# Patient Record
Sex: FEMALE | Race: ASIAN | NOT HISPANIC OR LATINO
[De-identification: names, ages, dates, MRNs, and addresses within clinical notes are randomized per-mention and may not be internally consistent; named-entity substitution may affect disease eponyms.]

---

## 2017-06-28 PROBLEM — Z00.00 ENCOUNTER FOR PREVENTIVE HEALTH EXAMINATION: Status: ACTIVE | Noted: 2017-06-28

## 2017-06-29 ENCOUNTER — APPOINTMENT (OUTPATIENT)
Dept: THORACIC SURGERY | Facility: CLINIC | Age: 67
End: 2017-06-29
Payer: MEDICARE

## 2017-06-29 VITALS
WEIGHT: 120 LBS | DIASTOLIC BLOOD PRESSURE: 64 MMHG | RESPIRATION RATE: 16 BRPM | SYSTOLIC BLOOD PRESSURE: 131 MMHG | HEIGHT: 61.42 IN | HEART RATE: 64 BPM | OXYGEN SATURATION: 98 % | BODY MASS INDEX: 22.37 KG/M2

## 2017-06-29 DIAGNOSIS — I10 ESSENTIAL (PRIMARY) HYPERTENSION: ICD-10-CM

## 2017-06-29 DIAGNOSIS — Z77.22 CONTACT WITH AND (SUSPECTED) EXPOSURE TO ENVIRONMENTAL TOBACCO SMOKE (ACUTE) (CHRONIC): ICD-10-CM

## 2017-06-29 DIAGNOSIS — Z80.1 FAMILY HISTORY OF MALIGNANT NEOPLASM OF TRACHEA, BRONCHUS AND LUNG: ICD-10-CM

## 2017-06-29 DIAGNOSIS — Z86.39 PERSONAL HISTORY OF OTHER ENDOCRINE, NUTRITIONAL AND METABOLIC DISEASE: ICD-10-CM

## 2017-06-29 PROCEDURE — 99205 OFFICE O/P NEW HI 60 MIN: CPT

## 2017-06-29 RX ORDER — OXCARBAZEPINE 150 MG/1
150 TABLET, FILM COATED ORAL
Qty: 60 | Refills: 0 | Status: COMPLETED | COMMUNITY
Start: 2017-05-25

## 2017-06-29 RX ORDER — FLUOCINONIDE 0.5 MG/G
0.05 CREAM TOPICAL
Qty: 60 | Refills: 0 | Status: COMPLETED | COMMUNITY
Start: 2017-05-04

## 2017-06-29 RX ORDER — AZITHROMYCIN 250 MG/1
250 TABLET, FILM COATED ORAL
Qty: 6 | Refills: 0 | Status: COMPLETED | COMMUNITY
Start: 2017-06-21

## 2017-06-29 RX ORDER — METHYLPREDNISOLONE 4 MG/1
4 TABLET ORAL
Qty: 21 | Refills: 0 | Status: COMPLETED | COMMUNITY
Start: 2017-05-22

## 2017-07-03 ENCOUNTER — OUTPATIENT (OUTPATIENT)
Dept: OUTPATIENT SERVICES | Facility: HOSPITAL | Age: 67
LOS: 1 days | End: 2017-07-03

## 2017-07-03 VITALS
HEIGHT: 60 IN | SYSTOLIC BLOOD PRESSURE: 120 MMHG | WEIGHT: 121.03 LBS | DIASTOLIC BLOOD PRESSURE: 70 MMHG | RESPIRATION RATE: 15 BRPM | HEART RATE: 64 BPM | TEMPERATURE: 99 F

## 2017-07-03 DIAGNOSIS — Z90.710 ACQUIRED ABSENCE OF BOTH CERVIX AND UTERUS: Chronic | ICD-10-CM

## 2017-07-03 DIAGNOSIS — R91.1 SOLITARY PULMONARY NODULE: ICD-10-CM

## 2017-07-03 DIAGNOSIS — I10 ESSENTIAL (PRIMARY) HYPERTENSION: ICD-10-CM

## 2017-07-03 DIAGNOSIS — R91.8 OTHER NONSPECIFIC ABNORMAL FINDING OF LUNG FIELD: ICD-10-CM

## 2017-07-03 DIAGNOSIS — Z90.49 ACQUIRED ABSENCE OF OTHER SPECIFIED PARTS OF DIGESTIVE TRACT: Chronic | ICD-10-CM

## 2017-07-03 LAB
APPEARANCE UR: CLEAR — SIGNIFICANT CHANGE UP
BILIRUB UR-MCNC: NEGATIVE — SIGNIFICANT CHANGE UP
BLD GP AB SCN SERPL QL: NEGATIVE — SIGNIFICANT CHANGE UP
BLOOD UR QL VISUAL: NEGATIVE — SIGNIFICANT CHANGE UP
BUN SERPL-MCNC: 12 MG/DL — SIGNIFICANT CHANGE UP (ref 7–23)
CALCIUM SERPL-MCNC: 9.5 MG/DL — SIGNIFICANT CHANGE UP (ref 8.4–10.5)
CHLORIDE SERPL-SCNC: 102 MMOL/L — SIGNIFICANT CHANGE UP (ref 98–107)
CO2 SERPL-SCNC: 25 MMOL/L — SIGNIFICANT CHANGE UP (ref 22–31)
COLOR SPEC: YELLOW — SIGNIFICANT CHANGE UP
CREAT SERPL-MCNC: 0.61 MG/DL — SIGNIFICANT CHANGE UP (ref 0.5–1.3)
GLUCOSE SERPL-MCNC: 84 MG/DL — SIGNIFICANT CHANGE UP (ref 70–99)
GLUCOSE UR-MCNC: NEGATIVE — SIGNIFICANT CHANGE UP
HCT VFR BLD CALC: 37.7 % — SIGNIFICANT CHANGE UP (ref 34.5–45)
HGB BLD-MCNC: 12.6 G/DL — SIGNIFICANT CHANGE UP (ref 11.5–15.5)
INR BLD: 1.03 — SIGNIFICANT CHANGE UP (ref 0.88–1.17)
KETONES UR-MCNC: SIGNIFICANT CHANGE UP
LEUKOCYTE ESTERASE UR-ACNC: NEGATIVE — SIGNIFICANT CHANGE UP
MCHC RBC-ENTMCNC: 30.4 PG — SIGNIFICANT CHANGE UP (ref 27–34)
MCHC RBC-ENTMCNC: 33.4 % — SIGNIFICANT CHANGE UP (ref 32–36)
MCV RBC AUTO: 90.8 FL — SIGNIFICANT CHANGE UP (ref 80–100)
MUCOUS THREADS # UR AUTO: SIGNIFICANT CHANGE UP
NITRITE UR-MCNC: NEGATIVE — SIGNIFICANT CHANGE UP
NON-SQ EPI CELLS # UR AUTO: <1 — SIGNIFICANT CHANGE UP
NRBC # FLD: 0 — SIGNIFICANT CHANGE UP
PH UR: 6 — SIGNIFICANT CHANGE UP (ref 4.6–8)
PLATELET # BLD AUTO: 229 K/UL — SIGNIFICANT CHANGE UP (ref 150–400)
PMV BLD: 10.1 FL — SIGNIFICANT CHANGE UP (ref 7–13)
POTASSIUM SERPL-MCNC: 3.7 MMOL/L — SIGNIFICANT CHANGE UP (ref 3.5–5.3)
POTASSIUM SERPL-SCNC: 3.7 MMOL/L — SIGNIFICANT CHANGE UP (ref 3.5–5.3)
PROT UR-MCNC: NEGATIVE — SIGNIFICANT CHANGE UP
PROTHROM AB SERPL-ACNC: 11.6 SEC — SIGNIFICANT CHANGE UP (ref 9.8–13.1)
RBC # BLD: 4.15 M/UL — SIGNIFICANT CHANGE UP (ref 3.8–5.2)
RBC # FLD: 12.3 % — SIGNIFICANT CHANGE UP (ref 10.3–14.5)
RBC CASTS # UR COMP ASSIST: SIGNIFICANT CHANGE UP (ref 0–?)
RH IG SCN BLD-IMP: POSITIVE — SIGNIFICANT CHANGE UP
SODIUM SERPL-SCNC: 143 MMOL/L — SIGNIFICANT CHANGE UP (ref 135–145)
SP GR SPEC: 1.02 — SIGNIFICANT CHANGE UP (ref 1–1.03)
SQUAMOUS # UR AUTO: SIGNIFICANT CHANGE UP
UROBILINOGEN FLD QL: NORMAL E.U. — SIGNIFICANT CHANGE UP (ref 0.1–0.2)
WBC # BLD: 9.18 K/UL — SIGNIFICANT CHANGE UP (ref 3.8–10.5)
WBC # FLD AUTO: 9.18 K/UL — SIGNIFICANT CHANGE UP (ref 3.8–10.5)
WBC UR QL: SIGNIFICANT CHANGE UP (ref 0–?)

## 2017-07-03 RX ORDER — SODIUM CHLORIDE 9 MG/ML
1000 INJECTION, SOLUTION INTRAVENOUS
Qty: 0 | Refills: 0 | Status: DISCONTINUED | OUTPATIENT
Start: 2017-07-06 | End: 2017-07-21

## 2017-07-03 NOTE — H&P PST ADULT - LYMPHATIC
posterior cervical R/supraclavicular L/supraclavicular R/anterior cervical R/posterior cervical L/anterior cervical L

## 2017-07-03 NOTE — H&P PST ADULT - PROBLEM SELECTOR PLAN 1
Patient flexible bronchoscopy navigational bronchoscopy, biopsy of left upper lobe mass on 7/6/17.  preop instructions given and explained, son present both verbalized understanding   medical clearance completed awaiting report  Hibiclens given and explained

## 2017-07-03 NOTE — H&P PST ADULT - ASSESSMENT
66 y/o mandarin speaking female with pmhx of HTN, GERD presents to [facility] for flexible bronchoscopy navigational bronchoscopy, biopsy of left upper lobe mass on 7/6/17.

## 2017-07-03 NOTE — H&P PST ADULT - HISTORY OF PRESENT ILLNESS
68 y/o mandarin speaking female with pmhx of HTN, GERD presents to [facility] for flexible bronchoscopy navigational bronchoscopy, biopsy of left upper lobe mass on 7/6/17. Patient with recent SOB treated for URI and chest xray done revealing mass to lung.

## 2017-07-03 NOTE — H&P PST ADULT - NSANTHOSAYNRD_GEN_A_CORE
No. HIGINIO screening performed.  STOP BANG Legend: 0-2 = LOW Risk; 3-4 = INTERMEDIATE Risk; 5-8 = HIGH Risk

## 2017-07-05 ENCOUNTER — FORM ENCOUNTER (OUTPATIENT)
Age: 67
End: 2017-07-05

## 2017-07-06 ENCOUNTER — RESULT REVIEW (OUTPATIENT)
Age: 67
End: 2017-07-06

## 2017-07-06 ENCOUNTER — TRANSCRIPTION ENCOUNTER (OUTPATIENT)
Age: 67
End: 2017-07-06

## 2017-07-06 ENCOUNTER — APPOINTMENT (OUTPATIENT)
Dept: THORACIC SURGERY | Facility: HOSPITAL | Age: 67
End: 2017-07-06
Payer: MEDICARE

## 2017-07-06 ENCOUNTER — OUTPATIENT (OUTPATIENT)
Dept: OUTPATIENT SERVICES | Facility: HOSPITAL | Age: 67
LOS: 1 days | Discharge: HOME | End: 2017-07-06
Payer: MEDICARE

## 2017-07-06 VITALS
OXYGEN SATURATION: 97 % | HEART RATE: 59 BPM | RESPIRATION RATE: 16 BRPM | TEMPERATURE: 98 F | WEIGHT: 121.03 LBS | HEIGHT: 60 IN | SYSTOLIC BLOOD PRESSURE: 131 MMHG | DIASTOLIC BLOOD PRESSURE: 76 MMHG

## 2017-07-06 VITALS
OXYGEN SATURATION: 94 % | RESPIRATION RATE: 17 BRPM | SYSTOLIC BLOOD PRESSURE: 123 MMHG | HEART RATE: 64 BPM | DIASTOLIC BLOOD PRESSURE: 61 MMHG

## 2017-07-06 DIAGNOSIS — Z90.49 ACQUIRED ABSENCE OF OTHER SPECIFIED PARTS OF DIGESTIVE TRACT: Chronic | ICD-10-CM

## 2017-07-06 DIAGNOSIS — Z90.710 ACQUIRED ABSENCE OF BOTH CERVIX AND UTERUS: Chronic | ICD-10-CM

## 2017-07-06 DIAGNOSIS — R91.8 OTHER NONSPECIFIC ABNORMAL FINDING OF LUNG FIELD: ICD-10-CM

## 2017-07-06 LAB
CULTURE - ACID FAST SMEAR CONCENTRATED: SIGNIFICANT CHANGE UP
GRAM STN SPT: SIGNIFICANT CHANGE UP
SPECIMEN SOURCE: SIGNIFICANT CHANGE UP
SPECIMEN SOURCE: SIGNIFICANT CHANGE UP

## 2017-07-06 PROCEDURE — 88112 CYTOPATH CELL ENHANCE TECH: CPT | Mod: 26

## 2017-07-06 PROCEDURE — 31645 BRNCHSC W/THER ASPIR 1ST: CPT

## 2017-07-06 PROCEDURE — 88342 IMHCHEM/IMCYTCHM 1ST ANTB: CPT | Mod: 26

## 2017-07-06 PROCEDURE — 31627 NAVIGATIONAL BRONCHOSCOPY: CPT

## 2017-07-06 PROCEDURE — 31629 BRONCHOSCOPY/NEEDLE BX EACH: CPT

## 2017-07-06 PROCEDURE — 71010: CPT | Mod: 26

## 2017-07-06 PROCEDURE — 88341 IMHCHEM/IMCYTCHM EA ADD ANTB: CPT | Mod: 26

## 2017-07-06 PROCEDURE — 31624 DX BRONCHOSCOPE/LAVAGE: CPT

## 2017-07-06 PROCEDURE — 88305 TISSUE EXAM BY PATHOLOGIST: CPT | Mod: 26

## 2017-07-06 PROCEDURE — 88173 CYTOPATH EVAL FNA REPORT: CPT | Mod: 26,59

## 2017-07-06 PROCEDURE — 31623 DX BRONCHOSCOPE/BRUSH: CPT

## 2017-07-06 RX ORDER — ONDANSETRON 8 MG/1
4 TABLET, FILM COATED ORAL ONCE
Qty: 0 | Refills: 0 | Status: DISCONTINUED | OUTPATIENT
Start: 2017-07-06 | End: 2017-07-06

## 2017-07-06 RX ORDER — FENTANYL CITRATE 50 UG/ML
25 INJECTION INTRAVENOUS
Qty: 0 | Refills: 0 | Status: DISCONTINUED | OUTPATIENT
Start: 2017-07-06 | End: 2017-07-06

## 2017-07-06 RX ADMIN — SODIUM CHLORIDE 30 MILLILITER(S): 9 INJECTION, SOLUTION INTRAVENOUS at 10:47

## 2017-07-06 NOTE — ASU DISCHARGE PLAN (ADULT/PEDIATRIC). - CONDITIONS AT DISCHARGE
stable stable Verbalizing good understanding of post op instructions and medication review. Discharge criteria met. Cleared for discharge home by anesthesia. Escorted, via wheelchair, to waiting car for discharge home.

## 2017-07-06 NOTE — ASU DISCHARGE PLAN (ADULT/PEDIATRIC). - NOTIFY
Bleeding that does not stop/Swelling that continues/shortness of breath. It is normal to cough up small amounts of blood tinged sputum

## 2017-07-06 NOTE — BRIEF OPERATIVE NOTE - PROCEDURE
Navigational bronchoscopy  07/06/2017  Flex Bronch, BAL, Ruben Bronch with Biopsy and brushing  Active  ROD

## 2017-07-06 NOTE — ASU DISCHARGE PLAN (ADULT/PEDIATRIC). - NURSING INSTRUCTIONS
DO NOT TAKE ANY TYLENOL PRODUCT UNTIL  3PM today.  call MD for any shortness of breath, any difficulty swallowing, and >3TBSp blood in sputum.

## 2017-07-06 NOTE — ASU DISCHARGE PLAN (ADULT/PEDIATRIC). - MEDICATION SUMMARY - MEDICATIONS TO TAKE
I will START or STAY ON the medications listed below when I get home from the hospital:    atenolol 25 mg oral tablet  -- 1 tab(s) by mouth once a day  -- Indication: For blood pressure    alendronate 35 mg oral tablet  -- 1 tab(s) by mouth once a week  -- Indication: For osteoporosis    omeprazole 40 mg oral delayed release capsule  -- 1 cap(s) by mouth once a day  -- Indication: For GERD    Calcium 500+D  --  by mouth daily  -- Indication: For supplement    ergocalciferol 50,000 intl units (1.25 mg) oral capsule  -- 1 cap(s) by mouth once a week  -- Indication: For supplement

## 2017-07-07 LAB — SPECIMEN SOURCE: SIGNIFICANT CHANGE UP

## 2017-07-08 LAB — BACTERIA SPT RESP CULT: SIGNIFICANT CHANGE UP

## 2017-07-11 ENCOUNTER — APPOINTMENT (OUTPATIENT)
Dept: THORACIC SURGERY | Facility: CLINIC | Age: 67
End: 2017-07-11

## 2017-07-11 VITALS
DIASTOLIC BLOOD PRESSURE: 71 MMHG | RESPIRATION RATE: 16 BRPM | WEIGHT: 120 LBS | OXYGEN SATURATION: 97 % | SYSTOLIC BLOOD PRESSURE: 107 MMHG | BODY MASS INDEX: 22.37 KG/M2 | HEART RATE: 73 BPM | HEIGHT: 61.42 IN

## 2017-07-12 VITALS
HEIGHT: 60 IN | SYSTOLIC BLOOD PRESSURE: 107 MMHG | OXYGEN SATURATION: 97 % | DIASTOLIC BLOOD PRESSURE: 71 MMHG | RESPIRATION RATE: 16 BRPM | BODY MASS INDEX: 23.56 KG/M2 | WEIGHT: 120 LBS | HEART RATE: 73 BPM

## 2017-07-13 LAB — SPECIMEN SOURCE: SIGNIFICANT CHANGE UP

## 2017-07-17 ENCOUNTER — RESULT REVIEW (OUTPATIENT)
Age: 67
End: 2017-07-17

## 2017-07-17 ENCOUNTER — APPOINTMENT (OUTPATIENT)
Dept: GYNECOLOGIC ONCOLOGY | Facility: CLINIC | Age: 67
End: 2017-07-17
Payer: MEDICARE

## 2017-07-17 VITALS
SYSTOLIC BLOOD PRESSURE: 122 MMHG | BODY MASS INDEX: 23.06 KG/M2 | DIASTOLIC BLOOD PRESSURE: 78 MMHG | HEIGHT: 60.24 IN | HEART RATE: 59 BPM | WEIGHT: 119 LBS

## 2017-07-17 DIAGNOSIS — N83.9 NONINFLAMMATORY DISORDER OF OVARY, FALLOPIAN TUBE AND BROAD LIGAMENT, UNSPECIFIED: ICD-10-CM

## 2017-07-17 DIAGNOSIS — N84.1 POLYP OF CERVIX UTERI: ICD-10-CM

## 2017-07-17 PROCEDURE — 99205 OFFICE O/P NEW HI 60 MIN: CPT

## 2017-07-18 LAB — HPV HIGH+LOW RISK DNA PNL CVX: NEGATIVE

## 2017-07-19 ENCOUNTER — OUTPATIENT (OUTPATIENT)
Dept: OUTPATIENT SERVICES | Facility: HOSPITAL | Age: 67
LOS: 1 days | End: 2017-07-19
Payer: MEDICARE

## 2017-07-19 VITALS
SYSTOLIC BLOOD PRESSURE: 100 MMHG | HEART RATE: 56 BPM | TEMPERATURE: 96 F | RESPIRATION RATE: 16 BRPM | WEIGHT: 117.95 LBS | HEIGHT: 61 IN | DIASTOLIC BLOOD PRESSURE: 70 MMHG

## 2017-07-19 DIAGNOSIS — N83.9 NONINFLAMMATORY DISORDER OF OVARY, FALLOPIAN TUBE AND BROAD LIGAMENT, UNSPECIFIED: ICD-10-CM

## 2017-07-19 DIAGNOSIS — I10 ESSENTIAL (PRIMARY) HYPERTENSION: ICD-10-CM

## 2017-07-19 DIAGNOSIS — Z90.710 ACQUIRED ABSENCE OF BOTH CERVIX AND UTERUS: Chronic | ICD-10-CM

## 2017-07-19 DIAGNOSIS — Z90.49 ACQUIRED ABSENCE OF OTHER SPECIFIED PARTS OF DIGESTIVE TRACT: Chronic | ICD-10-CM

## 2017-07-19 LAB
ALBUMIN SERPL ELPH-MCNC: 4.1 G/DL — SIGNIFICANT CHANGE UP (ref 3.3–5)
ALP SERPL-CCNC: 52 U/L — SIGNIFICANT CHANGE UP (ref 40–120)
ALT FLD-CCNC: 15 U/L — SIGNIFICANT CHANGE UP (ref 4–33)
AST SERPL-CCNC: 16 U/L — SIGNIFICANT CHANGE UP (ref 4–32)
BILIRUB SERPL-MCNC: 0.6 MG/DL — SIGNIFICANT CHANGE UP (ref 0.2–1.2)
BLD GP AB SCN SERPL QL: NEGATIVE — SIGNIFICANT CHANGE UP
BUN SERPL-MCNC: 9 MG/DL — SIGNIFICANT CHANGE UP (ref 7–23)
CALCIUM SERPL-MCNC: 9.5 MG/DL — SIGNIFICANT CHANGE UP (ref 8.4–10.5)
CHLORIDE SERPL-SCNC: 104 MMOL/L — SIGNIFICANT CHANGE UP (ref 98–107)
CO2 SERPL-SCNC: 26 MMOL/L — SIGNIFICANT CHANGE UP (ref 22–31)
CREAT SERPL-MCNC: 0.7 MG/DL — SIGNIFICANT CHANGE UP (ref 0.5–1.3)
GLUCOSE SERPL-MCNC: 97 MG/DL — SIGNIFICANT CHANGE UP (ref 70–99)
HCT VFR BLD CALC: 37.3 % — SIGNIFICANT CHANGE UP (ref 34.5–45)
HGB BLD-MCNC: 12.5 G/DL — SIGNIFICANT CHANGE UP (ref 11.5–15.5)
MCHC RBC-ENTMCNC: 31 PG — SIGNIFICANT CHANGE UP (ref 27–34)
MCHC RBC-ENTMCNC: 33.5 % — SIGNIFICANT CHANGE UP (ref 32–36)
MCV RBC AUTO: 92.6 FL — SIGNIFICANT CHANGE UP (ref 80–100)
NRBC # FLD: 0 — SIGNIFICANT CHANGE UP
PLATELET # BLD AUTO: 216 K/UL — SIGNIFICANT CHANGE UP (ref 150–400)
PMV BLD: 10.2 FL — SIGNIFICANT CHANGE UP (ref 7–13)
POTASSIUM SERPL-MCNC: 3.9 MMOL/L — SIGNIFICANT CHANGE UP (ref 3.5–5.3)
POTASSIUM SERPL-SCNC: 3.9 MMOL/L — SIGNIFICANT CHANGE UP (ref 3.5–5.3)
PROT SERPL-MCNC: 7.8 G/DL — SIGNIFICANT CHANGE UP (ref 6–8.3)
RBC # BLD: 4.03 M/UL — SIGNIFICANT CHANGE UP (ref 3.8–5.2)
RBC # FLD: 12.1 % — SIGNIFICANT CHANGE UP (ref 10.3–14.5)
RH IG SCN BLD-IMP: POSITIVE — SIGNIFICANT CHANGE UP
SODIUM SERPL-SCNC: 142 MMOL/L — SIGNIFICANT CHANGE UP (ref 135–145)
WBC # BLD: 7.07 K/UL — SIGNIFICANT CHANGE UP (ref 3.8–10.5)
WBC # FLD AUTO: 7.07 K/UL — SIGNIFICANT CHANGE UP (ref 3.8–10.5)

## 2017-07-19 PROCEDURE — 93010 ELECTROCARDIOGRAM REPORT: CPT

## 2017-07-19 RX ORDER — ERGOCALCIFEROL 1.25 MG/1
1 CAPSULE ORAL
Qty: 0 | Refills: 0 | COMMUNITY

## 2017-07-19 RX ORDER — SODIUM CHLORIDE 9 MG/ML
1000 INJECTION, SOLUTION INTRAVENOUS
Qty: 0 | Refills: 0 | Status: DISCONTINUED | OUTPATIENT
Start: 2017-07-21 | End: 2017-07-21

## 2017-07-19 RX ORDER — ALENDRONATE SODIUM 70 MG/1
1 TABLET ORAL
Qty: 0 | Refills: 0 | COMMUNITY

## 2017-07-19 RX ORDER — ATENOLOL 25 MG/1
1 TABLET ORAL
Qty: 0 | Refills: 0 | COMMUNITY

## 2017-07-19 RX ORDER — SODIUM CHLORIDE 9 MG/ML
3 INJECTION INTRAMUSCULAR; INTRAVENOUS; SUBCUTANEOUS EVERY 8 HOURS
Qty: 0 | Refills: 0 | Status: DISCONTINUED | OUTPATIENT
Start: 2017-07-21 | End: 2017-07-21

## 2017-07-19 RX ORDER — OMEPRAZOLE 10 MG/1
1 CAPSULE, DELAYED RELEASE ORAL
Qty: 0 | Refills: 0 | COMMUNITY

## 2017-07-19 NOTE — H&P PST ADULT - PMH
Abnormal lung field    GERD (gastroesophageal reflux disease)    HTN (hypertension)    Noninflammatory disorder of ovary, fallopian tube and broad ligament, unspecified

## 2017-07-19 NOTE — H&P PST ADULT - HISTORY OF PRESENT ILLNESS
66y/o female presents for preop eval for scheduled robotic total laparoscopic hysterectomy, b/l salpingo oophorectomy tumor debulking on 7/21/17.  Pt states few months ago c/o cough with hemoptysis.  Cxr done and was followed with ct scan and pet scan.  Pt pt dx with"ovarian & lung cancer".  Pt denies vaginal spotting, bloating.

## 2017-07-19 NOTE — H&P PST ADULT - NEGATIVE CARDIOVASCULAR SYMPTOMS
no chest pain/no dyspnea on exertion/no paroxysmal nocturnal dyspnea/no claudication/no peripheral edema/no palpitations/no orthopnea

## 2017-07-19 NOTE — H&P PST ADULT - PROBLEM SELECTOR PLAN 1
scheduled robotic total laparoscopic hysterectomy, b/l salpingo oophorectomy tumor debulking on 7/21/17.   preop instructions, gi prophylaxis & surgical soap given  pt verbalized understanding  abo, fs on admit  pending copy of medical eval

## 2017-07-19 NOTE — H&P PST ADULT - LYMPHATIC
posterior cervical R/supraclavicular R/anterior cervical L/posterior cervical L/anterior cervical R/supraclavicular L

## 2017-07-19 NOTE — H&P PST ADULT - NEGATIVE GENERAL GENITOURINARY SYMPTOMS
no flank pain L/no dysuria/no urinary hesitancy/normal urinary frequency/no nocturia/no flank pain R/no incontinence

## 2017-07-21 ENCOUNTER — APPOINTMENT (OUTPATIENT)
Dept: GYNECOLOGIC ONCOLOGY | Facility: HOSPITAL | Age: 67
End: 2017-07-21

## 2017-07-21 ENCOUNTER — TRANSCRIPTION ENCOUNTER (OUTPATIENT)
Age: 67
End: 2017-07-21

## 2017-07-21 ENCOUNTER — INPATIENT (INPATIENT)
Facility: HOSPITAL | Age: 67
LOS: 0 days | Discharge: HOME | End: 2017-07-21
Attending: OBSTETRICS & GYNECOLOGY | Admitting: OBSTETRICS & GYNECOLOGY
Payer: MEDICARE

## 2017-07-21 ENCOUNTER — RESULT REVIEW (OUTPATIENT)
Age: 67
End: 2017-07-21

## 2017-07-21 VITALS
RESPIRATION RATE: 14 BRPM | OXYGEN SATURATION: 99 % | SYSTOLIC BLOOD PRESSURE: 111 MMHG | HEART RATE: 86 BPM | DIASTOLIC BLOOD PRESSURE: 63 MMHG

## 2017-07-21 VITALS
OXYGEN SATURATION: 97 % | WEIGHT: 117.95 LBS | DIASTOLIC BLOOD PRESSURE: 66 MMHG | HEART RATE: 61 BPM | SYSTOLIC BLOOD PRESSURE: 118 MMHG | RESPIRATION RATE: 15 BRPM | TEMPERATURE: 98 F | HEIGHT: 61 IN

## 2017-07-21 DIAGNOSIS — N83.9 NONINFLAMMATORY DISORDER OF OVARY, FALLOPIAN TUBE AND BROAD LIGAMENT, UNSPECIFIED: ICD-10-CM

## 2017-07-21 DIAGNOSIS — Z90.49 ACQUIRED ABSENCE OF OTHER SPECIFIED PARTS OF DIGESTIVE TRACT: Chronic | ICD-10-CM

## 2017-07-21 DIAGNOSIS — Z90.710 ACQUIRED ABSENCE OF BOTH CERVIX AND UTERUS: Chronic | ICD-10-CM

## 2017-07-21 PROCEDURE — 88360 TUMOR IMMUNOHISTOCHEM/MANUAL: CPT | Mod: 26

## 2017-07-21 PROCEDURE — 58662 LAPAROSCOPY EXCISE LESIONS: CPT | Mod: 22,GC

## 2017-07-21 PROCEDURE — 88305 TISSUE EXAM BY PATHOLOGIST: CPT | Mod: 26

## 2017-07-21 PROCEDURE — 88341 IMHCHEM/IMCYTCHM EA ADD ANTB: CPT | Mod: 26,59

## 2017-07-21 PROCEDURE — 88342 IMHCHEM/IMCYTCHM 1ST ANTB: CPT | Mod: 26,59

## 2017-07-21 PROCEDURE — 88112 CYTOPATH CELL ENHANCE TECH: CPT | Mod: 26

## 2017-07-21 PROCEDURE — 88331 PATH CONSLTJ SURG 1 BLK 1SPC: CPT | Mod: 26

## 2017-07-21 PROCEDURE — 88307 TISSUE EXAM BY PATHOLOGIST: CPT | Mod: 26

## 2017-07-21 RX ORDER — SODIUM CHLORIDE 9 MG/ML
1000 INJECTION, SOLUTION INTRAVENOUS
Qty: 0 | Refills: 0 | Status: DISCONTINUED | OUTPATIENT
Start: 2017-07-21 | End: 2017-07-21

## 2017-07-21 RX ORDER — ONDANSETRON 8 MG/1
4 TABLET, FILM COATED ORAL ONCE
Qty: 0 | Refills: 0 | Status: DISCONTINUED | OUTPATIENT
Start: 2017-07-21 | End: 2017-07-21

## 2017-07-21 RX ORDER — OXYCODONE HYDROCHLORIDE 5 MG/1
5 TABLET ORAL ONCE
Qty: 0 | Refills: 0 | Status: DISCONTINUED | OUTPATIENT
Start: 2017-07-21 | End: 2017-07-21

## 2017-07-21 RX ORDER — OXYCODONE HYDROCHLORIDE 5 MG/1
1 TABLET ORAL
Qty: 20 | Refills: 0 | OUTPATIENT
Start: 2017-07-21 | End: 2017-07-26

## 2017-07-21 RX ORDER — HEPARIN SODIUM 5000 [USP'U]/ML
5000 INJECTION INTRAVENOUS; SUBCUTANEOUS ONCE
Qty: 0 | Refills: 0 | Status: COMPLETED | OUTPATIENT
Start: 2017-07-21 | End: 2017-07-21

## 2017-07-21 RX ORDER — FENTANYL CITRATE 50 UG/ML
25 INJECTION INTRAVENOUS
Qty: 0 | Refills: 0 | Status: DISCONTINUED | OUTPATIENT
Start: 2017-07-21 | End: 2017-07-21

## 2017-07-21 RX ORDER — ACETAMINOPHEN 500 MG
2 TABLET ORAL
Qty: 40 | Refills: 0 | OUTPATIENT
Start: 2017-07-21 | End: 2017-07-26

## 2017-07-21 RX ADMIN — SODIUM CHLORIDE 125 MILLILITER(S): 9 INJECTION, SOLUTION INTRAVENOUS at 18:26

## 2017-07-21 RX ADMIN — OXYCODONE HYDROCHLORIDE 5 MILLIGRAM(S): 5 TABLET ORAL at 20:39

## 2017-07-21 RX ADMIN — SODIUM CHLORIDE 30 MILLILITER(S): 9 INJECTION, SOLUTION INTRAVENOUS at 14:16

## 2017-07-21 RX ADMIN — HEPARIN SODIUM 5000 UNIT(S): 5000 INJECTION INTRAVENOUS; SUBCUTANEOUS at 13:05

## 2017-07-21 NOTE — ASU DISCHARGE PLAN (ADULT/PEDIATRIC). - NOTIFY
Increased Irritability or Sluggishness/Bleeding that does not stop/Swelling that continues/GYN Fever>100.4/Inability to Tolerate Liquids or Foods/Persistent Nausea and Vomiting/Unable to Urinate/Pain not relieved by Medications

## 2017-07-21 NOTE — ASU DISCHARGE PLAN (ADULT/PEDIATRIC). - NURSING INSTRUCTIONS
Nothing in vagina, no intercourse, no douching, no tampons, no tub baths, and no swimming for about 2 weeks or until cleared by MD. Contact your doctor if you are soaking a pad every 30 minutes to an hour, experience clots or have a foul smelling discharge. No creams, lotions, powders  or ointments to incision site. Notify Md if red or pus drainage on the incision sites. Make appointment with MD.

## 2017-07-21 NOTE — ASU DISCHARGE PLAN (ADULT/PEDIATRIC). - MEDICATION SUMMARY - MEDICATIONS TO TAKE
I will START or STAY ON the medications listed below when I get home from the hospital:    Tylenol 325 mg oral tablet  -- 2 tab(s) by mouth every 6 hours  -- This product contains acetaminophen.  Do not use  with any other product containing acetaminophen to prevent possible liver damage.    -- Indication: For pain    oxyCODONE 5 mg oral tablet  -- 1 tab(s) by mouth every 6 hours MDD:4  -- Caution federal law prohibits the transfer of this drug to any person other  than the person for whom it was prescribed.  It is very important that you take or use this exactly as directed.  Do not skip doses or discontinue unless directed by your doctor.  May cause drowsiness.  Alcohol may intensify this effect.  Use care when operating dangerous machinery.  This prescription cannot be refilled.  Using more of this medication than prescribed may cause serious breathing problems.    -- Indication: For pain    benzonatate 100 mg oral capsule  -- 1 tab(s) by mouth 2 times a day, As Needed  -- Indication: For home med    atenolol 25 mg oral tablet  -- 1 tab(s) by mouth once a day, am  -- Indication: For home med

## 2017-07-21 NOTE — BRIEF OPERATIVE NOTE - OPERATION/FINDINGS
6cm friable left ovarian mass densely adherent to sigmoid colon and pelvic sidewall  Omentum with nodular thickening and adherent to anterior abdominal wall, pelvic peritoneal carcinomatosis in culdesac and on surface of rectum and cecum, 1cm perihepatic nodule adherent to anterior abdominal wall

## 2017-07-21 NOTE — BRIEF OPERATIVE NOTE - PROCEDURE
Salpingo-oopherectomy, robot-assisted, using da Damaris Xi  07/21/2017    Active  [facility]

## 2017-07-21 NOTE — ASU DISCHARGE PLAN (ADULT/PEDIATRIC). - SPECIAL INSTRUCTIONS
Narcotic pain medication may cause nausea or constipation. Take medication with food. Increase fluids and fiber intake. Majority of pain medication can irritate your stomach and should be taken with food or milk.  Please check your RX bottle for instructions.

## 2017-07-25 LAB — RESPIRATORY PATH PNL SPEC CULT: SIGNIFICANT CHANGE UP

## 2017-07-28 LAB — CYTOLOGY CVX/VAG DOC THIN PREP: NORMAL

## 2017-08-01 LAB — SURGICAL PATHOLOGY STUDY: SIGNIFICANT CHANGE UP

## 2017-08-03 ENCOUNTER — APPOINTMENT (OUTPATIENT)
Dept: GYNECOLOGIC ONCOLOGY | Facility: CLINIC | Age: 67
End: 2017-08-03
Payer: MEDICARE

## 2017-08-03 VITALS
HEIGHT: 60 IN | HEART RATE: 69 BPM | SYSTOLIC BLOOD PRESSURE: 126 MMHG | WEIGHT: 117 LBS | BODY MASS INDEX: 22.97 KG/M2 | DIASTOLIC BLOOD PRESSURE: 80 MMHG | TEMPERATURE: 98.3 F

## 2017-08-03 LAB — NON-GYNECOLOGICAL CYTOLOGY STUDY: SIGNIFICANT CHANGE UP

## 2017-08-03 PROCEDURE — 99024 POSTOP FOLLOW-UP VISIT: CPT | Mod: NC

## 2017-08-04 ENCOUNTER — OUTPATIENT (OUTPATIENT)
Dept: OUTPATIENT SERVICES | Facility: HOSPITAL | Age: 67
LOS: 1 days | End: 2017-08-04
Payer: MEDICARE

## 2017-08-04 VITALS
HEART RATE: 59 BPM | TEMPERATURE: 99 F | WEIGHT: 110.01 LBS | RESPIRATION RATE: 16 BRPM | OXYGEN SATURATION: 99 % | DIASTOLIC BLOOD PRESSURE: 72 MMHG | HEIGHT: 61 IN | SYSTOLIC BLOOD PRESSURE: 106 MMHG

## 2017-08-04 DIAGNOSIS — C34.92 MALIGNANT NEOPLASM OF UNSPECIFIED PART OF LEFT BRONCHUS OR LUNG: ICD-10-CM

## 2017-08-04 DIAGNOSIS — C80.1 MALIGNANT (PRIMARY) NEOPLASM, UNSPECIFIED: ICD-10-CM

## 2017-08-04 DIAGNOSIS — Z90.722 ACQUIRED ABSENCE OF OVARIES, BILATERAL: Chronic | ICD-10-CM

## 2017-08-04 DIAGNOSIS — Z90.49 ACQUIRED ABSENCE OF OTHER SPECIFIED PARTS OF DIGESTIVE TRACT: Chronic | ICD-10-CM

## 2017-08-04 DIAGNOSIS — Z90.710 ACQUIRED ABSENCE OF BOTH CERVIX AND UTERUS: Chronic | ICD-10-CM

## 2017-08-04 LAB
BLD GP AB SCN SERPL QL: NEGATIVE — SIGNIFICANT CHANGE UP
BUN SERPL-MCNC: 11 MG/DL — SIGNIFICANT CHANGE UP (ref 7–23)
CALCIUM SERPL-MCNC: 9.4 MG/DL — SIGNIFICANT CHANGE UP (ref 8.4–10.5)
CHLORIDE SERPL-SCNC: 103 MMOL/L — SIGNIFICANT CHANGE UP (ref 98–107)
CO2 SERPL-SCNC: 27 MMOL/L — SIGNIFICANT CHANGE UP (ref 22–31)
CREAT SERPL-MCNC: 0.62 MG/DL — SIGNIFICANT CHANGE UP (ref 0.5–1.3)
GLUCOSE SERPL-MCNC: 77 MG/DL — SIGNIFICANT CHANGE UP (ref 70–99)
HCT VFR BLD CALC: 34.4 % — LOW (ref 34.5–45)
HGB BLD-MCNC: 11.3 G/DL — LOW (ref 11.5–15.5)
MCHC RBC-ENTMCNC: 30.3 PG — SIGNIFICANT CHANGE UP (ref 27–34)
MCHC RBC-ENTMCNC: 32.8 % — SIGNIFICANT CHANGE UP (ref 32–36)
MCV RBC AUTO: 92.2 FL — SIGNIFICANT CHANGE UP (ref 80–100)
NRBC # FLD: 0 — SIGNIFICANT CHANGE UP
PLATELET # BLD AUTO: 298 K/UL — SIGNIFICANT CHANGE UP (ref 150–400)
PMV BLD: 9.8 FL — SIGNIFICANT CHANGE UP (ref 7–13)
POTASSIUM SERPL-MCNC: 4.3 MMOL/L — SIGNIFICANT CHANGE UP (ref 3.5–5.3)
POTASSIUM SERPL-SCNC: 4.3 MMOL/L — SIGNIFICANT CHANGE UP (ref 3.5–5.3)
RBC # BLD: 3.73 M/UL — LOW (ref 3.8–5.2)
RBC # FLD: 12.2 % — SIGNIFICANT CHANGE UP (ref 10.3–14.5)
RH IG SCN BLD-IMP: POSITIVE — SIGNIFICANT CHANGE UP
SODIUM SERPL-SCNC: 144 MMOL/L — SIGNIFICANT CHANGE UP (ref 135–145)
WBC # BLD: 6.59 K/UL — SIGNIFICANT CHANGE UP (ref 3.8–10.5)
WBC # FLD AUTO: 6.59 K/UL — SIGNIFICANT CHANGE UP (ref 3.8–10.5)

## 2017-08-04 PROCEDURE — 93010 ELECTROCARDIOGRAM REPORT: CPT

## 2017-08-04 RX ORDER — SODIUM CHLORIDE 9 MG/ML
1000 INJECTION, SOLUTION INTRAVENOUS
Qty: 0 | Refills: 0 | Status: DISCONTINUED | OUTPATIENT
Start: 2017-08-16 | End: 2017-08-17

## 2017-08-04 NOTE — H&P PST ADULT - PMH
Abnormal lung field    GERD (gastroesophageal reflux disease)    HTN (hypertension)    Noninflammatory disorder of ovary, fallopian tube and broad ligament, unspecified Abnormal lung field    GERD (gastroesophageal reflux disease)    HTN (hypertension)    Malignant neoplasm  left lung  Noninflammatory disorder of ovary, fallopian tube and broad ligament, unspecified

## 2017-08-04 NOTE — H&P PST ADULT - HISTORY OF PRESENT ILLNESS
66y/o female with recent  S/p b/l salpingo oophorectomy tumor debulking on 7/21/17.  Pt states few months ago c/o cough with hemoptysis.  Cxr done and was followed with ct scan and pet scan  and dx with lung & ovarian cancer.  Now scheduled for left VAT, robotic assisted left upper lobectomy on 8/16/2017.

## 2017-08-04 NOTE — H&P PST ADULT - PSH
H/O abdominal hysterectomy    H/O bilateral salpingo-oophorectomy  7/21/17  History of cholecystectomy

## 2017-08-04 NOTE — H&P PST ADULT - PROBLEM SELECTOR PLAN 2
pt instructed to take atenolol on routine schedule Now scheduled for left VAT, robotic assisted left upper lobectomy on 8/16/2017.  preop instructions, gi prophylaxis & surgical soap given  pt verbalized understanding

## 2017-08-04 NOTE — H&P PST ADULT - PROBLEM SELECTOR PLAN 1
scheduled robotic total laparoscopic hysterectomy, b/l salpingo oophorectomy tumor debulking on 7/21/17.   preop instructions, gi prophylaxis & surgical soap given  pt verbalized understanding  abo, fs on admit  pending copy of medical eval pt instructed to take atenolol on routine schedule

## 2017-08-04 NOTE — H&P PST ADULT - ASSESSMENT
preop dx noninflammatory disorder of ovary fallopian tube and broad ligament unspecified preop dx malignant neoplasm of lung

## 2017-08-04 NOTE — H&P PST ADULT - NEGATIVE GENERAL GENITOURINARY SYMPTOMS
no flank pain R/no flank pain L/no nocturia/no dysuria/no urinary hesitancy/normal urinary frequency/no incontinence

## 2017-08-04 NOTE — H&P PST ADULT - NEGATIVE CARDIOVASCULAR SYMPTOMS
no orthopnea/no chest pain/no paroxysmal nocturnal dyspnea/no claudication/no palpitations/no dyspnea on exertion/no peripheral edema

## 2017-08-04 NOTE — H&P PST ADULT - PROBLEM SELECTOR PROBLEM 1
Noninflammatory disorder of ovary, fallopian tube and broad ligament, unspecified HTN (hypertension)

## 2017-08-07 LAB — FUNGUS SPEC QL CULT: SIGNIFICANT CHANGE UP

## 2017-08-08 ENCOUNTER — APPOINTMENT (OUTPATIENT)
Dept: THORACIC SURGERY | Facility: CLINIC | Age: 67
End: 2017-08-08
Payer: MEDICARE

## 2017-08-08 VITALS
WEIGHT: 114 LBS | SYSTOLIC BLOOD PRESSURE: 90 MMHG | RESPIRATION RATE: 16 BRPM | OXYGEN SATURATION: 97 % | HEART RATE: 74 BPM | HEIGHT: 60 IN | TEMPERATURE: 97.9 F | DIASTOLIC BLOOD PRESSURE: 60 MMHG | BODY MASS INDEX: 22.38 KG/M2

## 2017-08-08 PROCEDURE — 99215 OFFICE O/P EST HI 40 MIN: CPT

## 2017-08-16 ENCOUNTER — APPOINTMENT (OUTPATIENT)
Dept: THORACIC SURGERY | Facility: HOSPITAL | Age: 67
End: 2017-08-16
Payer: MEDICARE

## 2017-08-16 ENCOUNTER — INPATIENT (INPATIENT)
Facility: HOSPITAL | Age: 67
LOS: 3 days | Discharge: HOME | End: 2017-08-20
Attending: THORACIC SURGERY (CARDIOTHORACIC VASCULAR SURGERY) | Admitting: THORACIC SURGERY (CARDIOTHORACIC VASCULAR SURGERY)
Payer: MEDICAID

## 2017-08-16 ENCOUNTER — TRANSCRIPTION ENCOUNTER (OUTPATIENT)
Age: 67
End: 2017-08-16

## 2017-08-16 ENCOUNTER — RESULT REVIEW (OUTPATIENT)
Age: 67
End: 2017-08-16

## 2017-08-16 VITALS
WEIGHT: 110.01 LBS | SYSTOLIC BLOOD PRESSURE: 138 MMHG | TEMPERATURE: 99 F | HEART RATE: 63 BPM | HEIGHT: 61 IN | RESPIRATION RATE: 16 BRPM | DIASTOLIC BLOOD PRESSURE: 82 MMHG | OXYGEN SATURATION: 98 %

## 2017-08-16 DIAGNOSIS — Z90.49 ACQUIRED ABSENCE OF OTHER SPECIFIED PARTS OF DIGESTIVE TRACT: Chronic | ICD-10-CM

## 2017-08-16 DIAGNOSIS — Z90.722 ACQUIRED ABSENCE OF OVARIES, BILATERAL: Chronic | ICD-10-CM

## 2017-08-16 DIAGNOSIS — Z90.710 ACQUIRED ABSENCE OF BOTH CERVIX AND UTERUS: Chronic | ICD-10-CM

## 2017-08-16 DIAGNOSIS — C34.92 MALIGNANT NEOPLASM OF UNSPECIFIED PART OF LEFT BRONCHUS OR LUNG: ICD-10-CM

## 2017-08-16 PROCEDURE — 71010: CPT | Mod: 26

## 2017-08-16 PROCEDURE — 32652 THORACOSCOPY REM TOTL CORTEX: CPT | Mod: AS

## 2017-08-16 PROCEDURE — 32663 THORACOSCOPY W/LOBECTOMY: CPT | Mod: AS

## 2017-08-16 PROCEDURE — 32652 THORACOSCOPY REM TOTL CORTEX: CPT

## 2017-08-16 PROCEDURE — 88313 SPECIAL STAINS GROUP 2: CPT | Mod: 26

## 2017-08-16 PROCEDURE — 88309 TISSUE EXAM BY PATHOLOGIST: CPT | Mod: 26

## 2017-08-16 PROCEDURE — 88305 TISSUE EXAM BY PATHOLOGIST: CPT | Mod: 26

## 2017-08-16 PROCEDURE — 32674 THORACOSCOPY LYMPH NODE EXC: CPT

## 2017-08-16 PROCEDURE — 32663 THORACOSCOPY W/LOBECTOMY: CPT

## 2017-08-16 PROCEDURE — S2900 ROBOTIC SURGICAL SYSTEM: CPT | Mod: NC

## 2017-08-16 PROCEDURE — 32674 THORACOSCOPY LYMPH NODE EXC: CPT | Mod: AS

## 2017-08-16 PROCEDURE — 99233 SBSQ HOSP IP/OBS HIGH 50: CPT

## 2017-08-16 RX ORDER — DOCUSATE SODIUM 100 MG
100 CAPSULE ORAL THREE TIMES A DAY
Qty: 0 | Refills: 0 | Status: DISCONTINUED | OUTPATIENT
Start: 2017-08-16 | End: 2017-08-20

## 2017-08-16 RX ORDER — BENZOCAINE AND MENTHOL 5; 1 G/100ML; G/100ML
1 LIQUID ORAL EVERY 4 HOURS
Qty: 0 | Refills: 0 | Status: DISCONTINUED | OUTPATIENT
Start: 2017-08-16 | End: 2017-08-20

## 2017-08-16 RX ORDER — NALOXONE HYDROCHLORIDE 4 MG/.1ML
0.1 SPRAY NASAL
Qty: 0 | Refills: 0 | Status: DISCONTINUED | OUTPATIENT
Start: 2017-08-16 | End: 2017-08-20

## 2017-08-16 RX ORDER — CHLORHEXIDINE GLUCONATE 213 G/1000ML
1 SOLUTION TOPICAL DAILY
Qty: 0 | Refills: 0 | Status: DISCONTINUED | OUTPATIENT
Start: 2017-08-16 | End: 2017-08-20

## 2017-08-16 RX ORDER — HYDROMORPHONE HYDROCHLORIDE 2 MG/ML
30 INJECTION INTRAMUSCULAR; INTRAVENOUS; SUBCUTANEOUS
Qty: 0 | Refills: 0 | Status: DISCONTINUED | OUTPATIENT
Start: 2017-08-16 | End: 2017-08-17

## 2017-08-16 RX ORDER — FAMOTIDINE 10 MG/ML
20 INJECTION INTRAVENOUS EVERY 12 HOURS
Qty: 0 | Refills: 0 | Status: DISCONTINUED | OUTPATIENT
Start: 2017-08-16 | End: 2017-08-20

## 2017-08-16 RX ORDER — HYDROMORPHONE HYDROCHLORIDE 2 MG/ML
0.5 INJECTION INTRAMUSCULAR; INTRAVENOUS; SUBCUTANEOUS
Qty: 0 | Refills: 0 | Status: DISCONTINUED | OUTPATIENT
Start: 2017-08-16 | End: 2017-08-17

## 2017-08-16 RX ORDER — HEPARIN SODIUM 5000 [USP'U]/ML
5000 INJECTION INTRAVENOUS; SUBCUTANEOUS ONCE
Qty: 0 | Refills: 0 | Status: COMPLETED | OUTPATIENT
Start: 2017-08-16 | End: 2017-08-16

## 2017-08-16 RX ORDER — SENNA PLUS 8.6 MG/1
2 TABLET ORAL AT BEDTIME
Qty: 0 | Refills: 0 | Status: DISCONTINUED | OUTPATIENT
Start: 2017-08-16 | End: 2017-08-20

## 2017-08-16 RX ORDER — HEPARIN SODIUM 5000 [USP'U]/ML
5000 INJECTION INTRAVENOUS; SUBCUTANEOUS EVERY 8 HOURS
Qty: 0 | Refills: 0 | Status: DISCONTINUED | OUTPATIENT
Start: 2017-08-16 | End: 2017-08-20

## 2017-08-16 RX ORDER — ONDANSETRON 8 MG/1
4 TABLET, FILM COATED ORAL EVERY 6 HOURS
Qty: 0 | Refills: 0 | Status: DISCONTINUED | OUTPATIENT
Start: 2017-08-16 | End: 2017-08-20

## 2017-08-16 RX ADMIN — FAMOTIDINE 20 MILLIGRAM(S): 10 INJECTION INTRAVENOUS at 16:39

## 2017-08-16 RX ADMIN — SODIUM CHLORIDE 30 MILLILITER(S): 9 INJECTION, SOLUTION INTRAVENOUS at 12:10

## 2017-08-16 RX ADMIN — HYDROMORPHONE HYDROCHLORIDE 30 MILLILITER(S): 2 INJECTION INTRAMUSCULAR; INTRAVENOUS; SUBCUTANEOUS at 15:13

## 2017-08-16 RX ADMIN — HYDROMORPHONE HYDROCHLORIDE 30 MILLILITER(S): 2 INJECTION INTRAMUSCULAR; INTRAVENOUS; SUBCUTANEOUS at 19:28

## 2017-08-16 RX ADMIN — HEPARIN SODIUM 5000 UNIT(S): 5000 INJECTION INTRAVENOUS; SUBCUTANEOUS at 22:21

## 2017-08-16 RX ADMIN — SENNA PLUS 2 TABLET(S): 8.6 TABLET ORAL at 22:20

## 2017-08-16 RX ADMIN — ONDANSETRON 4 MILLIGRAM(S): 8 TABLET, FILM COATED ORAL at 22:32

## 2017-08-16 RX ADMIN — HEPARIN SODIUM 5000 UNIT(S): 5000 INJECTION INTRAVENOUS; SUBCUTANEOUS at 08:11

## 2017-08-16 RX ADMIN — HEPARIN SODIUM 5000 UNIT(S): 5000 INJECTION INTRAVENOUS; SUBCUTANEOUS at 14:05

## 2017-08-16 RX ADMIN — ONDANSETRON 4 MILLIGRAM(S): 8 TABLET, FILM COATED ORAL at 14:05

## 2017-08-16 RX ADMIN — Medication 100 MILLIGRAM(S): at 22:20

## 2017-08-16 RX ADMIN — SODIUM CHLORIDE 30 MILLILITER(S): 9 INJECTION, SOLUTION INTRAVENOUS at 08:11

## 2017-08-16 NOTE — PROGRESS NOTE ADULT - SUBJECTIVE AND OBJECTIVE BOX
LINA CHAIDEZ            MRN-[id number]         No Known Allergies                 HPI:  66y/o female with recent  S/p b/l salpingo oophorectomy tumor debulking on 7/21/17.  Pt states few months ago c/o cough with hemoptysis.  Cxr done and was followed with ct scan and pet scan  and dx with lung & ovarian cancer.  Now scheduled for left VAT, robotic assisted left upper lobectomy on 8/16/2017. (04 Aug 2017 12:22)      Procedure: FB / Robot-assisted FERMÍN Lobectomy / LND  POD# 0    Home Medications:   * Incomplete Medication History as of 04-Aug-2017 13:31 documented in Prescription Writer  · 	atenolol 25 mg oral tablet: Last Dose Taken:  , 1 tab(s) orally once a day, am    Issues:   Lung cancer  Postop pain  HTN  GERD    PAST MEDICAL & SURGICAL HISTORY:  Malignant neoplasm: left lung  Noninflammatory disorder of ovary, fallopian tube and broad ligament, unspecified  GERD (gastroesophageal reflux disease)  Abnormal lung field  HTN (hypertension)  H/O bilateral salpingo-oophorectomy: 7/21/17  H/O abdominal hysterectomy  History of cholecystectomy        ICU Vital Signs Last 24 Hrs  T(C): 36.7 (16 Aug 2017 16:00), Max: 37 (16 Aug 2017 07:42)  T(F): 98 (16 Aug 2017 16:00), Max: 98.6 (16 Aug 2017 07:42)  HR: 67 (16 Aug 2017 16:30) (51 - 68)  BP: 144/79 (16 Aug 2017 14:00) (125/64 - 144/79)  BP(mean): 93 (16 Aug 2017 14:00) (80 - 93)  ABP: 126/68 (16 Aug 2017 16:30) (126/66 - 147/78)  ABP(mean): 92 (16 Aug 2017 16:30) (65 - 105)  RR: 14 (16 Aug 2017 16:30) (12 - 19)  SpO2: 98% (16 Aug 2017 16:30) (98% - 100%)    I&O's Summary    16 Aug 2017 07:01  -  16 Aug 2017 16:55  --------------------------------------------------------  IN: 150 mL / OUT: 400 mL / NET: -250 mL      CAPILLARY BLOOD GLUCOSE      MEDICATIONS  (STANDING):  lactated ringers. 1000 milliLiter(s) (30 mL/Hr) IV Continuous <Continuous>  heparin  Injectable 5000 Unit(s) SubCutaneous every 8 hours  famotidine    Tablet 20 milliGRAM(s) Oral every 12 hours  docusate sodium 100 milliGRAM(s) Oral three times a day  senna 2 Tablet(s) Oral at bedtime  HYDROmorphone PCA (1 mG/mL) 30 milliLiter(s) PCA Continuous PCA Continuous    MEDICATIONS  (PRN):  HYDROmorphone PCA (1 mG/mL) Rescue Clinician Bolus 0.5 milliGRAM(s) IV Push every 15 minutes PRN for Pain Scale GREATER THAN 6  naloxone Injectable 0.1 milliGRAM(s) IV Push every 3 minutes PRN For ANY of the following changes in patient status:  A. RR LESS THAN 10 breaths per minute, B. Oxygen saturation LESS THAN 90%, C. Sedation score of 6  ondansetron Injectable 4 milliGRAM(s) IV Push every 6 hours PRN Nausea      Physical exam:                             General:               Pt is awake, alert, not in any distress                                                  Neuro:                  Nonfocal                             Cardiovascular:     S1 & S2, regular                           Respiratory:         Air entry is fair and equal on both sides, has bilateral conducted sounds                           GI:                      Soft, nondistended and nontender, Bowel sounds active                            Ext:                    No cyanosis or edema     Labs:                                                                 CXR: 8/16/17  Postop changes, L-CT     Plan:    General: 67yFemale  s/p  FB / Robot-assisted FERMÍN Lobectomy / LND  POD# 0 progressing well, OOB in chair experiencing  pain with deep breathing.                             Neuro:                                         Pain control with PCA                             Cardiovascular:                                          Continue hemodynamic monitoring.    HTN: Restart Atenolol                            Respiratory:                                         Pt is on  2L  nasal canula,                                           Comfortable, not in any distress.                                         Using incentive spirometry                                          Monitor chest tube output                                         Chest tube to suction - No air leak                                                                 Continue bronchodilators, pulmonary toilet                            GI                                         On clear liquids                                          GERD: Continue Pepcid                                          Continue Zofran / Reglan for nausea - PRN	                                                                 Renal:                                         Continue LR 30CC/hr                                         Monitor I/Os and electrolytes                                         [place]                                                  Hem/ Onc:                                                                                  Monitor chest tube output &  signs of bleeding.                                          Follow CBC in AM                           Infectious disease:                                            No signs of infection. Monitor for fever / leukocytosis.                                          All surgical incision / chest tube  sites look clean                            Endocrine                                             Continue Accu-Checks with coverage        Pertinent clinical, laboratory, radiographic, hemodynamic, echocardiographic, respiratory data, microbiologic data and chart were reviewed and analyzed frequently throughout the course of the day and night  Patient seen, examined and plan discussed with CT Surgeon / CTICU team during rounds.    Pt's status discussed with family at bedside, updated status          Fernando Presley MD

## 2017-08-16 NOTE — BRIEF OPERATIVE NOTE - PROCEDURE
Lobectomy, lung, robot-assisted, thoracoscopic  08/16/2017  Left upper lobe, lymph node dissection, flex bronch  Active  [facility]

## 2017-08-16 NOTE — PHYSICAL THERAPY INITIAL EVALUATION ADULT - PERTINENT HX OF CURRENT PROBLEM, REHAB EVAL
S/p b/l salpingo oophorectomy tumor debulking on 7/21/17.  Pt states few months ago c/o cough with hemoptysis.  Cxr done and was followed with ct scan and pet scan  and dx with lung & ovarian cancer.

## 2017-08-17 ENCOUNTER — TRANSCRIPTION ENCOUNTER (OUTPATIENT)
Age: 67
End: 2017-08-17

## 2017-08-17 LAB
ACID FAST STN SPEC: SIGNIFICANT CHANGE UP
BASOPHILS # BLD AUTO: 0.01 K/UL — SIGNIFICANT CHANGE UP (ref 0–0.2)
BASOPHILS NFR BLD AUTO: 0.1 % — SIGNIFICANT CHANGE UP (ref 0–2)
BUN SERPL-MCNC: 12 MG/DL — SIGNIFICANT CHANGE UP (ref 7–23)
CALCIUM SERPL-MCNC: 8.6 MG/DL — SIGNIFICANT CHANGE UP (ref 8.4–10.5)
CHLORIDE SERPL-SCNC: 103 MMOL/L — SIGNIFICANT CHANGE UP (ref 98–107)
CO2 SERPL-SCNC: 24 MMOL/L — SIGNIFICANT CHANGE UP (ref 22–31)
CREAT SERPL-MCNC: 0.52 MG/DL — SIGNIFICANT CHANGE UP (ref 0.5–1.3)
EOSINOPHIL # BLD AUTO: 0 K/UL — SIGNIFICANT CHANGE UP (ref 0–0.5)
EOSINOPHIL NFR BLD AUTO: 0 % — SIGNIFICANT CHANGE UP (ref 0–6)
GLUCOSE SERPL-MCNC: 129 MG/DL — HIGH (ref 70–99)
HCT VFR BLD CALC: 29.6 % — LOW (ref 34.5–45)
HGB BLD-MCNC: 10 G/DL — LOW (ref 11.5–15.5)
IMM GRANULOCYTES # BLD AUTO: 0.06 # — SIGNIFICANT CHANGE UP
IMM GRANULOCYTES NFR BLD AUTO: 0.5 % — SIGNIFICANT CHANGE UP (ref 0–1.5)
LYMPHOCYTES # BLD AUTO: 1.45 K/UL — SIGNIFICANT CHANGE UP (ref 1–3.3)
LYMPHOCYTES # BLD AUTO: 12.9 % — LOW (ref 13–44)
MCHC RBC-ENTMCNC: 30.7 PG — SIGNIFICANT CHANGE UP (ref 27–34)
MCHC RBC-ENTMCNC: 33.8 % — SIGNIFICANT CHANGE UP (ref 32–36)
MCV RBC AUTO: 90.8 FL — SIGNIFICANT CHANGE UP (ref 80–100)
MONOCYTES # BLD AUTO: 0.86 K/UL — SIGNIFICANT CHANGE UP (ref 0–0.9)
MONOCYTES NFR BLD AUTO: 7.7 % — SIGNIFICANT CHANGE UP (ref 2–14)
NEUTROPHILS # BLD AUTO: 8.85 K/UL — HIGH (ref 1.8–7.4)
NEUTROPHILS NFR BLD AUTO: 78.8 % — HIGH (ref 43–77)
NRBC # FLD: 0 — SIGNIFICANT CHANGE UP
PLATELET # BLD AUTO: 173 K/UL — SIGNIFICANT CHANGE UP (ref 150–400)
PMV BLD: 10.8 FL — SIGNIFICANT CHANGE UP (ref 7–13)
POTASSIUM SERPL-MCNC: 4.3 MMOL/L — SIGNIFICANT CHANGE UP (ref 3.5–5.3)
POTASSIUM SERPL-SCNC: 4.3 MMOL/L — SIGNIFICANT CHANGE UP (ref 3.5–5.3)
RBC # BLD: 3.26 M/UL — LOW (ref 3.8–5.2)
RBC # FLD: 12 % — SIGNIFICANT CHANGE UP (ref 10.3–14.5)
SODIUM SERPL-SCNC: 139 MMOL/L — SIGNIFICANT CHANGE UP (ref 135–145)
WBC # BLD: 11.23 K/UL — HIGH (ref 3.8–10.5)
WBC # FLD AUTO: 11.23 K/UL — HIGH (ref 3.8–10.5)

## 2017-08-17 PROCEDURE — 71010: CPT | Mod: 26,77,76

## 2017-08-17 PROCEDURE — 71010: CPT | Mod: 26,76

## 2017-08-17 RX ORDER — ACETAMINOPHEN 500 MG
650 TABLET ORAL EVERY 6 HOURS
Qty: 0 | Refills: 0 | Status: DISCONTINUED | OUTPATIENT
Start: 2017-08-17 | End: 2017-08-20

## 2017-08-17 RX ORDER — TRAMADOL HYDROCHLORIDE 50 MG/1
25 TABLET ORAL EVERY 4 HOURS
Qty: 0 | Refills: 0 | Status: DISCONTINUED | OUTPATIENT
Start: 2017-08-17 | End: 2017-08-20

## 2017-08-17 RX ADMIN — SENNA PLUS 2 TABLET(S): 8.6 TABLET ORAL at 21:42

## 2017-08-17 RX ADMIN — CHLORHEXIDINE GLUCONATE 1 APPLICATION(S): 213 SOLUTION TOPICAL at 05:49

## 2017-08-17 RX ADMIN — Medication 650 MILLIGRAM(S): at 19:07

## 2017-08-17 RX ADMIN — Medication 100 MILLIGRAM(S): at 05:48

## 2017-08-17 RX ADMIN — Medication 650 MILLIGRAM(S): at 19:30

## 2017-08-17 RX ADMIN — HEPARIN SODIUM 5000 UNIT(S): 5000 INJECTION INTRAVENOUS; SUBCUTANEOUS at 13:03

## 2017-08-17 RX ADMIN — HYDROMORPHONE HYDROCHLORIDE 30 MILLILITER(S): 2 INJECTION INTRAMUSCULAR; INTRAVENOUS; SUBCUTANEOUS at 08:30

## 2017-08-17 RX ADMIN — Medication 100 MILLIGRAM(S): at 21:42

## 2017-08-17 RX ADMIN — BENZOCAINE AND MENTHOL 1 LOZENGE: 5; 1 LIQUID ORAL at 02:58

## 2017-08-17 RX ADMIN — Medication 100 MILLIGRAM(S): at 13:03

## 2017-08-17 RX ADMIN — HEPARIN SODIUM 5000 UNIT(S): 5000 INJECTION INTRAVENOUS; SUBCUTANEOUS at 05:48

## 2017-08-17 RX ADMIN — SODIUM CHLORIDE 30 MILLILITER(S): 9 INJECTION, SOLUTION INTRAVENOUS at 09:07

## 2017-08-17 RX ADMIN — FAMOTIDINE 20 MILLIGRAM(S): 10 INJECTION INTRAVENOUS at 18:41

## 2017-08-17 RX ADMIN — FAMOTIDINE 20 MILLIGRAM(S): 10 INJECTION INTRAVENOUS at 05:48

## 2017-08-17 RX ADMIN — HEPARIN SODIUM 5000 UNIT(S): 5000 INJECTION INTRAVENOUS; SUBCUTANEOUS at 21:43

## 2017-08-17 NOTE — PROGRESS NOTE ADULT - SUBJECTIVE AND OBJECTIVE BOX
ANESTHESIA POSTOP CHECK    67y Female POSTOP DAY 1 S/P     Vital Signs Last 24 Hrs  T(C): 36.8 (17 Aug 2017 12:29), Max: 37.1 (17 Aug 2017 04:00)  T(F): 98.2 (17 Aug 2017 12:29), Max: 98.7 (17 Aug 2017 04:00)  HR: 61 (17 Aug 2017 12:29) (58 - 77)  BP: 110/68 (17 Aug 2017 12:29) (106/64 - 127/74)  BP(mean): 86 (16 Aug 2017 22:00) (74 - 86)  RR: 18 (17 Aug 2017 12:29) (11 - 23)  SpO2: 93% (17 Aug 2017 12:29) (93% - 100%)  I&O's Summary    16 Aug 2017 07:01  -  17 Aug 2017 07:00  --------------------------------------------------------  IN: 995 mL / OUT: 1090 mL / NET: -95 mL    17 Aug 2017 07:01  -  17 Aug 2017 14:58  --------------------------------------------------------  IN: 120 mL / OUT: 325 mL / NET: -205 mL        [X ] NO APPARENT ANESTHESIA COMPLICATIONS      Comments:

## 2017-08-17 NOTE — PROGRESS NOTE ADULT - SUBJECTIVE AND OBJECTIVE BOX
Anesthesia Pain Management Service    SUBJECTIVE: Patient is doing well with IV PCA and no significant problems reported.    Pain Scale Score	At rest: ___ 	With Activity: ___ 	[X ] Refer to charted pain scores    THERAPY:    [ ] IV PCA Morphine		[ ] 5 mg/mL	[ ] 1 mg/mL  [X ] IV PCA Hydromorphone	[ ] 5 mg/mL	[X ] 1 mg/mL  [ ] IV PCA Fentanyl		[ ] 50 micrograms/mL    Demand dose __0.2_ lockout __6_ (minutes) Continuous Rate _0__ Total: _4__  mg used (in past 24 hours)      MEDICATIONS  (STANDING):  lactated ringers. 1000 milliLiter(s) (30 mL/Hr) IV Continuous <Continuous>  heparin  Injectable 5000 Unit(s) SubCutaneous every 8 hours  famotidine    Tablet 20 milliGRAM(s) Oral every 12 hours  docusate sodium 100 milliGRAM(s) Oral three times a day  senna 2 Tablet(s) Oral at bedtime  HYDROmorphone PCA (1 mG/mL) 30 milliLiter(s) PCA Continuous PCA Continuous  chlorhexidine 4% Liquid 1 Application(s) Topical daily    MEDICATIONS  (PRN):  HYDROmorphone PCA (1 mG/mL) Rescue Clinician Bolus 0.5 milliGRAM(s) IV Push every 15 minutes PRN for Pain Scale GREATER THAN 6  naloxone Injectable 0.1 milliGRAM(s) IV Push every 3 minutes PRN For ANY of the following changes in patient status:  A. RR LESS THAN 10 breaths per minute, B. Oxygen saturation LESS THAN 90%, C. Sedation score of 6  ondansetron Injectable 4 milliGRAM(s) IV Push every 6 hours PRN Nausea  benzocaine 15 mG/menthol 3.6 mG Lozenge 1 Lozenge Oral every 4 hours PRN Sore Throat      OBJECTIVE:    Sedation Score:	[ X] Alert	[ ] Drowsy 	[ ] Arousable	[ ] Asleep	[ ] Unresponsive    Side Effects:	[X ] None	[ ] Nausea	[ ] Vomiting	[ ] Pruritus  		[ ] Other:    Vital Signs Last 24 Hrs  T(C): 36.8 (17 Aug 2017 12:29), Max: 37.1 (17 Aug 2017 04:00)  T(F): 98.2 (17 Aug 2017 12:29), Max: 98.7 (17 Aug 2017 04:00)  HR: 61 (17 Aug 2017 12:29) (58 - 77)  BP: 110/68 (17 Aug 2017 12:29) (106/64 - 127/74)  BP(mean): 86 (16 Aug 2017 22:00) (74 - 86)  RR: 18 (17 Aug 2017 12:29) (11 - 23)  SpO2: 93% (17 Aug 2017 12:29) (93% - 100%)    ASSESSMENT/ PLAN    Therapy to  be:	[ X] Continue   [ ] Discontinued   [ ] Change to prn Analgesics    Documentation and Verification of current medications:   [X] Done	[ ] Not done, not elligible    Comments:    Patient was seen 08-17-17 @ 14:57

## 2017-08-17 NOTE — PROGRESS NOTE ADULT - SUBJECTIVE AND OBJECTIVE BOX
LINA CHAIDEZ            MRN-[id number]         No Known Allergies               HPI:  66y/o female with recent  S/p b/l salpingo oophorectomy tumor debulking on 7/21/17.  Pt states few months ago c/o cough with hemoptysis.  Cxr done and was followed with ct scan and pet scan  and dx with lung & ovarian cancer.  Now scheduled for left VAT, robotic assisted left upper lobectomy on 8/16/2017. (04 Aug 2017 12:22)      Procedure: FB / Robot-assisted FERMÍN Lobectomy / LND  POD# 1    Home Medications:   * Incomplete Medication History as of 04-Aug-2017 13:31 documented in Prescription Writer  · 	atenolol 25 mg oral tablet: Last Dose Taken:  , 1 tab(s) orally once a day, am    Issues:   Lung cancer  Postop pain  HTN  GERD      PAST MEDICAL & SURGICAL HISTORY:  Malignant neoplasm: left lung  Noninflammatory disorder of ovary, fallopian tube and broad ligament, unspecified  GERD (gastroesophageal reflux disease)  Abnormal lung field  HTN (hypertension)  H/O bilateral salpingo-oophorectomy: 7/21/17  H/O abdominal hysterectomy  History of cholecystectomy        ICU Vital Signs Last 24 Hrs  T(C): 37.1 (17 Aug 2017 04:00), Max: 37.1 (17 Aug 2017 04:00)  T(F): 98.7 (17 Aug 2017 04:00), Max: 98.7 (17 Aug 2017 04:00)  HR: 62 (17 Aug 2017 06:00) (51 - 77)  BP: 127/74 (16 Aug 2017 22:00) (110/63 - 144/79)  BP(mean): 86 (16 Aug 2017 22:00) (74 - 93)  ABP: 127/69 (17 Aug 2017 06:00) (108/56 - 147/78)  ABP(mean): 93 (17 Aug 2017 06:00) (65 - 105)  RR: 21 (17 Aug 2017 06:00) (11 - 23)  SpO2: 93% (17 Aug 2017 06:00) (93% - 100%)    I&O's Summary    16 Aug 2017 07:01  -  17 Aug 2017 06:23  --------------------------------------------------------  IN: 995 mL / OUT: 1090 mL / NET: -95 mL      CAPILLARY BLOOD GLUCOSE        MEDICATIONS  (STANDING):  lactated ringers. 1000 milliLiter(s) (30 mL/Hr) IV Continuous <Continuous>  heparin  Injectable 5000 Unit(s) SubCutaneous every 8 hours  famotidine    Tablet 20 milliGRAM(s) Oral every 12 hours  docusate sodium 100 milliGRAM(s) Oral three times a day  senna 2 Tablet(s) Oral at bedtime  HYDROmorphone PCA (1 mG/mL) 30 milliLiter(s) PCA Continuous PCA Continuous  chlorhexidine 4% Liquid 1 Application(s) Topical daily    MEDICATIONS  (PRN):  HYDROmorphone PCA (1 mG/mL) Rescue Clinician Bolus 0.5 milliGRAM(s) IV Push every 15 minutes PRN for Pain Scale GREATER THAN 6  naloxone Injectable 0.1 milliGRAM(s) IV Push every 3 minutes PRN For ANY of the following changes in patient status:  A. RR LESS THAN 10 breaths per minute, B. Oxygen saturation LESS THAN 90%, C. Sedation score of 6  ondansetron Injectable 4 milliGRAM(s) IV Push every 6 hours PRN Nausea  benzocaine 15 mG/menthol 3.6 mG Lozenge 1 Lozenge Oral every 4 hours PRN Sore Throat      Physical exam:                            General:               Pt is awake, alert, not in any distress                                                  Neuro:                 Nonfocal                             Cardiovascular:     S1 & S2, regular                           Respiratory:         Air entry is fair and equal on both sides, has bilateral conducted sounds                           GI:                      Soft, nondistended and nontender, Bowel sounds active                            Ext:                    No cyanosis or edema     Labs:                                                                           10.0   11.23 )-----------( 173      ( 17 Aug 2017 04:35 )             29.6             08-17    139  |  103  |  12  ----------------------------<  129<H>  4.3   |  24  |  0.52    Ca    8.6      17 Aug 2017 04:35                      CXR:    Plan:  General: 67yFemale  s/p  FB / Robot-assisted FERMÍN Lobectomy / LND  POD# 1 progressing well, OOB in chair experiencing  pain with deep breathing.                             Neuro:                                         Pain control with PCA                             Cardiovascular:                                          Continue hemodynamic monitoring.    HTN: Restart Atenolol                            Respiratory:                                         Pt is on  2L  nasal canula,                                           Comfortable, not in any distress.                                         Using incentive spirometry                                          Monitor chest tube output                                         Chest tube to suction - Small air leak                                                                 Continue bronchodilators, pulmonary toilet                            GI                                         Start regular diet                                          GERD: Continue Pepcid                                          Continue Zofran / Reglan for nausea - PRN	                                                                 Renal:                                         Continue LR 30CC/hr                                         Monitor I/Os and electrolytes                                         [place]                                                  Hem/ Onc:                                                                                  Monitor chest tube output &  signs of bleeding.                                          Follow CBC in AM                           Infectious disease:                                            No signs of infection. Monitor for fever / leukocytosis.                                          All surgical incision / chest tube  sites look clean                            Endocrine                                             Continue Accu-Checks with coverage        Pertinent clinical, laboratory, radiographic, hemodynamic, echocardiographic, respiratory data, microbiologic data and chart were reviewed and analyzed frequently throughout the course of the day and night  Patient seen, examined and plan discussed with CT Surgeon / CTICU team during rounds.    Pt's status discussed with family at bedside, updated status        Fernando Presley MD

## 2017-08-18 LAB
BUN SERPL-MCNC: 11 MG/DL — SIGNIFICANT CHANGE UP (ref 7–23)
CALCIUM SERPL-MCNC: 9 MG/DL — SIGNIFICANT CHANGE UP (ref 8.4–10.5)
CHLORIDE SERPL-SCNC: 102 MMOL/L — SIGNIFICANT CHANGE UP (ref 98–107)
CO2 SERPL-SCNC: 28 MMOL/L — SIGNIFICANT CHANGE UP (ref 22–31)
CREAT SERPL-MCNC: 0.56 MG/DL — SIGNIFICANT CHANGE UP (ref 0.5–1.3)
GLUCOSE SERPL-MCNC: 120 MG/DL — HIGH (ref 70–99)
HCT VFR BLD CALC: 31.9 % — LOW (ref 34.5–45)
HGB BLD-MCNC: 10.5 G/DL — LOW (ref 11.5–15.5)
MAGNESIUM SERPL-MCNC: 2.2 MG/DL — SIGNIFICANT CHANGE UP (ref 1.6–2.6)
MCHC RBC-ENTMCNC: 29.7 PG — SIGNIFICANT CHANGE UP (ref 27–34)
MCHC RBC-ENTMCNC: 32.9 % — SIGNIFICANT CHANGE UP (ref 32–36)
MCV RBC AUTO: 90.4 FL — SIGNIFICANT CHANGE UP (ref 80–100)
NRBC # FLD: 0 — SIGNIFICANT CHANGE UP
PHOSPHATE SERPL-MCNC: 2 MG/DL — LOW (ref 2.5–4.5)
PLATELET # BLD AUTO: 180 K/UL — SIGNIFICANT CHANGE UP (ref 150–400)
PMV BLD: 10.8 FL — SIGNIFICANT CHANGE UP (ref 7–13)
POTASSIUM SERPL-MCNC: 3.8 MMOL/L — SIGNIFICANT CHANGE UP (ref 3.5–5.3)
POTASSIUM SERPL-SCNC: 3.8 MMOL/L — SIGNIFICANT CHANGE UP (ref 3.5–5.3)
RBC # BLD: 3.53 M/UL — LOW (ref 3.8–5.2)
RBC # FLD: 11.9 % — SIGNIFICANT CHANGE UP (ref 10.3–14.5)
SODIUM SERPL-SCNC: 142 MMOL/L — SIGNIFICANT CHANGE UP (ref 135–145)
SURGICAL PATHOLOGY STUDY: SIGNIFICANT CHANGE UP
WBC # BLD: 9.37 K/UL — SIGNIFICANT CHANGE UP (ref 3.8–10.5)
WBC # FLD AUTO: 9.37 K/UL — SIGNIFICANT CHANGE UP (ref 3.8–10.5)

## 2017-08-18 PROCEDURE — 71010: CPT | Mod: 26

## 2017-08-18 RX ORDER — ATENOLOL 25 MG/1
25 TABLET ORAL DAILY
Qty: 0 | Refills: 0 | Status: DISCONTINUED | OUTPATIENT
Start: 2017-08-18 | End: 2017-08-20

## 2017-08-18 RX ORDER — METOPROLOL TARTRATE 50 MG
12.5 TABLET ORAL
Qty: 0 | Refills: 0 | Status: DISCONTINUED | OUTPATIENT
Start: 2017-08-18 | End: 2017-08-18

## 2017-08-18 RX ORDER — POTASSIUM CHLORIDE 20 MEQ
40 PACKET (EA) ORAL ONCE
Qty: 0 | Refills: 0 | Status: COMPLETED | OUTPATIENT
Start: 2017-08-18 | End: 2017-08-18

## 2017-08-18 RX ADMIN — ATENOLOL 25 MILLIGRAM(S): 25 TABLET ORAL at 21:24

## 2017-08-18 RX ADMIN — HEPARIN SODIUM 5000 UNIT(S): 5000 INJECTION INTRAVENOUS; SUBCUTANEOUS at 21:25

## 2017-08-18 RX ADMIN — TRAMADOL HYDROCHLORIDE 25 MILLIGRAM(S): 50 TABLET ORAL at 21:25

## 2017-08-18 RX ADMIN — SENNA PLUS 2 TABLET(S): 8.6 TABLET ORAL at 21:25

## 2017-08-18 RX ADMIN — FAMOTIDINE 20 MILLIGRAM(S): 10 INJECTION INTRAVENOUS at 18:26

## 2017-08-18 RX ADMIN — HEPARIN SODIUM 5000 UNIT(S): 5000 INJECTION INTRAVENOUS; SUBCUTANEOUS at 05:33

## 2017-08-18 RX ADMIN — TRAMADOL HYDROCHLORIDE 25 MILLIGRAM(S): 50 TABLET ORAL at 04:00

## 2017-08-18 RX ADMIN — Medication 100 MILLIGRAM(S): at 05:33

## 2017-08-18 RX ADMIN — CHLORHEXIDINE GLUCONATE 1 APPLICATION(S): 213 SOLUTION TOPICAL at 05:33

## 2017-08-18 RX ADMIN — FAMOTIDINE 20 MILLIGRAM(S): 10 INJECTION INTRAVENOUS at 05:33

## 2017-08-18 RX ADMIN — Medication 100 MILLIGRAM(S): at 13:29

## 2017-08-18 RX ADMIN — Medication 100 MILLIGRAM(S): at 21:25

## 2017-08-18 RX ADMIN — Medication 12.5 MILLIGRAM(S): at 18:26

## 2017-08-18 RX ADMIN — Medication 40 MILLIEQUIVALENT(S): at 13:29

## 2017-08-18 RX ADMIN — HEPARIN SODIUM 5000 UNIT(S): 5000 INJECTION INTRAVENOUS; SUBCUTANEOUS at 13:29

## 2017-08-18 NOTE — DISCHARGE NOTE ADULT - HOSPITAL COURSE
66y/o female who recently underwent a b/l salpingo oophorectomy tumor debulking on 7/21/17.for a recent ovarian cancer diagnosis was also found to have lung cancer.   Now she underwent a left VATS, robotic assisted left upper lobectomy on 8/16/2017.  She was discharged home after chest tube removal.

## 2017-08-18 NOTE — DISCHARGE NOTE ADULT - CONDITIONS AT DISCHARGE
Vital signs stable, pain controlled Vital signs stable, pain controlled. VATs site is c/d/i. IV removed. D/c instructions given to patient and daughter who's able to translate.

## 2017-08-18 NOTE — DISCHARGE NOTE ADULT - CARE PROVIDER_API CALL
Ken Sol (MD), Surgery; Thoracic Surgery  [address] [address] [place] [address]  [place] [address]  Phone: [phone]  Fax: [phone]    Romy Sanchez  Phone: [phone]  Fax: (   )    -

## 2017-08-18 NOTE — DISCHARGE NOTE ADULT - PATIENT PORTAL LINK FT
“You can access the Follow[employer] Patient Portal, offered by [facility], by registering with the following website: [email or web address]”

## 2017-08-18 NOTE — DISCHARGE NOTE ADULT - MEDICATION SUMMARY - MEDICATIONS TO TAKE
I will START or STAY ON the medications listed below when I get home from the hospital:    acetaminophen 325 mg oral tablet  -- 2 tab(s) by mouth every 6 hours, As needed, Mild Pain (1 - 3)  -- Indication: For Mild pain, do not take tylenol and percocet at the same time, as both contain tylenol    acetaminophen-oxycodone 325 mg-5 mg oral tablet  -- 1 tab(s) by mouth every 4 hours, As Needed -for moderate pain MDD:6  -- Caution federal law prohibits the transfer of this drug to any person other  than the person for whom it was prescribed.  May cause drowsiness.  Alcohol may intensify this effect.  Use care when operating dangerous machinery.  This prescription cannot be refilled.  This product contains acetaminophen.  Do not use  with any other product containing acetaminophen to prevent possible liver damage.  Using more of this medication than prescribed may cause serious breathing problems.    -- Indication: For Moderate pain.    atenolol 25 mg oral tablet  -- 1 tab(s) by mouth once a day, am  -- Indication: For Heart rate and blood pressure    docusate sodium 100 mg oral capsule  -- 1 cap(s) by mouth 3 times a day, As Needed for constipation  -- Indication: For stool softener    senna oral tablet  -- 2 tab(s) by mouth once a day (at bedtime), As Needed for constipation  -- Indication: For laxative I will START or STAY ON the medications listed below when I get home from the hospital:    acetaminophen 325 mg oral tablet  -- 2 tab(s) by mouth every 6 hours, As needed, Mild Pain (1 - 3)  -- Indication: For Mild pain, do not take tylenol and percocet at the same time, as both contain tylenol    traMADol 50 mg oral tablet  -- 0.5 to 1 tab(s) by mouth every 4 to 6 hours, As Needed, Moderate to Severe Pain MDD:6  -- Indication: For Pain    atenolol 25 mg oral tablet  -- 1 tab(s) by mouth once a day, am  -- Indication: For Heart rate and blood pressure    docusate sodium 100 mg oral capsule  -- 1 cap(s) by mouth 3 times a day, As Needed for constipation  -- Indication: For stool softener    senna oral tablet  -- 2 tab(s) by mouth once a day (at bedtime), As Needed for constipation  -- Indication: For laxative

## 2017-08-18 NOTE — DISCHARGE NOTE ADULT - PLAN OF CARE
follow up final pathology for plan; wound healing folow up with Dr Sol in about 2 weeks; call for an appointment; bring a new chext x-ray with you. as above please call with Dr Sol to schedule a follow visit in about 2 weeks; call for an appointment;have a  chext x-ray done 1-2 days prior to your appointment and bring a copy with you. Apply bandaid daily after showering to cover stitch and blister please call with Dr Sol to schedule a follow visit in about 2 weeks; call for an appointment; have a  chext x-ray done 1-2 days prior to your appointment and bring a copy with you. Apply bandaid daily after showering to cover stitch and blister

## 2017-08-18 NOTE — DISCHARGE NOTE ADULT - NS AS ACTIVITY OBS
Walking-Indoors allowed/Walking-Outdoors allowed/Showering allowed/No Heavy lifting/straining/Do not drive or operate machinery/Stairs allowed

## 2017-08-18 NOTE — DISCHARGE NOTE ADULT - CARE PLAN
Principal Discharge DX:	Malignant neoplasm  Goal:	follow up final pathology for plan; wound healing  Instructions for follow-up, activity and diet:	folow up with Dr Sol in about 2 weeks; call for an appointment; bring a new chext x-ray with you. Principal Discharge DX:	Malignant neoplasm  Goal:	follow up final pathology for plan; wound healing  Instructions for follow-up, activity and diet:	please call with Dr Sol to schedule a follow visit in about 2 weeks; call for an appointment;have a  chext x-ray done 1-2 days prior to your appointment and bring a copy with you. Apply bandaid daily after showering to cover stitch and blister  Instructions for follow-up, activity and diet:	as above Principal Discharge DX:	Malignant neoplasm  Goal:	follow up final pathology for plan; wound healing  Instructions for follow-up, activity and diet:	please call with Dr Sol to schedule a follow visit in about 2 weeks; call for an appointment; have a  chext x-ray done 1-2 days prior to your appointment and bring a copy with you. Apply bandaid daily after showering to cover stitch and blister  Instructions for follow-up, activity and diet:	as above

## 2017-08-18 NOTE — PROGRESS NOTE ADULT - SUBJECTIVE AND OBJECTIVE BOX
Subjective:    Vital Signs: Hemodynamically stable and afebrile  Vital Signs Last 24 Hrs  T(C): 36.2 (08-18-17 @ 15:57), Max: 37.2 (08-18-17 @ 12:00)  T(F): 97.2 (08-18-17 @ 15:57), Max: 98.9 (08-18-17 @ 12:00)  HR: 86 (08-18-17 @ 15:57) (74 - 96)  BP: 121/77 (08-18-17 @ 15:57) (116/71 - 123/73)  RR: 18 (08-18-17 @ 15:57) (18 - 20)  SpO2: 93% (08-18-17 @ 15:57) (93% - 97%) on21%O2    Telemetry/Alarms: NSR  General: WN/WD NAD  Neurology: Awake, nonfocal, HOUSE x 4  Eyes: Scleras clear, PERRLA/ EOMI, Gross vision intact  ENT:Gross hearing intact, grossly patent pharynx, no stridor  Neck: Neck supple, trachea midline, No JVD,   Respiratory: CTA B/L, No wheezing, rales, rhonchi  CV: RRR, S1S2, no murmurs, rubs or gallops  Abdominal: Soft, NT, ND +BS,   Extremities: No edema, + peripheral pulses, no calf pain or tenderness  Skin: No Rashes, Hematoma, Ecchymosis  Lymphatic: No Neck, axilla, groin LAD  Psych: Oriented x 3, normal affect  Incisions: No s-s of infection  Tubes: none  Relevant labs, radiology and Medications reviewed                        10.5   9.37  )-----------( 180      ( 18 Aug 2017 05:30 )             31.9     08-18    142  |  102  |  11  ----------------------------<  120<H>  3.8   |  28  |  0.56    Ca    9.0      18 Aug 2017 05:30  Phos  2.0     08-18  Mg     2.2     08-18        MEDICATIONS  (STANDING):  heparin  Injectable 5000 Unit(s) SubCutaneous every 8 hours  famotidine    Tablet 20 milliGRAM(s) Oral every 12 hours  docusate sodium 100 milliGRAM(s) Oral three times a day  senna 2 Tablet(s) Oral at bedtime  chlorhexidine 4% Liquid 1 Application(s) Topical daily  ATENolol  Tablet 25 milliGRAM(s) Oral daily    MEDICATIONS  (PRN):  naloxone Injectable 0.1 milliGRAM(s) IV Push every 3 minutes PRN For ANY of the following changes in patient status:  A. RR LESS THAN 10 breaths per minute, B. Oxygen saturation LESS THAN 90%, C. Sedation score of 6  ondansetron Injectable 4 milliGRAM(s) IV Push every 6 hours PRN Nausea  benzocaine 15 mG/menthol 3.6 mG Lozenge 1 Lozenge Oral every 4 hours PRN Sore Throat  traMADol 25 milliGRAM(s) Oral every 4 hours PRN Moderate Pain (4 - 6)  acetaminophen   Tablet. 650 milliGRAM(s) Oral every 6 hours PRN Mild Pain (1 - 3)    Pertinent Physical Exam  I&O's Summary    17 Aug 2017 07:01  -  18 Aug 2017 07:00  --------------------------------------------------------  IN: 120 mL / OUT: 1050 mL / NET: -930 mL        Assessment  67y Female  w/ PAST MEDICAL & SURGICAL HISTORY:  Malignant neoplasm: left lung  Noninflammatory disorder of ovary, fallopian tube and broad ligament, unspecified  GERD (gastroesophageal reflux disease)  Abnormal lung field  HTN (hypertension)  H/O bilateral salpingo-oophorectomy: 7/21/17  H/O abdominal hysterectomy  History of cholecystectomy  admitted with complaints of Patient is a 67y old  Female who presents with a chief complaint of Left lung mass (18 Aug 2017 03:45)  .  On (Date), patient underwent Lobectomy, lung, robot-assisted, thoracoscopic  . Postoperative course/issues:    PLAN  Neuro: Pain management  Pulm: Encourage coughing, deep breathing and use of incentive spirometry. Wean off supplemental oxygen as able. Daily CXR.   Cardio: Monitor telemetry/alarms  GI: Tolerating diet. Continue stool softeners.  Renal: monitor urine output, supplement electrolytes as needed  Vasc: Heparin SC/SCDs for DVT prophylaxis  Heme: Stable H/H. .   ID: Off antibiotics. Stable.  Therapy: OOB/ambulate  Tubes: Monitor Chest tube output  Disposition: Aim to D/C to home on  Discussed with Cardiothoracic Team at AM rounds. Subjective:    Vital Signs: Hemodynamically stable and afebrile  Vital Signs Last 24 Hrs  T(C): 36.2 (08-18-17 @ 15:57), Max: 37.2 (08-18-17 @ 12:00)  T(F): 97.2 (08-18-17 @ 15:57), Max: 98.9 (08-18-17 @ 12:00)  HR: 86 (08-18-17 @ 15:57) (74 - 96)  BP: 121/77 (08-18-17 @ 15:57) (116/71 - 123/73)  RR: 18 (08-18-17 @ 15:57) (18 - 20)  SpO2: 93% (08-18-17 @ 15:57) (93% - 97%) on21%O2    Telemetry/Alarms: NSR  General: WN/WD NAD  Neurology: Awake, nonfocal, HOUSE x 4  Eyes: Scleras clear, PERRLA/ EOMI, Gross vision intact  ENT:Gross hearing intact, grossly patent pharynx, no stridor  Neck: Neck supple, trachea midline, No JVD,   Respiratory: CTA B/L, No wheezing, rales, rhonchi  CV: RRR, S1S2, no murmurs, rubs or gallops  Abdominal: Soft, NT, ND +BS,   Extremities: No edema, + peripheral pulses, no calf pain or tenderness  Skin: No Rashes, Hematoma, Ecchymosis  Lymphatic: No Neck, axilla, groin LAD  Psych: Oriented x 3, normal affect  Incisions: No s-s of infection  Tubes: none  Relevant labs, radiology->Right basilar linear atelectasis. No pneumothorax  and Medications reviewed                        10.5   9.37  )-----------( 180      ( 18 Aug 2017 05:30 )             31.9     08-18    142  |  102  |  11  ----------------------------<  120<H>  3.8   |  28  |  0.56    Ca    9.0      18 Aug 2017 05:30  Phos  2.0     08-18  Mg     2.2     08-18        MEDICATIONS  (STANDING):  heparin  Injectable 5000 Unit(s) SubCutaneous every 8 hours  famotidine    Tablet 20 milliGRAM(s) Oral every 12 hours  docusate sodium 100 milliGRAM(s) Oral three times a day  senna 2 Tablet(s) Oral at bedtime  chlorhexidine 4% Liquid 1 Application(s) Topical daily  ATENolol  Tablet 25 milliGRAM(s) Oral daily    MEDICATIONS  (PRN):  naloxone Injectable 0.1 milliGRAM(s) IV Push every 3 minutes PRN For ANY of the following changes in patient status:  A. RR LESS THAN 10 breaths per minute, B. Oxygen saturation LESS THAN 90%, C. Sedation score of 6  ondansetron Injectable 4 milliGRAM(s) IV Push every 6 hours PRN Nausea  benzocaine 15 mG/menthol 3.6 mG Lozenge 1 Lozenge Oral every 4 hours PRN Sore Throat  traMADol 25 milliGRAM(s) Oral every 4 hours PRN Moderate Pain (4 - 6)  acetaminophen   Tablet. 650 milliGRAM(s) Oral every 6 hours PRN Mild Pain (1 - 3)    Pertinent Physical Exam  I&O's Summary    17 Aug 2017 07:01  -  18 Aug 2017 07:00  --------------------------------------------------------  IN: 120 mL / OUT: 1050 mL / NET: -930 mL        Assessment  67y Female  w/ PAST MEDICAL & SURGICAL HISTORY:  Malignant neoplasm: left lung  Noninflammatory disorder of ovary, fallopian tube and broad ligament, unspecified  GERD (gastroesophageal reflux disease)  Abnormal lung field  HTN (hypertension)  H/O bilateral salpingo-oophorectomy: 7/21/17  H/O abdominal hysterectomy  History of cholecystectomy  admitted with complaints of Patient is a 67y old  Female who presents with a chief complaint of Left lung mass (18 Aug 2017 03:45)  .  On (Date), patient underwent Lobectomy, lung, robot-assisted, thoracoscopic  .Postoperative course/issues:8/17 chest tube removed w foolow up cxr stable8/18 Burst of rapid AFib, resume BB and supplement K     PLAN  Neuro: Pain management  Pulm: Encourage coughing, deep breathing and use of incentive spirometry. Wean off supplemental oxygen as able. Daily CXR.   Cardio: Monitor telemetry/alarms  GI: Tolerating diet. Continue stool softeners.  Renal: monitor urine output, supplement electrolytes as needed  Vasc: Heparin SC/SCDs for DVT prophylaxis  Heme: Stable H/H. .   ID: Off antibiotics. Stable.  Therapy: OOB/ambulate  Tubes: Monitor Chest tube output  Disposition: Aim to D/C to home on8/19  Discussed with Cardiothoracic Team at AM rounds. Subjective:    Vital Signs: Hemodynamically stable and afebrile  Vital Signs Last 24 Hrs  T(C): 36.2 (08-18-17 @ 15:57), Max: 37.2 (08-18-17 @ 12:00)  T(F): 97.2 (08-18-17 @ 15:57), Max: 98.9 (08-18-17 @ 12:00)  HR: 86 (08-18-17 @ 15:57) (74 - 96)  BP: 121/77 (08-18-17 @ 15:57) (116/71 - 123/73)  RR: 18 (08-18-17 @ 15:57) (18 - 20)  SpO2: 93% (08-18-17 @ 15:57) (93% - 97%) on21%O2    Telemetry/Alarms: NSR  General: WN/WD NAD  Neurology: Awake, nonfocal, HOUSE x 4  Eyes: Scleras clear, PERRLA/ EOMI, Gross vision intact  ENT:Gross hearing intact, grossly patent pharynx, no stridor  Neck: Neck supple, trachea midline, No JVD,   Respiratory: CTA B/L, No wheezing, rales, rhonchi  CV: RRR, S1S2, no murmurs, rubs or gallops  Abdominal: Soft, NT, ND +BS,   Extremities: No edema, + peripheral pulses, no calf pain or tenderness  Skin: No Rashes, Hematoma, Ecchymosis  Lymphatic: No Neck, axilla, groin LAD  Psych: Oriented x 3, normal affect  Incisions: No s-s of infection  Tubes: none  Relevant labs, radiology->Right basilar linear atelectasis. No pneumothorax  and Medications reviewed                        10.5   9.37  )-----------( 180      ( 18 Aug 2017 05:30 )             31.9     08-18    142  |  102  |  11  ----------------------------<  120<H>  3.8   |  28  |  0.56    Ca    9.0      18 Aug 2017 05:30  Phos  2.0     08-18  Mg     2.2     08-18        MEDICATIONS  (STANDING):  heparin  Injectable 5000 Unit(s) SubCutaneous every 8 hours  famotidine    Tablet 20 milliGRAM(s) Oral every 12 hours  docusate sodium 100 milliGRAM(s) Oral three times a day  senna 2 Tablet(s) Oral at bedtime  chlorhexidine 4% Liquid 1 Application(s) Topical daily  ATENolol  Tablet 25 milliGRAM(s) Oral daily    MEDICATIONS  (PRN):  naloxone Injectable 0.1 milliGRAM(s) IV Push every 3 minutes PRN For ANY of the following changes in patient status:  A. RR LESS THAN 10 breaths per minute, B. Oxygen saturation LESS THAN 90%, C. Sedation score of 6  ondansetron Injectable 4 milliGRAM(s) IV Push every 6 hours PRN Nausea  benzocaine 15 mG/menthol 3.6 mG Lozenge 1 Lozenge Oral every 4 hours PRN Sore Throat  traMADol 25 milliGRAM(s) Oral every 4 hours PRN Moderate Pain (4 - 6)  acetaminophen   Tablet. 650 milliGRAM(s) Oral every 6 hours PRN Mild Pain (1 - 3)    Pertinent Physical Exam  I&O's Summary    17 Aug 2017 07:01  -  18 Aug 2017 07:00  --------------------------------------------------------  IN: 120 mL / OUT: 1050 mL / NET: -930 mL        Assessment  67y Female  w/ PAST MEDICAL & SURGICAL HISTORY:  Malignant neoplasm: left lung  Noninflammatory disorder of ovary, fallopian tube and broad ligament, unspecified  GERD (gastroesophageal reflux disease)  Abnormal lung field  HTN (hypertension)  H/O bilateral salpingo-oophorectomy: 7/21/17  H/O abdominal hysterectomy  History of cholecystectomy  admitted with complaints of Patient is a 67y old  Female who presents with a chief complaint of Left lung mass (18 Aug 2017 03:45)  .  On (Date), patient underwent Lobectomy, lung, robot-assisted, thoracoscopic  .Postoperative course/issues:8/17 chest tube removed w foolow up cxr stable8/18 Burst of rapid AFib, resume BB and supplement K     PLAN d/c home tomorrow if remains in SR  Neuro: Pain management  Pulm: Encourage coughing, deep breathing and use of incentive spirometry. Wean off supplemental oxygen as able. Daily CXR.   Cardio: Monitor telemetry/alarms  GI: Tolerating diet. Continue stool softeners.  Renal: monitor urine output, supplement electrolytes as needed  Vasc: Heparin SC/SCDs for DVT prophylaxis  Heme: Stable H/H. .   ID: Off antibiotics. Stable.  Therapy: OOB/ambulate  Tubes: Monitor Chest tube output  Disposition: Aim to D/C to home on8/19  Discussed with Cardiothoracic Team at AM rounds.

## 2017-08-18 NOTE — DISCHARGE NOTE ADULT - INSTRUCTIONS
resume usual diet Any temperature greater than 100.4, pain not relieved with medications or white discharge coming from surgical site please call your surgeon. Please continue to use incentive spirometer and ambulate as tolerated.

## 2017-08-19 LAB
BUN SERPL-MCNC: 8 MG/DL — SIGNIFICANT CHANGE UP (ref 7–23)
CALCIUM SERPL-MCNC: 9.1 MG/DL — SIGNIFICANT CHANGE UP (ref 8.4–10.5)
CHLORIDE SERPL-SCNC: 101 MMOL/L — SIGNIFICANT CHANGE UP (ref 98–107)
CO2 SERPL-SCNC: 25 MMOL/L — SIGNIFICANT CHANGE UP (ref 22–31)
CREAT SERPL-MCNC: 0.58 MG/DL — SIGNIFICANT CHANGE UP (ref 0.5–1.3)
GLUCOSE SERPL-MCNC: 144 MG/DL — HIGH (ref 70–99)
POTASSIUM SERPL-MCNC: 4.1 MMOL/L — SIGNIFICANT CHANGE UP (ref 3.5–5.3)
POTASSIUM SERPL-SCNC: 4.1 MMOL/L — SIGNIFICANT CHANGE UP (ref 3.5–5.3)
SODIUM SERPL-SCNC: 140 MMOL/L — SIGNIFICANT CHANGE UP (ref 135–145)

## 2017-08-19 PROCEDURE — 71010: CPT | Mod: 26

## 2017-08-19 RX ORDER — DOCUSATE SODIUM 100 MG
1 CAPSULE ORAL
Qty: 0 | Refills: 0 | DISCHARGE
Start: 2017-08-19

## 2017-08-19 RX ORDER — SENNA PLUS 8.6 MG/1
2 TABLET ORAL
Qty: 0 | Refills: 0 | COMMUNITY
Start: 2017-08-19

## 2017-08-19 RX ORDER — OXYCODONE HYDROCHLORIDE 5 MG/1
1 TABLET ORAL
Qty: 30 | Refills: 0 | OUTPATIENT
Start: 2017-08-19 | End: 2017-08-24

## 2017-08-19 RX ORDER — ACETAMINOPHEN 500 MG
2 TABLET ORAL
Qty: 0 | Refills: 0 | COMMUNITY
Start: 2017-08-19

## 2017-08-19 RX ADMIN — TRAMADOL HYDROCHLORIDE 25 MILLIGRAM(S): 50 TABLET ORAL at 21:31

## 2017-08-19 RX ADMIN — HEPARIN SODIUM 5000 UNIT(S): 5000 INJECTION INTRAVENOUS; SUBCUTANEOUS at 05:39

## 2017-08-19 RX ADMIN — HEPARIN SODIUM 5000 UNIT(S): 5000 INJECTION INTRAVENOUS; SUBCUTANEOUS at 14:40

## 2017-08-19 RX ADMIN — HEPARIN SODIUM 5000 UNIT(S): 5000 INJECTION INTRAVENOUS; SUBCUTANEOUS at 21:01

## 2017-08-19 RX ADMIN — FAMOTIDINE 20 MILLIGRAM(S): 10 INJECTION INTRAVENOUS at 17:37

## 2017-08-19 RX ADMIN — Medication 100 MILLIGRAM(S): at 05:39

## 2017-08-19 RX ADMIN — ATENOLOL 25 MILLIGRAM(S): 25 TABLET ORAL at 05:39

## 2017-08-19 RX ADMIN — TRAMADOL HYDROCHLORIDE 25 MILLIGRAM(S): 50 TABLET ORAL at 21:01

## 2017-08-19 RX ADMIN — FAMOTIDINE 20 MILLIGRAM(S): 10 INJECTION INTRAVENOUS at 05:39

## 2017-08-19 RX ADMIN — CHLORHEXIDINE GLUCONATE 1 APPLICATION(S): 213 SOLUTION TOPICAL at 05:42

## 2017-08-20 VITALS
TEMPERATURE: 99 F | SYSTOLIC BLOOD PRESSURE: 105 MMHG | DIASTOLIC BLOOD PRESSURE: 69 MMHG | RESPIRATION RATE: 18 BRPM | OXYGEN SATURATION: 95 % | HEART RATE: 66 BPM

## 2017-08-20 LAB
BUN SERPL-MCNC: 8 MG/DL — SIGNIFICANT CHANGE UP (ref 7–23)
CALCIUM SERPL-MCNC: 8.9 MG/DL — SIGNIFICANT CHANGE UP (ref 8.4–10.5)
CHLORIDE SERPL-SCNC: 105 MMOL/L — SIGNIFICANT CHANGE UP (ref 98–107)
CO2 SERPL-SCNC: 23 MMOL/L — SIGNIFICANT CHANGE UP (ref 22–31)
CREAT SERPL-MCNC: 0.5 MG/DL — SIGNIFICANT CHANGE UP (ref 0.5–1.3)
GLUCOSE SERPL-MCNC: 107 MG/DL — HIGH (ref 70–99)
MAGNESIUM SERPL-MCNC: 2.9 MG/DL — HIGH (ref 1.6–2.6)
POTASSIUM SERPL-MCNC: 3.8 MMOL/L — SIGNIFICANT CHANGE UP (ref 3.5–5.3)
POTASSIUM SERPL-SCNC: 3.8 MMOL/L — SIGNIFICANT CHANGE UP (ref 3.5–5.3)
SODIUM SERPL-SCNC: 141 MMOL/L — SIGNIFICANT CHANGE UP (ref 135–145)

## 2017-08-20 PROCEDURE — 71010: CPT | Mod: 26

## 2017-08-20 RX ORDER — OXYCODONE HYDROCHLORIDE 5 MG/1
1 TABLET ORAL
Qty: 30 | Refills: 0 | OUTPATIENT
Start: 2017-08-20 | End: 2017-08-25

## 2017-08-20 RX ORDER — TRAMADOL HYDROCHLORIDE 50 MG/1
1 TABLET ORAL
Qty: 30 | Refills: 0 | OUTPATIENT
Start: 2017-08-20 | End: 2017-08-25

## 2017-08-20 RX ORDER — MAGNESIUM SULFATE 500 MG/ML
2 VIAL (ML) INJECTION ONCE
Qty: 0 | Refills: 0 | Status: COMPLETED | OUTPATIENT
Start: 2017-08-20 | End: 2017-08-20

## 2017-08-20 RX ADMIN — Medication 650 MILLIGRAM(S): at 08:27

## 2017-08-20 RX ADMIN — Medication 50 GRAM(S): at 01:03

## 2017-08-20 RX ADMIN — Medication 100 MILLIGRAM(S): at 05:59

## 2017-08-20 RX ADMIN — HEPARIN SODIUM 5000 UNIT(S): 5000 INJECTION INTRAVENOUS; SUBCUTANEOUS at 05:59

## 2017-08-20 RX ADMIN — ATENOLOL 25 MILLIGRAM(S): 25 TABLET ORAL at 05:59

## 2017-08-20 RX ADMIN — FAMOTIDINE 20 MILLIGRAM(S): 10 INJECTION INTRAVENOUS at 05:59

## 2017-08-20 NOTE — PROVIDER CONTACT NOTE (OTHER) - SITUATION
as per tele tech, patient having frequent/bursts of PAC's with elevated HR >120bpm sustaining for a few seconds.

## 2017-08-20 NOTE — PROVIDER CONTACT NOTE (OTHER) - ACTION/TREATMENT ORDERED:
order to give 2 grams Magnesium sulfate. as per order by ankit Clarke to give 2 grams Magnesium sulfate. as per order by ankit Clarke to give 2 grams Magnesium sulfate IVPB.

## 2017-08-29 ENCOUNTER — OUTPATIENT (OUTPATIENT)
Dept: OUTPATIENT SERVICES | Facility: HOSPITAL | Age: 67
LOS: 1 days | End: 2017-08-29
Payer: MEDICARE

## 2017-08-29 VITALS
OXYGEN SATURATION: 97 % | WEIGHT: 113.98 LBS | TEMPERATURE: 99 F | RESPIRATION RATE: 16 BRPM | HEART RATE: 66 BPM | DIASTOLIC BLOOD PRESSURE: 68 MMHG | SYSTOLIC BLOOD PRESSURE: 98 MMHG | HEIGHT: 61 IN

## 2017-08-29 DIAGNOSIS — N83.9 NONINFLAMMATORY DISORDER OF OVARY, FALLOPIAN TUBE AND BROAD LIGAMENT, UNSPECIFIED: ICD-10-CM

## 2017-08-29 DIAGNOSIS — C56.9 MALIGNANT NEOPLASM OF UNSPECIFIED OVARY: ICD-10-CM

## 2017-08-29 DIAGNOSIS — Z90.49 ACQUIRED ABSENCE OF OTHER SPECIFIED PARTS OF DIGESTIVE TRACT: Chronic | ICD-10-CM

## 2017-08-29 DIAGNOSIS — Z90.722 ACQUIRED ABSENCE OF OVARIES, BILATERAL: Chronic | ICD-10-CM

## 2017-08-29 DIAGNOSIS — Z98.890 OTHER SPECIFIED POSTPROCEDURAL STATES: Chronic | ICD-10-CM

## 2017-08-29 DIAGNOSIS — Z90.710 ACQUIRED ABSENCE OF BOTH CERVIX AND UTERUS: Chronic | ICD-10-CM

## 2017-08-29 LAB
ALBUMIN SERPL ELPH-MCNC: 4 G/DL — SIGNIFICANT CHANGE UP (ref 3.3–5)
ALP SERPL-CCNC: 68 U/L — SIGNIFICANT CHANGE UP (ref 40–120)
ALT FLD-CCNC: 20 U/L — SIGNIFICANT CHANGE UP (ref 4–33)
AST SERPL-CCNC: 19 U/L — SIGNIFICANT CHANGE UP (ref 4–32)
BILIRUB SERPL-MCNC: 0.2 MG/DL — SIGNIFICANT CHANGE UP (ref 0.2–1.2)
BLD GP AB SCN SERPL QL: NEGATIVE — SIGNIFICANT CHANGE UP
BUN SERPL-MCNC: 14 MG/DL — SIGNIFICANT CHANGE UP (ref 7–23)
CALCIUM SERPL-MCNC: 9.2 MG/DL — SIGNIFICANT CHANGE UP (ref 8.4–10.5)
CHLORIDE SERPL-SCNC: 105 MMOL/L — SIGNIFICANT CHANGE UP (ref 98–107)
CO2 SERPL-SCNC: 26 MMOL/L — SIGNIFICANT CHANGE UP (ref 22–31)
CREAT SERPL-MCNC: 0.63 MG/DL — SIGNIFICANT CHANGE UP (ref 0.5–1.3)
GLUCOSE SERPL-MCNC: 90 MG/DL — SIGNIFICANT CHANGE UP (ref 70–99)
HCT VFR BLD CALC: 34.6 % — SIGNIFICANT CHANGE UP (ref 34.5–45)
HGB BLD-MCNC: 11.3 G/DL — LOW (ref 11.5–15.5)
MCHC RBC-ENTMCNC: 30.1 PG — SIGNIFICANT CHANGE UP (ref 27–34)
MCHC RBC-ENTMCNC: 32.7 % — SIGNIFICANT CHANGE UP (ref 32–36)
MCV RBC AUTO: 92 FL — SIGNIFICANT CHANGE UP (ref 80–100)
NRBC # FLD: 0 — SIGNIFICANT CHANGE UP
PLATELET # BLD AUTO: 357 K/UL — SIGNIFICANT CHANGE UP (ref 150–400)
PMV BLD: 9.7 FL — SIGNIFICANT CHANGE UP (ref 7–13)
POTASSIUM SERPL-MCNC: 4 MMOL/L — SIGNIFICANT CHANGE UP (ref 3.5–5.3)
POTASSIUM SERPL-SCNC: 4 MMOL/L — SIGNIFICANT CHANGE UP (ref 3.5–5.3)
PROT SERPL-MCNC: 7.7 G/DL — SIGNIFICANT CHANGE UP (ref 6–8.3)
RBC # BLD: 3.76 M/UL — LOW (ref 3.8–5.2)
RBC # FLD: 12.1 % — SIGNIFICANT CHANGE UP (ref 10.3–14.5)
RH IG SCN BLD-IMP: POSITIVE — SIGNIFICANT CHANGE UP
SODIUM SERPL-SCNC: 144 MMOL/L — SIGNIFICANT CHANGE UP (ref 135–145)
WBC # BLD: 6.97 K/UL — SIGNIFICANT CHANGE UP (ref 3.8–10.5)
WBC # FLD AUTO: 6.97 K/UL — SIGNIFICANT CHANGE UP (ref 3.8–10.5)

## 2017-08-29 PROCEDURE — 93010 ELECTROCARDIOGRAM REPORT: CPT

## 2017-08-29 RX ORDER — SODIUM CHLORIDE 9 MG/ML
3 INJECTION INTRAMUSCULAR; INTRAVENOUS; SUBCUTANEOUS EVERY 8 HOURS
Qty: 0 | Refills: 0 | Status: DISCONTINUED | OUTPATIENT
Start: 2017-09-15 | End: 2017-09-23

## 2017-08-29 RX ORDER — SODIUM CHLORIDE 9 MG/ML
1000 INJECTION, SOLUTION INTRAVENOUS
Qty: 0 | Refills: 0 | Status: DISCONTINUED | OUTPATIENT
Start: 2017-09-15 | End: 2017-09-15

## 2017-08-29 NOTE — H&P PST ADULT - NEGATIVE MUSCULOSKELETAL SYMPTOMS
no muscle cramps/no muscle weakness/no arthralgia/no arthritis/no stiffness/no neck pain/no back pain

## 2017-08-29 NOTE — H&P PST ADULT - RESPIRATORY COMMENTS
Surgical scars left lateral abdomen covered with steri strips - no redness or swelling of tissue - no drainage noted

## 2017-08-29 NOTE — H&P PST ADULT - RESPIRATORY AND THORAX COMMENTS
hx of Hx of left upper lobe resection for lung ca 8/17/17 - pt healing well w/o infection or drainage but c/o of dyspnea with exertion

## 2017-08-29 NOTE — H&P PST ADULT - NEGATIVE NEUROLOGICAL SYMPTOMS
no syncope/no tremors/no transient paralysis/no weakness/no paresthesias/no difficulty walking/no confusion/no generalized seizures/no focal seizures

## 2017-08-29 NOTE — H&P PST ADULT - PSH
H/O bilateral salpingo-oophorectomy  7/21/17 - ovarian ca  History of cholecystectomy    History of lung surgery  8/16/17 - Left = lung ca

## 2017-08-29 NOTE — H&P PST ADULT - HISTORY OF PRESENT ILLNESS
66y/o female with recent  S/p b/l salpingo oophorectomy tumor debulking on 7/21/17.  Pt states few months ago c/o cough with hemoptysis.  Cxr done and was followed with ct scan and pet scan  and dx with lung & ovarian cancer.  Now scheduled for left VAT, robotic assisted left upper lobectomy on 8/16/2017. 66y/o Mandarin speaking female S/P  b/l salpingo oophorectomy tumor debulking on 7/21/17 and Left VAT and left upper lobectomy with Dr Sol 8/16/17 for lung ca - is now scheduled for Total Abdominal Hysterectomty Tumor Debulking Possible Bowel Resection with Dr Brooks. Pt was Dx with abdominal mass that is now being removed following lung surgery. Pt  c/o of LLQ pain 3-4/10 that continues since surgery in 7/17 - pt surgical scars are healing and covered with steri strips - no redness or drainage noted.

## 2017-08-29 NOTE — H&P PST ADULT - ATTENDING COMMENTS
Plan for exploratory laparotomy, total abdominal hysterectomy/right salpingo-oophorectomy/tumor debulking/possible bowel resection/possible ostomy.  Discussed risks including bleeding/blood transfusion/infection/injury to bowel/bladder/ureter/blood vessels.

## 2017-08-29 NOTE — H&P PST ADULT - PMH
Abnormal lung field    GERD (gastroesophageal reflux disease)    HTN (hypertension)    Malignant neoplasm  left lung  Noninflammatory disorder of ovary, fallopian tube and broad ligament, unspecified GERD (gastroesophageal reflux disease)    HTN (hypertension)    Malignant neoplasm  left lung  Noninflammatory disorder of ovary, fallopian tube and broad ligament, unspecified    Ovarian ca

## 2017-08-29 NOTE — H&P PST ADULT - NEUROLOGICAL DETAILS
normal strength/responds to pain/responds to verbal commands/alert and oriented x 3/sensation intact

## 2017-08-29 NOTE — H&P PST ADULT - PROBLEM SELECTOR PLAN 1
Pt given pre-op instructions and Famotidine and Chlorhexidine and verbalized understanding.   Pt to get MC from PCP - forms given.

## 2017-08-29 NOTE — H&P PST ADULT - GIT GEN HX ROS MEA POS PC
abdominal pain/hx of Left LQ pain continues from surgery 7/17 abdominal pain/hx of Left LQ pain 3-4/10 continues from surgery 7/17

## 2017-08-29 NOTE — H&P PST ADULT - NSANTHOSAYNRD_GEN_A_CORE
No. HIGINIO screening performed.  STOP BANG Legend: 0-2 = LOW Risk; 3-4 = INTERMEDIATE Risk; 5-8 = HIGH Risk/denies

## 2017-08-29 NOTE — H&P PST ADULT - GENERAL COMMENTS
Pt is postop surgery for left lung resection 8/16/17 - pt denies fever Pt is postop surgery for left lung resection 8/16/17 - pt denies fever or infection

## 2017-09-05 ENCOUNTER — APPOINTMENT (OUTPATIENT)
Dept: THORACIC SURGERY | Facility: CLINIC | Age: 67
End: 2017-09-05
Payer: MEDICARE

## 2017-09-05 VITALS
RESPIRATION RATE: 16 BRPM | SYSTOLIC BLOOD PRESSURE: 119 MMHG | DIASTOLIC BLOOD PRESSURE: 78 MMHG | WEIGHT: 114 LBS | HEIGHT: 61 IN | BODY MASS INDEX: 21.52 KG/M2 | OXYGEN SATURATION: 97 % | HEART RATE: 72 BPM | TEMPERATURE: 98.9 F

## 2017-09-05 PROCEDURE — 99024 POSTOP FOLLOW-UP VISIT: CPT

## 2017-09-05 NOTE — DISCUSSION/SUMMARY
[Doing Well] : is doing well [Excellent Pain Control] : has excellent pain control [No Sign of Infection] : is showing no signs of infection [3] : 3 [Remove Sutures/Staples] : removed sutures/staples

## 2017-09-09 NOTE — CONSULT LETTER
[Dear  ___] : Dear  [unfilled], [Consult Letter:] : I had the pleasure of evaluating your patient, [unfilled]. [( Thank you for referring [unfilled] for consultation for _____ )] : Thank you for referring [unfilled] for consultation for [unfilled] [Please see my note below.] : Please see my note below. [Consult Closing:] : Thank you very much for allowing me to participate in the care of this patient.  If you have any questions, please do not hesitate to contact me. [Sincerely,] : Sincerely, [Ken Sol MD, MPH] : Ken Sol MD, MPH [System Director of Thoracic Surgery] : System Director of Thoracic Surgery [Director of [facility] [place]] : Director of [facility] [place] [Professor Cardiovascular & Thoracic Surgery] : Professor Cardiovascular & Thoracic Surgery  [[facility]] : [facility] [DrLewis  ___] : Dr. JOHNSON [FreeTextEntry2] : Liliana Sanchez (PCP) \bharath Brooks (OB/GYN)

## 2017-09-09 NOTE — REASON FOR VISIT
[Spouse] : spouse [Other: _____] : [unfilled] [de-identified] : Lt VATS Robotic-assisted LULobectomy, MLND [de-identified] : 8/16/17

## 2017-09-09 NOTE — ASSESSMENT
[FreeTextEntry1] : 66 y/o F, never smoker, w/ hx of HTN, DM, and Lung CA.\par \par CT Chest on 6/27/17:\par - a 2.3 x 2cm FERMÍN mass, irregular w/ adjacent linear changes extending to the posterior pleura\par - a 5mm LLL nodule, nonspecific\par - small Rt pleural effusion\par \par PFT 7/1/2017: FVC 1.96 (78%) FEV1 1.52 (76%) DLCO 15.76 (80%\par \par PET Scan 7/1/2017 revealed:\par - 23 mm SUV 2.6 posterior FERMÍN nodule \par - 42 x 31 mm SUV 7.9 left ovarian lesion consistent with malignance\par - 2 adjacent left common iliac lymph nodes measuring up to 8 mm SUV 2.2 suspicious for metastases\par - 6 mm SUV 3.1 perihepatic implant along the surface of the right hepatic lobe consistent with metastases\par - 5 mm LLL along the major fissure is stable\par \par Now 2mo s/p FB w/ BAL of the LLL bronchus, Navigational Bronch, FNA of the FERMÍN mass, Brushing of the FERMÍN lung mass and biopsy of the FERMÍN mass on 7/6/2017. Path revealed NSCLC, favoring AdenoCA, +TTF-1, +Naspin.\par LLL Bronchoalveolar Lavage - Negative for Malignant Cells\par FERMÍN Bronchoalveolar Lavage - Negative for Malignant Cells\par Respiratory Culture: No growth; AFB (-); Culture (-) Yeast and Molds; GMS No organisms.\par \par Pt is also s/p Lt ovarian tumor excision and fallopian tube excision on 7/21/17 by Dr. Tiffanie Brooks. 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 Path revealed AdenoCA, acinar (90%) and solid (10%), 2 x 1.8 x 0.5cm, margins, visceral pleura and LNs negative, pT1aN0 Stg IA.\par \par CXR 9/2/17 - small to moderate bilateral pleural effusions. Admits to increased sensation to Lt anterior chest wall and SOB, not using Percocet, using Tylenol.\par \par I have reviewed the patient's medical records and diagnostic images at time of this office consultation and have made the following recommendation:\par 1. Pt needs to f/u w/ GYN, Pt is stable to go for ovarian cancer removal, no need for further treatment in regards to her early staged lung cancer other than surveillance CTs.\par 2. RTC in 2-3mo w/ CXR (after ovarian Sx)\par 3. Recommended Motrin 600mg Q6-8hours, instead of Tylenol

## 2017-09-09 NOTE — PHYSICAL EXAM
[] : no respiratory distress [Auscultation Breath Sounds / Voice Sounds] : lungs were clear to auscultation bilaterally [Heart Rate And Rhythm] : heart rate was normal and rhythm regular [Heart Sounds] : normal S1 and S2 [Heart Sounds Gallop] : no gallops [Murmurs] : no murmurs [Heart Sounds Pericardial Friction Rub] : no pericardial rub [Clean] : clean [Dry] : dry [Healing Well] : healing well [No Edema] : no edema [Bleeding] : no active bleeding [Foul Odor] : no foul smell [Purulent Drainage] : no purulent drainage [Serosanguinous Drainage] : no serosanguinous drainage [Erythema] : not erythematous [Warm] : not warm [Tender] : not tender

## 2017-09-14 NOTE — ASU PATIENT PROFILE, ADULT - PMH
GERD (gastroesophageal reflux disease)    HTN (hypertension)    Malignant neoplasm  left lung  Noninflammatory disorder of ovary, fallopian tube and broad ligament, unspecified    Ovarian ca

## 2017-09-15 ENCOUNTER — RESULT REVIEW (OUTPATIENT)
Age: 67
End: 2017-09-15

## 2017-09-15 ENCOUNTER — INPATIENT (INPATIENT)
Facility: HOSPITAL | Age: 67
LOS: 7 days | Discharge: HOME | End: 2017-09-23
Attending: OBSTETRICS & GYNECOLOGY | Admitting: OBSTETRICS & GYNECOLOGY
Payer: MEDICARE

## 2017-09-15 ENCOUNTER — APPOINTMENT (OUTPATIENT)
Dept: GYNECOLOGIC ONCOLOGY | Facility: HOSPITAL | Age: 67
End: 2017-09-15

## 2017-09-15 VITALS
RESPIRATION RATE: 16 BRPM | TEMPERATURE: 98 F | HEART RATE: 72 BPM | OXYGEN SATURATION: 98 % | HEIGHT: 61 IN | DIASTOLIC BLOOD PRESSURE: 74 MMHG | SYSTOLIC BLOOD PRESSURE: 120 MMHG | WEIGHT: 113.98 LBS

## 2017-09-15 DIAGNOSIS — N83.9 NONINFLAMMATORY DISORDER OF OVARY, FALLOPIAN TUBE AND BROAD LIGAMENT, UNSPECIFIED: ICD-10-CM

## 2017-09-15 DIAGNOSIS — Z98.890 OTHER SPECIFIED POSTPROCEDURAL STATES: Chronic | ICD-10-CM

## 2017-09-15 DIAGNOSIS — Z90.722 ACQUIRED ABSENCE OF OVARIES, BILATERAL: Chronic | ICD-10-CM

## 2017-09-15 DIAGNOSIS — Z90.49 ACQUIRED ABSENCE OF OTHER SPECIFIED PARTS OF DIGESTIVE TRACT: Chronic | ICD-10-CM

## 2017-09-15 LAB
BASE EXCESS BLDA CALC-SCNC: -1.1 MMOL/L — SIGNIFICANT CHANGE UP
BASE EXCESS BLDA CALC-SCNC: -1.9 MMOL/L — SIGNIFICANT CHANGE UP
BASE EXCESS BLDA CALC-SCNC: -4.5 MMOL/L — SIGNIFICANT CHANGE UP
BASE EXCESS BLDA CALC-SCNC: -4.6 MMOL/L — SIGNIFICANT CHANGE UP
CA-I BLDA-SCNC: 1.11 MMOL/L — LOW (ref 1.15–1.29)
CA-I BLDA-SCNC: 1.13 MMOL/L — LOW (ref 1.15–1.29)
CA-I BLDA-SCNC: 1.13 MMOL/L — LOW (ref 1.15–1.29)
CA-I BLDA-SCNC: 1.19 MMOL/L — SIGNIFICANT CHANGE UP (ref 1.15–1.29)
GLUCOSE BLDA-MCNC: 130 MG/DL — HIGH (ref 70–99)
GLUCOSE BLDA-MCNC: 212 MG/DL — HIGH (ref 70–99)
GLUCOSE BLDA-MCNC: 233 MG/DL — HIGH (ref 70–99)
GLUCOSE BLDA-MCNC: 246 MG/DL — HIGH (ref 70–99)
HCO3 BLDA-SCNC: 20 MMOL/L — LOW (ref 22–26)
HCO3 BLDA-SCNC: 21 MMOL/L — LOW (ref 22–26)
HCO3 BLDA-SCNC: 23 MMOL/L — SIGNIFICANT CHANGE UP (ref 22–26)
HCO3 BLDA-SCNC: 24 MMOL/L — SIGNIFICANT CHANGE UP (ref 22–26)
HCT VFR BLDA CALC: 25.9 % — LOW (ref 34.5–46.5)
HCT VFR BLDA CALC: 27.1 % — LOW (ref 34.5–46.5)
HCT VFR BLDA CALC: 30.4 % — LOW (ref 34.5–46.5)
HCT VFR BLDA CALC: 30.6 % — LOW (ref 34.5–46.5)
HGB BLDA-MCNC: 8.3 G/DL — LOW (ref 11.5–15.5)
HGB BLDA-MCNC: 8.7 G/DL — LOW (ref 11.5–15.5)
HGB BLDA-MCNC: 9.8 G/DL — LOW (ref 11.5–15.5)
HGB BLDA-MCNC: 9.9 G/DL — LOW (ref 11.5–15.5)
PCO2 BLDA: 38 MMHG — SIGNIFICANT CHANGE UP (ref 32–48)
PCO2 BLDA: 39 MMHG — SIGNIFICANT CHANGE UP (ref 32–48)
PCO2 BLDA: 41 MMHG — SIGNIFICANT CHANGE UP (ref 32–48)
PCO2 BLDA: 43 MMHG — SIGNIFICANT CHANGE UP (ref 32–48)
PH BLDA: 7.31 PH — LOW (ref 7.35–7.45)
PH BLDA: 7.32 PH — LOW (ref 7.35–7.45)
PH BLDA: 7.39 PH — SIGNIFICANT CHANGE UP (ref 7.35–7.45)
PH BLDA: 7.39 PH — SIGNIFICANT CHANGE UP (ref 7.35–7.45)
PO2 BLDA: 153 MMHG — HIGH (ref 83–108)
PO2 BLDA: 213 MMHG — HIGH (ref 83–108)
PO2 BLDA: 237 MMHG — HIGH (ref 83–108)
PO2 BLDA: 238 MMHG — HIGH (ref 83–108)
POTASSIUM BLDA-SCNC: 3.5 MMOL/L — SIGNIFICANT CHANGE UP (ref 3.4–4.5)
POTASSIUM BLDA-SCNC: 3.6 MMOL/L — SIGNIFICANT CHANGE UP (ref 3.4–4.5)
SAO2 % BLDA: 99.1 % — HIGH (ref 95–99)
SAO2 % BLDA: 99.4 % — HIGH (ref 95–99)
SAO2 % BLDA: 99.4 % — HIGH (ref 95–99)
SAO2 % BLDA: 99.6 % — HIGH (ref 95–99)
SODIUM BLDA-SCNC: 136 MMOL/L — SIGNIFICANT CHANGE UP (ref 136–146)
SODIUM BLDA-SCNC: 136 MMOL/L — SIGNIFICANT CHANGE UP (ref 136–146)
SODIUM BLDA-SCNC: 137 MMOL/L — SIGNIFICANT CHANGE UP (ref 136–146)
SODIUM BLDA-SCNC: 140 MMOL/L — SIGNIFICANT CHANGE UP (ref 136–146)

## 2017-09-15 PROCEDURE — 88305 TISSUE EXAM BY PATHOLOGIST: CPT | Mod: 26,59

## 2017-09-15 PROCEDURE — 49204: CPT | Mod: 59

## 2017-09-15 PROCEDURE — 88112 CYTOPATH CELL ENHANCE TECH: CPT | Mod: 26

## 2017-09-15 PROCEDURE — 88305 TISSUE EXAM BY PATHOLOGIST: CPT | Mod: 26

## 2017-09-15 PROCEDURE — 58953 TAH RAD DISSECT FOR DEBULK: CPT | Mod: 58,GC

## 2017-09-15 PROCEDURE — 88309 TISSUE EXAM BY PATHOLOGIST: CPT | Mod: 26

## 2017-09-15 PROCEDURE — 88342 IMHCHEM/IMCYTCHM 1ST ANTB: CPT | Mod: 26

## 2017-09-15 PROCEDURE — 88341 IMHCHEM/IMCYTCHM EA ADD ANTB: CPT | Mod: 26

## 2017-09-15 PROCEDURE — 88307 TISSUE EXAM BY PATHOLOGIST: CPT | Mod: 26

## 2017-09-15 PROCEDURE — 44139 MOBILIZATION OF COLON: CPT

## 2017-09-15 PROCEDURE — 44145 PARTIAL REMOVAL OF COLON: CPT

## 2017-09-15 RX ORDER — INSULIN LISPRO 100/ML
VIAL (ML) SUBCUTANEOUS EVERY 6 HOURS
Qty: 0 | Refills: 0 | Status: DISCONTINUED | OUTPATIENT
Start: 2017-09-15 | End: 2017-09-18

## 2017-09-15 RX ORDER — HEPARIN SODIUM 5000 [USP'U]/ML
5000 INJECTION INTRAVENOUS; SUBCUTANEOUS EVERY 8 HOURS
Qty: 0 | Refills: 0 | Status: DISCONTINUED | OUTPATIENT
Start: 2017-09-15 | End: 2017-09-18

## 2017-09-15 RX ORDER — HYDROMORPHONE HYDROCHLORIDE 2 MG/ML
30 INJECTION INTRAMUSCULAR; INTRAVENOUS; SUBCUTANEOUS
Qty: 0 | Refills: 0 | Status: DISCONTINUED | OUTPATIENT
Start: 2017-09-15 | End: 2017-09-18

## 2017-09-15 RX ORDER — MEPERIDINE HYDROCHLORIDE 50 MG/ML
12.5 INJECTION INTRAMUSCULAR; INTRAVENOUS; SUBCUTANEOUS
Qty: 0 | Refills: 0 | Status: DISCONTINUED | OUTPATIENT
Start: 2017-09-15 | End: 2017-09-18

## 2017-09-15 RX ORDER — METOPROLOL TARTRATE 50 MG
5 TABLET ORAL EVERY 6 HOURS
Qty: 0 | Refills: 0 | Status: DISCONTINUED | OUTPATIENT
Start: 2017-09-15 | End: 2017-09-21

## 2017-09-15 RX ORDER — DIPHENHYDRAMINE HCL 50 MG
12.5 CAPSULE ORAL EVERY 4 HOURS
Qty: 0 | Refills: 0 | Status: DISCONTINUED | OUTPATIENT
Start: 2017-09-15 | End: 2017-09-18

## 2017-09-15 RX ORDER — NALOXONE HYDROCHLORIDE 4 MG/.1ML
0.1 SPRAY NASAL
Qty: 0 | Refills: 0 | Status: DISCONTINUED | OUTPATIENT
Start: 2017-09-15 | End: 2017-09-23

## 2017-09-15 RX ORDER — HEPARIN SODIUM 5000 [USP'U]/ML
5000 INJECTION INTRAVENOUS; SUBCUTANEOUS ONCE
Qty: 0 | Refills: 0 | Status: COMPLETED | OUTPATIENT
Start: 2017-09-15 | End: 2017-09-15

## 2017-09-15 RX ORDER — ONDANSETRON 8 MG/1
4 TABLET, FILM COATED ORAL EVERY 6 HOURS
Qty: 0 | Refills: 0 | Status: DISCONTINUED | OUTPATIENT
Start: 2017-09-15 | End: 2017-09-23

## 2017-09-15 RX ORDER — HYDROMORPHONE HYDROCHLORIDE 2 MG/ML
0.4 INJECTION INTRAMUSCULAR; INTRAVENOUS; SUBCUTANEOUS
Qty: 0 | Refills: 0 | Status: DISCONTINUED | OUTPATIENT
Start: 2017-09-15 | End: 2017-09-18

## 2017-09-15 RX ORDER — HYDROMORPHONE HYDROCHLORIDE 2 MG/ML
0.5 INJECTION INTRAMUSCULAR; INTRAVENOUS; SUBCUTANEOUS
Qty: 0 | Refills: 0 | Status: DISCONTINUED | OUTPATIENT
Start: 2017-09-15 | End: 2017-09-18

## 2017-09-15 RX ORDER — HYDROMORPHONE HYDROCHLORIDE 2 MG/ML
0.8 INJECTION INTRAMUSCULAR; INTRAVENOUS; SUBCUTANEOUS
Qty: 0 | Refills: 0 | Status: DISCONTINUED | OUTPATIENT
Start: 2017-09-15 | End: 2017-09-18

## 2017-09-15 RX ORDER — SODIUM CHLORIDE 9 MG/ML
1000 INJECTION INTRAMUSCULAR; INTRAVENOUS; SUBCUTANEOUS
Qty: 0 | Refills: 0 | Status: DISCONTINUED | OUTPATIENT
Start: 2017-09-15 | End: 2017-09-20

## 2017-09-15 RX ADMIN — Medication 4: at 20:44

## 2017-09-15 RX ADMIN — HEPARIN SODIUM 5000 UNIT(S): 5000 INJECTION INTRAVENOUS; SUBCUTANEOUS at 21:09

## 2017-09-15 RX ADMIN — SODIUM CHLORIDE 3 MILLILITER(S): 9 INJECTION INTRAMUSCULAR; INTRAVENOUS; SUBCUTANEOUS at 22:06

## 2017-09-15 RX ADMIN — HEPARIN SODIUM 5000 UNIT(S): 5000 INJECTION INTRAVENOUS; SUBCUTANEOUS at 09:22

## 2017-09-15 RX ADMIN — SODIUM CHLORIDE 100 MILLILITER(S): 9 INJECTION INTRAMUSCULAR; INTRAVENOUS; SUBCUTANEOUS at 20:47

## 2017-09-15 RX ADMIN — HYDROMORPHONE HYDROCHLORIDE 30 MILLILITER(S): 2 INJECTION INTRAMUSCULAR; INTRAVENOUS; SUBCUTANEOUS at 22:08

## 2017-09-15 RX ADMIN — SODIUM CHLORIDE 30 MILLILITER(S): 9 INJECTION, SOLUTION INTRAVENOUS at 09:22

## 2017-09-15 NOTE — BRIEF OPERATIVE NOTE - POST-OP DX
Carcinomatosis  09/15/2017    Active  Tiffanie Brooks  Malignant neoplasm of left ovary  09/15/2017    Active  Tiffanie Brooks

## 2017-09-15 NOTE — BRIEF OPERATIVE NOTE - OPERATION/FINDINGS
Extensive carcinomatosis in pelvis with tumor involvement of bladder serosa, left pelvic sidewall and rectosigmoid colon.  Multiple omental masses in infracolic and supracolic omentum extending to hepatic flexure  <5mm nodules on right diaphragm peritoneum  5cm anterior abdominal wall mass involving fascia in right lower quadrant port site  2cm anterior abdominal mass involving the fascia in left lower quadrant port site

## 2017-09-15 NOTE — ASU PREOP CHECKLIST - 1.
SCIP measures initiated form in chart. Patient primary language is Mandarin. refused  service phone used, spoke with [employer] ID# [id number].Requested to us daughter in law Nilam groves to translate.

## 2017-09-15 NOTE — BRIEF OPERATIVE NOTE - SPECIMENS
uterus, cervix, right ovary and fallopian tube, omentum, tumor and portion of ascending colon, appendix with tumor, recto-sigmoid resection, tumor from sigmoid colon, proximal and distal donuts, tumor from bladder serosa, tumor from greater curvature of stomach, tumor from left pelvic sidewall, right abdominal wall mass, and left anterior abdominal wall mass

## 2017-09-15 NOTE — BRIEF OPERATIVE NOTE - PROCEDURE
<<-----Click on this checkbox to enter Procedure Hysterectomy, total, abdominal, with radical dissection for tumor  09/15/2017    Active  [facility]

## 2017-09-16 ENCOUNTER — TRANSCRIPTION ENCOUNTER (OUTPATIENT)
Age: 67
End: 2017-09-16

## 2017-09-16 LAB
BASOPHILS # BLD AUTO: 0.01 K/UL — SIGNIFICANT CHANGE UP (ref 0–0.2)
BASOPHILS NFR BLD AUTO: 0.1 % — SIGNIFICANT CHANGE UP (ref 0–2)
BUN SERPL-MCNC: 10 MG/DL — SIGNIFICANT CHANGE UP (ref 7–23)
BUN SERPL-MCNC: 10 MG/DL — SIGNIFICANT CHANGE UP (ref 7–23)
CALCIUM SERPL-MCNC: 7.7 MG/DL — LOW (ref 8.4–10.5)
CALCIUM SERPL-MCNC: 7.7 MG/DL — LOW (ref 8.4–10.5)
CHLORIDE SERPL-SCNC: 104 MMOL/L — SIGNIFICANT CHANGE UP (ref 98–107)
CHLORIDE SERPL-SCNC: 107 MMOL/L — SIGNIFICANT CHANGE UP (ref 98–107)
CO2 SERPL-SCNC: 20 MMOL/L — LOW (ref 22–31)
CO2 SERPL-SCNC: 21 MMOL/L — LOW (ref 22–31)
CREAT SERPL-MCNC: 0.54 MG/DL — SIGNIFICANT CHANGE UP (ref 0.5–1.3)
CREAT SERPL-MCNC: 0.84 MG/DL — SIGNIFICANT CHANGE UP (ref 0.5–1.3)
EOSINOPHIL # BLD AUTO: 0 K/UL — SIGNIFICANT CHANGE UP (ref 0–0.5)
EOSINOPHIL NFR BLD AUTO: 0 % — SIGNIFICANT CHANGE UP (ref 0–6)
GLUCOSE SERPL-MCNC: 158 MG/DL — HIGH (ref 70–99)
GLUCOSE SERPL-MCNC: 217 MG/DL — HIGH (ref 70–99)
HBA1C BLD-MCNC: 6.2 % — HIGH (ref 4–5.6)
HCT VFR BLD CALC: 33.6 % — LOW (ref 34.5–45)
HCT VFR BLD CALC: 36 % — SIGNIFICANT CHANGE UP (ref 34.5–45)
HGB BLD-MCNC: 11.6 G/DL — SIGNIFICANT CHANGE UP (ref 11.5–15.5)
HGB BLD-MCNC: 12 G/DL — SIGNIFICANT CHANGE UP (ref 11.5–15.5)
IMM GRANULOCYTES # BLD AUTO: 0.03 # — SIGNIFICANT CHANGE UP
IMM GRANULOCYTES NFR BLD AUTO: 0.2 % — SIGNIFICANT CHANGE UP (ref 0–1.5)
LYMPHOCYTES # BLD AUTO: 0.89 K/UL — LOW (ref 1–3.3)
LYMPHOCYTES # BLD AUTO: 6.4 % — LOW (ref 13–44)
MAGNESIUM SERPL-MCNC: 1.3 MG/DL — LOW (ref 1.6–2.6)
MAGNESIUM SERPL-MCNC: 1.3 MG/DL — LOW (ref 1.6–2.6)
MCHC RBC-ENTMCNC: 29.3 PG — SIGNIFICANT CHANGE UP (ref 27–34)
MCHC RBC-ENTMCNC: 30.1 PG — SIGNIFICANT CHANGE UP (ref 27–34)
MCHC RBC-ENTMCNC: 33.3 % — SIGNIFICANT CHANGE UP (ref 32–36)
MCHC RBC-ENTMCNC: 34.5 % — SIGNIFICANT CHANGE UP (ref 32–36)
MCV RBC AUTO: 87 FL — SIGNIFICANT CHANGE UP (ref 80–100)
MCV RBC AUTO: 87.8 FL — SIGNIFICANT CHANGE UP (ref 80–100)
MONOCYTES # BLD AUTO: 1.2 K/UL — HIGH (ref 0–0.9)
MONOCYTES NFR BLD AUTO: 8.7 % — SIGNIFICANT CHANGE UP (ref 2–14)
NEUTROPHILS # BLD AUTO: 11.67 K/UL — HIGH (ref 1.8–7.4)
NEUTROPHILS NFR BLD AUTO: 84.6 % — HIGH (ref 43–77)
NRBC # FLD: 0 — SIGNIFICANT CHANGE UP
NRBC # FLD: 0 — SIGNIFICANT CHANGE UP
PHOSPHATE SERPL-MCNC: 4.1 MG/DL — SIGNIFICANT CHANGE UP (ref 2.5–4.5)
PHOSPHATE SERPL-MCNC: 5.1 MG/DL — HIGH (ref 2.5–4.5)
PLATELET # BLD AUTO: 153 K/UL — SIGNIFICANT CHANGE UP (ref 150–400)
PLATELET # BLD AUTO: 194 K/UL — SIGNIFICANT CHANGE UP (ref 150–400)
PMV BLD: 10 FL — SIGNIFICANT CHANGE UP (ref 7–13)
PMV BLD: 11.1 FL — SIGNIFICANT CHANGE UP (ref 7–13)
POTASSIUM SERPL-MCNC: 3.9 MMOL/L — SIGNIFICANT CHANGE UP (ref 3.5–5.3)
POTASSIUM SERPL-MCNC: 4.4 MMOL/L — SIGNIFICANT CHANGE UP (ref 3.5–5.3)
POTASSIUM SERPL-SCNC: 3.9 MMOL/L — SIGNIFICANT CHANGE UP (ref 3.5–5.3)
POTASSIUM SERPL-SCNC: 4.4 MMOL/L — SIGNIFICANT CHANGE UP (ref 3.5–5.3)
RBC # BLD: 3.86 M/UL — SIGNIFICANT CHANGE UP (ref 3.8–5.2)
RBC # BLD: 4.1 M/UL — SIGNIFICANT CHANGE UP (ref 3.8–5.2)
RBC # FLD: 12.6 % — SIGNIFICANT CHANGE UP (ref 10.3–14.5)
RBC # FLD: 13 % — SIGNIFICANT CHANGE UP (ref 10.3–14.5)
SODIUM SERPL-SCNC: 140 MMOL/L — SIGNIFICANT CHANGE UP (ref 135–145)
SODIUM SERPL-SCNC: 141 MMOL/L — SIGNIFICANT CHANGE UP (ref 135–145)
WBC # BLD: 13.17 K/UL — HIGH (ref 3.8–10.5)
WBC # BLD: 13.8 K/UL — HIGH (ref 3.8–10.5)
WBC # FLD AUTO: 13.17 K/UL — HIGH (ref 3.8–10.5)
WBC # FLD AUTO: 13.8 K/UL — HIGH (ref 3.8–10.5)

## 2017-09-16 RX ORDER — MAGNESIUM SULFATE 500 MG/ML
2 VIAL (ML) INJECTION ONCE
Qty: 0 | Refills: 0 | Status: COMPLETED | OUTPATIENT
Start: 2017-09-16 | End: 2017-09-16

## 2017-09-16 RX ORDER — INFLUENZA VIRUS VACCINE 15; 15; 15; 15 UG/.5ML; UG/.5ML; UG/.5ML; UG/.5ML
0.5 SUSPENSION INTRAMUSCULAR ONCE
Qty: 0 | Refills: 0 | Status: COMPLETED | OUTPATIENT
Start: 2017-09-16 | End: 2017-09-16

## 2017-09-16 RX ADMIN — HYDROMORPHONE HYDROCHLORIDE 30 MILLILITER(S): 2 INJECTION INTRAMUSCULAR; INTRAVENOUS; SUBCUTANEOUS at 08:23

## 2017-09-16 RX ADMIN — Medication 6: at 15:34

## 2017-09-16 RX ADMIN — Medication 5 MILLIGRAM(S): at 17:37

## 2017-09-16 RX ADMIN — SODIUM CHLORIDE 3 MILLILITER(S): 9 INJECTION INTRAMUSCULAR; INTRAVENOUS; SUBCUTANEOUS at 22:20

## 2017-09-16 RX ADMIN — HEPARIN SODIUM 5000 UNIT(S): 5000 INJECTION INTRAVENOUS; SUBCUTANEOUS at 05:25

## 2017-09-16 RX ADMIN — SODIUM CHLORIDE 100 MILLILITER(S): 9 INJECTION INTRAMUSCULAR; INTRAVENOUS; SUBCUTANEOUS at 22:20

## 2017-09-16 RX ADMIN — SODIUM CHLORIDE 3 MILLILITER(S): 9 INJECTION INTRAMUSCULAR; INTRAVENOUS; SUBCUTANEOUS at 11:57

## 2017-09-16 RX ADMIN — Medication 2: at 09:02

## 2017-09-16 RX ADMIN — HEPARIN SODIUM 5000 UNIT(S): 5000 INJECTION INTRAVENOUS; SUBCUTANEOUS at 22:20

## 2017-09-16 RX ADMIN — HYDROMORPHONE HYDROCHLORIDE 30 MILLILITER(S): 2 INJECTION INTRAMUSCULAR; INTRAVENOUS; SUBCUTANEOUS at 01:13

## 2017-09-16 RX ADMIN — Medication 2: at 03:14

## 2017-09-16 RX ADMIN — SODIUM CHLORIDE 100 MILLILITER(S): 9 INJECTION INTRAMUSCULAR; INTRAVENOUS; SUBCUTANEOUS at 18:52

## 2017-09-16 RX ADMIN — HYDROMORPHONE HYDROCHLORIDE 30 MILLILITER(S): 2 INJECTION INTRAMUSCULAR; INTRAVENOUS; SUBCUTANEOUS at 18:52

## 2017-09-16 RX ADMIN — Medication 5 MILLIGRAM(S): at 11:59

## 2017-09-16 RX ADMIN — Medication 50 GRAM(S): at 05:35

## 2017-09-16 RX ADMIN — HEPARIN SODIUM 5000 UNIT(S): 5000 INJECTION INTRAVENOUS; SUBCUTANEOUS at 14:05

## 2017-09-16 RX ADMIN — SODIUM CHLORIDE 3 MILLILITER(S): 9 INJECTION INTRAMUSCULAR; INTRAVENOUS; SUBCUTANEOUS at 05:26

## 2017-09-16 RX ADMIN — Medication 2: at 22:20

## 2017-09-16 NOTE — PATIENT PROFILE ADULT. - LANGUAGE ASSISTANCE NEEDED
No-Patient/Caregiver offered and refused free interpretation services./Nilam Flores (daughter in law at [facility] to interpret)

## 2017-09-16 NOTE — PROGRESS NOTE ADULT - SUBJECTIVE AND OBJECTIVE BOX
Anesthesia Pain Management Service    SUBJECTIVE: Patient is doing well with IV PCA and no significant problems reported.    Pain Scale Score	At rest: ___ 	With Activity: ___ 	[X ] Refer to charted pain scores    THERAPY:    [ ] IV PCA Morphine		[ ] 5 mg/mL	[ ] 1 mg/mL  [X ] IV PCA Hydromorphone	[ ] 5 mg/mL	[X ] 1 mg/mL  [ ] IV PCA Fentanyl		[ ] 50 micrograms/mL    Demand dose __0.2_ lockout __6_ (minutes) Continuous Rate _0__ Total: _3.2__  mg used (in past 24 hours)      MEDICATIONS  (STANDING):  sodium chloride 0.9% lock flush 3 milliLiter(s) IV Push every 8 hours  HYDROmorphone PCA (1 mG/mL) 30 milliLiter(s) PCA Continuous PCA Continuous  heparin  Injectable 5000 Unit(s) SubCutaneous every 8 hours  metoprolol Injectable 5 milliGRAM(s) IV Push every 6 hours  insulin lispro (HumaLOG) corrective regimen sliding scale   SubCutaneous every 6 hours  sodium chloride 0.9%. 1000 milliLiter(s) (100 mL/Hr) IV Continuous <Continuous>  influenza   Vaccine 0.5 milliLiter(s) IntraMuscular once    MEDICATIONS  (PRN):  HYDROmorphone PCA (1 mG/mL) Rescue Clinician Bolus 0.5 milliGRAM(s) IV Push every 15 minutes PRN for Pain Scale GREATER THAN 6  naloxone Injectable 0.1 milliGRAM(s) IV Push every 3 minutes PRN For ANY of the following changes in patient status:  A. RR LESS THAN 10 breaths per minute, B. Oxygen saturation LESS THAN 90%, C. Sedation score of 6  ondansetron Injectable 4 milliGRAM(s) IV Push every 6 hours PRN Nausea  diphenhydrAMINE   Injectable 12.5 milliGRAM(s) IV Push every 4 hours PRN Pruritus  HYDROmorphone  Injectable 0.8 milliGRAM(s) IV Push every 10 minutes PRN Severe Pain (7 - 10)  HYDROmorphone  Injectable 0.4 milliGRAM(s) IV Push every 10 minutes PRN Moderate Pain (4 - 6)  promethazine IVPB 6.25 milliGRAM(s) IV Intermittent once PRN Nausea and/or Vomiting  meperidine     Injectable 12.5 milliGRAM(s) IV Push every 10 minutes PRN Shivering      OBJECTIVE:    Sedation Score:	[ X] Alert	[ ] Drowsy 	[ ] Arousable	[ ] Asleep	[ ] Unresponsive    Side Effects:	[X ] None	[ ] Nausea	[ ] Vomiting	[ ] Pruritus  		[ ] Other:    Vital Signs Last 24 Hrs  T(C): 37 (16 Sep 2017 13:58), Max: 37.1 (15 Sep 2017 23:15)  T(F): 98.6 (16 Sep 2017 13:58), Max: 98.8 (15 Sep 2017 23:15)  HR: 94 (16 Sep 2017 13:58) (75 - 120)  BP: 128/76 (16 Sep 2017 13:58) (106/70 - 147/77)  BP(mean): --  RR: 16 (16 Sep 2017 13:58) (12 - 19)  SpO2: 98% (16 Sep 2017 13:58) (89% - 100%)    ASSESSMENT/ PLAN    Therapy to  be:	[ X] Continue   [ ] Discontinued   [ ] Change to prn Analgesics    Documentation and Verification of current medications:   [X] Done	[ ] Not done, not elligible    Comments:    Progress Note written now but Patient was seen earlier.

## 2017-09-16 NOTE — DISCHARGE NOTE ADULT - CARE PLAN
Principal Discharge DX:	Malignant neoplasm of ovary, unspecified laterality  Goal:	Wellness  Instructions for follow-up, activity and diet:	Regular diet as tolerated, regular activity as tolerated, no heavy lifting for first two weeks.  Nothing per vagina: no intercourse, tampons or douching.  Call your provider if you experience fevers, chills, worsening abdominal pain, inability to urinate or worsening vaginal bleeding.  Follow up with your provider in 2 weeks. Principal Discharge DX:	Malignant neoplasm of ovary, unspecified laterality  Goal:	Wellness  Instructions for follow-up, activity and diet:	Regular diet as tolerated, regular activity as tolerated, no heavy lifting for first two weeks.  Nothing per vagina: no intercourse, tampons or douching.  Call your provider if you experience fevers, chills, worsening abdominal pain, inability to urinate or worsening vaginal bleeding.  Follow up with your provider in 2 weeks.    You are being discharged with a drain in place.  Please drain the bulb daily and keep in on suction in between as instructed by your nurse.  Please see Dr Rossi in the office in one week for drain removal.    Please see Dr Brooks in the office on 10/2/17 at 11am Principal Discharge DX:	Malignant neoplasm of ovary, unspecified laterality  Goal:	Wellness  Instructions for follow-up, activity and diet:	Regular diet as tolerated, regular activity as tolerated, no heavy lifting for first two weeks.  Nothing per vagina: no intercourse, tampons or douching.  Call your provider if you experience fevers, chills, worsening abdominal pain, inability to urinate or worsening vaginal bleeding.    You are being discharged with a drain in place.  Please drain the bulb daily and keep in on suction in between as instructed by your nurse.  Please see Dr Rossi in the office in one week for drain removal.    Please see Dr Brooks in the office on 10/2/17 at 11am

## 2017-09-16 NOTE — PROGRESS NOTE ADULT - SUBJECTIVE AND OBJECTIVE BOX
SURGERY DAILY PROGRESS NOTE:     Overnight Events:  No acute events.   Complains of pain, with some relief from PCA.      Physical Exam  Vital Signs Last 24 Hrs  T(C): 36.4 (16 Sep 2017 09:00), Max: 37.1 (15 Sep 2017 23:15)  T(F): 97.6 (16 Sep 2017 09:00), Max: 98.8 (15 Sep 2017 23:15)  HR: 100 (16 Sep 2017 09:00) (75 - 100)  BP: 115/73 (16 Sep 2017 09:00) (106/70 - 147/77)  BP(mean): --  RR: 15 (16 Sep 2017 09:00) (12 - 19)  SpO2: 93% (16 Sep 2017 09:00) (93% - 100%)    Gen: NAD, awake  HEENT: normocephalic, atraumatic, no scleral icterus  CV: RRR  Pulm: non-labored respirations  Abd: Softly distended. Incisional tenderness. Dressing with min serosang staining. JPSS x2.   Ext: warm, no edema    I&O's Detail    15 Sep 2017 07:01  -  16 Sep 2017 07:00  --------------------------------------------------------  IN:    sodium chloride 0.9%.: 1100 mL  Total IN: 1100 mL    OUT:    Bulb: 24 mL    Bulb: 160 mL    Indwelling Catheter - Urethral: 675 mL  Total OUT: 859 mL    Total NET: 241 mL      16 Sep 2017 07:01  -  16 Sep 2017 09:34  --------------------------------------------------------  IN:  Total IN: 0 mL    OUT:    Bulb: 5 mL    Bulb: 10 mL    Indwelling Catheter - Urethral: 200 mL  Total OUT: 215 mL    Total NET: -215 mL          Labs:                        11.6   13.17 )-----------( 153      ( 16 Sep 2017 06:00 )             33.6     09-16    141  |  107  |  10  ----------------------------<  158<H>  3.9   |  21<L>  |  0.54    Ca    7.7<L>      16 Sep 2017 06:00  Phos  4.1     09-16  Mg     1.3     09-16          ABG - ( 15 Sep 2017 18:48 )  pH: 7.32  /  pCO2: 41    /  pO2: 238   / HCO3: 21    / Base Excess: -4.5  /  SaO2: 99.6                  Assesment/Plan  67 year old female s/p AVEL, LAR POD# 1  - Check GI fxn  - Pain control  - Monitor abd exams  - DVT PPx  - Out of Bed  - Incentive Spirometry  - Care per primary team  - will follow along  - pt seen and examined with attending Onelia Frost R4  D Team #[id number]

## 2017-09-16 NOTE — DISCHARGE NOTE ADULT - PATIENT PORTAL LINK FT
“You can access the Follow[employer] Patient Portal, offered by [facility], by registering with the following website: [email or web address]”

## 2017-09-16 NOTE — DISCHARGE NOTE ADULT - CARE PROVIDERS DIRECT ADDRESSES
[email or web address]

## 2017-09-16 NOTE — DISCHARGE NOTE ADULT - MEDICATION SUMMARY - MEDICATIONS TO TAKE
I will START or STAY ON the medications listed below when I get home from the hospital:    acetaminophen 325 mg oral tablet  -- 2 tab(s) by mouth every 6 hours, As needed, Mild Pain (1 - 3)  -- Indication: For Pain    oxyCODONE 5 mg oral capsule  -- 1 cap(s) by mouth every 6 hours, As Needed -for severe pain MDD:4 tabs   -- Caution federal law prohibits the transfer of this drug to any person other  than the person for whom it was prescribed.  It is very important that you take or use this exactly as directed.  Do not skip doses or discontinue unless directed by your doctor.  May cause drowsiness.  Alcohol may intensify this effect.  Use care when operating dangerous machinery.  This prescription cannot be refilled.  Using more of this medication than prescribed may cause serious breathing problems.    -- Indication: For Pain    naproxen 375 mg oral tablet  -- 1 tab(s) by mouth every 12 hours   -- Check with your doctor before becoming pregnant.  It is very important that you take or use this exactly as directed.  Do not skip doses or discontinue unless directed by your doctor.  May cause drowsiness or dizziness.  Obtain medical advice before taking any non-prescription drugs as some may affect the action of this medication.  Take with food or milk.    -- Indication: For Pain    enoxaparin 40 mg/0.4 mL injectable solution  -- 40 milligram(s) injectable once a day MDD:.4ml  -- It is very important that you take or use this exactly as directed.  Do not skip doses or discontinue unless directed by your doctor.    -- Indication: For blood clot prevention    atenolol 25 mg oral tablet  -- 1 tab(s) by mouth once a day, am  -- Indication: For Home med    docusate sodium 100 mg oral capsule  -- 1 cap(s) by mouth 3 times a day, As Needed for constipation  -- Indication: For Home med    senna oral tablet  -- 2 tab(s) by mouth once a day (at bedtime), As Needed for constipation  -- Indication: For Home med I will START or STAY ON the medications listed below when I get home from the hospital:    acetaminophen 325 mg oral tablet  -- 2 tab(s) by mouth every 6 hours, As needed, Mild Pain (1 - 3)  -- Indication: For Pain    oxyCODONE 5 mg oral capsule  -- 1 cap(s) by mouth every 6 hours, As Needed -for severe pain MDD:4 tabs   -- Caution federal law prohibits the transfer of this drug to any person other  than the person for whom it was prescribed.  It is very important that you take or use this exactly as directed.  Do not skip doses or discontinue unless directed by your doctor.  May cause drowsiness.  Alcohol may intensify this effect.  Use care when operating dangerous machinery.  This prescription cannot be refilled.  Using more of this medication than prescribed may cause serious breathing problems.    -- Indication: For Pain    naproxen 375 mg oral tablet  -- 1 tab(s) by mouth every 12 hours   -- Check with your doctor before becoming pregnant.  It is very important that you take or use this exactly as directed.  Do not skip doses or discontinue unless directed by your doctor.  May cause drowsiness or dizziness.  Obtain medical advice before taking any non-prescription drugs as some may affect the action of this medication.  Take with food or milk.    -- Indication: For Pain    enoxaparin 40 mg/0.4 mL injectable solution  -- 40 milligram(s) injectable once a day MDD:.4ml  -- It is very important that you take or use this exactly as directed.  Do not skip doses or discontinue unless directed by your doctor.    -- Indication: For blood clot prevention    atenolol 25 mg oral tablet  -- 1 tab(s) by mouth once a day, am  -- Indication: For Home med    docusate sodium 100 mg oral capsule  -- 1 cap(s) by mouth 3 times a day, As Needed for constipation  -- Indication: For Home med    senna oral tablet  -- 2 tab(s) by mouth once a day (at bedtime), As Needed for constipation  -- Indication: For Home med    potassium chloride 20 mEq oral tablet, extended release  -- 1 tab(s) by mouth once a day   -- Indication: For Low potassium I will START or STAY ON the medications listed below when I get home from the hospital:    Serum potassium level  -- Please draw blood and check serum potassium level.  -- Indication: For To check potassium level    oxyCODONE 5 mg oral capsule  -- 1 cap(s) by mouth every 6 hours, As Needed -for severe pain MDD:4 tabs   -- Caution federal law prohibits the transfer of this drug to any person other  than the person for whom it was prescribed.  It is very important that you take or use this exactly as directed.  Do not skip doses or discontinue unless directed by your doctor.  May cause drowsiness.  Alcohol may intensify this effect.  Use care when operating dangerous machinery.  This prescription cannot be refilled.  Using more of this medication than prescribed may cause serious breathing problems.    -- Indication: For Pain    naproxen 375 mg oral tablet  -- 1 tab(s) by mouth every 12 hours   -- Check with your doctor before becoming pregnant.  It is very important that you take or use this exactly as directed.  Do not skip doses or discontinue unless directed by your doctor.  May cause drowsiness or dizziness.  Obtain medical advice before taking any non-prescription drugs as some may affect the action of this medication.  Take with food or milk.    -- Indication: For Pain    acetaminophen 325 mg oral tablet  -- 2 tab(s) by mouth every 6 hours, As needed, Mild Pain (1 - 3)  -- Indication: For Pain    enoxaparin 40 mg/0.4 mL injectable solution  -- 40 milligram(s) injectable once a day MDD:.4ml  -- It is very important that you take or use this exactly as directed.  Do not skip doses or discontinue unless directed by your doctor.    -- Indication: For blood clot prevention    atenolol 25 mg oral tablet  -- 1 tab(s) by mouth once a day, am  -- Indication: For Home med    docusate sodium 100 mg oral capsule  -- 1 cap(s) by mouth 3 times a day, As Needed for constipation  -- Indication: For Home med    senna oral tablet  -- 2 tab(s) by mouth once a day (at bedtime), As Needed for constipation  -- Indication: For Home med    Colace 100 mg oral capsule  -- 1 cap(s) by mouth 2 times a day   -- Medication should be taken with plenty of water.    -- Indication: For Constipation    potassium chloride 20 mEq oral tablet, extended release  -- 1 tab(s) by mouth once a day   -- Indication: For Low potassium

## 2017-09-16 NOTE — DISCHARGE NOTE ADULT - PLAN OF CARE
Wellness Regular diet as tolerated, regular activity as tolerated, no heavy lifting for first two weeks.  Nothing per vagina: no intercourse, tampons or douching.  Call your provider if you experience fevers, chills, worsening abdominal pain, inability to urinate or worsening vaginal bleeding.  Follow up with your provider in 2 weeks. Regular diet as tolerated, regular activity as tolerated, no heavy lifting for first two weeks.  Nothing per vagina: no intercourse, tampons or douching.  Call your provider if you experience fevers, chills, worsening abdominal pain, inability to urinate or worsening vaginal bleeding.  Follow up with your provider in 2 weeks.    You are being discharged with a drain in place.  Please drain the bulb daily and keep in on suction in between as instructed by your nurse.  Please see Dr Rossi in the office in one week for drain removal.    Please see Dr Brooks in the office on 10/2/17 at 11am Regular diet as tolerated, regular activity as tolerated, no heavy lifting for first two weeks.  Nothing per vagina: no intercourse, tampons or douching.  Call your provider if you experience fevers, chills, worsening abdominal pain, inability to urinate or worsening vaginal bleeding.    You are being discharged with a drain in place.  Please drain the bulb daily and keep in on suction in between as instructed by your nurse.  Please see Dr Rossi in the office in one week for drain removal.    Please see Dr Brooks in the office on 10/2/17 at 11am

## 2017-09-16 NOTE — DISCHARGE NOTE ADULT - CARE PROVIDER_API CALL
Tiffanie Brooks (MD), OBSGYN  Oncology  [address] [place] [address]  [address] [place] [address]  Phone: [phone]  Fax: [phone] Tiffanie Brooks), OBSGYN  Oncology  [address] [place] [address]  [address]th [place] [address]  Phone: [phone]  Fax: [phone]    Michael Rossi), Surgery  [address] [place] [id number]  Phone: [phone]  Fax: [phone]

## 2017-09-16 NOTE — PROGRESS NOTE ADULT - SUBJECTIVE AND OBJECTIVE BOX
[facility] Progress Note POD #    Pt seen and examined at bedside. Pt states mild abdominal pain.  Pt denies fever, chills, chest pain, SOB, nausea, vomiting, lightheadedness, dizziness.      T(F): 98.5 (09-16-17 @ 05:20), Max: 98.8 (09-15-17 @ 23:15)  HR: 96 (09-16-17 @ 05:20) (72 - 96)  BP: 109/68 (09-16-17 @ 05:20) (106/70 - 147/77)  RR: 17 (09-16-17 @ 05:20) (12 - 19)  SpO2: 93% (09-16-17 @ 05:20) (93% - 100%)  Wt(kg): --  CAPILLARY BLOOD GLUCOSE  153 (16 Sep 2017 03:02)  224 (15 Sep 2017 20:45)  94 (15 Sep 2017 08:36)    I&O's Detail    15 Sep 2017 07:01  -  16 Sep 2017 05:50  --------------------------------------------------------  IN:    sodium chloride 0.9%.: 1000 mL  Total IN: 1000 mL    OUT:    Bulb: 24 mL    Bulb: 160 mL    Indwelling Catheter - Urethral: 675 mL  Total OUT: 859 mL    Total NET: 141 mL          MEDICATIONS  (STANDING):  sodium chloride 0.9% lock flush 3 milliLiter(s) IV Push every 8 hours  HYDROmorphone PCA (1 mG/mL) 30 milliLiter(s) PCA Continuous PCA Continuous  heparin  Injectable 5000 Unit(s) SubCutaneous every 8 hours  metoprolol Injectable 5 milliGRAM(s) IV Push every 6 hours  insulin lispro (HumaLOG) corrective regimen sliding scale   SubCutaneous every 6 hours  sodium chloride 0.9%. 1000 milliLiter(s) (100 mL/Hr) IV Continuous <Continuous>  influenza   Vaccine 0.5 milliLiter(s) IntraMuscular once    MEDICATIONS  (PRN):  HYDROmorphone PCA (1 mG/mL) Rescue Clinician Bolus 0.5 milliGRAM(s) IV Push every 15 minutes PRN for Pain Scale GREATER THAN 6  naloxone Injectable 0.1 milliGRAM(s) IV Push every 3 minutes PRN For ANY of the following changes in patient status:  A. RR LESS THAN 10 breaths per minute, B. Oxygen saturation LESS THAN 90%, C. Sedation score of 6  ondansetron Injectable 4 milliGRAM(s) IV Push every 6 hours PRN Nausea  diphenhydrAMINE   Injectable 12.5 milliGRAM(s) IV Push every 4 hours PRN Pruritus  HYDROmorphone  Injectable 0.8 milliGRAM(s) IV Push every 10 minutes PRN Severe Pain (7 - 10)  HYDROmorphone  Injectable 0.4 milliGRAM(s) IV Push every 10 minutes PRN Moderate Pain (4 - 6)  promethazine IVPB 6.25 milliGRAM(s) IV Intermittent once PRN Nausea and/or Vomiting  meperidine     Injectable 12.5 milliGRAM(s) IV Push every 10 minutes PRN Shivering      Physical Exam:  Constitutional: WDWN female, NAD AxOx3  Chest: s1s2+, RRR, clear to auscultation bilaterally, no w/r/r    Abdomen: soft, nondistended, no guarding, no rebound, [] bowel sounds, appropriate tenderness noted   Incision site:   clean, dry, intact.    Extremities: no lower extremity edema or calf tenderness bilaterally; intermittent compression stockings in place     LABS:                        12.0   13.80 )-----------( 194      ( 15 Sep 2017 23:34 )             36.0     09-15    140    |  104    |  10     ----------------------------<  217<H>  4.4     |  20<L>  |  0.84     Ca    7.7<L>      15 Sep 2017 23:54  Phos  5.1       09-15  Mg     1.3       09-15                RADIOLOGY & ADDITIONAL TESTS:    a/p: This 67y female, s/p     CV: hemodynamically stable  PUL: adequate on RA  GI:   :  ID: afebrile, WBC stable  DVT prophylaxis:   Pain Management: controlled  d/w    continue with current care    KENNETH West  #[id number] PA [facility] Progress Note POD #1    Pt seen and examined at bedside. Family at bedside.  Pt states mild abdominal pain that is relieved with PCA.  Pt tolerating clear fluids.  Pt denies fever, chills, chest pain, SOB, nausea, vomiting, lightheadedness, dizziness.      T(F): 98.5 (09-16-17 @ 05:20), Max: 98.8 (09-15-17 @ 23:15)  HR: 96 (09-16-17 @ 05:20) (72 - 96)  BP: 109/68 (09-16-17 @ 05:20) (106/70 - 147/77)  RR: 17 (09-16-17 @ 05:20) (12 - 19)  SpO2: 93% (09-16-17 @ 05:20) (93% - 100%)    CAPILLARY BLOOD GLUCOSE  153 (16 Sep 2017 03:02)  224 (15 Sep 2017 20:45)  94 (15 Sep 2017 08:36)    I&O's Detail    15 Sep 2017 07:01  -  16 Sep 2017 05:50  --------------------------------------------------------  IN:    sodium chloride 0.9%.: 1000 mL  Total IN: 1000 mL    OUT:    Bulb: 24 mL    Bulb: 160 mL    Indwelling Catheter - Urethral: 675 mL  Total OUT: 859 mL    Total NET: 141 mL    MEDICATIONS  (STANDING):  sodium chloride 0.9% lock flush 3 milliLiter(s) IV Push every 8 hours  HYDROmorphone PCA (1 mG/mL) 30 milliLiter(s) PCA Continuous  heparin  Injectable 5000 Unit(s) SubCutaneous every 8 hours  metoprolol Injectable 5 milliGRAM(s) IV Push every 6 hours  insulin lispro (HumaLOG) corrective regimen sliding scale   SubCutaneous every 6 hours  sodium chloride 0.9%. 1000 milliLiter(s) (100 mL/Hr) IV Continuous  influenza   Vaccine 0.5 milliLiter(s) IntraMuscular once    MEDICATIONS  (PRN):  HYDROmorphone PCA (1 mG/mL) Rescue Clinician Bolus 0.5 milliGRAM(s) IV Push every 15 minutes PRN for Pain Scale GREATER THAN 6  naloxone Injectable 0.1 milliGRAM(s) IV Push every 3 minutes PRN For ANY of the following changes in patient status:  A. RR LESS THAN 10 breaths per minute, B. Oxygen saturation LESS THAN 90%, C. Sedation score of 6  ondansetron Injectable 4 milliGRAM(s) IV Push every 6 hours PRN Nausea  diphenhydrAMINE   Injectable 12.5 milliGRAM(s) IV Push every 4 hours PRN Pruritus  HYDROmorphone  Injectable 0.8 milliGRAM(s) IV Push every 10 minutes PRN Severe Pain (7 - 10)  HYDROmorphone  Injectable 0.4 milliGRAM(s) IV Push every 10 minutes PRN Moderate Pain (4 - 6)  promethazine IVPB 6.25 milliGRAM(s) IV Intermittent once PRN Nausea and/or Vomiting  meperidine     Injectable 12.5 milliGRAM(s) IV Push every 10 minutes PRN Shivering      Physical Exam:  Constitutional: WDWN female, NAD AxOx3  Chest: s1s2+, RRR, clear to auscultation bilaterally, no w/r/r    Abdomen: softly distended, no guarding, no rebound, faint bowel sounds, appropriate tenderness noted   Incision site:   dressing vertical clean, dry, intact.    Extremities: no lower extremity edema or calf tenderness bilaterally; intermittent compression stockings in place     LABS:                             11.6   13.17 )-----------( 153      ( 16 Sep 2017 06:00 )             33.6                    12.0   13.80 )-----------( 194      ( 15 Sep 2017 23:34 )             36.0     09-16    141  |  107  |  10  ----------------------------<  158<H>  3.9   |  21<L>  |  0.54    Ca    7.7<L>      16 Sep 2017 06:00  Phos  4.1     09-16  Mg     1.3     09-16 09-15    140    |  104    |  10     ----------------------------<  217<H>  4.4     |  20<L>  |  0.84     Ca    7.7<L>      15 Sep 2017 23:54  Phos  5.1       09-15  Mg     1.3       09-15    a/p: This 67y female, s/p     CV: hemodynamically stable  PUL: adequate on RA  GI: tolerating clear fluids consider advance as tolerated  : butt  in place, will remain  ID: afebrile, WBC stable  DVT prophylaxis: Heparin, venadynes  Pain Management: controlled on PCA  d/w Dr. Brooks  encourage oob and ambulating today  attempt to transfer pt to 4T to womens surgical unit  daily labs  continue with current care    KENNETH West  #[id number] PA [facility] Progress Note POD #1    Pt seen and examined at bedside. Family at bedside.  Pt states mild abdominal pain that is relieved with PCA.  Pt tolerating clear fluids.  Pt denies fever, chills, chest pain, SOB, nausea, vomiting, lightheadedness, dizziness.      T(F): 98.5 (09-16-17 @ 05:20), Max: 98.8 (09-15-17 @ 23:15)  HR: 96 (09-16-17 @ 05:20) (72 - 96)  BP: 109/68 (09-16-17 @ 05:20) (106/70 - 147/77)  RR: 17 (09-16-17 @ 05:20) (12 - 19)  SpO2: 93% (09-16-17 @ 05:20) (93% - 100%)    CAPILLARY BLOOD GLUCOSE  153 (16 Sep 2017 03:02)  224 (15 Sep 2017 20:45)  94 (15 Sep 2017 08:36)    I&O's Detail    15 Sep 2017 07:01  -  16 Sep 2017 05:50  --------------------------------------------------------  IN:    sodium chloride 0.9%.: 1000 mL  Total IN: 1000 mL    OUT:    Bulb: 24 mL    Bulb: 160 mL    Indwelling Catheter - Urethral: 675 mL  Total OUT: 859 mL    Total NET: 141 mL    MEDICATIONS  (STANDING):  sodium chloride 0.9% lock flush 3 milliLiter(s) IV Push every 8 hours  HYDROmorphone PCA (1 mG/mL) 30 milliLiter(s) PCA Continuous  heparin  Injectable 5000 Unit(s) SubCutaneous every 8 hours  metoprolol Injectable 5 milliGRAM(s) IV Push every 6 hours  insulin lispro (HumaLOG) corrective regimen sliding scale   SubCutaneous every 6 hours  sodium chloride 0.9%. 1000 milliLiter(s) (100 mL/Hr) IV Continuous  influenza   Vaccine 0.5 milliLiter(s) IntraMuscular once    MEDICATIONS  (PRN):  HYDROmorphone PCA (1 mG/mL) Rescue Clinician Bolus 0.5 milliGRAM(s) IV Push every 15 minutes PRN for Pain Scale GREATER THAN 6  naloxone Injectable 0.1 milliGRAM(s) IV Push every 3 minutes PRN For ANY of the following changes in patient status:  A. RR LESS THAN 10 breaths per minute, B. Oxygen saturation LESS THAN 90%, C. Sedation score of 6  ondansetron Injectable 4 milliGRAM(s) IV Push every 6 hours PRN Nausea  diphenhydrAMINE   Injectable 12.5 milliGRAM(s) IV Push every 4 hours PRN Pruritus  HYDROmorphone  Injectable 0.8 milliGRAM(s) IV Push every 10 minutes PRN Severe Pain (7 - 10)  HYDROmorphone  Injectable 0.4 milliGRAM(s) IV Push every 10 minutes PRN Moderate Pain (4 - 6)  promethazine IVPB 6.25 milliGRAM(s) IV Intermittent once PRN Nausea and/or Vomiting  meperidine     Injectable 12.5 milliGRAM(s) IV Push every 10 minutes PRN Shivering      Physical Exam:  Constitutional: WDWN female, NAD AxOx3  Chest: s1s2+, RRR, clear to auscultation bilaterally, no w/r/r    Abdomen: softly distended, no guarding, no rebound, faint bowel sounds, appropriate tenderness noted   Incision site:   dressing vertical clean, dry, intact.    Extremities: no lower extremity edema or calf tenderness bilaterally; intermittent compression stockings in place     LABS:                             11.6   13.17 )-----------( 153      ( 16 Sep 2017 06:00 )             33.6                    12.0   13.80 )-----------( 194      ( 15 Sep 2017 23:34 )             36.0     09-16    141  |  107  |  10  ----------------------------<  158<H>  3.9   |  21<L>  |  0.54    Ca    7.7<L>      16 Sep 2017 06:00  Phos  4.1     09-16  Mg     1.3     09-16 09-15    140    |  104    |  10     ----------------------------<  217<H>  4.4     |  20<L>  |  0.84     Ca    7.7<L>      15 Sep 2017 23:54  Phos  5.1       09-15  Mg     1.3       09-15    a/p: This 67y female, s/p  Exlap, AVEL, BSO, Omentectomy, resection of anterior abdominal wall masses, resection of portion of ascending colon, appendectomy, tumor debulking, cystotomy repair, for known poorly diff. ovarian cancer, patient stable at present time    CV: hemodynamically stable  PUL: adequate on RA  GI: tolerating clear fluids consider advance as tolerated  : butt  in place, will remain  ID: afebrile, WBC stable  DVT prophylaxis: Heparin, venadynes  Pain Management: controlled on PCA  d/w Dr. Brooks  encourage oob and ambulating today  attempt to transfer pt to 4T to womens surgical unit  daily labs  continue with current care    KENNETH West  #[id number]

## 2017-09-16 NOTE — DISCHARGE NOTE ADULT - HOSPITAL COURSE
66 y/o s/p Ex-lap, AVEL-RSO,  omentectomy, resection of anterior abdominal wall masses,  resection of portion of ascending colon,  appendectomy, tumor debulking, cystotomy repair for poorly differentiated ovarian cancer.  Please see operative note for details.  The patient was transferred to the floor post-op in stable condition with PCA for pain control, a butt in place, and with a  clear diet. During her stay, the patient was transitioned to PO pain medication, butt was removed, the patient voided, and was advanced to regular diet. Hct: 34.6->36.0->33.6. n the day of discharge the patient is afebrile and hemodynamically stable. She is ambulating without assistance, voiding spontaneously, and tolerating regular diet. He pain is well controlled on oral medication. She is cleared for discharge with instructions for appropriate follow up. 66 y/o s/p Ex-lap, AVEL-RSO,  omentectomy, resection of anterior abdominal wall masses,  resection of portion of ascending colon,  appendectomy, tumor debulking, cystotomy repair for poorly differentiated ovarian cancer.  Please see operative note for details.  The patient was transferred to the floor post-op in stable condition with PCA for pain control, a butt in place, and with a  clear diet. POD#1 pt was without issues. On POD#2 the patient was transitioned to PO pain medication which was well tolerated. She started passing flatus and was transitioned to regular diet on POD#4. She was still feeling some weakness and was slow with ambulation during that time, but this improved by POD#6. On POD#7 a cystogram was performed and her butt was removed. She voided later in the day. Hct: 34.6->36.0->33.6->28.5->27.2->24.7->25.8->26.0->28.1->28.1->26.7. On POD#7, the day of discharge, the patient is afebrile and hemodynamically stable. She is ambulating without assistance, voiding spontaneously, and tolerating regular diet. Her pain is well controlled on oral medication. She is cleared for discharge with instructions for appropriate follow up. Her SubQ JOSÉ and abdominal aldo drain will be removed on an outpatient basis. 68 y/o s/p Ex-lap, AVEL-RSO,  omentectomy, resection of anterior abdominal wall masses,  resection of portion of ascending colon,  appendectomy, tumor debulking, cystotomy repair for poorly differentiated ovarian cancer.  Please see operative note for details.  The patient was transferred to the floor post-op in stable condition with PCA for pain control, a butt in place, and with a  clear diet. POD#1 pt was without issues. On POD#2 the patient was transitioned to PO pain medication which was well tolerated. She started passing flatus and was transitioned to regular diet on POD#4. She was still feeling some weakness and was slow with ambulation during that time, but this improved by POD#6. On POD#7 a cystogram was performed which showed a bladder leak still, so butt was maintained. R. SubQ JOSÉ drain was removed on POD#7. She voided later in the day. Hct: 34.6->36.0->33.6->28.5->27.2->24.7->25.8->26.0->28.1->28.1->26.7. On POD#7, the day of discharge, the patient is afebrile and hemodynamically stable. She is ambulating without assistance and tolerating regular diet. Her pain is well controlled on oral medication. She is cleared for discharge with instructions for appropriate follow up. Pt is being d/c'ed with butt and will f/u in Dr Brooks's office. Her abdominal aldo drain will be removed on an outpatient basis. 66 y/o s/p Ex-lap, AVEL-RSO,  omentectomy, resection of anterior abdominal wall masses,  resection of portion of ascending colon,  appendectomy, tumor debulking, cystotomy repair for poorly differentiated ovarian cancer.  Please see operative note for details.  The patient was transferred to the floor post-op in stable condition with PCA for pain control, a butt in place, and with a  clear diet. POD#1 pt was without issues. On POD#2 the patient was transitioned to PO pain medication which was well tolerated. She started passing flatus and was transitioned to regular diet on POD#4. She was still feeling some weakness and was slow with ambulation during that time, but this improved by POD#6. On POD#7 a cystogram was performed which showed a bladder leak still, so butt was maintained. R. SubQ JOSÉ drain was removed on POD#7. She voided later in the day. Hct: 34.6->36.0->33.6->28.5->27.2->24.7->25.8->26.0->28.1->28.1->26.7. On POD#7, the day of discharge, the patient is afebrile and hemodynamically stable. She is ambulating without assistance and tolerating regular diet. Her pain is well controlled on oral medication. She is cleared for discharge with instructions for appropriate follow up. Pt is being d/c'ed with butt and will f/u in Dr Brooks's office. Her abdominal aldo drain will be removed on an outpatient basis. She is also being discharged with potassium tabs due to low potassium inpatient. She will have a serum K level drawn outpatient this week.

## 2017-09-16 NOTE — PROGRESS NOTE ADULT - SUBJECTIVE AND OBJECTIVE BOX
[facility] Post Op Note    Pt seen and examined at bedside. Pt still in PACU with family at bedside.  Pts daughter in law is translating for patient.  Pt resting comfortably.  Pt denies fever, chills, chest pain, SOB, nausea, vomiting, lightheadedness, dizziness.  Butt catheter in place.      T(F): 98.8 (09-15-17 @ 23:15), Max: 98.8 (09-15-17 @ 23:15)  HR: 88 (09-15-17 @ 23:30) (72 - 89)  BP: 108/70 (09-15-17 @ 23:30) (106/70 - 147/77)  RR: 17 (09-15-17 @ 23:30) (12 - 19)  SpO2: 95% (09-15-17 @ 23:30) (95% - 100%)    I&O's Detail    15 Sep 2017 07:01  -  16 Sep 2017 00:42  --------------------------------------------------------  IN:    sodium chloride 0.9%.: 400 mL  Total IN: 400 mL    OUT:    Bulb: 125 mL    Bulb: 11.5 mL    Indwelling Catheter - Urethral: 375 mL  Total OUT: 511.5 mL    Total NET: -111.5 mL    Physical Exam:  Constitutional: WDWN female, NAD AxOx3  Chest: s1s2+, RRR, clear to auscultation bilaterally, no w/r/r    Abdomen: soft, nondistended, no guarding, no rebound, no bowel sounds appreciated at present,  Appropriate tenderness noted   Incisional site:  vertical dressing clean, dry, intact. 2 JOSÉ drains patent and draining serosanguinous fluid    Extremities: no lower extremity edema or calf tenderness bilaterally; intermittent compression stockings in place     LABS:                        12.0   13.80 )-----------( 194      ( 15 Sep 2017 23:34 )             36.0     09-15    140  |  104  |  10  ----------------------------<  217<H>  4.4   |  20<L>  |  0.84    Ca    7.7<L>      15 Sep 2017 23:54  Phos  5.1     09-15  Mg     1.3     09-15    a/p: This 67y female, s/p     CV: hemodynamically stable  PUL: adequate on RA  GI: NPO at present, tolerating   : butt in place, d/c   ID: afebrile, WBC stable, labs pending []  DVT prophylaxis:   Pain Management: controlled  d/w Dr. Dawn, PA  #[id number] [facility] Post Op Note    Pt seen and examined at bedside. Pt still in PACU with family at bedside.  Pts daughter in law is translating for patient.  Pt resting comfortably.  Pt denies fever, chills, chest pain, SOB, nausea, vomiting, lightheadedness, dizziness.  Butt catheter in place.      T(F): 98.8 (09-15-17 @ 23:15), Max: 98.8 (09-15-17 @ 23:15)  HR: 88 (09-15-17 @ 23:30) (72 - 89)  BP: 108/70 (09-15-17 @ 23:30) (106/70 - 147/77)  RR: 17 (09-15-17 @ 23:30) (12 - 19)  SpO2: 95% (09-15-17 @ 23:30) (95% - 100%)    I&O's Detail    15 Sep 2017 07:01  -  16 Sep 2017 00:42  --------------------------------------------------------  IN:    sodium chloride 0.9%.: 400 mL  Total IN: 400 mL    OUT:    Bulb: 125 mL    Bulb: 11.5 mL    Indwelling Catheter - Urethral: 375 mL  Total OUT: 511.5 mL    Total NET: -111.5 mL    Physical Exam:  Constitutional: WDWN female, NAD AxOx3  Chest: s1s2+, RRR, clear to auscultation bilaterally, no w/r/r    Abdomen: soft, nondistended, no guarding, no rebound, no bowel sounds appreciated at present,  Appropriate tenderness noted   Incisional site:  vertical dressing clean, dry, intact. 2 JOSÉ drains patent and draining serosanguinous fluid    Extremities: no lower extremity edema or calf tenderness bilaterally; intermittent compression stockings in place     LABS:                        12.0   13.80 )-----------( 194      ( 15 Sep 2017 23:34 )             36.0     09-15    140  |  104  |  10  ----------------------------<  217<H>  4.4   |  20<L>  |  0.84    Ca    7.7<L>      15 Sep 2017 23:54  Phos  5.1     09-15  Mg     1.3     09-15    a/p: This 67y female, s/p ExLap, AVEL, BSO, Omentectomy, recestion of anterior abdominal wall masses, resection of portion of ascending colon, appendectomy, tumer debulking, cystotomy,repair, for Poorly differentiated ovarian cancer w/known lung cancer(stage 1) stable    CV: hemodynamically stable  PUL: adequate on RA  GI: NPO at present, tolerating   : butt in place and will remain for 7 days  ID: afebrile, WBC stable, labs pending for am  DVT prophylaxis: Heparin, venadynes  Pain Management: controlled on PCA  d/w Dr. Todd Dawn, PA  #[id number]

## 2017-09-17 LAB
BUN SERPL-MCNC: 13 MG/DL — SIGNIFICANT CHANGE UP (ref 7–23)
CALCIUM SERPL-MCNC: 7.9 MG/DL — LOW (ref 8.4–10.5)
CHLORIDE SERPL-SCNC: 104 MMOL/L — SIGNIFICANT CHANGE UP (ref 98–107)
CO2 SERPL-SCNC: 23 MMOL/L — SIGNIFICANT CHANGE UP (ref 22–31)
CREAT SERPL-MCNC: 0.64 MG/DL — SIGNIFICANT CHANGE UP (ref 0.5–1.3)
GLUCOSE SERPL-MCNC: 140 MG/DL — HIGH (ref 70–99)
HCT VFR BLD CALC: 27.2 % — LOW (ref 34.5–45)
HCT VFR BLD CALC: 28.5 % — LOW (ref 34.5–45)
HGB BLD-MCNC: 8.9 G/DL — LOW (ref 11.5–15.5)
HGB BLD-MCNC: 9.5 G/DL — LOW (ref 11.5–15.5)
MAGNESIUM SERPL-MCNC: 2.2 MG/DL — SIGNIFICANT CHANGE UP (ref 1.6–2.6)
MCHC RBC-ENTMCNC: 29.2 PG — SIGNIFICANT CHANGE UP (ref 27–34)
MCHC RBC-ENTMCNC: 29.7 PG — SIGNIFICANT CHANGE UP (ref 27–34)
MCHC RBC-ENTMCNC: 32.7 % — SIGNIFICANT CHANGE UP (ref 32–36)
MCHC RBC-ENTMCNC: 33.3 % — SIGNIFICANT CHANGE UP (ref 32–36)
MCV RBC AUTO: 89.1 FL — SIGNIFICANT CHANGE UP (ref 80–100)
MCV RBC AUTO: 89.2 FL — SIGNIFICANT CHANGE UP (ref 80–100)
NRBC # FLD: 0 — SIGNIFICANT CHANGE UP
NRBC # FLD: 0 — SIGNIFICANT CHANGE UP
PHOSPHATE SERPL-MCNC: 2.4 MG/DL — LOW (ref 2.5–4.5)
PLATELET # BLD AUTO: 180 K/UL — SIGNIFICANT CHANGE UP (ref 150–400)
PLATELET # BLD AUTO: 191 K/UL — SIGNIFICANT CHANGE UP (ref 150–400)
PMV BLD: 10.2 FL — SIGNIFICANT CHANGE UP (ref 7–13)
PMV BLD: 10.2 FL — SIGNIFICANT CHANGE UP (ref 7–13)
POTASSIUM SERPL-MCNC: 4.2 MMOL/L — SIGNIFICANT CHANGE UP (ref 3.5–5.3)
POTASSIUM SERPL-SCNC: 4.2 MMOL/L — SIGNIFICANT CHANGE UP (ref 3.5–5.3)
RBC # BLD: 3.05 M/UL — LOW (ref 3.8–5.2)
RBC # BLD: 3.2 M/UL — LOW (ref 3.8–5.2)
RBC # FLD: 13 % — SIGNIFICANT CHANGE UP (ref 10.3–14.5)
RBC # FLD: 13.1 % — SIGNIFICANT CHANGE UP (ref 10.3–14.5)
SODIUM SERPL-SCNC: 137 MMOL/L — SIGNIFICANT CHANGE UP (ref 135–145)
WBC # BLD: 15.38 K/UL — HIGH (ref 3.8–10.5)
WBC # BLD: 17.17 K/UL — HIGH (ref 3.8–10.5)
WBC # FLD AUTO: 15.38 K/UL — HIGH (ref 3.8–10.5)
WBC # FLD AUTO: 17.17 K/UL — HIGH (ref 3.8–10.5)

## 2017-09-17 RX ORDER — BENZOCAINE AND MENTHOL 5; 1 G/100ML; G/100ML
1 LIQUID ORAL
Qty: 0 | Refills: 0 | Status: DISCONTINUED | OUTPATIENT
Start: 2017-09-17 | End: 2017-09-23

## 2017-09-17 RX ADMIN — HEPARIN SODIUM 5000 UNIT(S): 5000 INJECTION INTRAVENOUS; SUBCUTANEOUS at 21:50

## 2017-09-17 RX ADMIN — SODIUM CHLORIDE 3 MILLILITER(S): 9 INJECTION INTRAMUSCULAR; INTRAVENOUS; SUBCUTANEOUS at 22:04

## 2017-09-17 RX ADMIN — ONDANSETRON 4 MILLIGRAM(S): 8 TABLET, FILM COATED ORAL at 17:53

## 2017-09-17 RX ADMIN — Medication 5 MILLIGRAM(S): at 17:53

## 2017-09-17 RX ADMIN — Medication 5 MILLIGRAM(S): at 05:47

## 2017-09-17 RX ADMIN — SODIUM CHLORIDE 100 MILLILITER(S): 9 INJECTION INTRAMUSCULAR; INTRAVENOUS; SUBCUTANEOUS at 05:47

## 2017-09-17 RX ADMIN — Medication 5 MILLIGRAM(S): at 14:26

## 2017-09-17 RX ADMIN — HYDROMORPHONE HYDROCHLORIDE 30 MILLILITER(S): 2 INJECTION INTRAMUSCULAR; INTRAVENOUS; SUBCUTANEOUS at 19:17

## 2017-09-17 RX ADMIN — Medication 5 MILLIGRAM(S): at 00:50

## 2017-09-17 RX ADMIN — BENZOCAINE AND MENTHOL 1 LOZENGE: 5; 1 LIQUID ORAL at 10:51

## 2017-09-17 RX ADMIN — SODIUM CHLORIDE 3 MILLILITER(S): 9 INJECTION INTRAMUSCULAR; INTRAVENOUS; SUBCUTANEOUS at 14:27

## 2017-09-17 RX ADMIN — HEPARIN SODIUM 5000 UNIT(S): 5000 INJECTION INTRAVENOUS; SUBCUTANEOUS at 14:27

## 2017-09-17 RX ADMIN — HEPARIN SODIUM 5000 UNIT(S): 5000 INJECTION INTRAVENOUS; SUBCUTANEOUS at 05:47

## 2017-09-17 RX ADMIN — SODIUM CHLORIDE 3 MILLILITER(S): 9 INJECTION INTRAMUSCULAR; INTRAVENOUS; SUBCUTANEOUS at 05:47

## 2017-09-17 RX ADMIN — HYDROMORPHONE HYDROCHLORIDE 30 MILLILITER(S): 2 INJECTION INTRAMUSCULAR; INTRAVENOUS; SUBCUTANEOUS at 07:33

## 2017-09-17 RX ADMIN — SODIUM CHLORIDE 100 MILLILITER(S): 9 INJECTION INTRAMUSCULAR; INTRAVENOUS; SUBCUTANEOUS at 07:32

## 2017-09-17 NOTE — CHART NOTE - NSCHARTNOTEFT_GEN_A_CORE
Notified by team, pt vomited.   Pt seen and examined, resting comfortably in bed, family at bedside.   Dtr described episode as pt was coughing which lead to vomiting. Pt was not feeling nauseated at the time. Had eaten a small amount of clears earlier and was not nauseous after eating. Was given zofran after episode and has been feeling better since. Pt has not yet passed gas.   On exam, pt is awake and alert, in NAD. Abdomen soft, nontender, +BS.   Discussed with pt to continue to consume a clear diet, if nauseous at all to notify staff.   Will continue to monitor pt.   Discussed with attending.

## 2017-09-17 NOTE — PROGRESS NOTE ADULT - SUBJECTIVE AND OBJECTIVE BOX
R2 Update    Called to evaluate pt for decreased UOP. Pt without recorded UOP over shift. Metcalf manipulated by RN with 400cc of yellow-colored urine, mild amount of blood noted. Pt complains of incisional pain at this time, taught to use PCA.    d/w Dr. Todd Alvarado, pgy2

## 2017-09-17 NOTE — PROGRESS NOTE ADULT - ATTENDING COMMENTS
Pt seen and examined, agree with gyn PA  POD#2  Tolerating clears, continue until flatus per surgery  Labs stable, exam benign  Increase OOB with ambulation

## 2017-09-17 NOTE — PROVIDER CONTACT NOTE (OTHER) - ASSESSMENT
Pt is resting comfortable. No complain of pains. VSS. Metcalf draining. left side flory drained 90cc for 4 hours and right side flory drained 10cc.

## 2017-09-17 NOTE — PROGRESS NOTE ADULT - SUBJECTIVE AND OBJECTIVE BOX
SURGERY DAILY PROGRESS NOTE:     Overnight Events:  No acute events.  Pt's family reports miscommunication regarding diet--pt ate pears and pudding yesterday. Denies any nausea currently. Still with abd pain. No flatus/BM.      Physical Exam  Vital Signs Last 24 Hrs  T(C): 36.6 (17 Sep 2017 05:57), Max: 37 (16 Sep 2017 13:58)  T(F): 97.8 (17 Sep 2017 05:57), Max: 98.6 (16 Sep 2017 13:58)  HR: 99 (17 Sep 2017 05:57) (94 - 109)  BP: 124/67 (17 Sep 2017 05:57) (115/78 - 128/76)  BP(mean): --  RR: 18 (17 Sep 2017 05:57) (16 - 18)  SpO2: 98% (17 Sep 2017 05:57) (90% - 98%)    Gen: NAD, awake  HEENT: normocephalic, atraumatic, no scleral icterus  CV: RRR  Pulm: non-labored respirations  Abd: Softly distended. Incisional tenderness. JPSS x2.   Ext: warm, no edema    I&O's Detail    16 Sep 2017 07:01  -  17 Sep 2017 07:00  --------------------------------------------------------  IN:    Oral Fluid: 390 mL    sodium chloride 0.9%.: 1800 mL  Total IN: 2190 mL    OUT:    Bulb: 45 mL    Bulb: 215 mL    Indwelling Catheter - Urethral: 875 mL  Total OUT: 1135 mL    Total NET: 1055 mL          Labs:                        9.5    17.17 )-----------( 180      ( 17 Sep 2017 04:50 )             28.5     09-17    137  |  104  |  13  ----------------------------<  140<H>  4.2   |  23  |  0.64    Ca    7.9<L>      17 Sep 2017 04:50  Phos  2.4     09-17  Mg     2.2     09-17          ABG - ( 15 Sep 2017 18:48 )  pH: 7.32  /  pCO2: 41    /  pO2: 238   / HCO3: 21    / Base Excess: -4.5  /  SaO2: 99.6                Assessment/Plan  67 year old female s/p AVEL, LAR POD# 2  - Check GI fxn  - clears for now  - Pain control  - Monitor abd exams  - Out of Bed  - Incentive Spirometry  - Care per primary team  - pt seen and examined with attending Onelia Frost R4 SURGERY DAILY PROGRESS NOTE:     Overnight Events:  No acute events.  Pt's family reports miscommunication regarding diet--pt ate pears and pudding yesterday. Denies any nausea currently. Still with abd pain. No flatus/BM.      Physical Exam  Vital Signs Last 24 Hrs  T(C): 36.6 (17 Sep 2017 05:57), Max: 37 (16 Sep 2017 13:58)  T(F): 97.8 (17 Sep 2017 05:57), Max: 98.6 (16 Sep 2017 13:58)  HR: 99 (17 Sep 2017 05:57) (94 - 109)  BP: 124/67 (17 Sep 2017 05:57) (115/78 - 128/76)  BP(mean): --  RR: 18 (17 Sep 2017 05:57) (16 - 18)  SpO2: 98% (17 Sep 2017 05:57) (90% - 98%)    Gen: NAD, awake  HEENT: normocephalic, atraumatic, no scleral icterus  CV: RRR  Pulm: non-labored respirations  Abd: Softly distended. Incisional tenderness. JPSS x2.   Ext: warm, no edema    I&O's Detail    16 Sep 2017 07:01  -  17 Sep 2017 07:00  --------------------------------------------------------  IN:    Oral Fluid: 390 mL    sodium chloride 0.9%.: 1800 mL  Total IN: 2190 mL    OUT:    Bulb: 45 mL    Bulb: 215 mL    Indwelling Catheter - Urethral: 875 mL  Total OUT: 1135 mL    Total NET: 1055 mL          Labs:                        9.5    17.17 )-----------( 180      ( 17 Sep 2017 04:50 )             28.5     09-17    137  |  104  |  13  ----------------------------<  140<H>  4.2   |  23  |  0.64    Ca    7.9<L>      17 Sep 2017 04:50  Phos  2.4     09-17  Mg     2.2     09-17          ABG - ( 15 Sep 2017 18:48 )  pH: 7.32  /  pCO2: 41    /  pO2: 238   / HCO3: 21    / Base Excess: -4.5  /  SaO2: 99.6                Assessment/Plan  67 year old female s/p AVEL, LAR POD# 2  - Check GI fxn  - clears for now  - Pain control  - Monitor abd exams  - Out of Bed  - Incentive Spirometry  - Care per primary team  - pt seen and examined with attending Onelia Frost R4  D team [id number]

## 2017-09-17 NOTE — PROGRESS NOTE ADULT - SUBJECTIVE AND OBJECTIVE BOX
Anesthesia Pain Management Service    SUBJECTIVE: Patient is doing well with IV PCA and no significant problems reported.    Pain Scale Score	At rest: ___ 	With Activity: ___ 	[X ] Refer to charted pain scores    THERAPY:    [ ] IV PCA Morphine		[ ] 5 mg/mL	[ ] 1 mg/mL  [X ] IV PCA Hydromorphone	[ ] 5 mg/mL	[X ] 1 mg/mL  [ ] IV PCA Fentanyl		[ ] 50 micrograms/mL    Demand dose __0.2_ lockout __6_ (minutes) Continuous Rate _0__ Total: __3.6 mg_  Daily      MEDICATIONS  (STANDING):  sodium chloride 0.9% lock flush 3 milliLiter(s) IV Push every 8 hours  HYDROmorphone PCA (1 mG/mL) 30 milliLiter(s) PCA Continuous PCA Continuous  heparin  Injectable 5000 Unit(s) SubCutaneous every 8 hours  metoprolol Injectable 5 milliGRAM(s) IV Push every 6 hours  insulin lispro (HumaLOG) corrective regimen sliding scale   SubCutaneous every 6 hours  sodium chloride 0.9%. 1000 milliLiter(s) (100 mL/Hr) IV Continuous <Continuous>  influenza   Vaccine 0.5 milliLiter(s) IntraMuscular once    MEDICATIONS  (PRN):  HYDROmorphone PCA (1 mG/mL) Rescue Clinician Bolus 0.5 milliGRAM(s) IV Push every 15 minutes PRN for Pain Scale GREATER THAN 6  naloxone Injectable 0.1 milliGRAM(s) IV Push every 3 minutes PRN For ANY of the following changes in patient status:  A. RR LESS THAN 10 breaths per minute, B. Oxygen saturation LESS THAN 90%, C. Sedation score of 6  ondansetron Injectable 4 milliGRAM(s) IV Push every 6 hours PRN Nausea  diphenhydrAMINE   Injectable 12.5 milliGRAM(s) IV Push every 4 hours PRN Pruritus  HYDROmorphone  Injectable 0.8 milliGRAM(s) IV Push every 10 minutes PRN Severe Pain (7 - 10)  HYDROmorphone  Injectable 0.4 milliGRAM(s) IV Push every 10 minutes PRN Moderate Pain (4 - 6)  promethazine IVPB 6.25 milliGRAM(s) IV Intermittent once PRN Nausea and/or Vomiting  meperidine     Injectable 12.5 milliGRAM(s) IV Push every 10 minutes PRN Shivering      OBJECTIVE:    Sedation Score:	[ X] Alert	[ ] Drowsy 	[ ] Arousable	[ ] Asleep	[ ] Unresponsive    Side Effects:	[X ] None	[ ] Nausea	[ ] Vomiting	[ ] Pruritus  		[ ] Other:    Vital Signs Last 24 Hrs  T(C): 36.6 (17 Sep 2017 05:57), Max: 37 (16 Sep 2017 13:58)  T(F): 97.8 (17 Sep 2017 05:57), Max: 98.6 (16 Sep 2017 13:58)  HR: 99 (17 Sep 2017 05:57) (94 - 109)  BP: 124/67 (17 Sep 2017 05:57) (115/78 - 128/76)  BP(mean): --  RR: 18 (17 Sep 2017 05:57) (16 - 18)  SpO2: 98% (17 Sep 2017 05:57) (90% - 98%)    ASSESSMENT/ PLAN    Therapy to  be:	[ X] Continue   [ ] Discontinued   [ ] Change to prn Analgesics    Documentation and Verification of current medications:   [X] Done	[ ] Not done, not elligible    Comments:

## 2017-09-17 NOTE — PROGRESS NOTE ADULT - SUBJECTIVE AND OBJECTIVE BOX
PA GynOnc Progress Note POD #    Pt seen and examined at bedside. Pt states mild abdominal pain.  Pt denies fever, chills, chest pain, SOB, nausea, vomiting, lightheadedness, dizziness.      T(F): 97.8 (09-17-17 @ 05:57), Max: 98.6 (09-16-17 @ 13:58)  HR: 99 (09-17-17 @ 05:57) (94 - 120)  BP: 124/67 (09-17-17 @ 05:57) (107/70 - 128/76)  RR: 18 (09-17-17 @ 05:57) (15 - 18)  SpO2: 98% (09-17-17 @ 05:57) (89% - 98%)  Wt(kg): --  CAPILLARY BLOOD GLUCOSE  135 (17 Sep 2017 02:52)  163 (16 Sep 2017 22:30)  276 (16 Sep 2017 15:35)  163 (16 Sep 2017 09:00)    I&O's Detail    16 Sep 2017 07:01  -  17 Sep 2017 07:00  --------------------------------------------------------  IN:    Oral Fluid: 390 mL    sodium chloride 0.9%.: 1800 mL  Total IN: 2190 mL    OUT:    Bulb: 45 mL    Bulb: 215 mL    Indwelling Catheter - Urethral: 875 mL  Total OUT: 1135 mL    Total NET: 1055 mL          MEDICATIONS  (STANDING):  sodium chloride 0.9% lock flush 3 milliLiter(s) IV Push every 8 hours  HYDROmorphone PCA (1 mG/mL) 30 milliLiter(s) PCA Continuous PCA Continuous  heparin  Injectable 5000 Unit(s) SubCutaneous every 8 hours  metoprolol Injectable 5 milliGRAM(s) IV Push every 6 hours  insulin lispro (HumaLOG) corrective regimen sliding scale   SubCutaneous every 6 hours  sodium chloride 0.9%. 1000 milliLiter(s) (100 mL/Hr) IV Continuous <Continuous>  influenza   Vaccine 0.5 milliLiter(s) IntraMuscular once    MEDICATIONS  (PRN):  HYDROmorphone PCA (1 mG/mL) Rescue Clinician Bolus 0.5 milliGRAM(s) IV Push every 15 minutes PRN for Pain Scale GREATER THAN 6  naloxone Injectable 0.1 milliGRAM(s) IV Push every 3 minutes PRN For ANY of the following changes in patient status:  A. RR LESS THAN 10 breaths per minute, B. Oxygen saturation LESS THAN 90%, C. Sedation score of 6  ondansetron Injectable 4 milliGRAM(s) IV Push every 6 hours PRN Nausea  diphenhydrAMINE   Injectable 12.5 milliGRAM(s) IV Push every 4 hours PRN Pruritus  HYDROmorphone  Injectable 0.8 milliGRAM(s) IV Push every 10 minutes PRN Severe Pain (7 - 10)  HYDROmorphone  Injectable 0.4 milliGRAM(s) IV Push every 10 minutes PRN Moderate Pain (4 - 6)  promethazine IVPB 6.25 milliGRAM(s) IV Intermittent once PRN Nausea and/or Vomiting  meperidine     Injectable 12.5 milliGRAM(s) IV Push every 10 minutes PRN Shivering      Physical Exam:  Constitutional: WDWN female, NAD AxOx3  Chest: s1s2+, RRR, clear to auscultation bilaterally, no w/r/r    Abdomen: soft, nondistended, no guarding, no rebound, [] bowel sounds, appropriate tenderness noted   Incision site:   clean, dry, intact.    Extremities: no lower extremity edema or calf tenderness bilaterally; intermittent compression stockings in place     LABS:    09-17    137    |  104    |  13     ----------------------------<  140<H>  4.2     |  23     |  0.64   09-16    141    |  107    |  10     ----------------------------<  158<H>  3.9     |  21<L>  |  0.54   09-15    140    |  104    |  10     ----------------------------<  217<H>  4.4     |  20<L>  |  0.84     Ca    7.9<L>      17 Sep 2017 04:50  Ca    7.7<L>      16 Sep 2017 06:00  Ca    7.7<L>      15 Sep 2017 23:54  Phos  2.4       09-17  Phos  4.1       09-16  Phos  5.1       09-15  Mg     2.2       09-17  Mg     1.3       09-16  Mg     1.3       09-15                RADIOLOGY & ADDITIONAL TESTS:    a/p: This 67y female, s/p     CV: hemodynamically stable  PUL: adequate on RA  GI:   :  ID: afebrile, WBC stable  DVT prophylaxis:   Pain Management: controlled  d/w    continue with current care    KENNETH West  #[id number] [facility] Progress Note POD #2    Pt seen and examined at bedside. Pt states mild abdominal pain, not passing flatus.  Pt is tolerating clear fluids.  Pt denies fever, chills, chest pain, SOB, nausea, vomiting, lightheadedness, dizziness.      T(F): 97.8 (09-17-17 @ 05:57), Max: 98.6 (09-16-17 @ 13:58)  HR: 99 (09-17-17 @ 05:57) (94 - 120)  BP: 124/67 (09-17-17 @ 05:57) (107/70 - 128/76)  RR: 18 (09-17-17 @ 05:57) (15 - 18)  SpO2: 98% (09-17-17 @ 05:57) (89% - 98%)    CAPILLARY BLOOD GLUCOSE  135 (17 Sep 2017 02:52)  163 (16 Sep 2017 22:30)  276 (16 Sep 2017 15:35)  163 (16 Sep 2017 09:00)    I&O's Detail    16 Sep 2017 07:01  -  17 Sep 2017 07:00  --------------------------------------------------------  IN:    Oral Fluid: 390 mL    sodium chloride 0.9%.: 1800 mL  Total IN: 2190 mL    OUT:    Bulb: 45 mL    Bulb: 215 mL    Indwelling Catheter - Urethral: 875 mL  Total OUT: 1135 mL    Total NET: 1055 mL      MEDICATIONS  (STANDING):  sodium chloride 0.9% lock flush 3 milliLiter(s) IV Push every 8 hours  HYDROmorphone PCA (1 mG/mL) 30 milliLiter(s) PCA Continuous   heparin  Injectable 5000 Unit(s) SubCutaneous every 8 hours  metoprolol Injectable 5 milliGRAM(s) IV Push every 6 hours  insulin lispro (HumaLOG) corrective regimen sliding scale   SubCutaneous every 6 hours  sodium chloride 0.9%. 1000 milliLiter(s) (100 mL/Hr) IV Continuous  influenza   Vaccine 0.5 milliLiter(s) IntraMuscular once    MEDICATIONS  (PRN):  HYDROmorphone PCA (1 mG/mL) Rescue Clinician Bolus 0.5 milliGRAM(s) IV Push every 15 minutes PRN for Pain Scale GREATER THAN 6  naloxone Injectable 0.1 milliGRAM(s) IV Push every 3 minutes PRN For ANY of the following changes in patient status:  A. RR LESS THAN 10 breaths per minute, B. Oxygen saturation LESS THAN 90%, C. Sedation score of 6  ondansetron Injectable 4 milliGRAM(s) IV Push every 6 hours PRN Nausea  diphenhydrAMINE   Injectable 12.5 milliGRAM(s) IV Push every 4 hours PRN Pruritus  HYDROmorphone  Injectable 0.8 milliGRAM(s) IV Push every 10 minutes PRN Severe Pain (7 - 10)  HYDROmorphone  Injectable 0.4 milliGRAM(s) IV Push every 10 minutes PRN Moderate Pain (4 - 6)  promethazine IVPB 6.25 milliGRAM(s) IV Intermittent once PRN Nausea and/or Vomiting  meperidine     Injectable 12.5 milliGRAM(s) IV Push every 10 minutes PRN Shivering      Physical Exam:  Constitutional: WDWN female, NAD AxOx3  Chest: s1s2+, RRR, +Rhonchi noted L>R,, breathing not labored, no wheezes    Abdomen: soft, nondistended, no guarding, no rebound, faint bowel sounds, appropriate tenderness noted   JOSÉ drains: patent and draining serosang fluid.    Incision site:   vertical dressing removed, incision clean, dry, steris intact.    Extremities: no lower extremity edema or calf tenderness bilaterally; intermittent compression stockings in place     LABS:               9.5    17.17 )-----------( 180      ( 09-17 @ 04:50 )             28.5                11.6   13.17 )-----------( 153      ( 09-16 @ 06:00 )             33.6                12.0   13.80 )-----------( 194      ( 09-15 @ 23:34 )             36.0     09-17  137    |  104    |  13     ----------------------------<  140<H>  4.2     |  23     |  0.64     09-16  141    |  107    |  10     ----------------------------<  158<H>  3.9     |  21<L>  |  0.54     09-15  140    |  104    |  10     ----------------------------<  217<H>  4.4     |  20<L>  |  0.84     Ca    7.9<L>      17 Sep 2017 04:50  Ca    7.7<L>      16 Sep 2017 06:00  Ca    7.7<L>      15 Sep 2017 23:54  Phos  2.4       09-17  Phos  4.1       09-16  Phos  5.1       09-15  Mg     2.2       09-17  Mg     1.3       09-16  Mg     1.3       09-15      a/p: This 67y female, s/p Exlap, AVEL, BSO, Omentectomy, resection of anterior abdominal wall masses, resection of portion of ascending colon, appendectomy, tumor debulking, cystotomy repair, for known poorly diff. ovarian cancer, patient stable at present time    CV: hemodynamically stable H/H dropped will repeat cbc this afternoon  PUL: adequate on RA  GI:  tolerating clear fluids, continue till flatus  : butt catheter in place, continue for 7 days  ID: afebrile, WBC stable  DVT prophylaxis: Heparin  Pain Management: controlled on PCA  d/w Dr. Brooks  appreciate SurgOnc recommendations, diet per surgery  continue with current care    KENNETH West  #[id number]

## 2017-09-18 DIAGNOSIS — C56.9 MALIGNANT NEOPLASM OF UNSPECIFIED OVARY: ICD-10-CM

## 2017-09-18 LAB
BUN SERPL-MCNC: 7 MG/DL — SIGNIFICANT CHANGE UP (ref 7–23)
CALCIUM SERPL-MCNC: 7.8 MG/DL — LOW (ref 8.4–10.5)
CHLORIDE SERPL-SCNC: 103 MMOL/L — SIGNIFICANT CHANGE UP (ref 98–107)
CO2 SERPL-SCNC: 19 MMOL/L — LOW (ref 22–31)
CREAT SERPL-MCNC: 0.33 MG/DL — LOW (ref 0.5–1.3)
GLUCOSE SERPL-MCNC: 103 MG/DL — HIGH (ref 70–99)
HCT VFR BLD CALC: 24.7 % — LOW (ref 34.5–45)
HCT VFR BLD CALC: 25.8 % — LOW (ref 34.5–45)
HGB BLD-MCNC: 8.5 G/DL — LOW (ref 11.5–15.5)
HGB BLD-MCNC: 8.7 G/DL — LOW (ref 11.5–15.5)
MAGNESIUM SERPL-MCNC: 1.9 MG/DL — SIGNIFICANT CHANGE UP (ref 1.6–2.6)
MCHC RBC-ENTMCNC: 30.2 PG — SIGNIFICANT CHANGE UP (ref 27–34)
MCHC RBC-ENTMCNC: 30.9 PG — SIGNIFICANT CHANGE UP (ref 27–34)
MCHC RBC-ENTMCNC: 33.7 % — SIGNIFICANT CHANGE UP (ref 32–36)
MCHC RBC-ENTMCNC: 34.4 % — SIGNIFICANT CHANGE UP (ref 32–36)
MCV RBC AUTO: 89.6 FL — SIGNIFICANT CHANGE UP (ref 80–100)
MCV RBC AUTO: 89.8 FL — SIGNIFICANT CHANGE UP (ref 80–100)
NRBC # FLD: 0 — SIGNIFICANT CHANGE UP
NRBC # FLD: 0 — SIGNIFICANT CHANGE UP
PHOSPHATE SERPL-MCNC: 2 MG/DL — LOW (ref 2.5–4.5)
PLATELET # BLD AUTO: 177 K/UL — SIGNIFICANT CHANGE UP (ref 150–400)
PLATELET # BLD AUTO: 200 K/UL — SIGNIFICANT CHANGE UP (ref 150–400)
PMV BLD: 10 FL — SIGNIFICANT CHANGE UP (ref 7–13)
PMV BLD: 10.3 FL — SIGNIFICANT CHANGE UP (ref 7–13)
POTASSIUM SERPL-MCNC: 3.4 MMOL/L — LOW (ref 3.5–5.3)
POTASSIUM SERPL-SCNC: 3.4 MMOL/L — LOW (ref 3.5–5.3)
RBC # BLD: 2.75 M/UL — LOW (ref 3.8–5.2)
RBC # BLD: 2.88 M/UL — LOW (ref 3.8–5.2)
RBC # FLD: 12.6 % — SIGNIFICANT CHANGE UP (ref 10.3–14.5)
RBC # FLD: 12.8 % — SIGNIFICANT CHANGE UP (ref 10.3–14.5)
SODIUM SERPL-SCNC: 139 MMOL/L — SIGNIFICANT CHANGE UP (ref 135–145)
WBC # BLD: 13.24 K/UL — HIGH (ref 3.8–10.5)
WBC # BLD: 13.56 K/UL — HIGH (ref 3.8–10.5)
WBC # FLD AUTO: 13.24 K/UL — HIGH (ref 3.8–10.5)
WBC # FLD AUTO: 13.56 K/UL — HIGH (ref 3.8–10.5)

## 2017-09-18 RX ORDER — INSULIN LISPRO 100/ML
VIAL (ML) SUBCUTANEOUS
Qty: 0 | Refills: 0 | Status: DISCONTINUED | OUTPATIENT
Start: 2017-09-18 | End: 2017-09-23

## 2017-09-18 RX ORDER — OXYCODONE HYDROCHLORIDE 5 MG/1
5 TABLET ORAL
Qty: 0 | Refills: 0 | Status: DISCONTINUED | OUTPATIENT
Start: 2017-09-18 | End: 2017-09-23

## 2017-09-18 RX ORDER — ENOXAPARIN SODIUM 100 MG/ML
40 INJECTION SUBCUTANEOUS DAILY
Qty: 0 | Refills: 0 | Status: DISCONTINUED | OUTPATIENT
Start: 2017-09-18 | End: 2017-09-23

## 2017-09-18 RX ORDER — POTASSIUM PHOSPHATE, MONOBASIC POTASSIUM PHOSPHATE, DIBASIC 236; 224 MG/ML; MG/ML
15 INJECTION, SOLUTION INTRAVENOUS ONCE
Qty: 0 | Refills: 0 | Status: COMPLETED | OUTPATIENT
Start: 2017-09-18 | End: 2017-09-18

## 2017-09-18 RX ORDER — ACETAMINOPHEN 500 MG
650 TABLET ORAL EVERY 6 HOURS
Qty: 0 | Refills: 0 | Status: COMPLETED | OUTPATIENT
Start: 2017-09-18 | End: 2017-09-20

## 2017-09-18 RX ADMIN — OXYCODONE HYDROCHLORIDE 5 MILLIGRAM(S): 5 TABLET ORAL at 22:53

## 2017-09-18 RX ADMIN — Medication 5 MILLIGRAM(S): at 12:20

## 2017-09-18 RX ADMIN — OXYCODONE HYDROCHLORIDE 5 MILLIGRAM(S): 5 TABLET ORAL at 17:50

## 2017-09-18 RX ADMIN — OXYCODONE HYDROCHLORIDE 5 MILLIGRAM(S): 5 TABLET ORAL at 18:20

## 2017-09-18 RX ADMIN — Medication 5 MILLIGRAM(S): at 06:47

## 2017-09-18 RX ADMIN — SODIUM CHLORIDE 3 MILLILITER(S): 9 INJECTION INTRAMUSCULAR; INTRAVENOUS; SUBCUTANEOUS at 14:05

## 2017-09-18 RX ADMIN — OXYCODONE HYDROCHLORIDE 5 MILLIGRAM(S): 5 TABLET ORAL at 23:30

## 2017-09-18 RX ADMIN — Medication 650 MILLIGRAM(S): at 14:27

## 2017-09-18 RX ADMIN — OXYCODONE HYDROCHLORIDE 5 MILLIGRAM(S): 5 TABLET ORAL at 12:30

## 2017-09-18 RX ADMIN — HYDROMORPHONE HYDROCHLORIDE 30 MILLILITER(S): 2 INJECTION INTRAMUSCULAR; INTRAVENOUS; SUBCUTANEOUS at 08:02

## 2017-09-18 RX ADMIN — POTASSIUM PHOSPHATE, MONOBASIC POTASSIUM PHOSPHATE, DIBASIC 62.5 MILLIMOLE(S): 236; 224 INJECTION, SOLUTION INTRAVENOUS at 12:31

## 2017-09-18 RX ADMIN — OXYCODONE HYDROCHLORIDE 5 MILLIGRAM(S): 5 TABLET ORAL at 13:00

## 2017-09-18 RX ADMIN — ENOXAPARIN SODIUM 40 MILLIGRAM(S): 100 INJECTION SUBCUTANEOUS at 23:07

## 2017-09-18 RX ADMIN — SODIUM CHLORIDE 30 MILLILITER(S): 9 INJECTION INTRAMUSCULAR; INTRAVENOUS; SUBCUTANEOUS at 17:51

## 2017-09-18 RX ADMIN — SODIUM CHLORIDE 100 MILLILITER(S): 9 INJECTION INTRAMUSCULAR; INTRAVENOUS; SUBCUTANEOUS at 06:47

## 2017-09-18 RX ADMIN — Medication 650 MILLIGRAM(S): at 23:30

## 2017-09-18 RX ADMIN — Medication 5 MILLIGRAM(S): at 17:53

## 2017-09-18 RX ADMIN — Medication 650 MILLIGRAM(S): at 22:53

## 2017-09-18 RX ADMIN — HEPARIN SODIUM 5000 UNIT(S): 5000 INJECTION INTRAVENOUS; SUBCUTANEOUS at 14:24

## 2017-09-18 RX ADMIN — Medication 5 MILLIGRAM(S): at 00:17

## 2017-09-18 RX ADMIN — HEPARIN SODIUM 5000 UNIT(S): 5000 INJECTION INTRAVENOUS; SUBCUTANEOUS at 06:47

## 2017-09-18 RX ADMIN — SODIUM CHLORIDE 3 MILLILITER(S): 9 INJECTION INTRAMUSCULAR; INTRAVENOUS; SUBCUTANEOUS at 06:47

## 2017-09-18 NOTE — PROGRESS NOTE ADULT - PROBLEM SELECTOR PLAN 1
Neuro: c/w PCA for analgesia  CV: hemodynamically stable, normotensive, cw metoprolol  Pulm: saturating well on room air, encourage incentive spirometry and ambulation  GI: continue with clear diet per surgery, awaiting bowel function  : voiding adequately  Heme: ambulation, SCDs, and heparin for DVT ppx  Endo: c/w ISS, -120    J Carl PGY2

## 2017-09-18 NOTE — PROGRESS NOTE ADULT - SUBJECTIVE AND OBJECTIVE BOX
POD#3   HD#4    Patient seen and examined at bedside.  Patient had one episode of emesis around 6pm and has had no further emesis or nausea.  No acute complaints, pain well controlled.  Patient is ambulating, not yet passing flatus, voiding spontaneously, and tolerating clear liquid diet. Denies CP, SOB, fevers, and chills.    Vital Signs Last 24 Hours  T(C): 36.5 (09-18-17 @ 06:46), Max: 37.7 (09-17-17 @ 17:58)  HR: 99 (09-18-17 @ 06:46) (98 - 113)  BP: 136/76 (09-18-17 @ 06:46) (122/77 - 137/84)  RR: 18 (09-18-17 @ 06:46) (17 - 19)  SpO2: 92% (09-18-17 @ 06:46) (91% - 100%)    I&O's Summary    16 Sep 2017 07:01  -  17 Sep 2017 07:00  --------------------------------------------------------  IN: 2190 mL / OUT: 1135 mL / NET: 1055 mL    17 Sep 2017 07:01  -  18 Sep 2017 06:49  --------------------------------------------------------  IN: 0 mL / OUT: 3230 mL / NET: -3230 mL    L JOSÉ Drain 20 cc  R JOSÉ Drain 50 cc    Physical Exam:  General: NAD  CV: NR, RR, S1, S2, no M/R/G  Lungs: CTAB  Abdomen: Soft, appropriate post-op tenderness, non-distended, decreased BS  Incision: vertical midline incision CDI, port sites CDI, b/l JOSÉ drains with serosanguinous fluid  Ext: No pain or swelling    Labs:                        8.5    13.24 )-----------( 177      ( 18 Sep 2017 05:20 )             24.7   baso x      eos x      imm gran x      lymph x      mono x      poly x                            8.9    15.38 )-----------( 191      ( 17 Sep 2017 12:06 )             27.2   baso x      eos x      imm gran x      lymph x      mono x      poly x                            9.5    17.17 )-----------( 180      ( 17 Sep 2017 04:50 )             28.5   baso x      eos x      imm gran x      lymph x      mono x      poly x                            11.6   13.17 )-----------( 153      ( 16 Sep 2017 06:00 )             33.6   baso x      eos x      imm gran x      lymph x      mono x      poly x                            12.0   13.80 )-----------( 194      ( 15 Sep 2017 23:34 )             36.0   baso 0.1    eos 0.0    imm gran 0.2    lymph 6.4    mono 8.7    poly 84.6     09-18    139  |  103  |  7   ----------------------------<  103<H>  3.4<L>   |  19<L>  |  0.33<L>    Ca    7.8<L>      18 Sep 2017 05:20  Phos  2.0     09-18  Mg     1.9     09-18            Blood Type: A Positive      MEDICATIONS  (STANDING):  sodium chloride 0.9% lock flush 3 milliLiter(s) IV Push every 8 hours  HYDROmorphone PCA (1 mG/mL) 30 milliLiter(s) PCA Continuous PCA Continuous  heparin  Injectable 5000 Unit(s) SubCutaneous every 8 hours  metoprolol Injectable 5 milliGRAM(s) IV Push every 6 hours  insulin lispro (HumaLOG) corrective regimen sliding scale   SubCutaneous every 6 hours  sodium chloride 0.9%. 1000 milliLiter(s) (100 mL/Hr) IV Continuous <Continuous>  influenza   Vaccine 0.5 milliLiter(s) IntraMuscular once    MEDICATIONS  (PRN):  HYDROmorphone PCA (1 mG/mL) Rescue Clinician Bolus 0.5 milliGRAM(s) IV Push every 15 minutes PRN for Pain Scale GREATER THAN 6  naloxone Injectable 0.1 milliGRAM(s) IV Push every 3 minutes PRN For ANY of the following changes in patient status:  A. RR LESS THAN 10 breaths per minute, B. Oxygen saturation LESS THAN 90%, C. Sedation score of 6  ondansetron Injectable 4 milliGRAM(s) IV Push every 6 hours PRN Nausea  diphenhydrAMINE   Injectable 12.5 milliGRAM(s) IV Push every 4 hours PRN Pruritus  HYDROmorphone  Injectable 0.8 milliGRAM(s) IV Push every 10 minutes PRN Severe Pain (7 - 10)  HYDROmorphone  Injectable 0.4 milliGRAM(s) IV Push every 10 minutes PRN Moderate Pain (4 - 6)  promethazine IVPB 6.25 milliGRAM(s) IV Intermittent once PRN Nausea and/or Vomiting  meperidine     Injectable 12.5 milliGRAM(s) IV Push every 10 minutes PRN Shivering  benzocaine 15 mG/menthol 3.6 mG Lozenge 1 Lozenge Oral five times a day PRN Sore Throat

## 2017-09-18 NOTE — PROGRESS NOTE ADULT - SUBJECTIVE AND OBJECTIVE BOX
Anesthesia Pain Management Service    SUBJECTIVE: Pt now off IV PCA without problems reported.    Therapy:	  [ X] IV PCA	   [ ] Epidural           [ ] s/p Spinal Opoid              [ ] Postpartum infusion	  [ ] Patient controlled regional anesthesia (PCRA)    [ ] prn Analgesics    Allergies    No Known Allergies    Intolerances      MEDICATIONS  (STANDING):  sodium chloride 0.9% lock flush 3 milliLiter(s) IV Push every 8 hours  heparin  Injectable 5000 Unit(s) SubCutaneous every 8 hours  metoprolol Injectable 5 milliGRAM(s) IV Push every 6 hours  insulin lispro (HumaLOG) corrective regimen sliding scale   SubCutaneous every 6 hours  sodium chloride 0.9%. 1000 milliLiter(s) (100 mL/Hr) IV Continuous <Continuous>  influenza   Vaccine 0.5 milliLiter(s) IntraMuscular once  acetaminophen   Tablet. 650 milliGRAM(s) Oral every 6 hours    MEDICATIONS  (PRN):  naloxone Injectable 0.1 milliGRAM(s) IV Push every 3 minutes PRN For ANY of the following changes in patient status:  A. RR LESS THAN 10 breaths per minute, B. Oxygen saturation LESS THAN 90%, C. Sedation score of 6  ondansetron Injectable 4 milliGRAM(s) IV Push every 6 hours PRN Nausea  benzocaine 15 mG/menthol 3.6 mG Lozenge 1 Lozenge Oral five times a day PRN Sore Throat  oxyCODONE    IR 5 milliGRAM(s) Oral every 3 hours PRN Moderate and Severe Pain (4 - 10)      OBJECTIVE:   [X] No new signs     [ ] Other:    Side Effects:  [X ] None			[ ] Other:    Assessment of Catheter Site:		[ ] Intact		[ ] Other:    ASSESSMENT/PLAN  [ ] Continue current therapy    [X ] Therapy changed to:    [ ] IV PCA       [ ] Epidural     [ X] prn Analgesics     Comments: Opioids or Adjuvent analgesics to be used at this point.    Progress Note written now but Patient was seen earlier.

## 2017-09-18 NOTE — PROGRESS NOTE ADULT - SUBJECTIVE AND OBJECTIVE BOX
Day _4_ of Anesthesia Pain Management Service    Allergies  No Known Allergies    SUBJECTIVE: non-contributory  Per family member at bedside, patient "has not developed the habit of pressing the button herself, and tends to forget at times."  Per RN, the patient falls asleep right after pressing the button  Both agree that she may do better on oral anagesics    Pain Scale Score	At rest: ___ 	With Activity: ___ 	[x] Refer to charted pain scores    THERAPY:    [ ] IV PCA Morphine		[ ] 5 mg/mL	[ ] 1 mg/mL  [X] IV PCA Hydromorphone	[ ] 5 mg/mL	[X] 1 mg/mL  [ ] IV PCA Fentanyl		[ ] 50 micrograms/mL    Demand dose _0.2mg_ lockout _6_ (minutes) Continuous Rate _0_ Total: _3.2mg_  Daily      MEDICATIONS  (STANDING):  sodium chloride 0.9% lock flush 3 milliLiter(s) IV Push every 8 hours  heparin  Injectable 5000 Unit(s) SubCutaneous every 8 hours  metoprolol Injectable 5 milliGRAM(s) IV Push every 6 hours  insulin lispro (HumaLOG) corrective regimen sliding scale   SubCutaneous every 6 hours  sodium chloride 0.9%. 1000 milliLiter(s) (100 mL/Hr) IV Continuous <Continuous>  influenza   Vaccine 0.5 milliLiter(s) IntraMuscular once  potassium phosphate IVPB 15 milliMole(s) IV Intermittent once  acetaminophen   Tablet. 650 milliGRAM(s) Oral every 6 hours    MEDICATIONS  (PRN):  naloxone Injectable 0.1 milliGRAM(s) IV Push every 3 minutes PRN For ANY of the following changes in patient status:  A. RR LESS THAN 10 breaths per minute, B. Oxygen saturation LESS THAN 90%, C. Sedation score of 6  ondansetron Injectable 4 milliGRAM(s) IV Push every 6 hours PRN Nausea  benzocaine 15 mG/menthol 3.6 mG Lozenge 1 Lozenge Oral five times a day PRN Sore Throat  oxyCODONE    IR 5 milliGRAM(s) Oral every 3 hours PRN Moderate and Severe Pain (4 - 10)      OBJECTIVE: Asleep, NAD, sitting in recliner. Awakens to verbal stimuli, slightly drowsy.    Sedation Score:	[] Alert	[ ] Drowsy	[X] Arousable	[x] Asleep	[ ] Unresponsive    Side Effects:	[X] None	[ ] Nausea	[ ] Vomiting	[ ] Pruritus  		  [ ] Weakness		[ ] Numbness	[ ] Other:                          8.5    13.24 )-----------( 177      ( 18 Sep 2017 05:20 )             24.7       09-18    139  |  103  |  7   ----------------------------<  103<H>  3.4<L>   |  19<L>  |  0.33<L>    Ca    7.8<L>      18 Sep 2017 05:20  Phos  2.0     09-18  Mg     1.9     09-18        ASSESSMENT/ PLAN    Therapy to  be:	[] Continue   [x] Discontinued   [x] Change to prn Analgesics    Documentation and Verification of current medications:  [X] Done	[ ] Not done, not eligible  [ ] Not done, reason not given    Comments:  Patient encouraged to request oral analgesics as needed

## 2017-09-18 NOTE — CHART NOTE - NSCHARTNOTEFT_GEN_A_CORE
Patient evaluated at bedside this afternoon. Pain well controlled. Tolerating clears, no flatus yet, not yet OOB.    Objective  T(C): 36.9 (09-18-17 @ 14:22), Max: 36.9 (09-18-17 @ 14:22)  HR: 91 (09-18-17 @ 14:22) (91 - 99)  BP: 140/78 (09-18-17 @ 14:22) (132/74 - 140/78)  RR: 16 (09-18-17 @ 14:22) (16 - 18)  SpO2: 92% (09-18-17 @ 14:22) (90% - 92%)  Wt(kg): --    09-17 @ 07:01  -  09-18 @ 07:00  --------------------------------------------------------  IN: 1200 mL / OUT: 3230 mL / NET: -2030 mL    09-18 @ 07:01  -  09-18 @ 16:39  --------------------------------------------------------  IN: 250 mL / OUT: 1035 mL / NET: -785 mL      Gen: NAD  Abd: Soft, non-distended, appropriately tender    A/P:  - encourage OOB, continue to monitor  - c/w plan per AM rounds    d/w Dr. Deepa Awad, PGY-2  Pager #[phone] ([facility]), [phone] ([place])

## 2017-09-18 NOTE — PROGRESS NOTE ADULT - ASSESSMENT
67 year old POD#3 s/p ex-lap, AVEL-RSO, omentectomy, resection of anterior abdominal wall masses, resection of portion of ascending colon, appendectomy, tumor debulking, cystotomy repair.  Patient is hemodynamically stable with pain well-controlled.

## 2017-09-18 NOTE — PROGRESS NOTE ADULT - SUBJECTIVE AND OBJECTIVE BOX
DX:  s/p LAR during AVEL tumor debulking for ovarian cancer  POD#: 3          SUBJECTIVE    Endorses nausea and emesis overnight. Recieved zofran, which helped symptoms. Denies flatus. Has been OOB to chair.     10-point review of systems completed and negative except as noted above.    sodium chloride 0.9% lock flush 3 milliLiter(s) IV Push every 8 hours  HYDROmorphone PCA (1 mG/mL) 30 milliLiter(s) PCA Continuous PCA Continuous  HYDROmorphone PCA (1 mG/mL) Rescue Clinician Bolus 0.5 milliGRAM(s) IV Push every 15 minutes PRN  naloxone Injectable 0.1 milliGRAM(s) IV Push every 3 minutes PRN  ondansetron Injectable 4 milliGRAM(s) IV Push every 6 hours PRN  diphenhydrAMINE   Injectable 12.5 milliGRAM(s) IV Push every 4 hours PRN  HYDROmorphone  Injectable 0.8 milliGRAM(s) IV Push every 10 minutes PRN  HYDROmorphone  Injectable 0.4 milliGRAM(s) IV Push every 10 minutes PRN  promethazine IVPB 6.25 milliGRAM(s) IV Intermittent once PRN  meperidine     Injectable 12.5 milliGRAM(s) IV Push every 10 minutes PRN  heparin  Injectable 5000 Unit(s) SubCutaneous every 8 hours  metoprolol Injectable 5 milliGRAM(s) IV Push every 6 hours  insulin lispro (HumaLOG) corrective regimen sliding scale   SubCutaneous every 6 hours  sodium chloride 0.9%. 1000 milliLiter(s) IV Continuous <Continuous>  influenza   Vaccine 0.5 milliLiter(s) IntraMuscular once  benzocaine 15 mG/menthol 3.6 mG Lozenge 1 Lozenge Oral five times a day PRN            OBJECTIVE    T(C): 36.5 (09-18-17 @ 06:46), Max: 37.7 (09-17-17 @ 17:58)  HR: 99 (09-18-17 @ 06:46) (98 - 113)  BP: 136/76 (09-18-17 @ 06:46) (122/77 - 137/84)  RR: 18 (09-18-17 @ 06:46) (17 - 19)  SpO2: 92% (09-18-17 @ 06:46) (91% - 100%)    09-17-17 @ 07:01  -  09-18-17 @ 07:00  --------------------------------------------------------  IN: 0 mL / OUT: 3230 mL / NET: -3230 mL        EXAM  Gen: NAD, awake  HEENT: normocephalic, atraumatic, no scleral icterus  CV: RRR  Pulm: non-labored respirations  Abd: Softly distended. Incisional tenderness. JPSS x2.   Ext: warm, no edema    LABS                        8.5    13.24 )-----------( 177      ( 18 Sep 2017 05:20 )             24.7     09-18    139  |  103  |  7   ----------------------------<  103<H>  3.4<L>   |  19<L>  |  0.33<L>    Ca    7.8<L>      18 Sep 2017 05:20  Phos  2.0     09-18  Mg     1.9     09-18

## 2017-09-18 NOTE — PROGRESS NOTE ADULT - ASSESSMENT
67 year old female POD 3 s/p AVEL, LAR.  - Check GI fxn  - Continue CLD, monitor for nausea  - Pain control  - Monitor abd exams  - Encourage OOB/IS  - Care per primary team

## 2017-09-19 LAB
BUN SERPL-MCNC: 6 MG/DL — LOW (ref 7–23)
CALCIUM SERPL-MCNC: 8 MG/DL — LOW (ref 8.4–10.5)
CHLORIDE SERPL-SCNC: 99 MMOL/L — SIGNIFICANT CHANGE UP (ref 98–107)
CO2 SERPL-SCNC: 17 MMOL/L — LOW (ref 22–31)
CREAT SERPL-MCNC: 0.32 MG/DL — LOW (ref 0.5–1.3)
GLUCOSE SERPL-MCNC: 93 MG/DL — SIGNIFICANT CHANGE UP (ref 70–99)
HCT VFR BLD CALC: 26 % — LOW (ref 34.5–45)
HGB BLD-MCNC: 8.9 G/DL — LOW (ref 11.5–15.5)
MAGNESIUM SERPL-MCNC: 2 MG/DL — SIGNIFICANT CHANGE UP (ref 1.6–2.6)
MCHC RBC-ENTMCNC: 30.2 PG — SIGNIFICANT CHANGE UP (ref 27–34)
MCHC RBC-ENTMCNC: 34.2 % — SIGNIFICANT CHANGE UP (ref 32–36)
MCV RBC AUTO: 88.1 FL — SIGNIFICANT CHANGE UP (ref 80–100)
NRBC # FLD: 0 — SIGNIFICANT CHANGE UP
PHOSPHATE SERPL-MCNC: 2.2 MG/DL — LOW (ref 2.5–4.5)
PLATELET # BLD AUTO: 233 K/UL — SIGNIFICANT CHANGE UP (ref 150–400)
PMV BLD: 10.4 FL — SIGNIFICANT CHANGE UP (ref 7–13)
POTASSIUM SERPL-MCNC: 3.2 MMOL/L — LOW (ref 3.5–5.3)
POTASSIUM SERPL-SCNC: 3.2 MMOL/L — LOW (ref 3.5–5.3)
RBC # BLD: 2.95 M/UL — LOW (ref 3.8–5.2)
RBC # FLD: 12.4 % — SIGNIFICANT CHANGE UP (ref 10.3–14.5)
SODIUM SERPL-SCNC: 137 MMOL/L — SIGNIFICANT CHANGE UP (ref 135–145)
WBC # BLD: 14.23 K/UL — HIGH (ref 3.8–10.5)
WBC # FLD AUTO: 14.23 K/UL — HIGH (ref 3.8–10.5)

## 2017-09-19 RX ORDER — POTASSIUM PHOSPHATE, MONOBASIC POTASSIUM PHOSPHATE, DIBASIC 236; 224 MG/ML; MG/ML
15 INJECTION, SOLUTION INTRAVENOUS ONCE
Qty: 0 | Refills: 0 | Status: COMPLETED | OUTPATIENT
Start: 2017-09-19 | End: 2017-09-19

## 2017-09-19 RX ADMIN — OXYCODONE HYDROCHLORIDE 5 MILLIGRAM(S): 5 TABLET ORAL at 21:56

## 2017-09-19 RX ADMIN — OXYCODONE HYDROCHLORIDE 5 MILLIGRAM(S): 5 TABLET ORAL at 02:09

## 2017-09-19 RX ADMIN — Medication 650 MILLIGRAM(S): at 19:45

## 2017-09-19 RX ADMIN — SODIUM CHLORIDE 30 MILLILITER(S): 9 INJECTION INTRAMUSCULAR; INTRAVENOUS; SUBCUTANEOUS at 10:51

## 2017-09-19 RX ADMIN — Medication 650 MILLIGRAM(S): at 19:03

## 2017-09-19 RX ADMIN — Medication 5 MILLIGRAM(S): at 12:44

## 2017-09-19 RX ADMIN — POTASSIUM PHOSPHATE, MONOBASIC POTASSIUM PHOSPHATE, DIBASIC 62.5 MILLIMOLE(S): 236; 224 INJECTION, SOLUTION INTRAVENOUS at 10:51

## 2017-09-19 RX ADMIN — Medication 5 MILLIGRAM(S): at 05:57

## 2017-09-19 RX ADMIN — Medication 650 MILLIGRAM(S): at 04:10

## 2017-09-19 RX ADMIN — OXYCODONE HYDROCHLORIDE 5 MILLIGRAM(S): 5 TABLET ORAL at 03:00

## 2017-09-19 RX ADMIN — ENOXAPARIN SODIUM 40 MILLIGRAM(S): 100 INJECTION SUBCUTANEOUS at 21:56

## 2017-09-19 RX ADMIN — Medication 5 MILLIGRAM(S): at 02:09

## 2017-09-19 RX ADMIN — SODIUM CHLORIDE 30 MILLILITER(S): 9 INJECTION INTRAMUSCULAR; INTRAVENOUS; SUBCUTANEOUS at 21:56

## 2017-09-19 RX ADMIN — SODIUM CHLORIDE 3 MILLILITER(S): 9 INJECTION INTRAMUSCULAR; INTRAVENOUS; SUBCUTANEOUS at 13:04

## 2017-09-19 RX ADMIN — Medication 650 MILLIGRAM(S): at 10:51

## 2017-09-19 RX ADMIN — Medication 5 MILLIGRAM(S): at 19:03

## 2017-09-19 RX ADMIN — OXYCODONE HYDROCHLORIDE 5 MILLIGRAM(S): 5 TABLET ORAL at 22:35

## 2017-09-19 RX ADMIN — Medication 650 MILLIGRAM(S): at 11:21

## 2017-09-19 NOTE — PROGRESS NOTE ADULT - SUBJECTIVE AND OBJECTIVE BOX
DX:  LAR during AVEL tumor debulking for ovarian cancer  POD#: 4          SUBJECTIVE    NAEO. Endorses flatus. Denies BM. Has been OOB to chair. Pain well controlled.     10-point review of systems completed and negative except as noted above.    sodium chloride 0.9% lock flush 3 milliLiter(s) IV Push every 8 hours  naloxone Injectable 0.1 milliGRAM(s) IV Push every 3 minutes PRN  ondansetron Injectable 4 milliGRAM(s) IV Push every 6 hours PRN  metoprolol Injectable 5 milliGRAM(s) IV Push every 6 hours  sodium chloride 0.9%. 1000 milliLiter(s) IV Continuous <Continuous>  influenza   Vaccine 0.5 milliLiter(s) IntraMuscular once  benzocaine 15 mG/menthol 3.6 mG Lozenge 1 Lozenge Oral five times a day PRN  oxyCODONE    IR 5 milliGRAM(s) Oral every 3 hours PRN  acetaminophen   Tablet. 650 milliGRAM(s) Oral every 6 hours  enoxaparin Injectable 40 milliGRAM(s) SubCutaneous daily  insulin lispro (HumaLOG) corrective regimen sliding scale   SubCutaneous four times a day before meals            OBJECTIVE    T(C): 36.7 (09-19-17 @ 05:55), Max: 36.9 (09-18-17 @ 14:22)  HR: 88 (09-19-17 @ 05:55) (86 - 96)  BP: 139/79 (09-19-17 @ 05:55) (132/72 - 141/81)  RR: 17 (09-19-17 @ 05:55) (16 - 18)  SpO2: 99% (09-19-17 @ 05:55) (90% - 99%)    09-18-17 @ 07:01  -  09-19-17 @ 07:00  --------------------------------------------------------  IN: 700 mL / OUT: 3182.5 mL / NET: -2482.5 mL        EXAM  General: NAD  CV: NR, RR, S1, S2, no M/R/G  Lungs: CTAB  Abdomen: Soft, appropriate post-op tenderness, non-distended, faint BS  Incision: vertical midline CDI, JOSÉ drains - serosanguinous fluid  Ext: No pain or swelling    LABS                        8.9    14.23 )-----------( 233      ( 19 Sep 2017 05:40 )             26.0     09-19    137  |  99  |  6<L>  ----------------------------<  93  3.2<L>   |  17<L>  |  0.32<L>    Ca    8.0<L>      19 Sep 2017 05:40  Phos  2.2     09-19  Mg     2.0     09-19

## 2017-09-19 NOTE — PROGRESS NOTE ADULT - ATTENDING COMMENTS
Pt seen and examined, agree with gyn housestaff  POD#4  + flatus, advance diet per surgical oncology  F/u AM labs

## 2017-09-19 NOTE — PROGRESS NOTE ADULT - PROBLEM SELECTOR PLAN 1
Neuro: c/w oral analgesia  CV: hemodynamically stable, normotensive, c/w metoprolol  Pulm: saturating well on room air, encourage incentive spirometry and ambulation  GI: patient passing scant flatus, consider advancing to reg diet per surgery  : adequate UOP, maintain indwelling catheter  Heme: ambulation, SCDs, and heparin for DVT ppx  Endo: Fasting FS 97, cw ISS    RAMILA Henry PGY2

## 2017-09-19 NOTE — PROGRESS NOTE ADULT - ASSESSMENT
68 y/o POD#4 s/p ex-lap, AVEL-RSO, omentectomy, resection of anterior abdominal wall masses, resection of portion of ascending colon, appendectomy, tumor debulking, cystotomy repair.  Patient is hemodynamically stable, pain is well-controlled, and bowel function is resuming.

## 2017-09-19 NOTE — CHART NOTE - NSCHARTNOTEFT_GEN_A_CORE
GYN ONC Note    Patient doing well. Advanced to regular diet, however, patient has not eaten yet. Metcalf in place. Lower back pain relieved with heat packs    ICU Vital Signs Last 24 Hrs  T(C): 36.6 (19 Sep 2017 13:03), Max: 37 (19 Sep 2017 09:51)  T(F): 97.9 (19 Sep 2017 13:03), Max: 98.6 (19 Sep 2017 09:51)  HR: 80 (19 Sep 2017 13:03) (80 - 92)  BP: 135/78 (19 Sep 2017 13:03) (132/72 - 146/79)  BP(mean): --  ABP: --  ABP(mean): --  RR: 18 (19 Sep 2017 13:03) (17 - 18)  SpO2: 97% (19 Sep 2017 13:03) (94% - 100%)    A/P: Patient doing well, VSS  Cont routine post op care    CHERISE Mckeon, PGY4

## 2017-09-19 NOTE — PROGRESS NOTE ADULT - ASSESSMENT
67 year old female POD 4 s/p AVEL, LAR.  - Check GI fxn  - If continues flatus, ADAT  - Pain control  - Monitor abd exams  - Encourage OOB/IS  - Care per primary team

## 2017-09-20 ENCOUNTER — TRANSCRIPTION ENCOUNTER (OUTPATIENT)
Age: 67
End: 2017-09-20

## 2017-09-20 LAB
ANISOCYTOSIS BLD QL: SLIGHT — SIGNIFICANT CHANGE UP
APPEARANCE UR: CLEAR — SIGNIFICANT CHANGE UP
BACTERIA # UR AUTO: SIGNIFICANT CHANGE UP
BASOPHILS # BLD AUTO: 0.04 K/UL — SIGNIFICANT CHANGE UP (ref 0–0.2)
BASOPHILS NFR BLD AUTO: 0.2 % — SIGNIFICANT CHANGE UP (ref 0–2)
BASOPHILS NFR SPEC: 0 % — SIGNIFICANT CHANGE UP (ref 0–2)
BILIRUB UR-MCNC: NEGATIVE — SIGNIFICANT CHANGE UP
BLOOD UR QL VISUAL: NEGATIVE — SIGNIFICANT CHANGE UP
BUN SERPL-MCNC: 4 MG/DL — LOW (ref 7–23)
CALCIUM SERPL-MCNC: 8.2 MG/DL — LOW (ref 8.4–10.5)
CHLORIDE SERPL-SCNC: 101 MMOL/L — SIGNIFICANT CHANGE UP (ref 98–107)
CO2 SERPL-SCNC: 15 MMOL/L — LOW (ref 22–31)
COLOR SPEC: SIGNIFICANT CHANGE UP
CREAT SERPL-MCNC: 0.36 MG/DL — LOW (ref 0.5–1.3)
EOSINOPHIL # BLD AUTO: 0.02 K/UL — SIGNIFICANT CHANGE UP (ref 0–0.5)
EOSINOPHIL NFR BLD AUTO: 0.1 % — SIGNIFICANT CHANGE UP (ref 0–6)
EOSINOPHIL NFR FLD: 0 % — SIGNIFICANT CHANGE UP (ref 0–6)
GIANT PLATELETS BLD QL SMEAR: PRESENT — SIGNIFICANT CHANGE UP
GLUCOSE SERPL-MCNC: 111 MG/DL — HIGH (ref 70–99)
GLUCOSE UR-MCNC: 300 — SIGNIFICANT CHANGE UP
HCT VFR BLD CALC: 28.1 % — LOW (ref 34.5–45)
HGB BLD-MCNC: 9.6 G/DL — LOW (ref 11.5–15.5)
HYPOCHROMIA BLD QL: SIGNIFICANT CHANGE UP
IMM GRANULOCYTES # BLD AUTO: 0.4 # — SIGNIFICANT CHANGE UP
IMM GRANULOCYTES NFR BLD AUTO: 2.2 % — HIGH (ref 0–1.5)
KETONES UR-MCNC: SIGNIFICANT CHANGE UP
LEUKOCYTE ESTERASE UR-ACNC: NEGATIVE — SIGNIFICANT CHANGE UP
LYMPHOCYTES # BLD AUTO: 1.3 K/UL — SIGNIFICANT CHANGE UP (ref 1–3.3)
LYMPHOCYTES # BLD AUTO: 7.3 % — LOW (ref 13–44)
LYMPHOCYTES NFR SPEC AUTO: 7 % — LOW (ref 13–44)
MAGNESIUM SERPL-MCNC: 2 MG/DL — SIGNIFICANT CHANGE UP (ref 1.6–2.6)
MCHC RBC-ENTMCNC: 29 PG — SIGNIFICANT CHANGE UP (ref 27–34)
MCHC RBC-ENTMCNC: 34.2 % — SIGNIFICANT CHANGE UP (ref 32–36)
MCV RBC AUTO: 84.9 FL — SIGNIFICANT CHANGE UP (ref 80–100)
MONOCYTES # BLD AUTO: 1.4 K/UL — HIGH (ref 0–0.9)
MONOCYTES NFR BLD AUTO: 7.9 % — SIGNIFICANT CHANGE UP (ref 2–14)
MONOCYTES NFR BLD: 4.3 % — SIGNIFICANT CHANGE UP (ref 2–9)
MUCOUS THREADS # UR AUTO: SIGNIFICANT CHANGE UP
NEUTROPHIL AB SER-ACNC: 88.7 % — HIGH (ref 43–77)
NEUTROPHILS # BLD AUTO: 14.67 K/UL — HIGH (ref 1.8–7.4)
NEUTROPHILS NFR BLD AUTO: 82.3 % — HIGH (ref 43–77)
NITRITE UR-MCNC: NEGATIVE — SIGNIFICANT CHANGE UP
NON-GYNECOLOGICAL CYTOLOGY STUDY: SIGNIFICANT CHANGE UP
NRBC # FLD: 0 — SIGNIFICANT CHANGE UP
PH UR: 6 — SIGNIFICANT CHANGE UP (ref 4.6–8)
PHOSPHATE SERPL-MCNC: 2.4 MG/DL — LOW (ref 2.5–4.5)
PLATELET # BLD AUTO: 295 K/UL — SIGNIFICANT CHANGE UP (ref 150–400)
PLATELET COUNT - ESTIMATE: NORMAL — SIGNIFICANT CHANGE UP
PMV BLD: 9.8 FL — SIGNIFICANT CHANGE UP (ref 7–13)
POIKILOCYTOSIS BLD QL AUTO: SLIGHT — SIGNIFICANT CHANGE UP
POLYCHROMASIA BLD QL SMEAR: SIGNIFICANT CHANGE UP
POTASSIUM SERPL-MCNC: 2.9 MMOL/L — CRITICAL LOW (ref 3.5–5.3)
POTASSIUM SERPL-SCNC: 2.9 MMOL/L — CRITICAL LOW (ref 3.5–5.3)
PROT UR-MCNC: 10 — SIGNIFICANT CHANGE UP
RBC # BLD: 3.31 M/UL — LOW (ref 3.8–5.2)
RBC # FLD: 12.4 % — SIGNIFICANT CHANGE UP (ref 10.3–14.5)
RBC CASTS # UR COMP ASSIST: SIGNIFICANT CHANGE UP (ref 0–?)
SODIUM SERPL-SCNC: 137 MMOL/L — SIGNIFICANT CHANGE UP (ref 135–145)
SP GR SPEC: 1.01 — SIGNIFICANT CHANGE UP (ref 1–1.03)
UROBILINOGEN FLD QL: NORMAL E.U. — SIGNIFICANT CHANGE UP (ref 0.1–0.2)
WBC # BLD: 17.83 K/UL — HIGH (ref 3.8–10.5)
WBC # FLD AUTO: 17.83 K/UL — HIGH (ref 3.8–10.5)
WBC UR QL: SIGNIFICANT CHANGE UP (ref 0–?)

## 2017-09-20 RX ORDER — SODIUM CHLORIDE 9 MG/ML
1000 INJECTION INTRAMUSCULAR; INTRAVENOUS; SUBCUTANEOUS
Qty: 0 | Refills: 0 | Status: DISCONTINUED | OUTPATIENT
Start: 2017-09-20 | End: 2017-09-23

## 2017-09-20 RX ORDER — ACETAMINOPHEN 500 MG
650 TABLET ORAL EVERY 6 HOURS
Qty: 0 | Refills: 0 | Status: DISCONTINUED | OUTPATIENT
Start: 2017-09-20 | End: 2017-09-23

## 2017-09-20 RX ORDER — POTASSIUM CHLORIDE 20 MEQ
10 PACKET (EA) ORAL
Qty: 0 | Refills: 0 | Status: COMPLETED | OUTPATIENT
Start: 2017-09-20 | End: 2017-09-20

## 2017-09-20 RX ORDER — POTASSIUM CHLORIDE 20 MEQ
20 PACKET (EA) ORAL
Qty: 0 | Refills: 0 | Status: COMPLETED | OUTPATIENT
Start: 2017-09-20 | End: 2017-09-20

## 2017-09-20 RX ADMIN — Medication 20 MILLIEQUIVALENT(S): at 18:22

## 2017-09-20 RX ADMIN — SODIUM CHLORIDE 100 MILLILITER(S): 9 INJECTION INTRAMUSCULAR; INTRAVENOUS; SUBCUTANEOUS at 09:19

## 2017-09-20 RX ADMIN — Medication 5 MILLIGRAM(S): at 23:51

## 2017-09-20 RX ADMIN — Medication 650 MILLIGRAM(S): at 00:46

## 2017-09-20 RX ADMIN — Medication 5 MILLIGRAM(S): at 14:10

## 2017-09-20 RX ADMIN — ENOXAPARIN SODIUM 40 MILLIGRAM(S): 100 INJECTION SUBCUTANEOUS at 14:00

## 2017-09-20 RX ADMIN — SODIUM CHLORIDE 30 MILLILITER(S): 9 INJECTION INTRAMUSCULAR; INTRAVENOUS; SUBCUTANEOUS at 06:31

## 2017-09-20 RX ADMIN — SODIUM CHLORIDE 3 MILLILITER(S): 9 INJECTION INTRAMUSCULAR; INTRAVENOUS; SUBCUTANEOUS at 23:01

## 2017-09-20 RX ADMIN — Medication 5 MILLIGRAM(S): at 17:00

## 2017-09-20 RX ADMIN — Medication 20 MILLIEQUIVALENT(S): at 14:01

## 2017-09-20 RX ADMIN — Medication 650 MILLIGRAM(S): at 20:30

## 2017-09-20 RX ADMIN — Medication 5 MILLIGRAM(S): at 06:31

## 2017-09-20 RX ADMIN — Medication 650 MILLIGRAM(S): at 19:43

## 2017-09-20 RX ADMIN — SODIUM CHLORIDE 100 MILLILITER(S): 9 INJECTION INTRAMUSCULAR; INTRAVENOUS; SUBCUTANEOUS at 16:59

## 2017-09-20 RX ADMIN — Medication 100 MILLIEQUIVALENT(S): at 08:23

## 2017-09-20 RX ADMIN — SODIUM CHLORIDE 3 MILLILITER(S): 9 INJECTION INTRAMUSCULAR; INTRAVENOUS; SUBCUTANEOUS at 14:13

## 2017-09-20 RX ADMIN — Medication 5 MILLIGRAM(S): at 00:46

## 2017-09-20 RX ADMIN — Medication 100 MILLIEQUIVALENT(S): at 06:31

## 2017-09-20 RX ADMIN — OXYCODONE HYDROCHLORIDE 5 MILLIGRAM(S): 5 TABLET ORAL at 10:16

## 2017-09-20 RX ADMIN — Medication 650 MILLIGRAM(S): at 06:31

## 2017-09-20 RX ADMIN — Medication 100 MILLIEQUIVALENT(S): at 10:16

## 2017-09-20 RX ADMIN — OXYCODONE HYDROCHLORIDE 5 MILLIGRAM(S): 5 TABLET ORAL at 09:18

## 2017-09-20 RX ADMIN — SODIUM CHLORIDE 3 MILLILITER(S): 9 INJECTION INTRAMUSCULAR; INTRAVENOUS; SUBCUTANEOUS at 06:31

## 2017-09-20 RX ADMIN — SODIUM CHLORIDE 100 MILLILITER(S): 9 INJECTION INTRAMUSCULAR; INTRAVENOUS; SUBCUTANEOUS at 08:23

## 2017-09-20 RX ADMIN — Medication 20 MILLIEQUIVALENT(S): at 10:28

## 2017-09-20 NOTE — PROGRESS NOTE ADULT - ASSESSMENT
68 y/o POD#5 s/p ex-lap, AVEL-RSO, omentectomy, resection of anterior abdominal wall masses, resection of portion of ascending colon, appendectomy, tumor debulking, cystotomy repair.  Patient is hemodynamically stable, pain is well-controlled, and bowel function is resuming.

## 2017-09-20 NOTE — PROGRESS NOTE ADULT - ATTENDING COMMENTS
Pt w incr WBC, and decreasing bicarb.  No fever, tachycardia.  Excellent uo.      Cont to observe closely    Cont LRD as tolerated    If fevers or clinical change in exam -- consider CT

## 2017-09-20 NOTE — CHART NOTE - NSCHARTNOTEFT_GEN_A_CORE
R2 PM Rounds, HD#6, POD#5    Patient seen and examined at bedside.  Patient complains of pain, relieved by PO medication.  Patient tolerating low residue diet with small bowel movement this morning.  Patient denies CP, SOB, nausea, vomiting, fevers, and chills.

## 2017-09-20 NOTE — PROGRESS NOTE ADULT - SUBJECTIVE AND OBJECTIVE BOX
R4 GYN ONC Note    Patient seen and examined at bedside, no acute overnight events. No acute complaints, pain well controlled.  Patient is ambulating and tolerating small amount of regular diet. + flatus and BM. Metcalf is still in place. Denies CP, SOB, N/V, fevers, and chills.    Vital Signs Last 24 Hours  T(C): 36.5 (09-20-17 @ 06:29), Max: 37.2 (09-19-17 @ 22:59)  HR: 72 (09-20-17 @ 06:29) (72 - 84)  BP: 142/82 (09-20-17 @ 06:29) (133/73 - 147/86)  RR: 18 (09-20-17 @ 06:29) (18 - 18)  SpO2: 98% (09-20-17 @ 06:29) (95% - 100%)    I&O's Summary    18 Sep 2017 07:01  -  19 Sep 2017 07:00  --------------------------------------------------------  IN: 700 mL / OUT: 3182.5 mL / NET: -2482.5 mL    19 Sep 2017 07:01  -  20 Sep 2017 06:40  --------------------------------------------------------  IN: 360 mL / OUT: 3942 mL / NET: -3582 mL        Physical Exam:  General: NAD  CV: NR, RR, S1, S2, no M/R/G  Lungs: CTA-B  Abdomen: Soft, non-tender, non-distended, +BS  Incision: bertical CDI  Ext: No pain or swelling    Labs:             9.6    17.83 )-----------( 295      ( 09-20 @ 04:30 )             28.1                8.9    14.23 )-----------( 233      ( 09-19 @ 05:40 )             26.0                8.7    13.56 )-----------( 200      ( 09-18 @ 16:29 )             25.8                8.5    13.24 )-----------( 177      ( 09-18 @ 05:20 )             24.7                8.9    15.38 )-----------( 191      ( 09-17 @ 12:06 )             27.2         MEDICATIONS  (STANDING):  sodium chloride 0.9% lock flush 3 milliLiter(s) IV Push every 8 hours  metoprolol Injectable 5 milliGRAM(s) IV Push every 6 hours  sodium chloride 0.9%. 1000 milliLiter(s) (30 mL/Hr) IV Continuous <Continuous>  influenza   Vaccine 0.5 milliLiter(s) IntraMuscular once  enoxaparin Injectable 40 milliGRAM(s) SubCutaneous daily  insulin lispro (HumaLOG) corrective regimen sliding scale   SubCutaneous four times a day before meals  potassium chloride  10 mEq/100 mL IVPB 10 milliEquivalent(s) IV Intermittent every 1 hour    MEDICATIONS  (PRN):  naloxone Injectable 0.1 milliGRAM(s) IV Push every 3 minutes PRN For ANY of the following changes in patient status:  A. RR LESS THAN 10 breaths per minute, B. Oxygen saturation LESS THAN 90%, C. Sedation score of 6  ondansetron Injectable 4 milliGRAM(s) IV Push every 6 hours PRN Nausea  benzocaine 15 mG/menthol 3.6 mG Lozenge 1 Lozenge Oral five times a day PRN Sore Throat  oxyCODONE    IR 5 milliGRAM(s) Oral every 3 hours PRN Moderate and Severe Pain (4 - 10)

## 2017-09-20 NOTE — PROGRESS NOTE ADULT - PROBLEM SELECTOR PLAN 1
Neuro: c/w oral analgesia  CV: hemodynamically stable, normotensive, c/w metoprolol  Pulm: saturating well on room air, encourage incentive spirometry and ambulation  GI: bowel function resumed, cont low fiber diet  : adequate UOP, maintain indwelling catheter  Heme: ambulation, SCDs, and heparin for DVT ppx  Endo: cookie Deal, PGY4

## 2017-09-20 NOTE — PROGRESS NOTE ADULT - SUBJECTIVE AND OBJECTIVE BOX
SURGICAL ONCOLOGY PROGRESS NOTE    No new complaints.  (+) BM and flatus    Vital Signs Last 24 Hrs  T(C): 36.5 (20 Sep 2017 06:29), Max: 37.2 (19 Sep 2017 22:59)  T(F): 97.7 (20 Sep 2017 06:29), Max: 98.9 (19 Sep 2017 22:59)  HR: 72 (20 Sep 2017 06:29) (72 - 84)  BP: 142/82 (20 Sep 2017 06:29) (133/73 - 147/86)  BP(mean): --  RR: 18 (20 Sep 2017 06:29) (18 - 18)  SpO2: 98% (20 Sep 2017 06:29) (95% - 100%)  I&O's Detail    19 Sep 2017 07:01  -  20 Sep 2017 07:00  --------------------------------------------------------  IN:    sodium chloride 0.9%.: 360 mL  Total IN: 360 mL    OUT:    Bulb: 162.5 mL    Bulb: 11 mL    Indwelling Catheter - Urethral: 3800 mL    Stool: 1 mL  Total OUT: 3974.5 mL    Total NET: -3614.5 mL          PE:    A&A  NAD    soft, NT, ND, No peritoneal signs    inc  cdi left aldo drain serous                          9.6    17.83 )-----------( 295      ( 20 Sep 2017 04:30 )             28.1     09-20    137  |  101  |  4<L>  ----------------------------<  111<H>  2.9<LL>   |  15<L>  |  0.36<L>    Ca    8.2<L>      20 Sep 2017 04:30  Phos  2.4     09-20  Mg     2.0     09-20

## 2017-09-21 LAB
BACTERIA UR CULT: SIGNIFICANT CHANGE UP
BASOPHILS # BLD AUTO: 0.02 K/UL — SIGNIFICANT CHANGE UP (ref 0–0.2)
BASOPHILS NFR BLD AUTO: 0.1 % — SIGNIFICANT CHANGE UP (ref 0–2)
BUN SERPL-MCNC: 5 MG/DL — LOW (ref 7–23)
CALCIUM SERPL-MCNC: 8.4 MG/DL — SIGNIFICANT CHANGE UP (ref 8.4–10.5)
CHLORIDE SERPL-SCNC: 103 MMOL/L — SIGNIFICANT CHANGE UP (ref 98–107)
CO2 SERPL-SCNC: 15 MMOL/L — LOW (ref 22–31)
CREAT SERPL-MCNC: 0.33 MG/DL — LOW (ref 0.5–1.3)
EOSINOPHIL # BLD AUTO: 0.07 K/UL — SIGNIFICANT CHANGE UP (ref 0–0.5)
EOSINOPHIL NFR BLD AUTO: 0.5 % — SIGNIFICANT CHANGE UP (ref 0–6)
GLUCOSE SERPL-MCNC: 123 MG/DL — HIGH (ref 70–99)
HCT VFR BLD CALC: 28.1 % — LOW (ref 34.5–45)
HGB BLD-MCNC: 9.7 G/DL — LOW (ref 11.5–15.5)
IMM GRANULOCYTES # BLD AUTO: 0.64 # — SIGNIFICANT CHANGE UP
IMM GRANULOCYTES NFR BLD AUTO: 4.7 % — HIGH (ref 0–1.5)
LYMPHOCYTES # BLD AUTO: 1.3 K/UL — SIGNIFICANT CHANGE UP (ref 1–3.3)
LYMPHOCYTES # BLD AUTO: 9.5 % — LOW (ref 13–44)
MAGNESIUM SERPL-MCNC: 2 MG/DL — SIGNIFICANT CHANGE UP (ref 1.6–2.6)
MCHC RBC-ENTMCNC: 29.2 PG — SIGNIFICANT CHANGE UP (ref 27–34)
MCHC RBC-ENTMCNC: 34.5 % — SIGNIFICANT CHANGE UP (ref 32–36)
MCV RBC AUTO: 84.6 FL — SIGNIFICANT CHANGE UP (ref 80–100)
MONOCYTES # BLD AUTO: 1.17 K/UL — HIGH (ref 0–0.9)
MONOCYTES NFR BLD AUTO: 8.5 % — SIGNIFICANT CHANGE UP (ref 2–14)
NEUTROPHILS # BLD AUTO: 10.53 K/UL — HIGH (ref 1.8–7.4)
NEUTROPHILS NFR BLD AUTO: 76.7 % — SIGNIFICANT CHANGE UP (ref 43–77)
NRBC # FLD: 0 — SIGNIFICANT CHANGE UP
PHOSPHATE SERPL-MCNC: 1.8 MG/DL — LOW (ref 2.5–4.5)
PLATELET # BLD AUTO: 330 K/UL — SIGNIFICANT CHANGE UP (ref 150–400)
PMV BLD: 9.7 FL — SIGNIFICANT CHANGE UP (ref 7–13)
POTASSIUM SERPL-MCNC: 3.5 MMOL/L — SIGNIFICANT CHANGE UP (ref 3.5–5.3)
POTASSIUM SERPL-SCNC: 3.5 MMOL/L — SIGNIFICANT CHANGE UP (ref 3.5–5.3)
RBC # BLD: 3.32 M/UL — LOW (ref 3.8–5.2)
RBC # FLD: 12.6 % — SIGNIFICANT CHANGE UP (ref 10.3–14.5)
SODIUM SERPL-SCNC: 136 MMOL/L — SIGNIFICANT CHANGE UP (ref 135–145)
SPECIMEN SOURCE: SIGNIFICANT CHANGE UP
WBC # BLD: 13.73 K/UL — HIGH (ref 3.8–10.5)
WBC # FLD AUTO: 13.73 K/UL — HIGH (ref 3.8–10.5)

## 2017-09-21 RX ORDER — POTASSIUM PHOSPHATE, MONOBASIC POTASSIUM PHOSPHATE, DIBASIC 236; 224 MG/ML; MG/ML
15 INJECTION, SOLUTION INTRAVENOUS ONCE
Qty: 0 | Refills: 0 | Status: DISCONTINUED | OUTPATIENT
Start: 2017-09-21 | End: 2017-09-21

## 2017-09-21 RX ORDER — ATENOLOL 25 MG/1
25 TABLET ORAL DAILY
Qty: 0 | Refills: 0 | Status: DISCONTINUED | OUTPATIENT
Start: 2017-09-21 | End: 2017-09-23

## 2017-09-21 RX ORDER — ENOXAPARIN SODIUM 100 MG/ML
40 INJECTION SUBCUTANEOUS
Qty: 11.2 | Refills: 0 | OUTPATIENT
Start: 2017-09-21 | End: 2017-10-19

## 2017-09-21 RX ORDER — SODIUM,POTASSIUM PHOSPHATES 278-250MG
1 POWDER IN PACKET (EA) ORAL
Qty: 0 | Refills: 0 | Status: DISCONTINUED | OUTPATIENT
Start: 2017-09-21 | End: 2017-09-21

## 2017-09-21 RX ORDER — SODIUM,POTASSIUM PHOSPHATES 278-250MG
1 POWDER IN PACKET (EA) ORAL
Qty: 0 | Refills: 0 | Status: COMPLETED | OUTPATIENT
Start: 2017-09-21 | End: 2017-09-22

## 2017-09-21 RX ADMIN — Medication 650 MILLIGRAM(S): at 08:55

## 2017-09-21 RX ADMIN — Medication 5 MILLIGRAM(S): at 05:09

## 2017-09-21 RX ADMIN — Medication 1 TABLET(S): at 17:56

## 2017-09-21 RX ADMIN — ATENOLOL 25 MILLIGRAM(S): 25 TABLET ORAL at 12:33

## 2017-09-21 RX ADMIN — Medication 1 TABLET(S): at 12:33

## 2017-09-21 RX ADMIN — Medication 650 MILLIGRAM(S): at 15:55

## 2017-09-21 RX ADMIN — SODIUM CHLORIDE 3 MILLILITER(S): 9 INJECTION INTRAMUSCULAR; INTRAVENOUS; SUBCUTANEOUS at 05:09

## 2017-09-21 RX ADMIN — Medication 650 MILLIGRAM(S): at 08:19

## 2017-09-21 RX ADMIN — SODIUM CHLORIDE 3 MILLILITER(S): 9 INJECTION INTRAMUSCULAR; INTRAVENOUS; SUBCUTANEOUS at 12:28

## 2017-09-21 RX ADMIN — ENOXAPARIN SODIUM 40 MILLIGRAM(S): 100 INJECTION SUBCUTANEOUS at 12:33

## 2017-09-21 RX ADMIN — Medication 1 TABLET(S): at 23:00

## 2017-09-21 RX ADMIN — Medication 650 MILLIGRAM(S): at 02:15

## 2017-09-21 RX ADMIN — SODIUM CHLORIDE 3 MILLILITER(S): 9 INJECTION INTRAMUSCULAR; INTRAVENOUS; SUBCUTANEOUS at 23:01

## 2017-09-21 RX ADMIN — Medication 650 MILLIGRAM(S): at 21:45

## 2017-09-21 RX ADMIN — Medication 650 MILLIGRAM(S): at 03:00

## 2017-09-21 RX ADMIN — Medication 650 MILLIGRAM(S): at 15:06

## 2017-09-21 RX ADMIN — Medication 650 MILLIGRAM(S): at 21:20

## 2017-09-21 NOTE — CHART NOTE - NSCHARTNOTEFT_GEN_A_CORE
POD#6  HD#7    Patient seen ambulating in the hallway.  No acute events.  Pain is well-controlled.  Patient is ambulating, passing flatus, and tolerating regular diet.  Indwelling butt catheter in place. Denies CP, SOB, N/V, fevers, and chills.    Vital Signs Last 24 Hours  T(C): 36.6 (09-21-17 @ 14:21), Max: 36.9 (09-20-17 @ 23:46)  HR: 74 (09-21-17 @ 14:21) (71 - 83)  BP: 145/87 (09-21-17 @ 14:21) (144/81 - 149/89)  RR: 17 (09-21-17 @ 14:21) (16 - 19)  SpO2: 99% (09-21-17 @ 14:21) (98% - 99%)    I&O's Summary    20 Sep 2017 07:01  -  21 Sep 2017 07:00  --------------------------------------------------------  IN: 2370 mL / OUT: 3280 mL / NET: -910 mL    21 Sep 2017 07:01  -  21 Sep 2017 17:25  --------------------------------------------------------  IN: 0 mL / OUT: 765 mL / NET: -765 mL

## 2017-09-21 NOTE — PROGRESS NOTE ADULT - PROBLEM SELECTOR PLAN 1
Neuro: c/w oral analgesia  CV: hemodynamically stable, slightly hypertensive, c/w home atenolol  Pulm: saturating well on room air, encourage incentive spirometry and ambulation  GI: continue with low residue diet, patient has had resumption of bowel function  : catheter in place with adequate output, cystogram tomorrow.  K repleted adequately yesterday, phosphorus 1.8, repletion now  Heme: ambulation, SCDs, and heparin for DVT ppx  Endo: , 125 overnight.  c/w ISS.  ID: Leukocytosis improved, patient afebrile.  UA (9/21) wnl, UCx pending    RAMILA Henry PGY2

## 2017-09-21 NOTE — PROGRESS NOTE ADULT - SUBJECTIVE AND OBJECTIVE BOX
POD#6   HD#7    Patient seen and examined at bedside, no acute overnight events. Patient complains of persistent back pain, but pain is well controlled with oral analgesia.  Patient is ambulating, passing flatus and bowel movements, and tolerating low residue diet.  Indwelling butt catheter remains. Denies CP, SOB, N/V, fevers, and chills.    Vital Signs Last 24 Hours  T(C): 36.6 (17 @ 05:08), Max: 36.9 (17 @ 23:46)  HR: 79 (17 @ 05:25) (69 - 80)  BP: 145/82 (17 @ 05:25) (143/78 - 151/86)  RR: 17 (17 @ 05:25) (16 - 18)  SpO2: 99% (17 @ 05:25) (98% - 100%)  FS: 135, 125    I&O's Summary    19 Sep 2017 07:01  -  20 Sep 2017 07:00  --------------------------------------------------------  IN: 360 mL / OUT: 3974.5 mL / NET: -3614.5 mL    20 Sep 2017 07:01  -  21 Sep 2017 06:49  --------------------------------------------------------  IN: 2370 mL / OUT: 3280 mL / NET: -910 mL  JOSÉ R 5 cc  JOSÉ L 17.5      Physical Exam:  General: NAD  CV: NR, RR, S1, S2, no M/R/G  Lungs: CTAB  Abdomen: Soft, appropriate post-op tenderness, non-distended, +BS  Incision: vartical midline, CDI, JPx2 draining serosanguinous fluid  Ext: No pain or swelling    Labs:                        9.7    13.73 )-----------( 330      ( 21 Sep 2017 04:36 )             28.1   baso 0.1    eos 0.5    imm gran 4.7    lymph 9.5    mono 8.5    poly 76.7                         9.6    17.83 )-----------( 295      ( 20 Sep 2017 04:30 )             28.1   baso 0.2    eos 0.1    imm gran 2.2    lymph 7.3    mono 7.9    poly 82.3                         8.9    14.23 )-----------( 233      ( 19 Sep 2017 05:40 )             26.0   baso x      eos x      imm gran x      lymph x      mono x      poly x                            8.7    13.56 )-----------( 200      ( 18 Sep 2017 16:29 )             25.8   baso x      eos x      imm gran x      lymph x      mono x      poly x        09-21    136  |  103  |  5<L>  ----------------------------<  123<H>  3.5   |  15<L>  |  0.33<L>    Ca    8.4      21 Sep 2017 04:36  Phos  1.8       Mg     2.0             Urinalysis Basic - ( 20 Sep 2017 10:50 )    Color: PLYEL / Appearance: CLEAR / S.011 / pH: 6.0  Gluc: 300 / Ketone: LARGE  / Bili: NEGATIVE / Urobili: NORMAL E.U.   Blood: NEGATIVE / Protein: 10 / Nitrite: NEGATIVE   Leuk Esterase: NEGATIVE / RBC: 2-5 / WBC 0-2   Sq Epi: x / Non Sq Epi: x / Bacteria: FEW        Blood Type: A Positive      MEDICATIONS  (STANDING):  sodium chloride 0.9% lock flush 3 milliLiter(s) IV Push every 8 hours  influenza   Vaccine 0.5 milliLiter(s) IntraMuscular once  enoxaparin Injectable 40 milliGRAM(s) SubCutaneous daily  insulin lispro (HumaLOG) corrective regimen sliding scale   SubCutaneous four times a day before meals  sodium chloride 0.9%. 1000 milliLiter(s) (30 mL/Hr) IV Continuous <Continuous>  potassium acid phosphate/sodium acid phosphate tablet (K-PHOS No. 2) 1 Tablet(s) Oral four times a day with meals  ATENolol  Tablet 25 milliGRAM(s) Oral daily    MEDICATIONS  (PRN):  naloxone Injectable 0.1 milliGRAM(s) IV Push every 3 minutes PRN For ANY of the following changes in patient status:  A. RR LESS THAN 10 breaths per minute, B. Oxygen saturation LESS THAN 90%, C. Sedation score of 6  ondansetron Injectable 4 milliGRAM(s) IV Push every 6 hours PRN Nausea  benzocaine 15 mG/menthol 3.6 mG Lozenge 1 Lozenge Oral five times a day PRN Sore Throat  oxyCODONE    IR 5 milliGRAM(s) Oral every 3 hours PRN Moderate and Severe Pain (4 - 10)  acetaminophen   Tablet. 650 milliGRAM(s) Oral every 6 hours PRN Mild Pain (1 - 3)

## 2017-09-21 NOTE — CHART NOTE - NSCHARTNOTEFT_GEN_A_CORE
Home Lovenox is set up. Pharmacy has it available and ready for  upon discharge. Co pay is $3.  and patient informed.

## 2017-09-21 NOTE — PROGRESS NOTE ADULT - ASSESSMENT
67 year old female POD 6 s/p AVEL, LAR    - Check GI fxn  - Continue LRD  - Pain control  - Monitor abd exams  - Encourage OOB/IS  - Care per primary team

## 2017-09-21 NOTE — PROGRESS NOTE ADULT - ASSESSMENT
66 y/o POD#6 s/p ex-lap, AVEL, BSO, omentectomy, resection of anterior abdominal wall masses, resection of portion of ascending colon, appendectomy, tumor debulking, and cystotomy repair for poorly differentiated ovarian cancer.  Patient hemodynamically stable with pain well-controlled. 66 y/o POD#6 s/p ex-lap, AVEL, BSO, omentectomy, resection of anterior abdominal wall masses, rectosigmoid resection, resection of portion of ascending colon, appendectomy, tumor debulking, and cystotomy repair for poorly differentiated ovarian cancer.  Patient hemodynamically stable with pain well-controlled.

## 2017-09-22 LAB
BASOPHILS # BLD AUTO: 0.03 K/UL — SIGNIFICANT CHANGE UP (ref 0–0.2)
BASOPHILS NFR BLD AUTO: 0.2 % — SIGNIFICANT CHANGE UP (ref 0–2)
BUN SERPL-MCNC: 6 MG/DL — LOW (ref 7–23)
CALCIUM SERPL-MCNC: 8.4 MG/DL — SIGNIFICANT CHANGE UP (ref 8.4–10.5)
CHLORIDE SERPL-SCNC: 103 MMOL/L — SIGNIFICANT CHANGE UP (ref 98–107)
CO2 SERPL-SCNC: 20 MMOL/L — LOW (ref 22–31)
CREAT SERPL-MCNC: 0.24 MG/DL — LOW (ref 0.5–1.3)
EOSINOPHIL # BLD AUTO: 0.11 K/UL — SIGNIFICANT CHANGE UP (ref 0–0.5)
EOSINOPHIL NFR BLD AUTO: 0.9 % — SIGNIFICANT CHANGE UP (ref 0–6)
GLUCOSE SERPL-MCNC: 125 MG/DL — HIGH (ref 70–99)
HCT VFR BLD CALC: 26.7 % — LOW (ref 34.5–45)
HGB BLD-MCNC: 9.2 G/DL — LOW (ref 11.5–15.5)
IMM GRANULOCYTES # BLD AUTO: 0.56 # — SIGNIFICANT CHANGE UP
IMM GRANULOCYTES NFR BLD AUTO: 4.6 % — HIGH (ref 0–1.5)
LYMPHOCYTES # BLD AUTO: 1.53 K/UL — SIGNIFICANT CHANGE UP (ref 1–3.3)
LYMPHOCYTES # BLD AUTO: 12.5 % — LOW (ref 13–44)
MAGNESIUM SERPL-MCNC: 2 MG/DL — SIGNIFICANT CHANGE UP (ref 1.6–2.6)
MCHC RBC-ENTMCNC: 28.8 PG — SIGNIFICANT CHANGE UP (ref 27–34)
MCHC RBC-ENTMCNC: 34.5 % — SIGNIFICANT CHANGE UP (ref 32–36)
MCV RBC AUTO: 83.7 FL — SIGNIFICANT CHANGE UP (ref 80–100)
MONOCYTES # BLD AUTO: 1.13 K/UL — HIGH (ref 0–0.9)
MONOCYTES NFR BLD AUTO: 9.2 % — SIGNIFICANT CHANGE UP (ref 2–14)
NEUTROPHILS # BLD AUTO: 8.91 K/UL — HIGH (ref 1.8–7.4)
NEUTROPHILS NFR BLD AUTO: 72.6 % — SIGNIFICANT CHANGE UP (ref 43–77)
NRBC # FLD: 0 — SIGNIFICANT CHANGE UP
PHOSPHATE SERPL-MCNC: 3.4 MG/DL — SIGNIFICANT CHANGE UP (ref 2.5–4.5)
PLATELET # BLD AUTO: 387 K/UL — SIGNIFICANT CHANGE UP (ref 150–400)
PMV BLD: 9.7 FL — SIGNIFICANT CHANGE UP (ref 7–13)
POTASSIUM SERPL-MCNC: 2.8 MMOL/L — CRITICAL LOW (ref 3.5–5.3)
POTASSIUM SERPL-SCNC: 2.8 MMOL/L — CRITICAL LOW (ref 3.5–5.3)
RBC # BLD: 3.19 M/UL — LOW (ref 3.8–5.2)
RBC # FLD: 12.5 % — SIGNIFICANT CHANGE UP (ref 10.3–14.5)
SODIUM SERPL-SCNC: 141 MMOL/L — SIGNIFICANT CHANGE UP (ref 135–145)
WBC # BLD: 12.27 K/UL — HIGH (ref 3.8–10.5)
WBC # FLD AUTO: 12.27 K/UL — HIGH (ref 3.8–10.5)

## 2017-09-22 PROCEDURE — 51600 INJECTION FOR BLADDER X-RAY: CPT

## 2017-09-22 PROCEDURE — 74430 CONTRAST X-RAY BLADDER: CPT | Mod: 26

## 2017-09-22 RX ORDER — POTASSIUM CHLORIDE 20 MEQ
10 PACKET (EA) ORAL
Qty: 0 | Refills: 0 | Status: COMPLETED | OUTPATIENT
Start: 2017-09-22 | End: 2017-09-22

## 2017-09-22 RX ORDER — OXYCODONE HYDROCHLORIDE 5 MG/1
1 TABLET ORAL
Qty: 20 | Refills: 0 | OUTPATIENT
Start: 2017-09-22

## 2017-09-22 RX ORDER — POTASSIUM CHLORIDE 20 MEQ
20 PACKET (EA) ORAL
Qty: 0 | Refills: 0 | Status: COMPLETED | OUTPATIENT
Start: 2017-09-22 | End: 2017-09-22

## 2017-09-22 RX ORDER — POTASSIUM CHLORIDE 20 MEQ
1 PACKET (EA) ORAL
Qty: 20 | Refills: 0 | OUTPATIENT
Start: 2017-09-22

## 2017-09-22 RX ORDER — POTASSIUM CHLORIDE 20 MEQ
40 PACKET (EA) ORAL EVERY 4 HOURS
Qty: 0 | Refills: 0 | Status: DISCONTINUED | OUTPATIENT
Start: 2017-09-22 | End: 2017-09-22

## 2017-09-22 RX ADMIN — SODIUM CHLORIDE 30 MILLILITER(S): 9 INJECTION INTRAMUSCULAR; INTRAVENOUS; SUBCUTANEOUS at 09:11

## 2017-09-22 RX ADMIN — Medication 100 MILLIEQUIVALENT(S): at 09:23

## 2017-09-22 RX ADMIN — Medication 20 MILLIEQUIVALENT(S): at 09:22

## 2017-09-22 RX ADMIN — ATENOLOL 25 MILLIGRAM(S): 25 TABLET ORAL at 05:22

## 2017-09-22 RX ADMIN — Medication 100 MILLIEQUIVALENT(S): at 12:57

## 2017-09-22 RX ADMIN — OXYCODONE HYDROCHLORIDE 5 MILLIGRAM(S): 5 TABLET ORAL at 14:49

## 2017-09-22 RX ADMIN — Medication 650 MILLIGRAM(S): at 04:10

## 2017-09-22 RX ADMIN — SODIUM CHLORIDE 3 MILLILITER(S): 9 INJECTION INTRAMUSCULAR; INTRAVENOUS; SUBCUTANEOUS at 21:21

## 2017-09-22 RX ADMIN — OXYCODONE HYDROCHLORIDE 5 MILLIGRAM(S): 5 TABLET ORAL at 15:30

## 2017-09-22 RX ADMIN — Medication 20 MILLIEQUIVALENT(S): at 13:05

## 2017-09-22 RX ADMIN — ENOXAPARIN SODIUM 40 MILLIGRAM(S): 100 INJECTION SUBCUTANEOUS at 14:09

## 2017-09-22 RX ADMIN — Medication 650 MILLIGRAM(S): at 09:18

## 2017-09-22 RX ADMIN — Medication 100 MILLIEQUIVALENT(S): at 14:56

## 2017-09-22 RX ADMIN — SODIUM CHLORIDE 3 MILLILITER(S): 9 INJECTION INTRAMUSCULAR; INTRAVENOUS; SUBCUTANEOUS at 05:21

## 2017-09-22 RX ADMIN — Medication 650 MILLIGRAM(S): at 21:22

## 2017-09-22 RX ADMIN — Medication 650 MILLIGRAM(S): at 22:10

## 2017-09-22 RX ADMIN — Medication 650 MILLIGRAM(S): at 03:18

## 2017-09-22 RX ADMIN — SODIUM CHLORIDE 3 MILLILITER(S): 9 INJECTION INTRAMUSCULAR; INTRAVENOUS; SUBCUTANEOUS at 14:50

## 2017-09-22 RX ADMIN — Medication 1 TABLET(S): at 09:23

## 2017-09-22 RX ADMIN — Medication 20 MILLIEQUIVALENT(S): at 18:46

## 2017-09-22 RX ADMIN — Medication 650 MILLIGRAM(S): at 10:15

## 2017-09-22 NOTE — PROGRESS NOTE ADULT - SUBJECTIVE AND OBJECTIVE BOX
DX: LAR during AVEL tumor debulking for ovarian cancer  POD#: 7          SUBJECTIVE    NAEO. Patient endorses some pain. Endorses OOB. Endorses flatus, denies BM. Tolerating diet but only eating small amounts.     10-point review of systems completed and negative except as noted above.    sodium chloride 0.9% lock flush 3 milliLiter(s) IV Push every 8 hours  naloxone Injectable 0.1 milliGRAM(s) IV Push every 3 minutes PRN  ondansetron Injectable 4 milliGRAM(s) IV Push every 6 hours PRN  influenza   Vaccine 0.5 milliLiter(s) IntraMuscular once  benzocaine 15 mG/menthol 3.6 mG Lozenge 1 Lozenge Oral five times a day PRN  oxyCODONE    IR 5 milliGRAM(s) Oral every 3 hours PRN  enoxaparin Injectable 40 milliGRAM(s) SubCutaneous daily  insulin lispro (HumaLOG) corrective regimen sliding scale   SubCutaneous four times a day before meals  acetaminophen   Tablet. 650 milliGRAM(s) Oral every 6 hours PRN  sodium chloride 0.9%. 1000 milliLiter(s) IV Continuous <Continuous>  ATENolol  Tablet 25 milliGRAM(s) Oral daily  potassium acid phosphate/sodium acid phosphate tablet (K-PHOS No. 2) 1 Tablet(s) Oral four times a day with meals            OBJECTIVE    T(C): 36.6 (17 @ 05:21), Max: 37 (17 @ 22:37)  HR: 69 (17 @ 05:21) (66 - 83)  BP: 141/76 (17 @ 05:21) (110/56 - 145/87)  RR: 18 (17 @ 05:21) (17 - 19)  SpO2: 98% (17 @ 05:21) (98% - 99%)    17 @ 07:01  -  17 @ 07:00  --------------------------------------------------------  IN: 240 mL / OUT: 2089.5 mL / NET: -1849.5 mL        EXAM  General: NAD  CV: NR, RR, S1, S2, no M/R/G  Lungs: CTAB  Abdomen: Soft, appropriate post-op tenderness, non-distended  Incision: vertical midline CDI, JOSÉ drains - serosanguinous fluid  Ext: No pain or swelling    LABS                        9.2    12.27 )-----------( 387      ( 22 Sep 2017 05:50 )             26.7     09-22    141  |  103  |  6<L>  ----------------------------<  125<H>  2.8<LL>   |  20<L>  |  0.24<L>    Ca    8.4      22 Sep 2017 05:30  Phos  1.8     -  Mg     2.0     -        Urinalysis Basic - ( 20 Sep 2017 10:50 )    Color: PLYEL / Appearance: CLEAR / S.011 / pH: 6.0  Gluc: 300 / Ketone: LARGE  / Bili: NEGATIVE / Urobili: NORMAL E.U.   Blood: NEGATIVE / Protein: 10 / Nitrite: NEGATIVE   Leuk Esterase: NEGATIVE / RBC: 2-5 / WBC 0-2   Sq Epi: x / Non Sq Epi: x / Bacteria: FEW

## 2017-09-22 NOTE — PROGRESS NOTE ADULT - ASSESSMENT
67 year old female POD 7 s/p AVEL, LAR    - Check GI fxn  - Continue LRD  - Pain control  - Monitor abd exams  - Encourage OOB/IS  - Consider d/c butt  - Care per primary team

## 2017-09-22 NOTE — PROGRESS NOTE ADULT - ASSESSMENT
66 y/o POD#7 s/p ex-lap, AVEL-BSO, omentectomy, resection of anterior abdominal wall masses, LAR, resection of portion of ascending colon, appendectomy, tumor debulking, and cystotomy repair for poorly differentiated ovarian cancer.  Patient is hemodynamically stable and meeting all appropriate post-operative milestones.

## 2017-09-22 NOTE — PROVIDER CONTACT NOTE (CRITICAL VALUE NOTIFICATION) - ASSESSMENT
Patient alert and oriented x 4, denies chest pains, resting comfortably, vital signs stable.
Pt is resting comfortably. No complain of pain. VSS.

## 2017-09-22 NOTE — CHART NOTE - NSCHARTNOTEFT_GEN_A_CORE
Pt seen for removal of subcutaneous right JOSÉ drain. JOSÉ removed without incident. Pt had cystogram today that showed presence of bladder leak still. Will be d/c'ed with butt. Will f/u with surgery as to whether aldo drain can be removed..    MAGDI Awad, PGY-2

## 2017-09-22 NOTE — PROGRESS NOTE ADULT - ATTENDING COMMENTS
Pt seen and examined with team. benigno reg low residue diet, ambulating, + flatus, + BM few days ago  NAD  Abd soft/approp tend/nd +BS  Incision c/d/i  drain minimal to 0 output  Ext NT SCDs  Plan  GI contin low residue diet, Low K - replete with PO packets, then d/c home with home Rx, likely low due to s/p chemo   voiting trial after cystogram today  CVS/Pulm stable  DVT Proph - lovenox, continue x 28 days for dvt proph, SCDs while in bed  Pain controlled Pt seen and examined with team. benigno reg low residue diet, ambulating, + flatus, + BM few days ago  NAD  Abd soft/approp tend/nd +BS  Incision c/d/i  drain minimal to 0 output  Ext NT SCDs  Plan  GI contin low residue diet, Low K - replete with PO packets, then d/c home with home Rx   voiting trial after cystogram today  CVS/Pulm stable  DVT Proph - lovenox, continue x 28 days for dvt proph, SCDs while in bed  Pain controlled

## 2017-09-22 NOTE — PROGRESS NOTE ADULT - SUBJECTIVE AND OBJECTIVE BOX
Gyn Onc Progress Note (R2) POD#7   HD#8    Patient seen and examined at bedside, no acute overnight events. Patient complains of back pain but it is well controlled with oral analgesia.  Patient is ambulating, passing flatus, and tolerating regular diet.  Metcalf catheter remains in place Denies CP, SOB, N/V, fevers, and chills.    Vital Signs Last 24 Hours  T(C): 36.6 (17 @ 05:21), Max: 37 (17 @ 22:37)  HR: 69 (17 @ 05:21) (66 - 83)  BP: 141/76 (17 @ 05:21) (110/56 - 145/87)  RR: 18 (17 @ 05:21) (17 - 19)  SpO2: 98% (17 @ 05:21) (98% - 99%)      I&O's Summary    20 Sep 2017 07:  -  21 Sep 2017 07:00  --------------------------------------------------------  IN: 2370 mL / OUT: 3280 mL / NET: -910 mL    21 Sep 2017 07:  -  22 Sep 2017 06:44  --------------------------------------------------------  IN: 240 mL / OUT: 2089.5 mL / NET: -1849.5 mL  JOSÉ-R 1.5 cc  JOSÉ-L 1.0cc      Physical Exam:  General: NAD  CV: NR, RR, S1, S2, no M/R/G  Lungs: CTAB  Abdomen: Soft, appropriate post-op tenderness, non-distended, decreased bowel sounds  Incision: vertical midline CDI, JOSÉ drains with serosanguinous fluid  Ext: No pain or swelling    Labs:                        9.2    12.27 )-----------( 387      ( 22 Sep 2017 05:50 )             26.7   baso 0.2    eos 0.9    imm gran 4.6    lymph 12.5   mono 9.2    poly 72.6                         9.7    13.73 )-----------( 330      ( 21 Sep 2017 04:36 )             28.1   baso 0.1    eos 0.5    imm gran 4.7    lymph 9.5    mono 8.5    poly 76.7                         9.6    17.83 )-----------( 295      ( 20 Sep 2017 04:30 )             28.1   baso 0.2    eos 0.1    imm gran 2.2    lymph 7.3    mono 7.9    poly 82.3     09-21    136  |  103  |  5<L>  ----------------------------<  123<H>  3.5   |  15<L>  |  0.33<L>    Ca    8.4      21 Sep 2017 04:36  Phos  1.8       Mg     2.0             Urinalysis Basic - ( 20 Sep 2017 10:50 )    Color: PLYEL / Appearance: CLEAR / S.011 / pH: 6.0  Gluc: 300 / Ketone: LARGE  / Bili: NEGATIVE / Urobili: NORMAL E.U.   Blood: NEGATIVE / Protein: 10 / Nitrite: NEGATIVE   Leuk Esterase: NEGATIVE / RBC: 2-5 / WBC 0-2   Sq Epi: x / Non Sq Epi: x / Bacteria: FEW        Blood Type: A Positive      MEDICATIONS  (STANDING):  sodium chloride 0.9% lock flush 3 milliLiter(s) IV Push every 8 hours  influenza   Vaccine 0.5 milliLiter(s) IntraMuscular once  enoxaparin Injectable 40 milliGRAM(s) SubCutaneous daily  insulin lispro (HumaLOG) corrective regimen sliding scale   SubCutaneous four times a day before meals  sodium chloride 0.9%. 1000 milliLiter(s) (30 mL/Hr) IV Continuous <Continuous>  ATENolol  Tablet 25 milliGRAM(s) Oral daily  potassium acid phosphate/sodium acid phosphate tablet (K-PHOS No. 2) 1 Tablet(s) Oral four times a day with meals    MEDICATIONS  (PRN):  naloxone Injectable 0.1 milliGRAM(s) IV Push every 3 minutes PRN For ANY of the following changes in patient status:  A. RR LESS THAN 10 breaths per minute, B. Oxygen saturation LESS THAN 90%, C. Sedation score of 6  ondansetron Injectable 4 milliGRAM(s) IV Push every 6 hours PRN Nausea  benzocaine 15 mG/menthol 3.6 mG Lozenge 1 Lozenge Oral five times a day PRN Sore Throat  oxyCODONE    IR 5 milliGRAM(s) Oral every 3 hours PRN Moderate and Severe Pain (4 - 10)  acetaminophen   Tablet. 650 milliGRAM(s) Oral every 6 hours PRN Mild Pain (1 - 3)

## 2017-09-22 NOTE — PROGRESS NOTE ADULT - PROBLEM SELECTOR PLAN 1
Neuro: c/w oral analgesia, pain well-controlled  CV: hemodynamically stable, normotensive, cw home metoprolol  Pulm: saturating well on room air, encourage incentive spirometry and ambulation  GI: passing scant flatus and tolerating PO, continue with low residue diet  : xray cystogram today, discontinue butt pending results  Heme: ambulation, SCDs, and heparin for DVT ppx  Endocrine: FS normal, continue with ISS as needed    RAMILA Henry PGY2

## 2017-09-23 VITALS
SYSTOLIC BLOOD PRESSURE: 130 MMHG | TEMPERATURE: 99 F | DIASTOLIC BLOOD PRESSURE: 70 MMHG | RESPIRATION RATE: 17 BRPM | HEART RATE: 69 BPM | OXYGEN SATURATION: 97 %

## 2017-09-23 LAB
BASOPHILS # BLD AUTO: 0.02 K/UL — SIGNIFICANT CHANGE UP (ref 0–0.2)
BASOPHILS NFR BLD AUTO: 0.2 % — SIGNIFICANT CHANGE UP (ref 0–2)
BUN SERPL-MCNC: 5 MG/DL — LOW (ref 7–23)
CALCIUM SERPL-MCNC: 8.3 MG/DL — LOW (ref 8.4–10.5)
CHLORIDE SERPL-SCNC: 102 MMOL/L — SIGNIFICANT CHANGE UP (ref 98–107)
CO2 SERPL-SCNC: 23 MMOL/L — SIGNIFICANT CHANGE UP (ref 22–31)
CREAT SERPL-MCNC: 0.29 MG/DL — LOW (ref 0.5–1.3)
EOSINOPHIL # BLD AUTO: 0.07 K/UL — SIGNIFICANT CHANGE UP (ref 0–0.5)
EOSINOPHIL NFR BLD AUTO: 0.6 % — SIGNIFICANT CHANGE UP (ref 0–6)
GLUCOSE SERPL-MCNC: 103 MG/DL — HIGH (ref 70–99)
HCT VFR BLD CALC: 26.7 % — LOW (ref 34.5–45)
HGB BLD-MCNC: 9.2 G/DL — LOW (ref 11.5–15.5)
IMM GRANULOCYTES # BLD AUTO: 0.36 # — SIGNIFICANT CHANGE UP
IMM GRANULOCYTES NFR BLD AUTO: 3.2 % — HIGH (ref 0–1.5)
LYMPHOCYTES # BLD AUTO: 1.79 K/UL — SIGNIFICANT CHANGE UP (ref 1–3.3)
LYMPHOCYTES # BLD AUTO: 15.8 % — SIGNIFICANT CHANGE UP (ref 13–44)
MAGNESIUM SERPL-MCNC: 1.9 MG/DL — SIGNIFICANT CHANGE UP (ref 1.6–2.6)
MCHC RBC-ENTMCNC: 29.5 PG — SIGNIFICANT CHANGE UP (ref 27–34)
MCHC RBC-ENTMCNC: 34.5 % — SIGNIFICANT CHANGE UP (ref 32–36)
MCV RBC AUTO: 85.6 FL — SIGNIFICANT CHANGE UP (ref 80–100)
MONOCYTES # BLD AUTO: 0.91 K/UL — HIGH (ref 0–0.9)
MONOCYTES NFR BLD AUTO: 8 % — SIGNIFICANT CHANGE UP (ref 2–14)
NEUTROPHILS # BLD AUTO: 8.21 K/UL — HIGH (ref 1.8–7.4)
NEUTROPHILS NFR BLD AUTO: 72.2 % — SIGNIFICANT CHANGE UP (ref 43–77)
NRBC # FLD: 0 — SIGNIFICANT CHANGE UP
PHOSPHATE SERPL-MCNC: 3.2 MG/DL — SIGNIFICANT CHANGE UP (ref 2.5–4.5)
PLATELET # BLD AUTO: 410 K/UL — HIGH (ref 150–400)
PMV BLD: 9.6 FL — SIGNIFICANT CHANGE UP (ref 7–13)
POTASSIUM SERPL-MCNC: 3 MMOL/L — LOW (ref 3.5–5.3)
POTASSIUM SERPL-MCNC: 3.8 MMOL/L — SIGNIFICANT CHANGE UP (ref 3.5–5.3)
POTASSIUM SERPL-SCNC: 3 MMOL/L — LOW (ref 3.5–5.3)
POTASSIUM SERPL-SCNC: 3.8 MMOL/L — SIGNIFICANT CHANGE UP (ref 3.5–5.3)
RBC # BLD: 3.12 M/UL — LOW (ref 3.8–5.2)
RBC # FLD: 13.2 % — SIGNIFICANT CHANGE UP (ref 10.3–14.5)
SODIUM SERPL-SCNC: 142 MMOL/L — SIGNIFICANT CHANGE UP (ref 135–145)
WBC # BLD: 11.36 K/UL — HIGH (ref 3.8–10.5)
WBC # FLD AUTO: 11.36 K/UL — HIGH (ref 3.8–10.5)

## 2017-09-23 RX ORDER — POTASSIUM CHLORIDE 20 MEQ
20 PACKET (EA) ORAL
Qty: 0 | Refills: 0 | Status: COMPLETED | OUTPATIENT
Start: 2017-09-23 | End: 2017-09-23

## 2017-09-23 RX ORDER — POTASSIUM CHLORIDE 20 MEQ
10 PACKET (EA) ORAL
Qty: 0 | Refills: 0 | Status: COMPLETED | OUTPATIENT
Start: 2017-09-23 | End: 2017-09-23

## 2017-09-23 RX ORDER — SENNA PLUS 8.6 MG/1
2 TABLET ORAL
Qty: 30 | Refills: 0
Start: 2017-09-23

## 2017-09-23 RX ORDER — DOCUSATE SODIUM 100 MG
1 CAPSULE ORAL
Qty: 30 | Refills: 0 | OUTPATIENT
Start: 2017-09-23

## 2017-09-23 RX ADMIN — Medication 20 MILLIEQUIVALENT(S): at 14:48

## 2017-09-23 RX ADMIN — ENOXAPARIN SODIUM 40 MILLIGRAM(S): 100 INJECTION SUBCUTANEOUS at 12:32

## 2017-09-23 RX ADMIN — ATENOLOL 25 MILLIGRAM(S): 25 TABLET ORAL at 05:42

## 2017-09-23 RX ADMIN — Medication 20 MILLIEQUIVALENT(S): at 12:32

## 2017-09-23 RX ADMIN — SODIUM CHLORIDE 3 MILLILITER(S): 9 INJECTION INTRAMUSCULAR; INTRAVENOUS; SUBCUTANEOUS at 14:13

## 2017-09-23 RX ADMIN — Medication 20 MILLIEQUIVALENT(S): at 09:19

## 2017-09-23 RX ADMIN — Medication 650 MILLIGRAM(S): at 10:25

## 2017-09-23 RX ADMIN — SODIUM CHLORIDE 3 MILLILITER(S): 9 INJECTION INTRAMUSCULAR; INTRAVENOUS; SUBCUTANEOUS at 05:41

## 2017-09-23 RX ADMIN — Medication 650 MILLIGRAM(S): at 09:33

## 2017-09-23 RX ADMIN — Medication 100 MILLIEQUIVALENT(S): at 14:12

## 2017-09-23 RX ADMIN — Medication 650 MILLIGRAM(S): at 04:25

## 2017-09-23 RX ADMIN — Medication 100 MILLIEQUIVALENT(S): at 11:32

## 2017-09-23 RX ADMIN — Medication 650 MILLIGRAM(S): at 16:16

## 2017-09-23 RX ADMIN — Medication 100 MILLIEQUIVALENT(S): at 08:43

## 2017-09-23 RX ADMIN — Medication 650 MILLIGRAM(S): at 03:33

## 2017-09-23 NOTE — PROGRESS NOTE ADULT - PROVIDER SPECIALTY LIST ADULT
Anesthesia
Gyn Onc
Pain Medicine
Surgery

## 2017-09-23 NOTE — PROGRESS NOTE ADULT - ATTENDING COMMENTS
Thorough conversation completed with the patient & her  with the use of Mandarin translation to review the discharge instructions.  All their questions were answered to their apparent satisfaction. Discharge home following potassium repletion; home with abdominal drain & Metcalf to gravity drainage.

## 2017-09-23 NOTE — PROGRESS NOTE ADULT - SUBJECTIVE AND OBJECTIVE BOX
D-Team (Surgical Oncology) Daily Progress Note    SUBJECTIVE:  Pt seen and examined, and is resting comfortably in bed. No acute events overnight. Pain is adequately controlled on current regimen. Pt has no complaints at this time.      OBJECTIVE:  Vital Signs Last 24 Hrs  T(C): 37.2 (23 Sep 2017 10:50), Max: 37.2 (23 Sep 2017 10:50)  T(F): 98.9 (23 Sep 2017 10:50), Max: 98.9 (23 Sep 2017 10:50)  HR: 69 (23 Sep 2017 10:50) (66 - 72)  BP: 130/70 (23 Sep 2017 10:50) (127/71 - 137/75)  BP(mean): --  RR: 17 (23 Sep 2017 10:50) (17 - 18)  SpO2: 97% (23 Sep 2017 10:50) (97% - 100%)    I&O's Detail    22 Sep 2017 07:01  -  23 Sep 2017 07:00  --------------------------------------------------------  IN:    IV PiggyBack: 300 mL    Oral Fluid: 600 mL    sodium chloride 0.9%: 400 mL  Total IN: 1300 mL    OUT:    Bulb: 137.5 mL    Indwelling Catheter - Urethral: 2480 mL  Total OUT: 2617.5 mL    Total NET: -1317.5 mL      23 Sep 2017 07:01  -  23 Sep 2017 11:28  --------------------------------------------------------  IN:  Total IN: 0 mL    OUT:    Bulb: 35 mL    Indwelling Catheter - Urethral: 200 mL  Total OUT: 235 mL    Total NET: -235 mL      Exam:  GEN: A&Ox3, NAD  HEENT: atraumatic, normocephalic  CV: no JVD  RESP: no increased work of breathing, no use of accessory muscles  GI/ABD: soft, appropriately tender, non-distended, incisionc/d/i, JOSÉ drain w/ serosanguinous output  : deferred  EXTREMITIES: warm, pink, well-perfused                          9.2    11.36 )-----------( 410      ( 23 Sep 2017 06:17 )             26.7       09-23    142  |  102  |  5<L>  ----------------------------<  103<H>  3.0<L>   |  23  |  0.29<L>    Ca    8.3<L>      23 Sep 2017 06:17  Phos  3.2     09-23  Mg     1.9     09-23        ASSESSMENT:  67 year old female POD 8 s/p AVEL, LAR    PLAN:     - Check GI fxn  - Continue LRD  - Pain control  - Monitor abd exams  - Encourage OOB/IS  - Care per primary team      Arvind Campbell MD PGY-1  pager: [phone]

## 2017-09-23 NOTE — PROGRESS NOTE ADULT - SUBJECTIVE AND OBJECTIVE BOX
R2 Gyn Onc Progress Note    Patient seen and examined at bedside, no acute overnight events. No acute complaints, pain well controlled. Patient is ambulating, passing flatus, tolerating regular diet, and still has butt in palce. Denies CP, SOB, N/V, fevers, and chills.    Vital Signs Last 24 Hours  T(C): 36.6 (09-23-17 @ 05:40), Max: 36.9 (09-22-17 @ 21:19)  HR: 72 (09-23-17 @ 05:40) (66 - 72)  BP: 137/75 (09-23-17 @ 05:40) (120/69 - 137/75)  RR: 17 (09-23-17 @ 05:40) (16 - 18)  SpO2: 99% (09-23-17 @ 05:40) (98% - 100%)    CAPILLARY BLOOD GLUCOSE  108 (22 Sep 2017 22:28)  107 (22 Sep 2017 16:21)  125 (22 Sep 2017 13:00)  140 (22 Sep 2017 08:45)      I&O's Detail    22 Sep 2017 07:01  -  23 Sep 2017 07:00  --------------------------------------------------------  IN:    IV PiggyBack: 300 mL    Oral Fluid: 600 mL    sodium chloride 0.9%.: 400 mL  Total IN: 1300 mL    OUT:    Bulb: 137.5 mL    Indwelling Catheter - Urethral: 2480 mL  Total OUT: 2617.5 mL    Total NET: -1317.5 mL      Physical Exam:  General: NAD  CV: NR, RR, S1, S2, no M/R/G  Lungs: CTA-B  Abdomen: Soft, appropriately tender, non-distended, +BS  Incision: Vertical incision CDI, aldo drain with serosanguinous fluid  Ext: No pain or swelling    Labs:             9.2<L>  12.27<H> )-----------( 387      ( 09-22 @ 05:50 )             26.7<L>               9.7<L>  13.73<H> )-----------( 330      ( 09-21 @ 04:36 )             28.1<L>               9.6<L>  17.83<H> )-----------( 295      ( 09-20 @ 04:30 )             28.1<L>               8.9<L>  14.23<H> )-----------( 233      ( 09-19 @ 05:40 )             26.0<L>               8.7<L>  13.56<H> )-----------( 200      ( 09-18 @ 16:29 )             25.8<L>      09-23 @ 06:17    142  |  102  |  5<L>  ----------------------------<  103<H>  3.0<L>   |  23  |  0.29<L>    09-22 @ 05:30    141  |  103  |  6<L>  ----------------------------<  125<H>  2.8<LL>   |  20<L>  |  0.24<L>    09-21 @ 04:36    136  |  103  |  5<L>  ----------------------------<  123<H>  3.5   |  15<L>  |  0.33<L>    09-20 @ 04:30    137  |  101  |  4<L>  ----------------------------<  111<H>  2.9<LL>   |  15<L>  |  0.36<L>    09-19 @ 05:40    137  |  99  |  6<L>  ----------------------------<  93  3.2<L>   |  17<L>  |  0.32<L>      Ca    8.3<L>      09-23 @ 06:17  Ca    8.4      09-22 @ 05:30  Ca    8.4      09-21 @ 04:36  Ca    8.2<L>      09-20 @ 04:30  Ca    8.0<L>      09-19 @ 05:40  Mg     1.9     09-23 @ 06:17  Mg     2.0     09-22 @ 05:30  Mg     2.0     09-21 @ 04:36  Mg     2.0     09-20 @ 04:30  Mg     2.0     09-19 @ 05:40  Phos  3.2     09-23 @ 06:17  Phos  3.4     09-22 @ 05:30  Phos  1.8<L>     09-21 @ 04:36  Phos  2.4<L>     09-20 @ 04:30  Phos  2.2<L>     09-19 @ 05:40                MEDICATIONS  (STANDING):  sodium chloride 0.9% lock flush 3 milliLiter(s) IV Push every 8 hours  influenza   Vaccine 0.5 milliLiter(s) IntraMuscular once  enoxaparin Injectable 40 milliGRAM(s) SubCutaneous daily  insulin lispro (HumaLOG) corrective regimen sliding scale   SubCutaneous four times a day before meals  sodium chloride 0.9%. 1000 milliLiter(s) (30 mL/Hr) IV Continuous <Continuous>  ATENolol  Tablet 25 milliGRAM(s) Oral daily    MEDICATIONS  (PRN):  naloxone Injectable 0.1 milliGRAM(s) IV Push every 3 minutes PRN For ANY of the following changes in patient status:  A. RR LESS THAN 10 breaths per minute, B. Oxygen saturation LESS THAN 90%, C. Sedation score of 6  ondansetron Injectable 4 milliGRAM(s) IV Push every 6 hours PRN Nausea  benzocaine 15 mG/menthol 3.6 mG Lozenge 1 Lozenge Oral five times a day PRN Sore Throat  oxyCODONE    IR 5 milliGRAM(s) Oral every 3 hours PRN Moderate and Severe Pain (4 - 10)  acetaminophen   Tablet. 650 milliGRAM(s) Oral every 6 hours PRN Mild Pain (1 - 3)

## 2017-09-23 NOTE — PROGRESS NOTE ADULT - ASSESSMENT
68 y/o POD#8 s/p ex-lap, AVEL-BSO, omentectomy, resection of anterior abdominal wall masses, LAR, resection of portion of ascending colon, appendectomy, tumor debulking, and cystotomy repair for poorly differentiated ovarian cancer.  Patient is hemodynamically stable and meeting all appropriate post-operative milestones.

## 2017-09-23 NOTE — PROGRESS NOTE ADULT - PROBLEM SELECTOR PLAN 1
Neuro: c/w oral analgesia, pain well-controlled  CV: hemodynamically stable, normotensive, cw home metoprolol  Pulm: saturating well on room air, encourage incentive spirometry and ambulation  GI: passing flatus and tolerating PO, continue with low residue diet; aldo drain to remain after d/c  : butt to remain after d/c due to bladder leak on cystogram yesterday; UOP adequate  Heme: ambulation, SCDs, and heparin for DVT ppx  Endocrine: FS normal, continue with ISS as needed  FEN: Potassium low, will replete and send home with K tabs  Dispo: Likely discharge today    Hamzah Awad, PGY-2  Pager #[phone] ([facility]), [phone] ([place]) Neuro: c/w oral analgesia, pain well-controlled  CV: hemodynamically stable, normotensive, continue home metoprolol  Pulm: saturating well on room air, encourage incentive spirometry and ambulation  GI: passing flatus and tolerating PO, continue with low residue diet; aldo drain to remain after d/c  : butt to remain after d/c due to bladder leak on cystogram yesterday; UOP adequate  Heme: ambulation, SCDs, and heparin for DVT ppx  Endocrine: FS normal, continue with ISS as needed  FEN: Potassium low, will replete and send home with K tabs  Dispo: Likely discharge today    Hamzah Awad, PGY-2  Pager #[phone] ([facility]), [phone] ([place])

## 2017-09-25 DIAGNOSIS — N99.72 ACCIDENTAL PUNCTURE AND LACERATION OF A GENITOURINARY SYSTEM ORGAN OR STRUCTURE DURING OTHER PROCEDURE: ICD-10-CM

## 2017-09-25 LAB — SURGICAL PATHOLOGY STUDY: SIGNIFICANT CHANGE UP

## 2017-09-29 ENCOUNTER — APPOINTMENT (OUTPATIENT)
Dept: OBGYN | Facility: CLINIC | Age: 67
End: 2017-09-29

## 2017-09-29 ENCOUNTER — APPOINTMENT (OUTPATIENT)
Dept: SURGICAL ONCOLOGY | Facility: CLINIC | Age: 67
End: 2017-09-29
Payer: MEDICARE

## 2017-09-29 VITALS
OXYGEN SATURATION: 98 % | HEART RATE: 72 BPM | BODY MASS INDEX: 19.63 KG/M2 | TEMPERATURE: 98.1 F | HEIGHT: 61 IN | SYSTOLIC BLOOD PRESSURE: 111 MMHG | WEIGHT: 104 LBS | DIASTOLIC BLOOD PRESSURE: 73 MMHG | RESPIRATION RATE: 16 BRPM

## 2017-09-29 PROCEDURE — 99024 POSTOP FOLLOW-UP VISIT: CPT

## 2017-10-02 ENCOUNTER — APPOINTMENT (OUTPATIENT)
Dept: GYNECOLOGIC ONCOLOGY | Facility: CLINIC | Age: 67
End: 2017-10-02
Payer: MEDICARE

## 2017-10-02 VITALS
DIASTOLIC BLOOD PRESSURE: 73 MMHG | TEMPERATURE: 97.7 F | SYSTOLIC BLOOD PRESSURE: 110 MMHG | BODY MASS INDEX: 19.63 KG/M2 | WEIGHT: 104 LBS | HEIGHT: 61 IN | HEART RATE: 69 BPM

## 2017-10-02 PROCEDURE — 99024 POSTOP FOLLOW-UP VISIT: CPT | Mod: NC

## 2017-10-05 ENCOUNTER — FORM ENCOUNTER (OUTPATIENT)
Age: 67
End: 2017-10-05

## 2017-10-05 ENCOUNTER — OUTPATIENT (OUTPATIENT)
Dept: OUTPATIENT SERVICES | Facility: HOSPITAL | Age: 67
LOS: 1 days | Discharge: HOME | End: 2017-10-05

## 2017-10-05 DIAGNOSIS — Z90.49 ACQUIRED ABSENCE OF OTHER SPECIFIED PARTS OF DIGESTIVE TRACT: Chronic | ICD-10-CM

## 2017-10-05 DIAGNOSIS — Z98.890 OTHER SPECIFIED POSTPROCEDURAL STATES: Chronic | ICD-10-CM

## 2017-10-05 DIAGNOSIS — Z90.722 ACQUIRED ABSENCE OF OVARIES, BILATERAL: Chronic | ICD-10-CM

## 2017-10-05 DIAGNOSIS — C56.9 MALIGNANT NEOPLASM OF UNSPECIFIED OVARY: ICD-10-CM

## 2017-10-06 ENCOUNTER — OUTPATIENT (OUTPATIENT)
Dept: OUTPATIENT SERVICES | Facility: HOSPITAL | Age: 67
LOS: 1 days | End: 2017-10-06

## 2017-10-06 ENCOUNTER — APPOINTMENT (OUTPATIENT)
Dept: RADIOLOGY | Facility: HOSPITAL | Age: 67
End: 2017-10-06
Payer: MEDICARE

## 2017-10-06 DIAGNOSIS — Z90.49 ACQUIRED ABSENCE OF OTHER SPECIFIED PARTS OF DIGESTIVE TRACT: Chronic | ICD-10-CM

## 2017-10-06 DIAGNOSIS — C34.92 MALIGNANT NEOPLASM OF UNSPECIFIED PART OF LEFT BRONCHUS OR LUNG: ICD-10-CM

## 2017-10-06 DIAGNOSIS — N99.72 ACCIDENTAL PUNCTURE AND LACERATION OF A GENITOURINARY SYSTEM ORGAN OR STRUCTURE DURING OTHER PROCEDURE: ICD-10-CM

## 2017-10-06 DIAGNOSIS — C56.2 MALIGNANT NEOPLASM OF LEFT OVARY: ICD-10-CM

## 2017-10-06 DIAGNOSIS — Z98.890 OTHER SPECIFIED POSTPROCEDURAL STATES: Chronic | ICD-10-CM

## 2017-10-06 DIAGNOSIS — Z90.722 ACQUIRED ABSENCE OF OVARIES, BILATERAL: Chronic | ICD-10-CM

## 2017-10-06 PROCEDURE — 51600 INJECTION FOR BLADDER X-RAY: CPT

## 2017-10-06 PROCEDURE — 74455 X-RAY URETHRA/BLADDER: CPT | Mod: 26

## 2017-10-09 ENCOUNTER — APPOINTMENT (OUTPATIENT)
Dept: GYNECOLOGIC ONCOLOGY | Facility: CLINIC | Age: 67
End: 2017-10-09

## 2017-10-09 VITALS
DIASTOLIC BLOOD PRESSURE: 72 MMHG | RESPIRATION RATE: 16 BRPM | SYSTOLIC BLOOD PRESSURE: 102 MMHG | TEMPERATURE: 97.8 F | HEART RATE: 72 BPM

## 2017-10-10 ENCOUNTER — LABORATORY RESULT (OUTPATIENT)
Age: 67
End: 2017-10-10

## 2017-10-10 ENCOUNTER — APPOINTMENT (OUTPATIENT)
Dept: HEMATOLOGY ONCOLOGY | Facility: CLINIC | Age: 67
End: 2017-10-10
Payer: MEDICARE

## 2017-10-10 ENCOUNTER — RESULT REVIEW (OUTPATIENT)
Age: 67
End: 2017-10-10

## 2017-10-10 VITALS
SYSTOLIC BLOOD PRESSURE: 117 MMHG | TEMPERATURE: 98 F | HEART RATE: 81 BPM | WEIGHT: 101.85 LBS | BODY MASS INDEX: 19.24 KG/M2 | OXYGEN SATURATION: 96 % | RESPIRATION RATE: 16 BRPM | DIASTOLIC BLOOD PRESSURE: 73 MMHG

## 2017-10-10 LAB
HCT VFR BLD CALC: 32.4 % — LOW (ref 34.5–45)
HGB BLD-MCNC: 10.9 G/DL — LOW (ref 11.5–15.5)
MCHC RBC-ENTMCNC: 30.1 PG — SIGNIFICANT CHANGE UP (ref 27–34)
MCHC RBC-ENTMCNC: 33.5 G/DL — SIGNIFICANT CHANGE UP (ref 32–36)
MCV RBC AUTO: 89.8 FL — SIGNIFICANT CHANGE UP (ref 80–100)
PLATELET # BLD AUTO: 257 K/UL — SIGNIFICANT CHANGE UP (ref 150–400)
RBC # BLD: 3.61 M/UL — LOW (ref 3.8–5.2)
RBC # FLD: 12.6 % — SIGNIFICANT CHANGE UP (ref 10.3–14.5)
WBC # BLD: 6.2 K/UL — SIGNIFICANT CHANGE UP (ref 3.8–10.5)
WBC # FLD AUTO: 6.2 K/UL — SIGNIFICANT CHANGE UP (ref 3.8–10.5)

## 2017-10-10 PROCEDURE — 99205 OFFICE O/P NEW HI 60 MIN: CPT

## 2017-10-13 LAB
ALBUMIN SERPL ELPH-MCNC: 3.9 G/DL
ALP BLD-CCNC: 58 U/L
ALT SERPL-CCNC: 8 U/L
ANION GAP SERPL CALC-SCNC: 12 MMOL/L
APTT BLD: 33.7 SEC
AST SERPL-CCNC: 12 U/L
BILIRUB SERPL-MCNC: 0.4 MG/DL
BUN SERPL-MCNC: 14 MG/DL
CALCIUM SERPL-MCNC: 9.5 MG/DL
CHLORIDE SERPL-SCNC: 103 MMOL/L
CO2 SERPL-SCNC: 26 MMOL/L
CREAT SERPL-MCNC: 0.51 MG/DL
GLUCOSE SERPL-MCNC: 117 MG/DL
HAV IGM SER QL: NONREACTIVE
HBV CORE IGG+IGM SER QL: REACTIVE
HBV CORE IGM SER QL: NONREACTIVE
HBV SURFACE AB SER QL: NONREACTIVE
HBV SURFACE AG SER QL: NONREACTIVE
HCV AB SER QL: NONREACTIVE
HCV S/CO RATIO: 0.15 S/CO
INR PPP: 1.03 RATIO
POTASSIUM SERPL-SCNC: 4.3 MMOL/L
PROT SERPL-MCNC: 7.4 G/DL
PT BLD: 11.6 SEC
SODIUM SERPL-SCNC: 141 MMOL/L

## 2017-10-13 RX ORDER — ENOXAPARIN SODIUM 100 MG/ML
40 INJECTION SUBCUTANEOUS
Qty: 11 | Refills: 0 | Status: DISCONTINUED | COMMUNITY
Start: 2017-09-21 | End: 2017-10-13

## 2017-10-13 RX ORDER — OXYCODONE AND ACETAMINOPHEN 5; 325 MG/1; MG/1
5-325 TABLET ORAL
Qty: 30 | Refills: 0 | Status: DISCONTINUED | COMMUNITY
Start: 2017-08-19 | End: 2017-10-13

## 2017-10-13 RX ORDER — NAPROXEN 375 MG/1
375 TABLET ORAL
Qty: 20 | Refills: 0 | Status: DISCONTINUED | COMMUNITY
Start: 2017-09-22 | End: 2017-10-13

## 2017-10-13 RX ORDER — OXYCODONE 5 MG/1
5 TABLET ORAL
Qty: 20 | Refills: 0 | Status: DISCONTINUED | COMMUNITY
Start: 2017-07-22 | End: 2017-10-13

## 2017-10-13 RX ORDER — ERGOCALCIFEROL 1.25 MG/1
1.25 MG CAPSULE ORAL
Refills: 0 | Status: DISCONTINUED | COMMUNITY
End: 2017-10-13

## 2017-10-13 RX ORDER — BENZONATATE 100 MG/1
100 CAPSULE ORAL
Qty: 30 | Refills: 0 | Status: DISCONTINUED | COMMUNITY
Start: 2017-07-12 | End: 2017-10-13

## 2017-10-13 RX ORDER — ATENOLOL 25 MG/1
25 TABLET ORAL
Qty: 30 | Refills: 0 | Status: DISCONTINUED | COMMUNITY
Start: 2017-06-21 | End: 2017-10-13

## 2017-10-16 DIAGNOSIS — C56.2 MALIGNANT NEOPLASM OF LEFT OVARY: ICD-10-CM

## 2017-10-24 ENCOUNTER — RESULT REVIEW (OUTPATIENT)
Age: 67
End: 2017-10-24

## 2017-10-24 ENCOUNTER — APPOINTMENT (OUTPATIENT)
Dept: INFUSION THERAPY | Facility: HOSPITAL | Age: 67
End: 2017-10-24

## 2017-10-24 LAB
HCT VFR BLD CALC: 31.8 % — LOW (ref 34.5–45)
HGB BLD-MCNC: 11.1 G/DL — LOW (ref 11.5–15.5)
MCHC RBC-ENTMCNC: 31.1 PG — SIGNIFICANT CHANGE UP (ref 27–34)
MCHC RBC-ENTMCNC: 35.1 G/DL — SIGNIFICANT CHANGE UP (ref 32–36)
MCV RBC AUTO: 88.6 FL — SIGNIFICANT CHANGE UP (ref 80–100)
PLATELET # BLD AUTO: 274 K/UL — SIGNIFICANT CHANGE UP (ref 150–400)
RBC # BLD: 3.58 M/UL — LOW (ref 3.8–5.2)
RBC # FLD: 12 % — SIGNIFICANT CHANGE UP (ref 10.3–14.5)
WBC # BLD: 8.2 K/UL — SIGNIFICANT CHANGE UP (ref 3.8–10.5)
WBC # FLD AUTO: 8.2 K/UL — SIGNIFICANT CHANGE UP (ref 3.8–10.5)

## 2017-10-25 DIAGNOSIS — R11.2 NAUSEA WITH VOMITING, UNSPECIFIED: ICD-10-CM

## 2017-10-25 DIAGNOSIS — Z51.11 ENCOUNTER FOR ANTINEOPLASTIC CHEMOTHERAPY: ICD-10-CM

## 2017-11-02 ENCOUNTER — APPOINTMENT (OUTPATIENT)
Dept: GYNECOLOGIC ONCOLOGY | Facility: CLINIC | Age: 67
End: 2017-11-02
Payer: MEDICARE

## 2017-11-02 ENCOUNTER — OUTPATIENT (OUTPATIENT)
Dept: OUTPATIENT SERVICES | Facility: HOSPITAL | Age: 67
LOS: 1 days | Discharge: HOME | End: 2017-11-02

## 2017-11-02 VITALS
TEMPERATURE: 98 F | HEIGHT: 61 IN | WEIGHT: 101 LBS | HEART RATE: 64 BPM | DIASTOLIC BLOOD PRESSURE: 68 MMHG | SYSTOLIC BLOOD PRESSURE: 110 MMHG | BODY MASS INDEX: 19.07 KG/M2

## 2017-11-02 DIAGNOSIS — Z90.49 ACQUIRED ABSENCE OF OTHER SPECIFIED PARTS OF DIGESTIVE TRACT: Chronic | ICD-10-CM

## 2017-11-02 DIAGNOSIS — Z90.710 ACQUIRED ABSENCE OF BOTH CERVIX AND UTERUS: Chronic | ICD-10-CM

## 2017-11-02 DIAGNOSIS — C56.2 MALIGNANT NEOPLASM OF LEFT OVARY: ICD-10-CM

## 2017-11-02 DIAGNOSIS — Z90.722 ACQUIRED ABSENCE OF OVARIES, BILATERAL: Chronic | ICD-10-CM

## 2017-11-02 DIAGNOSIS — Z98.890 OTHER SPECIFIED POSTPROCEDURAL STATES: Chronic | ICD-10-CM

## 2017-11-02 PROBLEM — E11.9 TYPE 2 DIABETES MELLITUS WITHOUT COMPLICATIONS: Chronic | Status: ACTIVE | Noted: 2017-10-20

## 2017-11-02 PROCEDURE — 99024 POSTOP FOLLOW-UP VISIT: CPT | Mod: NC

## 2017-11-06 ENCOUNTER — LABORATORY RESULT (OUTPATIENT)
Age: 67
End: 2017-11-06

## 2017-11-06 ENCOUNTER — RESULT REVIEW (OUTPATIENT)
Age: 67
End: 2017-11-06

## 2017-11-06 ENCOUNTER — APPOINTMENT (OUTPATIENT)
Dept: HEMATOLOGY ONCOLOGY | Facility: CLINIC | Age: 67
End: 2017-11-06
Payer: MEDICARE

## 2017-11-06 VITALS
WEIGHT: 102.51 LBS | BODY MASS INDEX: 19.37 KG/M2 | SYSTOLIC BLOOD PRESSURE: 108 MMHG | OXYGEN SATURATION: 97 % | DIASTOLIC BLOOD PRESSURE: 71 MMHG | RESPIRATION RATE: 16 BRPM | TEMPERATURE: 98.1 F | HEART RATE: 81 BPM

## 2017-11-06 LAB
BASOPHILS # BLD AUTO: 0 K/UL — SIGNIFICANT CHANGE UP (ref 0–0.2)
BASOPHILS NFR BLD AUTO: 1.2 % — SIGNIFICANT CHANGE UP (ref 0–2)
EOSINOPHIL # BLD AUTO: 0.1 K/UL — SIGNIFICANT CHANGE UP (ref 0–0.5)
EOSINOPHIL NFR BLD AUTO: 2.1 % — SIGNIFICANT CHANGE UP (ref 0–6)
HCT VFR BLD CALC: 30.9 % — LOW (ref 34.5–45)
HGB BLD-MCNC: 10.5 G/DL — LOW (ref 11.5–15.5)
LYMPHOCYTES # BLD AUTO: 1.5 K/UL — SIGNIFICANT CHANGE UP (ref 1–3.3)
LYMPHOCYTES # BLD AUTO: 43.6 % — SIGNIFICANT CHANGE UP (ref 13–44)
MCHC RBC-ENTMCNC: 29.8 PG — SIGNIFICANT CHANGE UP (ref 27–34)
MCHC RBC-ENTMCNC: 34 G/DL — SIGNIFICANT CHANGE UP (ref 32–36)
MCV RBC AUTO: 87.7 FL — SIGNIFICANT CHANGE UP (ref 80–100)
MONOCYTES # BLD AUTO: 0.5 K/UL — SIGNIFICANT CHANGE UP (ref 0–0.9)
MONOCYTES NFR BLD AUTO: 14.8 % — HIGH (ref 2–14)
NEUTROPHILS # BLD AUTO: 1.3 K/UL — LOW (ref 1.8–7.4)
NEUTROPHILS NFR BLD AUTO: 38.3 % — LOW (ref 43–77)
PLATELET # BLD AUTO: 213 K/UL — SIGNIFICANT CHANGE UP (ref 150–400)
RBC # BLD: 3.52 M/UL — LOW (ref 3.8–5.2)
RBC # FLD: 12.3 % — SIGNIFICANT CHANGE UP (ref 10.3–14.5)
WBC # BLD: 3.4 K/UL — LOW (ref 3.8–10.5)
WBC # FLD AUTO: 3.4 K/UL — LOW (ref 3.8–10.5)

## 2017-11-06 PROCEDURE — 99214 OFFICE O/P EST MOD 30 MIN: CPT

## 2017-11-07 ENCOUNTER — APPOINTMENT (OUTPATIENT)
Dept: THORACIC SURGERY | Facility: CLINIC | Age: 67
End: 2017-11-07
Payer: MEDICARE

## 2017-11-07 VITALS
SYSTOLIC BLOOD PRESSURE: 101 MMHG | DIASTOLIC BLOOD PRESSURE: 63 MMHG | RESPIRATION RATE: 17 BRPM | OXYGEN SATURATION: 97 % | WEIGHT: 99 LBS | HEART RATE: 71 BPM | BODY MASS INDEX: 18.71 KG/M2 | TEMPERATURE: 97.6 F

## 2017-11-07 PROCEDURE — 99024 POSTOP FOLLOW-UP VISIT: CPT

## 2017-11-08 ENCOUNTER — APPOINTMENT (OUTPATIENT)
Dept: HEMATOLOGY ONCOLOGY | Facility: CLINIC | Age: 67
End: 2017-11-08

## 2017-11-08 LAB
ALBUMIN SERPL ELPH-MCNC: 3.9 G/DL
ALP BLD-CCNC: 50 U/L
ALT SERPL-CCNC: 10 U/L
ANION GAP SERPL CALC-SCNC: 14 MMOL/L
AST SERPL-CCNC: 14 U/L
BILIRUB SERPL-MCNC: 0.2 MG/DL
BUN SERPL-MCNC: 17 MG/DL
CALCIUM SERPL-MCNC: 9.2 MG/DL
CHLORIDE SERPL-SCNC: 105 MMOL/L
CO2 SERPL-SCNC: 24 MMOL/L
CREAT SERPL-MCNC: 0.51 MG/DL
GLUCOSE SERPL-MCNC: 98 MG/DL
POTASSIUM SERPL-SCNC: 4.3 MMOL/L
PROT SERPL-MCNC: 7 G/DL
SODIUM SERPL-SCNC: 143 MMOL/L

## 2017-11-16 ENCOUNTER — APPOINTMENT (OUTPATIENT)
Dept: SURGICAL ONCOLOGY | Facility: CLINIC | Age: 67
End: 2017-11-16

## 2017-11-17 ENCOUNTER — RESULT REVIEW (OUTPATIENT)
Age: 67
End: 2017-11-17

## 2017-11-17 ENCOUNTER — APPOINTMENT (OUTPATIENT)
Dept: INFUSION THERAPY | Facility: HOSPITAL | Age: 67
End: 2017-11-17

## 2017-11-17 LAB
HCT VFR BLD CALC: 34.6 % — SIGNIFICANT CHANGE UP (ref 34.5–45)
HGB BLD-MCNC: 12 G/DL — SIGNIFICANT CHANGE UP (ref 11.5–15.5)
MCHC RBC-ENTMCNC: 30.5 PG — SIGNIFICANT CHANGE UP (ref 27–34)
MCHC RBC-ENTMCNC: 34.5 G/DL — SIGNIFICANT CHANGE UP (ref 32–36)
MCV RBC AUTO: 88.5 FL — SIGNIFICANT CHANGE UP (ref 80–100)
PLATELET # BLD AUTO: 194 K/UL — SIGNIFICANT CHANGE UP (ref 150–400)
RBC # BLD: 3.91 M/UL — SIGNIFICANT CHANGE UP (ref 3.8–5.2)
RBC # FLD: 13.1 % — SIGNIFICANT CHANGE UP (ref 10.3–14.5)
WBC # BLD: 6.3 K/UL — SIGNIFICANT CHANGE UP (ref 3.8–10.5)
WBC # FLD AUTO: 6.3 K/UL — SIGNIFICANT CHANGE UP (ref 3.8–10.5)

## 2017-11-18 ENCOUNTER — TRANSCRIPTION ENCOUNTER (OUTPATIENT)
Age: 67
End: 2017-11-18

## 2017-11-20 DIAGNOSIS — R11.2 NAUSEA WITH VOMITING, UNSPECIFIED: ICD-10-CM

## 2017-11-20 DIAGNOSIS — Z51.11 ENCOUNTER FOR ANTINEOPLASTIC CHEMOTHERAPY: ICD-10-CM

## 2017-11-29 ENCOUNTER — RESULT REVIEW (OUTPATIENT)
Age: 67
End: 2017-11-29

## 2017-11-29 ENCOUNTER — APPOINTMENT (OUTPATIENT)
Dept: HEMATOLOGY ONCOLOGY | Facility: CLINIC | Age: 67
End: 2017-11-29
Payer: MEDICARE

## 2017-11-29 ENCOUNTER — LABORATORY RESULT (OUTPATIENT)
Age: 67
End: 2017-11-29

## 2017-11-29 VITALS
SYSTOLIC BLOOD PRESSURE: 107 MMHG | BODY MASS INDEX: 19.79 KG/M2 | TEMPERATURE: 98.8 F | OXYGEN SATURATION: 97 % | WEIGHT: 104.72 LBS | RESPIRATION RATE: 16 BRPM | DIASTOLIC BLOOD PRESSURE: 73 MMHG | HEART RATE: 72 BPM

## 2017-11-29 LAB
BASOPHILS # BLD AUTO: 0 K/UL — SIGNIFICANT CHANGE UP (ref 0–0.2)
BASOPHILS NFR BLD AUTO: 0.9 % — SIGNIFICANT CHANGE UP (ref 0–2)
EOSINOPHIL # BLD AUTO: 0.1 K/UL — SIGNIFICANT CHANGE UP (ref 0–0.5)
EOSINOPHIL NFR BLD AUTO: 3.9 % — SIGNIFICANT CHANGE UP (ref 0–6)
HCT VFR BLD CALC: 34.6 % — SIGNIFICANT CHANGE UP (ref 34.5–45)
HGB BLD-MCNC: 11.6 G/DL — SIGNIFICANT CHANGE UP (ref 11.5–15.5)
LYMPHOCYTES # BLD AUTO: 1.4 K/UL — SIGNIFICANT CHANGE UP (ref 1–3.3)
LYMPHOCYTES # BLD AUTO: 45.1 % — HIGH (ref 13–44)
MCHC RBC-ENTMCNC: 29.5 PG — SIGNIFICANT CHANGE UP (ref 27–34)
MCHC RBC-ENTMCNC: 33.6 G/DL — SIGNIFICANT CHANGE UP (ref 32–36)
MCV RBC AUTO: 87.9 FL — SIGNIFICANT CHANGE UP (ref 80–100)
MONOCYTES # BLD AUTO: 0.4 K/UL — SIGNIFICANT CHANGE UP (ref 0–0.9)
MONOCYTES NFR BLD AUTO: 12.4 % — SIGNIFICANT CHANGE UP (ref 2–14)
NEUTROPHILS # BLD AUTO: 1.1 K/UL — LOW (ref 1.8–7.4)
NEUTROPHILS NFR BLD AUTO: 37.6 % — LOW (ref 43–77)
PLATELET # BLD AUTO: 201 K/UL — SIGNIFICANT CHANGE UP (ref 150–400)
RBC # BLD: 3.93 M/UL — SIGNIFICANT CHANGE UP (ref 3.8–5.2)
RBC # FLD: 12.8 % — SIGNIFICANT CHANGE UP (ref 10.3–14.5)
WBC # BLD: 3 K/UL — LOW (ref 3.8–10.5)
WBC # FLD AUTO: 3 K/UL — LOW (ref 3.8–10.5)

## 2017-11-29 PROCEDURE — 99214 OFFICE O/P EST MOD 30 MIN: CPT

## 2017-11-30 LAB — MAGNESIUM SERPL-MCNC: 2.2 MG/DL

## 2017-12-01 ENCOUNTER — OUTPATIENT (OUTPATIENT)
Dept: OUTPATIENT SERVICES | Facility: HOSPITAL | Age: 67
LOS: 1 days | Discharge: HOME | End: 2017-12-01

## 2017-12-01 DIAGNOSIS — Z90.49 ACQUIRED ABSENCE OF OTHER SPECIFIED PARTS OF DIGESTIVE TRACT: Chronic | ICD-10-CM

## 2017-12-01 DIAGNOSIS — C56.2 MALIGNANT NEOPLASM OF LEFT OVARY: ICD-10-CM

## 2017-12-01 DIAGNOSIS — Z90.710 ACQUIRED ABSENCE OF BOTH CERVIX AND UTERUS: Chronic | ICD-10-CM

## 2017-12-01 DIAGNOSIS — Z90.722 ACQUIRED ABSENCE OF OVARIES, BILATERAL: Chronic | ICD-10-CM

## 2017-12-01 DIAGNOSIS — Z98.890 OTHER SPECIFIED POSTPROCEDURAL STATES: Chronic | ICD-10-CM

## 2017-12-08 ENCOUNTER — LABORATORY RESULT (OUTPATIENT)
Age: 67
End: 2017-12-08

## 2017-12-08 ENCOUNTER — APPOINTMENT (OUTPATIENT)
Dept: INFUSION THERAPY | Facility: HOSPITAL | Age: 67
End: 2017-12-08

## 2017-12-08 ENCOUNTER — RESULT REVIEW (OUTPATIENT)
Age: 67
End: 2017-12-08

## 2017-12-08 LAB
HCT VFR BLD CALC: 33.3 % — LOW (ref 34.5–45)
HGB BLD-MCNC: 11.5 G/DL — SIGNIFICANT CHANGE UP (ref 11.5–15.5)
MCHC RBC-ENTMCNC: 29.8 PG — SIGNIFICANT CHANGE UP (ref 27–34)
MCHC RBC-ENTMCNC: 34.4 G/DL — SIGNIFICANT CHANGE UP (ref 32–36)
MCV RBC AUTO: 86.6 FL — SIGNIFICANT CHANGE UP (ref 80–100)
PLATELET # BLD AUTO: 222 K/UL — SIGNIFICANT CHANGE UP (ref 150–400)
RBC # BLD: 3.85 M/UL — SIGNIFICANT CHANGE UP (ref 3.8–5.2)
RBC # FLD: 13.3 % — SIGNIFICANT CHANGE UP (ref 10.3–14.5)
WBC # BLD: 5.7 K/UL — SIGNIFICANT CHANGE UP (ref 3.8–10.5)
WBC # FLD AUTO: 5.7 K/UL — SIGNIFICANT CHANGE UP (ref 3.8–10.5)

## 2017-12-11 DIAGNOSIS — R11.2 NAUSEA WITH VOMITING, UNSPECIFIED: ICD-10-CM

## 2017-12-11 DIAGNOSIS — Z51.11 ENCOUNTER FOR ANTINEOPLASTIC CHEMOTHERAPY: ICD-10-CM

## 2017-12-14 LAB
ALBUMIN SERPL ELPH-MCNC: 4 G/DL
ALP BLD-CCNC: 56 U/L
ALT SERPL-CCNC: 7 U/L
ANION GAP SERPL CALC-SCNC: 18 MMOL/L
AST SERPL-CCNC: 10 U/L
BILIRUB SERPL-MCNC: 0.2 MG/DL
BUN SERPL-MCNC: 16 MG/DL
CALCIUM SERPL-MCNC: 9.7 MG/DL
CHLORIDE SERPL-SCNC: 104 MMOL/L
CO2 SERPL-SCNC: 24 MMOL/L
CREAT SERPL-MCNC: 0.62 MG/DL
GLUCOSE SERPL-MCNC: 151 MG/DL
POTASSIUM SERPL-SCNC: 4.4 MMOL/L
PROT SERPL-MCNC: 7.3 G/DL
SODIUM SERPL-SCNC: 146 MMOL/L

## 2017-12-22 ENCOUNTER — RESULT REVIEW (OUTPATIENT)
Age: 67
End: 2017-12-22

## 2017-12-22 ENCOUNTER — APPOINTMENT (OUTPATIENT)
Dept: HEMATOLOGY ONCOLOGY | Facility: CLINIC | Age: 67
End: 2017-12-22
Payer: MEDICARE

## 2017-12-22 VITALS
DIASTOLIC BLOOD PRESSURE: 79 MMHG | OXYGEN SATURATION: 98 % | HEART RATE: 82 BPM | SYSTOLIC BLOOD PRESSURE: 134 MMHG | BODY MASS INDEX: 20.41 KG/M2 | TEMPERATURE: 97.6 F | RESPIRATION RATE: 16 BRPM | WEIGHT: 108.02 LBS

## 2017-12-22 LAB
BASOPHILS # BLD AUTO: 0 K/UL — SIGNIFICANT CHANGE UP (ref 0–0.2)
BASOPHILS NFR BLD AUTO: 1 % — SIGNIFICANT CHANGE UP (ref 0–2)
EOSINOPHIL # BLD AUTO: 0.1 K/UL — SIGNIFICANT CHANGE UP (ref 0–0.5)
EOSINOPHIL NFR BLD AUTO: 3 % — SIGNIFICANT CHANGE UP (ref 0–6)
HCT VFR BLD CALC: 33.2 % — LOW (ref 34.5–45)
HGB BLD-MCNC: 11.5 G/DL — SIGNIFICANT CHANGE UP (ref 11.5–15.5)
LYMPHOCYTES # BLD AUTO: 1.8 K/UL — SIGNIFICANT CHANGE UP (ref 1–3.3)
LYMPHOCYTES # BLD AUTO: 55 % — HIGH (ref 13–44)
MCHC RBC-ENTMCNC: 30.2 PG — SIGNIFICANT CHANGE UP (ref 27–34)
MCHC RBC-ENTMCNC: 34.5 G/DL — SIGNIFICANT CHANGE UP (ref 32–36)
MCV RBC AUTO: 87.6 FL — SIGNIFICANT CHANGE UP (ref 80–100)
MONOCYTES # BLD AUTO: 0.5 K/UL — SIGNIFICANT CHANGE UP (ref 0–0.9)
MONOCYTES NFR BLD AUTO: 13 % — SIGNIFICANT CHANGE UP (ref 2–14)
NEUTROPHILS # BLD AUTO: 0.9 K/UL — LOW (ref 1.8–7.4)
NEUTROPHILS NFR BLD AUTO: 28 % — LOW (ref 43–77)
PLAT MORPH BLD: NORMAL — SIGNIFICANT CHANGE UP
PLATELET # BLD AUTO: 232 K/UL — SIGNIFICANT CHANGE UP (ref 150–400)
RBC # BLD: 3.79 M/UL — LOW (ref 3.8–5.2)
RBC # FLD: 13.5 % — SIGNIFICANT CHANGE UP (ref 10.3–14.5)
RBC BLD AUTO: SIGNIFICANT CHANGE UP
WBC # BLD: 3.2 K/UL — LOW (ref 3.8–10.5)
WBC # FLD AUTO: 3.2 K/UL — LOW (ref 3.8–10.5)

## 2017-12-22 PROCEDURE — 99214 OFFICE O/P EST MOD 30 MIN: CPT

## 2017-12-22 RX ORDER — TRAMADOL HYDROCHLORIDE 50 MG/1
50 TABLET, COATED ORAL
Qty: 30 | Refills: 0 | Status: DISCONTINUED | COMMUNITY
Start: 2017-08-20

## 2017-12-27 LAB
ALBUMIN SERPL ELPH-MCNC: 3.9 G/DL
ALP BLD-CCNC: 58 U/L
ALT SERPL-CCNC: 5 U/L
ANION GAP SERPL CALC-SCNC: 14 MMOL/L
AST SERPL-CCNC: 12 U/L
BILIRUB SERPL-MCNC: <0.2 MG/DL
BUN SERPL-MCNC: 13 MG/DL
CALCIUM SERPL-MCNC: 9.1 MG/DL
CANCER AG125 SERPL-ACNC: 20 U/ML
CHLORIDE SERPL-SCNC: 104 MMOL/L
CO2 SERPL-SCNC: 27 MMOL/L
CREAT SERPL-MCNC: 0.93 MG/DL
GLUCOSE SERPL-MCNC: 113 MG/DL
MAGNESIUM SERPL-MCNC: 2.1 MG/DL
POTASSIUM SERPL-SCNC: 4 MMOL/L
PROT SERPL-MCNC: 7.5 G/DL
SODIUM SERPL-SCNC: 145 MMOL/L

## 2017-12-29 ENCOUNTER — LABORATORY RESULT (OUTPATIENT)
Age: 67
End: 2017-12-29

## 2017-12-29 ENCOUNTER — APPOINTMENT (OUTPATIENT)
Dept: INFUSION THERAPY | Facility: HOSPITAL | Age: 67
End: 2017-12-29

## 2017-12-29 ENCOUNTER — RESULT REVIEW (OUTPATIENT)
Age: 67
End: 2017-12-29

## 2017-12-29 LAB
HCT VFR BLD CALC: 35 % — SIGNIFICANT CHANGE UP (ref 34.5–45)
HGB BLD-MCNC: 12.3 G/DL — SIGNIFICANT CHANGE UP (ref 11.5–15.5)
MCHC RBC-ENTMCNC: 31 PG — SIGNIFICANT CHANGE UP (ref 27–34)
MCHC RBC-ENTMCNC: 35.3 G/DL — SIGNIFICANT CHANGE UP (ref 32–36)
MCV RBC AUTO: 87.8 FL — SIGNIFICANT CHANGE UP (ref 80–100)
PLATELET # BLD AUTO: 214 K/UL — SIGNIFICANT CHANGE UP (ref 150–400)
RBC # BLD: 3.98 M/UL — SIGNIFICANT CHANGE UP (ref 3.8–5.2)
RBC # FLD: 14 % — SIGNIFICANT CHANGE UP (ref 10.3–14.5)
WBC # BLD: 6.7 K/UL — SIGNIFICANT CHANGE UP (ref 3.8–10.5)
WBC # FLD AUTO: 6.7 K/UL — SIGNIFICANT CHANGE UP (ref 3.8–10.5)

## 2018-01-02 DIAGNOSIS — C56.9 MALIGNANT NEOPLASM OF UNSPECIFIED OVARY: ICD-10-CM

## 2018-01-17 ENCOUNTER — OUTPATIENT (OUTPATIENT)
Dept: OUTPATIENT SERVICES | Facility: HOSPITAL | Age: 68
LOS: 1 days | Discharge: HOME | End: 2018-01-17

## 2018-01-17 DIAGNOSIS — Z90.710 ACQUIRED ABSENCE OF BOTH CERVIX AND UTERUS: Chronic | ICD-10-CM

## 2018-01-17 DIAGNOSIS — C56.2 MALIGNANT NEOPLASM OF LEFT OVARY: ICD-10-CM

## 2018-01-17 DIAGNOSIS — Z90.722 ACQUIRED ABSENCE OF OVARIES, BILATERAL: Chronic | ICD-10-CM

## 2018-01-17 DIAGNOSIS — Z98.890 OTHER SPECIFIED POSTPROCEDURAL STATES: Chronic | ICD-10-CM

## 2018-01-17 DIAGNOSIS — Z90.49 ACQUIRED ABSENCE OF OTHER SPECIFIED PARTS OF DIGESTIVE TRACT: Chronic | ICD-10-CM

## 2018-01-19 ENCOUNTER — RESULT REVIEW (OUTPATIENT)
Age: 68
End: 2018-01-19

## 2018-01-19 ENCOUNTER — APPOINTMENT (OUTPATIENT)
Dept: INFUSION THERAPY | Facility: HOSPITAL | Age: 68
End: 2018-01-19

## 2018-01-19 ENCOUNTER — LABORATORY RESULT (OUTPATIENT)
Age: 68
End: 2018-01-19

## 2018-01-19 ENCOUNTER — APPOINTMENT (OUTPATIENT)
Dept: HEMATOLOGY ONCOLOGY | Facility: CLINIC | Age: 68
End: 2018-01-19
Payer: MEDICARE

## 2018-01-19 VITALS
HEART RATE: 77 BPM | WEIGHT: 112.64 LBS | TEMPERATURE: 98.3 F | DIASTOLIC BLOOD PRESSURE: 77 MMHG | RESPIRATION RATE: 16 BRPM | SYSTOLIC BLOOD PRESSURE: 132 MMHG | BODY MASS INDEX: 21.28 KG/M2 | OXYGEN SATURATION: 95 %

## 2018-01-19 LAB
HCT VFR BLD CALC: 35 % — SIGNIFICANT CHANGE UP (ref 34.5–45)
HGB BLD-MCNC: 12.3 G/DL — SIGNIFICANT CHANGE UP (ref 11.5–15.5)
MCHC RBC-ENTMCNC: 31.1 PG — SIGNIFICANT CHANGE UP (ref 27–34)
MCHC RBC-ENTMCNC: 35.2 G/DL — SIGNIFICANT CHANGE UP (ref 32–36)
MCV RBC AUTO: 88.4 FL — SIGNIFICANT CHANGE UP (ref 80–100)
PLATELET # BLD AUTO: 229 K/UL — SIGNIFICANT CHANGE UP (ref 150–400)
RBC # BLD: 3.96 M/UL — SIGNIFICANT CHANGE UP (ref 3.8–5.2)
RBC # FLD: 14.5 % — SIGNIFICANT CHANGE UP (ref 10.3–14.5)
WBC # BLD: 7.1 K/UL — SIGNIFICANT CHANGE UP (ref 3.8–10.5)
WBC # FLD AUTO: 7.1 K/UL — SIGNIFICANT CHANGE UP (ref 3.8–10.5)

## 2018-01-19 PROCEDURE — 99214 OFFICE O/P EST MOD 30 MIN: CPT

## 2018-01-22 DIAGNOSIS — R11.2 NAUSEA WITH VOMITING, UNSPECIFIED: ICD-10-CM

## 2018-01-22 DIAGNOSIS — Z51.11 ENCOUNTER FOR ANTINEOPLASTIC CHEMOTHERAPY: ICD-10-CM

## 2018-01-31 ENCOUNTER — RESULT REVIEW (OUTPATIENT)
Age: 68
End: 2018-01-31

## 2018-01-31 ENCOUNTER — APPOINTMENT (OUTPATIENT)
Dept: HEMATOLOGY ONCOLOGY | Facility: CLINIC | Age: 68
End: 2018-01-31
Payer: MEDICARE

## 2018-01-31 VITALS
HEART RATE: 71 BPM | BODY MASS INDEX: 21.24 KG/M2 | WEIGHT: 112.41 LBS | TEMPERATURE: 98 F | RESPIRATION RATE: 16 BRPM | OXYGEN SATURATION: 92 % | SYSTOLIC BLOOD PRESSURE: 138 MMHG | DIASTOLIC BLOOD PRESSURE: 85 MMHG

## 2018-01-31 LAB
BASOPHILS # BLD AUTO: 0 K/UL — SIGNIFICANT CHANGE UP (ref 0–0.2)
BASOPHILS NFR BLD AUTO: 0.6 % — SIGNIFICANT CHANGE UP (ref 0–2)
EOSINOPHIL # BLD AUTO: 0.1 K/UL — SIGNIFICANT CHANGE UP (ref 0–0.5)
EOSINOPHIL NFR BLD AUTO: 1.7 % — SIGNIFICANT CHANGE UP (ref 0–6)
HCT VFR BLD CALC: 32.6 % — LOW (ref 34.5–45)
HGB BLD-MCNC: 11.5 G/DL — SIGNIFICANT CHANGE UP (ref 11.5–15.5)
LYMPHOCYTES # BLD AUTO: 1.5 K/UL — SIGNIFICANT CHANGE UP (ref 1–3.3)
LYMPHOCYTES # BLD AUTO: 39.9 % — SIGNIFICANT CHANGE UP (ref 13–44)
MCHC RBC-ENTMCNC: 31.4 PG — SIGNIFICANT CHANGE UP (ref 27–34)
MCHC RBC-ENTMCNC: 35.3 G/DL — SIGNIFICANT CHANGE UP (ref 32–36)
MCV RBC AUTO: 88.9 FL — SIGNIFICANT CHANGE UP (ref 80–100)
MONOCYTES # BLD AUTO: 0.5 K/UL — SIGNIFICANT CHANGE UP (ref 0–0.9)
MONOCYTES NFR BLD AUTO: 13.1 % — SIGNIFICANT CHANGE UP (ref 2–14)
NEUTROPHILS # BLD AUTO: 1.7 K/UL — LOW (ref 1.8–7.4)
NEUTROPHILS NFR BLD AUTO: 44.7 % — SIGNIFICANT CHANGE UP (ref 43–77)
PLATELET # BLD AUTO: 193 K/UL — SIGNIFICANT CHANGE UP (ref 150–400)
RBC # BLD: 3.67 M/UL — LOW (ref 3.8–5.2)
RBC # FLD: 14.1 % — SIGNIFICANT CHANGE UP (ref 10.3–14.5)
WBC # BLD: 3.8 K/UL — SIGNIFICANT CHANGE UP (ref 3.8–10.5)
WBC # FLD AUTO: 3.8 K/UL — SIGNIFICANT CHANGE UP (ref 3.8–10.5)

## 2018-01-31 PROCEDURE — 99215 OFFICE O/P EST HI 40 MIN: CPT

## 2018-02-01 LAB
ALBUMIN SERPL ELPH-MCNC: 4.2 G/DL
ALP BLD-CCNC: 65 U/L
ALT SERPL-CCNC: 11 U/L
ANION GAP SERPL CALC-SCNC: 12 MMOL/L
AST SERPL-CCNC: 15 U/L
BILIRUB SERPL-MCNC: 0.2 MG/DL
BUN SERPL-MCNC: 16 MG/DL
CALCIUM SERPL-MCNC: 9.6 MG/DL
CANCER AG125 SERPL-ACNC: 13 U/ML
CHLORIDE SERPL-SCNC: 104 MMOL/L
CO2 SERPL-SCNC: 27 MMOL/L
CREAT SERPL-MCNC: 0.55 MG/DL
GLUCOSE SERPL-MCNC: 95 MG/DL
MAGNESIUM SERPL-MCNC: 2 MG/DL
POTASSIUM SERPL-SCNC: 4.2 MMOL/L
PROT SERPL-MCNC: 7.6 G/DL
SODIUM SERPL-SCNC: 143 MMOL/L

## 2018-02-05 ENCOUNTER — FORM ENCOUNTER (OUTPATIENT)
Age: 68
End: 2018-02-05

## 2018-02-06 ENCOUNTER — APPOINTMENT (OUTPATIENT)
Dept: CT IMAGING | Facility: IMAGING CENTER | Age: 68
End: 2018-02-06
Payer: MEDICARE

## 2018-02-06 ENCOUNTER — OUTPATIENT (OUTPATIENT)
Dept: OUTPATIENT SERVICES | Facility: HOSPITAL | Age: 68
LOS: 1 days | End: 2018-02-06
Payer: MEDICARE

## 2018-02-06 DIAGNOSIS — Z98.890 OTHER SPECIFIED POSTPROCEDURAL STATES: Chronic | ICD-10-CM

## 2018-02-06 DIAGNOSIS — R04.0 EPISTAXIS: ICD-10-CM

## 2018-02-06 DIAGNOSIS — Z90.49 ACQUIRED ABSENCE OF OTHER SPECIFIED PARTS OF DIGESTIVE TRACT: Chronic | ICD-10-CM

## 2018-02-06 DIAGNOSIS — Z90.710 ACQUIRED ABSENCE OF BOTH CERVIX AND UTERUS: Chronic | ICD-10-CM

## 2018-02-06 DIAGNOSIS — Z90.722 ACQUIRED ABSENCE OF OVARIES, BILATERAL: Chronic | ICD-10-CM

## 2018-02-06 PROCEDURE — 71260 CT THORAX DX C+: CPT | Mod: 26

## 2018-02-06 PROCEDURE — 71260 CT THORAX DX C+: CPT

## 2018-02-06 PROCEDURE — 74177 CT ABD & PELVIS W/CONTRAST: CPT

## 2018-02-06 PROCEDURE — 74177 CT ABD & PELVIS W/CONTRAST: CPT | Mod: 26

## 2018-02-12 ENCOUNTER — APPOINTMENT (OUTPATIENT)
Dept: INFUSION THERAPY | Facility: HOSPITAL | Age: 68
End: 2018-02-12

## 2018-02-12 ENCOUNTER — RESULT REVIEW (OUTPATIENT)
Age: 68
End: 2018-02-12

## 2018-02-12 ENCOUNTER — LABORATORY RESULT (OUTPATIENT)
Age: 68
End: 2018-02-12

## 2018-02-12 LAB
HCT VFR BLD CALC: 33.6 % — LOW (ref 34.5–45)
HGB BLD-MCNC: 12 G/DL — SIGNIFICANT CHANGE UP (ref 11.5–15.5)
MCHC RBC-ENTMCNC: 31.4 PG — SIGNIFICANT CHANGE UP (ref 27–34)
MCHC RBC-ENTMCNC: 35.6 G/DL — SIGNIFICANT CHANGE UP (ref 32–36)
MCV RBC AUTO: 88.2 FL — SIGNIFICANT CHANGE UP (ref 80–100)
PLATELET # BLD AUTO: 180 K/UL — SIGNIFICANT CHANGE UP (ref 150–400)
RBC # BLD: 3.81 M/UL — SIGNIFICANT CHANGE UP (ref 3.8–5.2)
RBC # FLD: 14.2 % — SIGNIFICANT CHANGE UP (ref 10.3–14.5)
WBC # BLD: 7.9 K/UL — SIGNIFICANT CHANGE UP (ref 3.8–10.5)
WBC # FLD AUTO: 7.9 K/UL — SIGNIFICANT CHANGE UP (ref 3.8–10.5)

## 2018-02-13 ENCOUNTER — APPOINTMENT (OUTPATIENT)
Dept: THORACIC SURGERY | Facility: CLINIC | Age: 68
End: 2018-02-13
Payer: MEDICARE

## 2018-02-13 VITALS
RESPIRATION RATE: 17 BRPM | DIASTOLIC BLOOD PRESSURE: 63 MMHG | HEART RATE: 82 BPM | BODY MASS INDEX: 21.92 KG/M2 | WEIGHT: 116 LBS | TEMPERATURE: 98 F | SYSTOLIC BLOOD PRESSURE: 131 MMHG | OXYGEN SATURATION: 95 %

## 2018-02-13 PROCEDURE — 99214 OFFICE O/P EST MOD 30 MIN: CPT

## 2018-02-15 ENCOUNTER — OUTPATIENT (OUTPATIENT)
Dept: OUTPATIENT SERVICES | Facility: HOSPITAL | Age: 68
LOS: 1 days | Discharge: HOME | End: 2018-02-15

## 2018-02-15 DIAGNOSIS — Z90.49 ACQUIRED ABSENCE OF OTHER SPECIFIED PARTS OF DIGESTIVE TRACT: Chronic | ICD-10-CM

## 2018-02-15 DIAGNOSIS — C56.2 MALIGNANT NEOPLASM OF LEFT OVARY: ICD-10-CM

## 2018-02-15 DIAGNOSIS — Z90.710 ACQUIRED ABSENCE OF BOTH CERVIX AND UTERUS: Chronic | ICD-10-CM

## 2018-02-15 DIAGNOSIS — Z98.890 OTHER SPECIFIED POSTPROCEDURAL STATES: Chronic | ICD-10-CM

## 2018-02-15 DIAGNOSIS — Z90.722 ACQUIRED ABSENCE OF OVARIES, BILATERAL: Chronic | ICD-10-CM

## 2018-02-21 ENCOUNTER — APPOINTMENT (OUTPATIENT)
Dept: HEMATOLOGY ONCOLOGY | Facility: CLINIC | Age: 68
End: 2018-02-21

## 2018-02-28 ENCOUNTER — APPOINTMENT (OUTPATIENT)
Dept: HEMATOLOGY ONCOLOGY | Facility: CLINIC | Age: 68
End: 2018-02-28
Payer: MEDICARE

## 2018-02-28 ENCOUNTER — RESULT REVIEW (OUTPATIENT)
Age: 68
End: 2018-02-28

## 2018-02-28 VITALS
OXYGEN SATURATION: 95 % | RESPIRATION RATE: 17 BRPM | SYSTOLIC BLOOD PRESSURE: 128 MMHG | BODY MASS INDEX: 21.87 KG/M2 | TEMPERATURE: 98.5 F | HEART RATE: 79 BPM | WEIGHT: 115.74 LBS | DIASTOLIC BLOOD PRESSURE: 76 MMHG

## 2018-02-28 DIAGNOSIS — Z86.39 PERSONAL HISTORY OF OTHER ENDOCRINE, NUTRITIONAL AND METABOLIC DISEASE: ICD-10-CM

## 2018-02-28 LAB
BASOPHILS # BLD AUTO: 0 K/UL — SIGNIFICANT CHANGE UP (ref 0–0.2)
EOSINOPHIL # BLD AUTO: 0.1 K/UL — SIGNIFICANT CHANGE UP (ref 0–0.5)
EOSINOPHIL NFR BLD AUTO: 1 % — SIGNIFICANT CHANGE UP (ref 0–6)
HCT VFR BLD CALC: 31.5 % — LOW (ref 34.5–45)
HGB BLD-MCNC: 11.2 G/DL — LOW (ref 11.5–15.5)
LYMPHOCYTES # BLD AUTO: 1.6 K/UL — SIGNIFICANT CHANGE UP (ref 1–3.3)
LYMPHOCYTES # BLD AUTO: 48 % — HIGH (ref 13–44)
MCHC RBC-ENTMCNC: 32.7 PG — SIGNIFICANT CHANGE UP (ref 27–34)
MCHC RBC-ENTMCNC: 35.6 G/DL — SIGNIFICANT CHANGE UP (ref 32–36)
MCV RBC AUTO: 91.9 FL — SIGNIFICANT CHANGE UP (ref 80–100)
MONOCYTES # BLD AUTO: 0.7 K/UL — SIGNIFICANT CHANGE UP (ref 0–0.9)
MONOCYTES NFR BLD AUTO: 11 % — SIGNIFICANT CHANGE UP (ref 2–14)
NEUTROPHILS # BLD AUTO: 1.4 K/UL — LOW (ref 1.8–7.4)
NEUTROPHILS NFR BLD AUTO: 40 % — LOW (ref 43–77)
PLAT MORPH BLD: NORMAL — SIGNIFICANT CHANGE UP
PLATELET # BLD AUTO: 189 K/UL — SIGNIFICANT CHANGE UP (ref 150–400)
RBC # BLD: 3.43 M/UL — LOW (ref 3.8–5.2)
RBC # FLD: 13.4 % — SIGNIFICANT CHANGE UP (ref 10.3–14.5)
RBC BLD AUTO: SIGNIFICANT CHANGE UP
WBC # BLD: 3.7 K/UL — LOW (ref 3.8–10.5)
WBC # FLD AUTO: 3.7 K/UL — LOW (ref 3.8–10.5)

## 2018-02-28 PROCEDURE — 99214 OFFICE O/P EST MOD 30 MIN: CPT

## 2018-03-01 LAB
ALBUMIN SERPL ELPH-MCNC: 4 G/DL
ALP BLD-CCNC: 56 U/L
ALT SERPL-CCNC: 11 U/L
ANION GAP SERPL CALC-SCNC: 13 MMOL/L
AST SERPL-CCNC: 14 U/L
BILIRUB SERPL-MCNC: 0.2 MG/DL
BUN SERPL-MCNC: 18 MG/DL
CALCIUM SERPL-MCNC: 9.4 MG/DL
CANCER AG125 SERPL-ACNC: 14 U/ML
CHLORIDE SERPL-SCNC: 104 MMOL/L
CO2 SERPL-SCNC: 24 MMOL/L
CREAT SERPL-MCNC: 0.58 MG/DL
GLUCOSE SERPL-MCNC: 149 MG/DL
MAGNESIUM SERPL-MCNC: 2.1 MG/DL
POTASSIUM SERPL-SCNC: 4.2 MMOL/L
PROT SERPL-MCNC: 7.4 G/DL
SODIUM SERPL-SCNC: 141 MMOL/L

## 2018-03-05 ENCOUNTER — APPOINTMENT (OUTPATIENT)
Dept: INFUSION THERAPY | Facility: HOSPITAL | Age: 68
End: 2018-03-05

## 2018-03-05 ENCOUNTER — RESULT REVIEW (OUTPATIENT)
Age: 68
End: 2018-03-05

## 2018-03-05 ENCOUNTER — LABORATORY RESULT (OUTPATIENT)
Age: 68
End: 2018-03-05

## 2018-03-05 LAB
HCT VFR BLD CALC: 33.1 % — LOW (ref 34.5–45)
HGB BLD-MCNC: 11.9 G/DL — SIGNIFICANT CHANGE UP (ref 11.5–15.5)
MCHC RBC-ENTMCNC: 32 PG — SIGNIFICANT CHANGE UP (ref 27–34)
MCHC RBC-ENTMCNC: 35.8 G/DL — SIGNIFICANT CHANGE UP (ref 32–36)
MCV RBC AUTO: 89.3 FL — SIGNIFICANT CHANGE UP (ref 80–100)
PLATELET # BLD AUTO: 191 K/UL — SIGNIFICANT CHANGE UP (ref 150–400)
RBC # BLD: 3.7 M/UL — LOW (ref 3.8–5.2)
RBC # FLD: 13 % — SIGNIFICANT CHANGE UP (ref 10.3–14.5)
WBC # BLD: 6.2 K/UL — SIGNIFICANT CHANGE UP (ref 3.8–10.5)
WBC # FLD AUTO: 6.2 K/UL — SIGNIFICANT CHANGE UP (ref 3.8–10.5)

## 2018-03-06 DIAGNOSIS — R11.2 NAUSEA WITH VOMITING, UNSPECIFIED: ICD-10-CM

## 2018-03-06 DIAGNOSIS — Z51.11 ENCOUNTER FOR ANTINEOPLASTIC CHEMOTHERAPY: ICD-10-CM

## 2018-03-22 ENCOUNTER — OUTPATIENT (OUTPATIENT)
Dept: OUTPATIENT SERVICES | Facility: HOSPITAL | Age: 68
LOS: 1 days | Discharge: HOME | End: 2018-03-22

## 2018-03-22 DIAGNOSIS — Z90.49 ACQUIRED ABSENCE OF OTHER SPECIFIED PARTS OF DIGESTIVE TRACT: Chronic | ICD-10-CM

## 2018-03-22 DIAGNOSIS — Z90.710 ACQUIRED ABSENCE OF BOTH CERVIX AND UTERUS: Chronic | ICD-10-CM

## 2018-03-22 DIAGNOSIS — Z90.722 ACQUIRED ABSENCE OF OVARIES, BILATERAL: Chronic | ICD-10-CM

## 2018-03-22 DIAGNOSIS — C56.2 MALIGNANT NEOPLASM OF LEFT OVARY: ICD-10-CM

## 2018-03-22 DIAGNOSIS — Z98.890 OTHER SPECIFIED POSTPROCEDURAL STATES: Chronic | ICD-10-CM

## 2018-03-26 ENCOUNTER — RESULT REVIEW (OUTPATIENT)
Age: 68
End: 2018-03-26

## 2018-03-26 ENCOUNTER — LABORATORY RESULT (OUTPATIENT)
Age: 68
End: 2018-03-26

## 2018-03-26 ENCOUNTER — APPOINTMENT (OUTPATIENT)
Dept: INFUSION THERAPY | Facility: HOSPITAL | Age: 68
End: 2018-03-26

## 2018-03-26 LAB
HCT VFR BLD CALC: 35.4 % — SIGNIFICANT CHANGE UP (ref 34.5–45)
HGB BLD-MCNC: 12.5 G/DL — SIGNIFICANT CHANGE UP (ref 11.5–15.5)
MCHC RBC-ENTMCNC: 32.7 PG — SIGNIFICANT CHANGE UP (ref 27–34)
MCHC RBC-ENTMCNC: 35.4 G/DL — SIGNIFICANT CHANGE UP (ref 32–36)
MCV RBC AUTO: 92.3 FL — SIGNIFICANT CHANGE UP (ref 80–100)
PLATELET # BLD AUTO: 198 K/UL — SIGNIFICANT CHANGE UP (ref 150–400)
RBC # BLD: 3.84 M/UL — SIGNIFICANT CHANGE UP (ref 3.8–5.2)
RBC # FLD: 12.8 % — SIGNIFICANT CHANGE UP (ref 10.3–14.5)
WBC # BLD: 5.5 K/UL — SIGNIFICANT CHANGE UP (ref 3.8–10.5)
WBC # FLD AUTO: 5.5 K/UL — SIGNIFICANT CHANGE UP (ref 3.8–10.5)

## 2018-03-27 DIAGNOSIS — Z51.11 ENCOUNTER FOR ANTINEOPLASTIC CHEMOTHERAPY: ICD-10-CM

## 2018-04-16 ENCOUNTER — LABORATORY RESULT (OUTPATIENT)
Age: 68
End: 2018-04-16

## 2018-04-16 ENCOUNTER — APPOINTMENT (OUTPATIENT)
Dept: HEMATOLOGY ONCOLOGY | Facility: CLINIC | Age: 68
End: 2018-04-16
Payer: MEDICARE

## 2018-04-16 ENCOUNTER — APPOINTMENT (OUTPATIENT)
Dept: INFUSION THERAPY | Facility: HOSPITAL | Age: 68
End: 2018-04-16

## 2018-04-16 ENCOUNTER — RESULT REVIEW (OUTPATIENT)
Age: 68
End: 2018-04-16

## 2018-04-16 VITALS
BODY MASS INDEX: 22.91 KG/M2 | OXYGEN SATURATION: 96 % | HEART RATE: 70 BPM | DIASTOLIC BLOOD PRESSURE: 88 MMHG | TEMPERATURE: 97.9 F | RESPIRATION RATE: 17 BRPM | SYSTOLIC BLOOD PRESSURE: 146 MMHG | WEIGHT: 121.23 LBS

## 2018-04-16 LAB
HCT VFR BLD CALC: 36.2 % — SIGNIFICANT CHANGE UP (ref 34.5–45)
HGB BLD-MCNC: 12.9 G/DL — SIGNIFICANT CHANGE UP (ref 11.5–15.5)
MCHC RBC-ENTMCNC: 32.6 PG — SIGNIFICANT CHANGE UP (ref 27–34)
MCHC RBC-ENTMCNC: 35.6 G/DL — SIGNIFICANT CHANGE UP (ref 32–36)
MCV RBC AUTO: 91.6 FL — SIGNIFICANT CHANGE UP (ref 80–100)
PLATELET # BLD AUTO: 181 K/UL — SIGNIFICANT CHANGE UP (ref 150–400)
RBC # BLD: 3.95 M/UL — SIGNIFICANT CHANGE UP (ref 3.8–5.2)
RBC # FLD: 11.8 % — SIGNIFICANT CHANGE UP (ref 10.3–14.5)
WBC # BLD: 5.6 K/UL — SIGNIFICANT CHANGE UP (ref 3.8–10.5)
WBC # FLD AUTO: 5.6 K/UL — SIGNIFICANT CHANGE UP (ref 3.8–10.5)

## 2018-04-16 PROCEDURE — 99214 OFFICE O/P EST MOD 30 MIN: CPT

## 2018-04-16 RX ORDER — METRONIDAZOLE 500 MG/1
500 TABLET ORAL
Qty: 14 | Refills: 0 | Status: DISCONTINUED | COMMUNITY
Start: 2018-03-09

## 2018-04-16 RX ORDER — IBUPROFEN 400 MG/1
400 TABLET, FILM COATED ORAL
Qty: 30 | Refills: 0 | Status: DISCONTINUED | COMMUNITY
Start: 2018-03-09

## 2018-05-08 ENCOUNTER — FORM ENCOUNTER (OUTPATIENT)
Age: 68
End: 2018-05-08

## 2018-05-09 ENCOUNTER — APPOINTMENT (OUTPATIENT)
Dept: CT IMAGING | Facility: IMAGING CENTER | Age: 68
End: 2018-05-09
Payer: MEDICARE

## 2018-05-09 ENCOUNTER — OUTPATIENT (OUTPATIENT)
Dept: OUTPATIENT SERVICES | Facility: HOSPITAL | Age: 68
LOS: 1 days | End: 2018-05-09
Payer: MEDICARE

## 2018-05-09 DIAGNOSIS — J39.8 OTHER SPECIFIED DISEASES OF UPPER RESPIRATORY TRACT: ICD-10-CM

## 2018-05-09 DIAGNOSIS — Z90.722 ACQUIRED ABSENCE OF OVARIES, BILATERAL: Chronic | ICD-10-CM

## 2018-05-09 DIAGNOSIS — Z90.710 ACQUIRED ABSENCE OF BOTH CERVIX AND UTERUS: Chronic | ICD-10-CM

## 2018-05-09 DIAGNOSIS — Z98.890 OTHER SPECIFIED POSTPROCEDURAL STATES: Chronic | ICD-10-CM

## 2018-05-09 DIAGNOSIS — Z90.49 ACQUIRED ABSENCE OF OTHER SPECIFIED PARTS OF DIGESTIVE TRACT: Chronic | ICD-10-CM

## 2018-05-09 PROCEDURE — 74177 CT ABD & PELVIS W/CONTRAST: CPT

## 2018-05-09 PROCEDURE — 71260 CT THORAX DX C+: CPT | Mod: 26

## 2018-05-09 PROCEDURE — 74177 CT ABD & PELVIS W/CONTRAST: CPT | Mod: 26

## 2018-05-09 PROCEDURE — 71260 CT THORAX DX C+: CPT

## 2018-05-17 ENCOUNTER — APPOINTMENT (OUTPATIENT)
Dept: GYNECOLOGIC ONCOLOGY | Facility: CLINIC | Age: 68
End: 2018-05-17
Payer: MEDICARE

## 2018-05-17 VITALS
BODY MASS INDEX: 22.28 KG/M2 | DIASTOLIC BLOOD PRESSURE: 79 MMHG | HEART RATE: 67 BPM | SYSTOLIC BLOOD PRESSURE: 117 MMHG | WEIGHT: 118 LBS | HEIGHT: 61 IN

## 2018-05-17 PROCEDURE — 99214 OFFICE O/P EST MOD 30 MIN: CPT

## 2018-05-21 NOTE — PROGRESS NOTE ADULT - SUBJECTIVE AND OBJECTIVE BOX
Consent: The risks of atrophy were reviewed with the patient. DX: LAR during AVEL tumor debulking for ovarian cancer  POD#: 6          SUBJECTIVE    NAEO. This am pt endorses flatus, BM. Tolerating diet. Has been OOB. Pain well controlled.    10-point review of systems completed and negative except as noted above.    sodium chloride 0.9% lock flush 3 milliLiter(s) IV Push every 8 hours  naloxone Injectable 0.1 milliGRAM(s) IV Push every 3 minutes PRN  ondansetron Injectable 4 milliGRAM(s) IV Push every 6 hours PRN  influenza   Vaccine 0.5 milliLiter(s) IntraMuscular once  benzocaine 15 mG/menthol 3.6 mG Lozenge 1 Lozenge Oral five times a day PRN  oxyCODONE    IR 5 milliGRAM(s) Oral every 3 hours PRN  enoxaparin Injectable 40 milliGRAM(s) SubCutaneous daily  insulin lispro (HumaLOG) corrective regimen sliding scale   SubCutaneous four times a day before meals  acetaminophen   Tablet. 650 milliGRAM(s) Oral every 6 hours PRN  sodium chloride 0.9%. 1000 milliLiter(s) IV Continuous <Continuous>  ATENolol  Tablet 25 milliGRAM(s) Oral daily  potassium acid phosphate/sodium acid phosphate tablet (K-PHOS No. 2) 1 Tablet(s) Oral four times a day with meals            OBJECTIVE    T(C): 36.6 (17 @ 14:21), Max: 36.9 (17 @ 23:46)  HR: 74 (17 @ 14:21) (71 - 83)  BP: 145/87 (17 @ 14:21) (143/78 - 149/89)  RR: 17 (17 @ 14:21) (16 - 19)  SpO2: 99% (17 @ 14:21) (98% - 100%)    17 @ 07:01  -  17 @ 07:00  --------------------------------------------------------  IN: 2370 mL / OUT: 3280 mL / NET: -910 mL    09-21-17 @ 07:01  -  -17 @ 15:38  --------------------------------------------------------  IN: 0 mL / OUT: 765 mL / NET: -765 mL        EXAM  General: NAD  CV: NR, RR, S1, S2, no M/R/G  Lungs: CTAB  Abdomen: Soft, appropriate post-op tenderness, non-distended, faint BS  Incision: vertical midline CDI, JOSÉ drains - serosanguinous fluid  Ext: No pain or swelling    LABS                        9.7    13.73 )-----------( 330      ( 21 Sep 2017 04:36 )             28.1         136  |  103  |  5<L>  ----------------------------<  123<H>  3.5   |  15<L>  |  0.33<L>    Ca    8.4      21 Sep 2017 04:36  Phos  1.8       Mg     2.0             Urinalysis Basic - ( 20 Sep 2017 10:50 )    Color: PLYEL / Appearance: CLEAR / S.011 / pH: 6.0  Gluc: 300 / Ketone: LARGE  / Bili: NEGATIVE / Urobili: NORMAL E.U.   Blood: NEGATIVE / Protein: 10 / Nitrite: NEGATIVE   Leuk Esterase: NEGATIVE / RBC: 2-5 / WBC 0-2   Sq Epi: x / Non Sq Epi: x / Bacteria: FEW Lot # (Optional): [id number] Total Volume (Ccs): 1 Ndc# Optional): 8397-9837-80 Expiration Date (Optional): 05/2019 Detail Level: Zone Concentration (Mg/Ml): 40.0 Kenalog Preparation: kenalog

## 2018-06-17 DIAGNOSIS — Z11.59 ENCOUNTER FOR SCREENING FOR OTHER VIRAL DISEASES: ICD-10-CM

## 2018-06-19 ENCOUNTER — OUTPATIENT (OUTPATIENT)
Dept: OUTPATIENT SERVICES | Facility: HOSPITAL | Age: 68
LOS: 1 days | Discharge: HOME | End: 2018-06-19

## 2018-06-19 DIAGNOSIS — Z90.710 ACQUIRED ABSENCE OF BOTH CERVIX AND UTERUS: Chronic | ICD-10-CM

## 2018-06-19 DIAGNOSIS — Z90.722 ACQUIRED ABSENCE OF OVARIES, BILATERAL: Chronic | ICD-10-CM

## 2018-06-19 DIAGNOSIS — Z98.890 OTHER SPECIFIED POSTPROCEDURAL STATES: Chronic | ICD-10-CM

## 2018-06-19 DIAGNOSIS — C56.2 MALIGNANT NEOPLASM OF LEFT OVARY: ICD-10-CM

## 2018-06-19 DIAGNOSIS — Z90.49 ACQUIRED ABSENCE OF OTHER SPECIFIED PARTS OF DIGESTIVE TRACT: Chronic | ICD-10-CM

## 2018-06-20 ENCOUNTER — APPOINTMENT (OUTPATIENT)
Dept: HEMATOLOGY ONCOLOGY | Facility: CLINIC | Age: 68
End: 2018-06-20

## 2018-07-16 PROBLEM — R91.8 OTHER NONSPECIFIC ABNORMAL FINDING OF LUNG FIELD: Chronic | Status: INACTIVE | Noted: 2017-07-03 | Resolved: 2017-08-29

## 2018-09-04 ENCOUNTER — APPOINTMENT (OUTPATIENT)
Dept: THORACIC SURGERY | Facility: CLINIC | Age: 68
End: 2018-09-04
Payer: MEDICARE

## 2018-09-04 VITALS
HEART RATE: 66 BPM | BODY MASS INDEX: 23.05 KG/M2 | SYSTOLIC BLOOD PRESSURE: 117 MMHG | RESPIRATION RATE: 17 BRPM | WEIGHT: 122 LBS | OXYGEN SATURATION: 96 % | DIASTOLIC BLOOD PRESSURE: 66 MMHG | TEMPERATURE: 98 F

## 2018-09-04 DIAGNOSIS — K08.89 OTHER SPECIFIED DISORDERS OF TEETH AND SUPPORTING STRUCTURES: ICD-10-CM

## 2018-09-04 DIAGNOSIS — Z87.898 PERSONAL HISTORY OF OTHER SPECIFIED CONDITIONS: ICD-10-CM

## 2018-09-04 DIAGNOSIS — L29.9 PRURITUS, UNSPECIFIED: ICD-10-CM

## 2018-09-04 PROBLEM — C56.9 MALIGNANT NEOPLASM OF UNSPECIFIED OVARY: Chronic | Status: ACTIVE | Noted: 2017-08-29

## 2018-09-04 PROBLEM — C80.1 MALIGNANT (PRIMARY) NEOPLASM, UNSPECIFIED: Chronic | Status: ACTIVE | Noted: 2017-08-04

## 2018-09-04 PROBLEM — N83.9 NONINFLAMMATORY DISORDER OF OVARY, FALLOPIAN TUBE AND BROAD LIGAMENT, UNSPECIFIED: Chronic | Status: ACTIVE | Noted: 2017-07-19

## 2018-09-04 PROBLEM — I10 ESSENTIAL (PRIMARY) HYPERTENSION: Chronic | Status: ACTIVE | Noted: 2017-07-03

## 2018-09-04 PROBLEM — K21.9 GASTRO-ESOPHAGEAL REFLUX DISEASE WITHOUT ESOPHAGITIS: Chronic | Status: ACTIVE | Noted: 2017-07-03

## 2018-09-04 PROCEDURE — 99213 OFFICE O/P EST LOW 20 MIN: CPT

## 2018-09-04 RX ORDER — HYDROCORTISONE 1 %
12 CREAM (GRAM) TOPICAL
Qty: 400 | Refills: 0 | Status: DISCONTINUED | COMMUNITY
Start: 2018-04-17 | End: 2018-09-04

## 2018-09-04 RX ORDER — POTASSIUM CHLORIDE 1500 MG/1
20 TABLET, EXTENDED RELEASE ORAL
Qty: 20 | Refills: 0 | Status: DISCONTINUED | COMMUNITY
Start: 2017-09-22 | End: 2018-09-04

## 2018-09-04 RX ORDER — ALENDRONATE SODIUM 35 MG/1
35 TABLET ORAL
Qty: 4 | Refills: 0 | Status: DISCONTINUED | COMMUNITY
Start: 2017-06-21 | End: 2018-09-04

## 2018-09-04 RX ORDER — CLOBETASOL PROPIONATE 0.5 MG/ML
0.05 SOLUTION TOPICAL DAILY
Qty: 50 | Refills: 1 | Status: DISCONTINUED | COMMUNITY
Start: 2017-11-29 | End: 2018-09-04

## 2018-09-04 RX ORDER — CHLORHEXIDINE GLUCONATE, 0.12% ORAL RINSE 1.2 MG/ML
0.12 SOLUTION DENTAL
Qty: 240 | Refills: 2 | Status: DISCONTINUED | COMMUNITY
Start: 2018-01-19 | End: 2018-09-04

## 2018-09-04 RX ORDER — METOCLOPRAMIDE 10 MG/1
10 TABLET ORAL EVERY 6 HOURS
Qty: 30 | Refills: 2 | Status: DISCONTINUED | COMMUNITY
Start: 2017-10-24 | End: 2018-09-04

## 2018-09-04 RX ORDER — SULFAMETHOXAZOLE AND TRIMETHOPRIM 800; 160 MG/1; MG/1
800-160 TABLET ORAL TWICE DAILY
Qty: 14 | Refills: 0 | Status: DISCONTINUED | COMMUNITY
Start: 2018-01-31 | End: 2018-09-04

## 2018-10-25 ENCOUNTER — APPOINTMENT (OUTPATIENT)
Dept: GYNECOLOGIC ONCOLOGY | Facility: CLINIC | Age: 68
End: 2018-10-25
Payer: MEDICARE

## 2018-10-25 VITALS
HEART RATE: 76 BPM | HEIGHT: 61 IN | DIASTOLIC BLOOD PRESSURE: 79 MMHG | WEIGHT: 124 LBS | SYSTOLIC BLOOD PRESSURE: 120 MMHG | BODY MASS INDEX: 23.41 KG/M2

## 2018-10-25 PROCEDURE — 99215 OFFICE O/P EST HI 40 MIN: CPT

## 2018-10-26 LAB — CANCER AG125 SERPL-ACNC: 12 U/ML

## 2018-10-30 ENCOUNTER — APPOINTMENT (OUTPATIENT)
Dept: CT IMAGING | Facility: IMAGING CENTER | Age: 68
End: 2018-10-30
Payer: MEDICARE

## 2018-10-30 ENCOUNTER — OUTPATIENT (OUTPATIENT)
Dept: OUTPATIENT SERVICES | Facility: HOSPITAL | Age: 68
LOS: 1 days | End: 2018-10-30
Payer: MEDICARE

## 2018-10-30 DIAGNOSIS — Z90.49 ACQUIRED ABSENCE OF OTHER SPECIFIED PARTS OF DIGESTIVE TRACT: Chronic | ICD-10-CM

## 2018-10-30 DIAGNOSIS — Z98.890 OTHER SPECIFIED POSTPROCEDURAL STATES: Chronic | ICD-10-CM

## 2018-10-30 DIAGNOSIS — Z90.710 ACQUIRED ABSENCE OF BOTH CERVIX AND UTERUS: Chronic | ICD-10-CM

## 2018-10-30 DIAGNOSIS — Z90.722 ACQUIRED ABSENCE OF OVARIES, BILATERAL: Chronic | ICD-10-CM

## 2018-10-30 DIAGNOSIS — C56.2 MALIGNANT NEOPLASM OF LEFT OVARY: ICD-10-CM

## 2018-10-30 PROCEDURE — 74177 CT ABD & PELVIS W/CONTRAST: CPT | Mod: 26

## 2018-10-30 PROCEDURE — 74177 CT ABD & PELVIS W/CONTRAST: CPT

## 2018-10-30 PROCEDURE — 82565 ASSAY OF CREATININE: CPT

## 2018-11-26 ENCOUNTER — APPOINTMENT (OUTPATIENT)
Dept: GYNECOLOGIC ONCOLOGY | Facility: CLINIC | Age: 68
End: 2018-11-26
Payer: MEDICARE

## 2018-12-16 ENCOUNTER — OUTPATIENT (OUTPATIENT)
Dept: OUTPATIENT SERVICES | Facility: HOSPITAL | Age: 68
LOS: 1 days | Discharge: HOME | End: 2018-12-16

## 2018-12-16 DIAGNOSIS — Z90.710 ACQUIRED ABSENCE OF BOTH CERVIX AND UTERUS: Chronic | ICD-10-CM

## 2018-12-16 DIAGNOSIS — Z98.890 OTHER SPECIFIED POSTPROCEDURAL STATES: Chronic | ICD-10-CM

## 2018-12-16 DIAGNOSIS — Z90.722 ACQUIRED ABSENCE OF OVARIES, BILATERAL: Chronic | ICD-10-CM

## 2018-12-16 DIAGNOSIS — Z90.49 ACQUIRED ABSENCE OF OTHER SPECIFIED PARTS OF DIGESTIVE TRACT: Chronic | ICD-10-CM

## 2018-12-16 DIAGNOSIS — C56.2 MALIGNANT NEOPLASM OF LEFT OVARY: ICD-10-CM

## 2018-12-26 ENCOUNTER — RESULT REVIEW (OUTPATIENT)
Age: 68
End: 2018-12-26

## 2018-12-26 ENCOUNTER — APPOINTMENT (OUTPATIENT)
Dept: HEMATOLOGY ONCOLOGY | Facility: CLINIC | Age: 68
End: 2018-12-26
Payer: MEDICARE

## 2018-12-26 VITALS
TEMPERATURE: 99.2 F | OXYGEN SATURATION: 100 % | WEIGHT: 125.44 LBS | SYSTOLIC BLOOD PRESSURE: 115 MMHG | BODY MASS INDEX: 23.7 KG/M2 | HEART RATE: 71 BPM | RESPIRATION RATE: 16 BRPM | DIASTOLIC BLOOD PRESSURE: 74 MMHG

## 2018-12-26 LAB
ALBUMIN SERPL ELPH-MCNC: 4.5 G/DL
ALP BLD-CCNC: 65 U/L
ALT SERPL-CCNC: 16 U/L
ANION GAP SERPL CALC-SCNC: 14 MMOL/L
AST SERPL-CCNC: 18 U/L
BILIRUB SERPL-MCNC: 0.6 MG/DL
BUN SERPL-MCNC: 15 MG/DL
CALCIUM SERPL-MCNC: 9.5 MG/DL
CANCER AG125 SERPL-ACNC: 14 U/ML
CHLORIDE SERPL-SCNC: 102 MMOL/L
CO2 SERPL-SCNC: 25 MMOL/L
CREAT SERPL-MCNC: 0.66 MG/DL
GLUCOSE SERPL-MCNC: 101 MG/DL
HCT VFR BLD CALC: 38 % — SIGNIFICANT CHANGE UP (ref 34.5–45)
HGB BLD-MCNC: 13.3 G/DL — SIGNIFICANT CHANGE UP (ref 11.5–15.5)
MCHC RBC-ENTMCNC: 31.1 PG — SIGNIFICANT CHANGE UP (ref 27–34)
MCHC RBC-ENTMCNC: 35 G/DL — SIGNIFICANT CHANGE UP (ref 32–36)
MCV RBC AUTO: 89 FL — SIGNIFICANT CHANGE UP (ref 80–100)
PLATELET # BLD AUTO: 226 K/UL — SIGNIFICANT CHANGE UP (ref 150–400)
POTASSIUM SERPL-SCNC: 4.2 MMOL/L
PROT SERPL-MCNC: 7.6 G/DL
RBC # BLD: 4.27 M/UL — SIGNIFICANT CHANGE UP (ref 3.8–5.2)
RBC # FLD: 11.1 % — SIGNIFICANT CHANGE UP (ref 10.3–14.5)
SODIUM SERPL-SCNC: 141 MMOL/L
WBC # BLD: 8.9 K/UL — SIGNIFICANT CHANGE UP (ref 3.8–10.5)
WBC # FLD AUTO: 8.9 K/UL — SIGNIFICANT CHANGE UP (ref 3.8–10.5)

## 2018-12-26 PROCEDURE — 99214 OFFICE O/P EST MOD 30 MIN: CPT

## 2018-12-26 RX ORDER — FLUTICASONE PROPIONATE 50 UG/1
50 SPRAY, METERED NASAL
Qty: 16 | Refills: 0 | Status: DISCONTINUED | COMMUNITY
Start: 2018-09-06

## 2018-12-26 NOTE — HISTORY OF PRESENT ILLNESS
[Disease: _____________________] : Disease: [unfilled] [T: ___] : T[unfilled] [N: ___] : N[unfilled] [M: ___] : M[unfilled] [AJCC Stage: ____] : AJCC Stage: [unfilled] [de-identified] : Age 67: Stage I NSCLC adenocarcinoma s/p left VATs robotic assisted FERMÍN lobectomy and MLND 8/16/17 with Dr Ken Sol\par Age 67: Stage IV poorly differentiated ovarian cancer s/p AVEL/ BSO/ total omentectomy, bilateral ureterolysis, resection of abdominal anterior wall masses, repair of cystostomy\par She initially presented with hemoptysis and had evaluation consisting of bronchoscopy and FNA. The bronchoscopy was negative and the FNA was positive for malignant cells: TTF1+ and PAX 8 negative. She had Pet/ CT on 7/6/17 which showed 2.3 cm left upper lobe nodule with SUV of 23 and 4.2 x 3.1 cm left ovarian lesion SUV of 7.9 and left common iliac lymph nodes measuring up to 8mm with SUV of 2.2, 6 mm perihepatic implant SUV of 3.1. She initially had laparoscopic evaluation with left ovary biopsy which showed poorly differentiated carcinoma that was ER, WT1, PAX 8 positive and TTF1 negative. She initially had the left VATs. Then she subsequently had exploratory laparotomy with  AVEL, BSO, radical dissection of tumor with total omentectomy, resection of anterior abdominal wall masses, lysis of adhesions, and repair of cystostomy. She had CT imaging done after 5th cycle of chemotherapy to evaluate abdominal pain and CT showed no evidence of disease. \par  [de-identified] : poorly differentiated carcinoma: ER positive  [de-identified] : carbo/ taxol: 10/24/17 to 2/12/18 with cumulative fatigue and neuropathy\par bevacizumab added on 12/8/17 to 4/2018 [de-identified] : She did not continue with maintenance bevacizumab and her family member is wondering if she should resume now. She had CT imaging chest in August and Abd/pelvis in October which did not show any disease. Over the last 2 months, she has noticed left groin pain that comes and goes. No precipitating factors. She denies any blood in the urine or diarrhea. She has good energy. Baseline numbness of the fingers/ toes since chemotherapy but no pain and able to do all activities.

## 2018-12-26 NOTE — ASSESSMENT
[FreeTextEntry1] : She is a 68 y/o F with Stage IV ovarian cancer and Stage I NSCLC diagnosed simultaneously. She has completed 6 cycles of carboplatin/ paclitaxel with bevacizumab and did not continue with bevacizumab maintenance: off since 4/2018. We reviewed her CT of the chest/ abd/ pelvis and her last  in October which was WNL. We explained given her negative imaging and off maintenance for 9 months, we would not resume unless there was evidence of recurrence which does happen in the majority of patients. We reviewed her left groin pain and will obtain  today. We will obtain repeat CT to evaluate her pain for recurrence. Questions answered to their satisfaction. If CT negative, next follow up in March 2019.

## 2018-12-26 NOTE — PHYSICAL EXAM
[Fully active, able to carry on all pre-disease performance without restriction] : Status 0 - Fully active, able to carry on all pre-disease performance without restriction [Thin] : thin [Normal] : affect appropriate [de-identified] : + BS, no tenderness on palpation

## 2018-12-26 NOTE — REVIEW OF SYSTEMS
[Abdominal Pain] : abdominal pain [Vomiting] : no vomiting [Constipation] : no constipation [Diarrhea] : no diarrhea [Confused] : no confusion [Dizziness] : no dizziness [Fainting] : no fainting [Difficulty Walking] : no difficulty walking [Negative] : Allergic/Immunologic [FreeTextEntry7] : left groin pain  [de-identified] : numbness of the fingers

## 2018-12-26 NOTE — CONSULT LETTER
[Dear  ___] : Dear  [unfilled], [Courtesy Letter:] : I had the pleasure of seeing your patient, [unfilled], in my office today. [Please see my note below.] : Please see my note below. [Consult Closing:] : Thank you very much for allowing me to participate in the care of this patient.  If you have any questions, please do not hesitate to contact me. [Sincerely,] : Sincerely, [FreeTextEntry2] : Romy Sanchez MD\par [address] [facility]\[place] [address] [FreeTextEntry3] : Geo Cuevas MD\par Attending\par [facility]\par  [DrLewis  ___] : Dr. JOHNSON

## 2018-12-26 NOTE — REASON FOR VISIT
[Follow-Up Visit] : a follow-up [Family Member] : family member [FreeTextEntry2] : follow up since last evaluation in April

## 2019-01-08 ENCOUNTER — FORM ENCOUNTER (OUTPATIENT)
Age: 69
End: 2019-01-08

## 2019-01-09 ENCOUNTER — OUTPATIENT (OUTPATIENT)
Dept: OUTPATIENT SERVICES | Facility: HOSPITAL | Age: 69
LOS: 1 days | End: 2019-01-09
Payer: MEDICARE

## 2019-01-09 ENCOUNTER — APPOINTMENT (OUTPATIENT)
Dept: CT IMAGING | Facility: IMAGING CENTER | Age: 69
End: 2019-01-09
Payer: MEDICARE

## 2019-01-09 DIAGNOSIS — C76.2 MALIGNANT NEOPLASM OF ABDOMEN: ICD-10-CM

## 2019-01-09 DIAGNOSIS — Z90.722 ACQUIRED ABSENCE OF OVARIES, BILATERAL: Chronic | ICD-10-CM

## 2019-01-09 DIAGNOSIS — Z90.49 ACQUIRED ABSENCE OF OTHER SPECIFIED PARTS OF DIGESTIVE TRACT: Chronic | ICD-10-CM

## 2019-01-09 DIAGNOSIS — Z98.890 OTHER SPECIFIED POSTPROCEDURAL STATES: Chronic | ICD-10-CM

## 2019-01-09 DIAGNOSIS — Z90.710 ACQUIRED ABSENCE OF BOTH CERVIX AND UTERUS: Chronic | ICD-10-CM

## 2019-01-09 PROCEDURE — 74177 CT ABD & PELVIS W/CONTRAST: CPT | Mod: 26

## 2019-01-09 PROCEDURE — 74177 CT ABD & PELVIS W/CONTRAST: CPT

## 2019-01-16 ENCOUNTER — APPOINTMENT (OUTPATIENT)
Dept: OBGYN | Facility: CLINIC | Age: 69
End: 2019-01-16

## 2019-01-30 ENCOUNTER — APPOINTMENT (OUTPATIENT)
Dept: OBGYN | Facility: CLINIC | Age: 69
End: 2019-01-30

## 2019-02-11 ENCOUNTER — APPOINTMENT (OUTPATIENT)
Dept: OBGYN | Facility: CLINIC | Age: 69
End: 2019-02-11

## 2019-02-12 ENCOUNTER — APPOINTMENT (OUTPATIENT)
Dept: THORACIC SURGERY | Facility: CLINIC | Age: 69
End: 2019-02-12
Payer: MEDICARE

## 2019-02-12 VITALS
OXYGEN SATURATION: 96 % | RESPIRATION RATE: 17 BRPM | HEART RATE: 73 BPM | TEMPERATURE: 97.6 F | DIASTOLIC BLOOD PRESSURE: 78 MMHG | WEIGHT: 126 LBS | SYSTOLIC BLOOD PRESSURE: 121 MMHG | BODY MASS INDEX: 23.81 KG/M2

## 2019-02-12 PROCEDURE — 99213 OFFICE O/P EST LOW 20 MIN: CPT

## 2019-02-13 RX ORDER — OMEPRAZOLE 40 MG/1
40 CAPSULE, DELAYED RELEASE ORAL
Qty: 30 | Refills: 0 | Status: COMPLETED | COMMUNITY
Start: 2017-05-25 | End: 2019-02-13

## 2019-02-14 NOTE — ASSESSMENT
[FreeTextEntry1] : 67 y/o F, never smoker, w/ hx of HTN, DM, Lt ovarian tumor c/w mullerian origin, and Lung CA.\par \par Now 1yr 7mo s/p FB w/ BAL of the LLL bronchus, Navigational Bronch, FNA of the FERMÍN mass, Brushing of the FERMÍN lung mass and biopsy of the FERMÍN mass on 7/6/2017. Path revealed NSCLC, favoring AdenoCA, +TTF-1, +Napsin.\par \par Pt is s/p Lt ovarian tumor excision and fallopian tube excision on 7/21/17 by Dr. Tiffanie Brooks. Path revealed poorly-diff CA, +WT-1, PAX-8 and ER, c/w mullerian origin. Pt is s/p Total Hysterectomy mid-Sept, 2017 by Dr. Brooks.\par \par Now 1yr s/p Lt VATS Robotic-assisted LULobectomy, MLND on 8/16/17. Path revealed AdenoCA, acinar (90%) and solid (10%), 2 x 1.8 x 0.5cm, margins, visceral pleura and LNs negative, pT1aN0 Stg IA.\par \par CT Chest on 2/7/19:\par - new 2mm RUL nodule (series 4 image 50)\par - new 2mm RUL nodule (series 4 image 71)\par - new 2mm FERMÍN nodule (series 4 image 33)\par \par I have reviewed the patient's medical records and diagnostic images at time of this office consultation and have made the following recommendation:\par 1. CT reviewed with pt, tiny nodules, I would like to continue monitoring at this time. RTC in 6 months with CT chest w/o contrast. \par \par Written by Modesta Owusu NP, acting as a scribe for Dr. Ken Sol. \par \par The documentation recorded by the scribe accurately reflects the service I personally performed and the decisions made by me. KEN SOL MD\par \par \par \par

## 2019-02-14 NOTE — HISTORY OF PRESENT ILLNESS
[FreeTextEntry1] : 67 y/o F, never smoker, w/ hx of HTN, DM, Lt ovarian tumor c/w mullerian origin, and Lung CA.\par \par Now 1yr 7mo s/p FB w/ BAL of the LLL bronchus, Navigational Bronch, FNA of the FERMÍN mass, Brushing of the FERMÍN lung mass and biopsy of the FERMÍN mass on 7/6/2017. Path revealed NSCLC, favoring AdenoCA, +TTF-1, +Napsin.\par \par Pt is s/p Lt ovarian tumor excision and fallopian tube excision on 7/21/17 by Dr. Tiffanie Brooks. Path revealed poorly-diff CA, +WT-1, PAX-8 and ER, c/w mullerian origin. Pt is s/p Total Hysterectomy mid-Sept, 2017 by Dr. Brooks.\par \par Now 1yr s/p Lt VATS Robotic-assisted LULobectomy, MLND on 8/16/17. Path revealed AdenoCA, acinar (90%) and solid (10%), 2 x 1.8 x 0.5cm, margins, visceral pleura and LNs negative, pT1aN0 Stg IA.\par \par CT Chest on 8/7/18:\par - post-op changes \par - MOODY\par \par CT Chest on 2/7/19:\par - new 2mm RUL nodule (series 4 image 50)\par - new 2mm RUL nodule (series 4 image 71)\par - new 2mm FERMÍN nodule (series 4 image 33)\par \par Pt is here today for follow up. Pt admits to SOB on exertion, denies cough or CP. \par

## 2019-02-14 NOTE — CONSULT LETTER
[Dear  ___] : Dear  [unfilled], [Consult Letter:] : I had the pleasure of evaluating your patient, [unfilled]. [( Thank you for referring [unfilled] for consultation for _____ )] : Thank you for referring [unfilled] for consultation for [unfilled] [Please see my note below.] : Please see my note below. [Consult Closing:] : Thank you very much for allowing me to participate in the care of this patient.  If you have any questions, please do not hesitate to contact me. [Sincerely,] : Sincerely, [DrLewis  ___] : Dr. JOHNSON [DrLewis ___] : Dr. JOHNSON [FreeTextEntry2] : Liliana Sanchez MD (PCP) \par Tiffanie Brooks MD (OB/GYN) \par Geo Cuevas MD (Hem/Onc)  [FreeTextEntry3] : Ken Sol MD, MPH \par System Director of Thoracic Surgery \par Director of Comprehensive Lung and Foregut [place] \par Professor Cardiovascular & Thoracic Surgery  \par [employer] [facility]\par

## 2019-02-14 NOTE — DATA REVIEWED
[FreeTextEntry1] : CT Chest on 2/7/19:\par - new 2mm RUL nodule (series 4 image 50)\par - new 2mm RUL nodule (series 4 image 71)\par - new 2mm FERMÍN nodule (series 4 image 33)

## 2019-02-27 ENCOUNTER — APPOINTMENT (OUTPATIENT)
Dept: OBGYN | Facility: CLINIC | Age: 69
End: 2019-02-27

## 2019-03-11 ENCOUNTER — OUTPATIENT (OUTPATIENT)
Dept: OUTPATIENT SERVICES | Facility: HOSPITAL | Age: 69
LOS: 1 days | Discharge: HOME | End: 2019-03-11

## 2019-03-11 DIAGNOSIS — Z90.710 ACQUIRED ABSENCE OF BOTH CERVIX AND UTERUS: Chronic | ICD-10-CM

## 2019-03-11 DIAGNOSIS — Z90.722 ACQUIRED ABSENCE OF OVARIES, BILATERAL: Chronic | ICD-10-CM

## 2019-03-11 DIAGNOSIS — C56.2 MALIGNANT NEOPLASM OF LEFT OVARY: ICD-10-CM

## 2019-03-11 DIAGNOSIS — Z90.49 ACQUIRED ABSENCE OF OTHER SPECIFIED PARTS OF DIGESTIVE TRACT: Chronic | ICD-10-CM

## 2019-03-11 DIAGNOSIS — Z98.890 OTHER SPECIFIED POSTPROCEDURAL STATES: Chronic | ICD-10-CM

## 2019-03-13 ENCOUNTER — APPOINTMENT (OUTPATIENT)
Dept: OBGYN | Facility: CLINIC | Age: 69
End: 2019-03-13

## 2019-03-19 ENCOUNTER — APPOINTMENT (OUTPATIENT)
Dept: OBGYN | Facility: CLINIC | Age: 69
End: 2019-03-19

## 2019-03-21 ENCOUNTER — APPOINTMENT (OUTPATIENT)
Dept: HEMATOLOGY ONCOLOGY | Facility: CLINIC | Age: 69
End: 2019-03-21
Payer: MEDICARE

## 2019-03-21 ENCOUNTER — RESULT REVIEW (OUTPATIENT)
Age: 69
End: 2019-03-21

## 2019-03-21 VITALS
SYSTOLIC BLOOD PRESSURE: 126 MMHG | OXYGEN SATURATION: 98 % | HEART RATE: 65 BPM | TEMPERATURE: 98.6 F | BODY MASS INDEX: 23.87 KG/M2 | RESPIRATION RATE: 18 BRPM | DIASTOLIC BLOOD PRESSURE: 78 MMHG | WEIGHT: 126.32 LBS

## 2019-03-21 LAB
HCT VFR BLD CALC: 37.4 % — SIGNIFICANT CHANGE UP (ref 34.5–45)
HGB BLD-MCNC: 13.6 G/DL — SIGNIFICANT CHANGE UP (ref 11.5–15.5)
MCHC RBC-ENTMCNC: 32.3 PG — SIGNIFICANT CHANGE UP (ref 27–34)
MCHC RBC-ENTMCNC: 36.5 G/DL — HIGH (ref 32–36)
MCV RBC AUTO: 88.6 FL — SIGNIFICANT CHANGE UP (ref 80–100)
PLATELET # BLD AUTO: 239 K/UL — SIGNIFICANT CHANGE UP (ref 150–400)
RBC # BLD: 4.22 M/UL — SIGNIFICANT CHANGE UP (ref 3.8–5.2)
RBC # FLD: 11.6 % — SIGNIFICANT CHANGE UP (ref 10.3–14.5)
WBC # BLD: 6.2 K/UL — SIGNIFICANT CHANGE UP (ref 3.8–10.5)
WBC # FLD AUTO: 6.2 K/UL — SIGNIFICANT CHANGE UP (ref 3.8–10.5)

## 2019-03-21 PROCEDURE — 99214 OFFICE O/P EST MOD 30 MIN: CPT

## 2019-03-21 RX ORDER — MELOXICAM 15 MG/1
15 TABLET ORAL
Qty: 30 | Refills: 0 | Status: DISCONTINUED | COMMUNITY
Start: 2019-01-31

## 2019-03-21 NOTE — PHYSICAL EXAM
[Fully active, able to carry on all pre-disease performance without restriction] : Status 0 - Fully active, able to carry on all pre-disease performance without restriction [Thin] : thin [Normal] : affect appropriate [de-identified] : + BS, mild tenderness on palpation of the LLQ; no guarding or distension  [de-identified] : L lower back tightness

## 2019-03-21 NOTE — HISTORY OF PRESENT ILLNESS
[Disease: _____________________] : Disease: [unfilled] [T: ___] : T[unfilled] [N: ___] : N[unfilled] [M: ___] : M[unfilled] [AJCC Stage: ____] : AJCC Stage: [unfilled] [de-identified] : Age 67: Stage I NSCLC adenocarcinoma s/p left VATs robotic assisted FERMÍN lobectomy and MLND 8/16/17 with Dr Ken Sol\par Age 67: Stage IV poorly differentiated ovarian cancer s/p AVEL/ BSO/ total omentectomy, bilateral ureterolysis, resection of abdominal anterior wall masses, repair of cystostomy\par She initially presented with hemoptysis and had evaluation consisting of bronchoscopy and FNA. The bronchoscopy was negative and the FNA was positive for malignant cells: TTF1+ and PAX 8 negative. She had Pet/ CT on 7/6/17 which showed 2.3 cm left upper lobe nodule with SUV of 23 and 4.2 x 3.1 cm left ovarian lesion SUV of 7.9 and left common iliac lymph nodes measuring up to 8mm with SUV of 2.2, 6 mm perihepatic implant SUV of 3.1. She initially had laparoscopic evaluation with left ovary biopsy which showed poorly differentiated carcinoma that was ER, WT1, PAX 8 positive and TTF1 negative. She initially had the left VATs. Then she subsequently had exploratory laparotomy with  AVEL, BSO, radical dissection of tumor with total omentectomy, resection of anterior abdominal wall masses, lysis of adhesions, and repair of cystostomy. She had CT imaging done after 5th cycle of chemotherapy to evaluate abdominal pain and CT showed no evidence of disease. \par  [de-identified] : poorly differentiated carcinoma: ER positive  [de-identified] : carbo/ taxol: 10/24/17 to 2/12/18 with cumulative fatigue and neuropathy\par bevacizumab added on 12/8/17 to 4/2018 [de-identified] : She has noticed over the last 3 weeks L lower abdominal pain. BM daily without any blood in stool or vaginal discharge/ bleeding. She denies any cramping or nausea or vomiting after eating. The pain is worse with activity and better with rest. Has not tried any pain medications; has been localized to the LLQ; no gas. She denies any recent back strain or exertion. No SOB or cough or new medications.

## 2019-03-21 NOTE — ASSESSMENT
[FreeTextEntry1] : She is a 69 y/o F with Stage IV ovarian cancer and Stage I NSCLC diagnosed simultaneously. She has completed 6 cycles of carboplatin/ paclitaxel with bevacizumab and did not continue with bevacizumab maintenance: off since 4/2018. She has continued with surveillance with new LLQ abdominal pain. We reviewed supportive measures: Tylenol and warm compresses over the lower back. We reviewed CT of the abd/ pelvis repeat to evaluate given her history of carcinomatosis and tendency for recurrence. We will obtain  today and blood work. If CT negative, next follow up will be in July only if abdominal pain improves. If not, will consider pet/ CT.

## 2019-03-21 NOTE — REVIEW OF SYSTEMS
[Abdominal Pain] : abdominal pain [Vomiting] : no vomiting [Constipation] : no constipation [Diarrhea] : no diarrhea [Negative] : Allergic/Immunologic

## 2019-03-21 NOTE — REASON FOR VISIT
[Follow-Up Visit] : a follow-up [Family Member] : family member [FreeTextEntry2] : follow up for ovarian cancer with lower left abdominal pain x 3 weeks

## 2019-03-22 LAB
ALBUMIN SERPL ELPH-MCNC: 4.6 G/DL
ALP BLD-CCNC: 64 U/L
ALT SERPL-CCNC: 16 U/L
ANION GAP SERPL CALC-SCNC: 13 MMOL/L
AST SERPL-CCNC: 18 U/L
BILIRUB SERPL-MCNC: 0.6 MG/DL
BUN SERPL-MCNC: 17 MG/DL
CALCIUM SERPL-MCNC: 9.9 MG/DL
CANCER AG125 SERPL-ACNC: 22 U/ML
CHLORIDE SERPL-SCNC: 103 MMOL/L
CO2 SERPL-SCNC: 26 MMOL/L
CREAT SERPL-MCNC: 0.61 MG/DL
GLUCOSE SERPL-MCNC: 121 MG/DL
POTASSIUM SERPL-SCNC: 4.4 MMOL/L
PROT SERPL-MCNC: 8.3 G/DL
SODIUM SERPL-SCNC: 142 MMOL/L

## 2019-03-25 ENCOUNTER — FORM ENCOUNTER (OUTPATIENT)
Age: 69
End: 2019-03-25

## 2019-03-26 ENCOUNTER — OUTPATIENT (OUTPATIENT)
Dept: OUTPATIENT SERVICES | Facility: HOSPITAL | Age: 69
LOS: 1 days | End: 2019-03-26
Payer: MEDICARE

## 2019-03-26 ENCOUNTER — APPOINTMENT (OUTPATIENT)
Dept: CT IMAGING | Facility: IMAGING CENTER | Age: 69
End: 2019-03-26
Payer: MEDICARE

## 2019-03-26 DIAGNOSIS — Z90.710 ACQUIRED ABSENCE OF BOTH CERVIX AND UTERUS: Chronic | ICD-10-CM

## 2019-03-26 DIAGNOSIS — Z90.722 ACQUIRED ABSENCE OF OVARIES, BILATERAL: Chronic | ICD-10-CM

## 2019-03-26 DIAGNOSIS — C76.2 MALIGNANT NEOPLASM OF ABDOMEN: ICD-10-CM

## 2019-03-26 DIAGNOSIS — Z90.49 ACQUIRED ABSENCE OF OTHER SPECIFIED PARTS OF DIGESTIVE TRACT: Chronic | ICD-10-CM

## 2019-03-26 DIAGNOSIS — Z98.890 OTHER SPECIFIED POSTPROCEDURAL STATES: Chronic | ICD-10-CM

## 2019-03-26 PROCEDURE — 74177 CT ABD & PELVIS W/CONTRAST: CPT | Mod: 26

## 2019-03-26 PROCEDURE — 74177 CT ABD & PELVIS W/CONTRAST: CPT

## 2019-03-27 ENCOUNTER — APPOINTMENT (OUTPATIENT)
Dept: OBGYN | Facility: CLINIC | Age: 69
End: 2019-03-27

## 2019-04-03 ENCOUNTER — APPOINTMENT (OUTPATIENT)
Dept: OBGYN | Facility: CLINIC | Age: 69
End: 2019-04-03

## 2019-04-08 ENCOUNTER — APPOINTMENT (OUTPATIENT)
Dept: HEMATOLOGY ONCOLOGY | Facility: CLINIC | Age: 69
End: 2019-04-08

## 2019-04-11 ENCOUNTER — OUTPATIENT (OUTPATIENT)
Dept: OUTPATIENT SERVICES | Facility: HOSPITAL | Age: 69
LOS: 1 days | Discharge: HOME | End: 2019-04-11

## 2019-04-11 DIAGNOSIS — Z98.890 OTHER SPECIFIED POSTPROCEDURAL STATES: Chronic | ICD-10-CM

## 2019-04-11 DIAGNOSIS — Z90.710 ACQUIRED ABSENCE OF BOTH CERVIX AND UTERUS: Chronic | ICD-10-CM

## 2019-04-11 DIAGNOSIS — C56.2 MALIGNANT NEOPLASM OF LEFT OVARY: ICD-10-CM

## 2019-04-11 DIAGNOSIS — Z90.49 ACQUIRED ABSENCE OF OTHER SPECIFIED PARTS OF DIGESTIVE TRACT: Chronic | ICD-10-CM

## 2019-04-11 DIAGNOSIS — Z90.722 ACQUIRED ABSENCE OF OVARIES, BILATERAL: Chronic | ICD-10-CM

## 2019-04-17 ENCOUNTER — APPOINTMENT (OUTPATIENT)
Dept: HEMATOLOGY ONCOLOGY | Facility: CLINIC | Age: 69
End: 2019-04-17
Payer: MEDICARE

## 2019-04-17 ENCOUNTER — APPOINTMENT (OUTPATIENT)
Dept: HEMATOLOGY ONCOLOGY | Facility: CLINIC | Age: 69
End: 2019-04-17

## 2019-04-17 VITALS
OXYGEN SATURATION: 97 % | BODY MASS INDEX: 21.33 KG/M2 | HEART RATE: 75 BPM | RESPIRATION RATE: 16 BRPM | DIASTOLIC BLOOD PRESSURE: 86 MMHG | TEMPERATURE: 98.6 F | SYSTOLIC BLOOD PRESSURE: 127 MMHG | WEIGHT: 112.85 LBS

## 2019-04-17 PROCEDURE — 99215 OFFICE O/P EST HI 40 MIN: CPT

## 2019-04-17 NOTE — REASON FOR VISIT
[Follow-Up Visit] : a follow-up [Spouse] : spouse [Family Member] : family member [FreeTextEntry2] : follow up for ovarian cancer s/p hospitalization at [facility] for brain lesion s/p resection

## 2019-04-17 NOTE — PHYSICAL EXAM
[Restricted in physically strenuous activity but ambulatory and able to carry out work of a light or sedentary nature] : Status 1- Restricted in physically strenuous activity but ambulatory and able to carry out work of a light or sedentary nature, e.g., light house work, office work [Thin] : thin [Ulcers] : no ulcers [Thrush] : thrush [Vesicles] : no vesicles [Mucositis] : no mucositis [Normal] : grossly intact [de-identified] : postsurgical site: stitches over the occipital area C/D/I  [de-identified] : L groin 2 cm palpable nodule  [de-identified] : + BS, mild tenderness on palpation of the LLQ; L groin tenderness  [de-identified] : L lower back tightness

## 2019-04-17 NOTE — CONSULT LETTER
[Dear  ___] : Dear  [unfilled], [Courtesy Letter:] : I had the pleasure of seeing your patient, [unfilled], in my office today. [Please see my note below.] : Please see my note below. [Consult Closing:] : Thank you very much for allowing me to participate in the care of this patient.  If you have any questions, please do not hesitate to contact me. [Sincerely,] : Sincerely, [FreeTextEntry2] : Tatyana Ortiz MD\par [address] [facility]\[place] [address] [FreeTextEntry3] : Geo Cuevas MD\par Attending\par [facility]\par  [DrLewis  ___] : Dr. JOHNSON

## 2019-04-17 NOTE — ASSESSMENT
[FreeTextEntry1] : She is a 69 y/o F with Stage IV ovarian cancer and Stage I NSCLC diagnosed simultaneously. She has completed 6 cycles of carboplatin/ paclitaxel with bevacizumab and did not continue with bevacizumab maintenance: off since 4/2018. We reviewed her hospital course at [facility] and will obtain records along with CD of CT and MRI imaging. Will obtain pathology and pathology slides for evaluation. We reviewed after left suboccipital craniotomy and role of RT to the area after surgery. We reviewed CT imaging and the most likely recurrence of ovarian cancer. We reviewed awaiting pathology from brain surgery but may consider biopsy of LN/ nodule to confirm recurrence of ovarian cancer. We reviewed given her Stage IV resected ovarian cancer, we would expect recurrence in the abdomen is due to ovarian cancer. We reviewed platinum sensitive tumor and once recovered from brain surgery and RT, will plan on palliative chemotherapy for recurrent disease. Questions answered to her families' satisfaction. \par \par Brain met s/p craniotomy: will await pathology. Her performance status is maintained currently but reviewed outpatient PT. \par \par Thrush due to dexamethasone: reviewed nystatin swish and swallow.

## 2019-04-17 NOTE — REVIEW OF SYSTEMS
[Abdominal Pain] : abdominal pain [Vomiting] : no vomiting [Constipation] : no constipation [Skin Rash] : no skin rash [Diarrhea] : no diarrhea [Skin Wound] : no skin wound [Negative] : Allergic/Immunologic [FreeTextEntry7] : groin pain  [de-identified] : dry skin

## 2019-05-01 ENCOUNTER — APPOINTMENT (OUTPATIENT)
Dept: HEMATOLOGY ONCOLOGY | Facility: CLINIC | Age: 69
End: 2019-05-01
Payer: MEDICARE

## 2019-05-01 ENCOUNTER — APPOINTMENT (OUTPATIENT)
Dept: HEMATOLOGY ONCOLOGY | Facility: CLINIC | Age: 69
End: 2019-05-01

## 2019-05-01 ENCOUNTER — RESULT REVIEW (OUTPATIENT)
Age: 69
End: 2019-05-01

## 2019-05-01 VITALS
RESPIRATION RATE: 16 BRPM | HEART RATE: 84 BPM | OXYGEN SATURATION: 97 % | SYSTOLIC BLOOD PRESSURE: 105 MMHG | WEIGHT: 120 LBS | TEMPERATURE: 98.8 F | DIASTOLIC BLOOD PRESSURE: 72 MMHG | BODY MASS INDEX: 22.67 KG/M2

## 2019-05-01 LAB
HCT VFR BLD CALC: 32.6 % — LOW (ref 34.5–45)
HGB BLD-MCNC: 11.4 G/DL — LOW (ref 11.5–15.5)
MCHC RBC-ENTMCNC: 31.8 PG — SIGNIFICANT CHANGE UP (ref 27–34)
MCHC RBC-ENTMCNC: 35 G/DL — SIGNIFICANT CHANGE UP (ref 32–36)
MCV RBC AUTO: 91 FL — SIGNIFICANT CHANGE UP (ref 80–100)
PLATELET # BLD AUTO: 172 K/UL — SIGNIFICANT CHANGE UP (ref 150–400)
RBC # BLD: 3.58 M/UL — LOW (ref 3.8–5.2)
RBC # FLD: 12.9 % — SIGNIFICANT CHANGE UP (ref 10.3–14.5)
WBC # BLD: 5 K/UL — SIGNIFICANT CHANGE UP (ref 3.8–10.5)
WBC # FLD AUTO: 5 K/UL — SIGNIFICANT CHANGE UP (ref 3.8–10.5)

## 2019-05-01 PROCEDURE — 99215 OFFICE O/P EST HI 40 MIN: CPT

## 2019-05-02 LAB
ALBUMIN SERPL ELPH-MCNC: 4.2 G/DL
ALP BLD-CCNC: 73 U/L
ALT SERPL-CCNC: 37 U/L
ANION GAP SERPL CALC-SCNC: 11 MMOL/L
APTT BLD: 30.8 SEC
AST SERPL-CCNC: 21 U/L
BILIRUB SERPL-MCNC: 0.4 MG/DL
BUN SERPL-MCNC: 18 MG/DL
CALCIUM SERPL-MCNC: 9.3 MG/DL
CANCER AG125 SERPL-ACNC: 34 U/ML
CHLORIDE SERPL-SCNC: 101 MMOL/L
CO2 SERPL-SCNC: 29 MMOL/L
CREAT SERPL-MCNC: 0.46 MG/DL
GLUCOSE SERPL-MCNC: 162 MG/DL
INR PPP: 0.94 RATIO
POTASSIUM SERPL-SCNC: 3.8 MMOL/L
PROT SERPL-MCNC: 6.6 G/DL
PT BLD: 10.7 SEC
SODIUM SERPL-SCNC: 141 MMOL/L

## 2019-05-04 NOTE — REVIEW OF SYSTEMS
[Abdominal Pain] : abdominal pain [Vomiting] : no vomiting [Diarrhea] : no diarrhea [Constipation] : no constipation [Negative] : Heme/Lymph [FreeTextEntry7] : L sided groin pain unchanged

## 2019-05-04 NOTE — PHYSICAL EXAM
[Fully active, able to carry on all pre-disease performance without restriction] : Status 0 - Fully active, able to carry on all pre-disease performance without restriction [Thin] : thin [Ulcers] : no ulcers [Thrush] : thrush [Mucositis] : no mucositis [Vesicles] : no vesicles [de-identified] : postsurgical site: stitches over the occipital area C/D/I  [Normal] : affect appropriate [de-identified] : L groin 2 cm palpable nodule  [de-identified] : + BS, mild tenderness on palpation of the LLQ; L groin tenderness  [de-identified] : L lower back tightness  [de-identified] : occipital area healed

## 2019-05-04 NOTE — HISTORY OF PRESENT ILLNESS
[T: ___] : T[unfilled] [Disease: _____________________] : Disease: [unfilled] [N: ___] : N[unfilled] [M: ___] : M[unfilled] [de-identified] : Age 67: Stage I NSCLC adenocarcinoma s/p left VATs robotic assisted FERMÍN lobectomy and MLND 8/16/17 with Dr Ken Sol\par Age 67: Stage IV poorly differentiated ovarian cancer s/p AVEL/ BSO/ total omentectomy, bilateral ureterolysis, resection of abdominal anterior wall masses, repair of cystostomy\par She initially presented with hemoptysis and had evaluation consisting of bronchoscopy and FNA. The bronchoscopy was negative and the FNA was positive for malignant cells: TTF1+ and PAX 8 negative. She had Pet/ CT on 7/6/17 which showed 2.3 cm left upper lobe nodule with SUV of 23 and 4.2 x 3.1 cm left ovarian lesion SUV of 7.9 and left common iliac lymph nodes measuring up to 8mm with SUV of 2.2, 6 mm perihepatic implant SUV of 3.1. She initially had laparoscopic evaluation with left ovary biopsy which showed poorly differentiated carcinoma that was ER, WT1, PAX 8 positive and TTF1 negative. She initially had the left VATs. Then she subsequently had exploratory laparotomy with  AVEL, BSO, radical dissection of tumor with total omentectomy, resection of anterior abdominal wall masses, lysis of adhesions, and repair of cystostomy. She had CT imaging done after 5th cycle of chemotherapy to evaluate abdominal pain and CT showed no evidence of disease. She had abdominal pain in 3/2019 and had follow up CT which showed new small pelvic implant and enlarged sita-caval LN. She had headache and went to [facility] 4/10/19. She had imaging that showed predominantly cystic peripherally enhancing mass within the left cerebellar hemisphere. She had left suboccipital craniotomy with Dr Tatyana Ortiz. \par  [AJCC Stage: ____] : AJCC Stage: [unfilled] [de-identified] : carbo/ taxol: 10/24/17 to 2/12/18 with cumulative fatigue and neuropathy\par bevacizumab added on 12/8/17 to 4/2018 (pt stopped coming for treatment and lost to follow up until 12/2018)\par retreat carbo/ taxol 5/7/19 [de-identified] : She has been off dexamethasone late April and will start 1st SRS session today. She will complete 3 sessions of SRS treatment 5/6/19 and was told she should start chemotherapy as soon as possible. They were not given copy of pathology but told brain lesion was metastatic ovarian cancer. She has groin pain but no new abdominal pain. She has been walking at home and completely independent. Stitches over the occipital area removed by surgeon. Used the nystatin swish and swallow and denies any dysphagia. No new medications. Appetite was improved on dexamethasone.  [de-identified] : poorly differentiated carcinoma: ER positive

## 2019-05-04 NOTE — CONSULT LETTER
[Dear  ___] : Dear  [unfilled], [Consult Closing:] : Thank you very much for allowing me to participate in the care of this patient.  If you have any questions, please do not hesitate to contact me. [Courtesy Letter:] : I had the pleasure of seeing your patient, [unfilled], in my office today. [Please see my note below.] : Please see my note below. [FreeTextEntry2] : Tatyana Ortiz MD\par [address] [facility]\[place] [address] [Sincerely,] : Sincerely, [FreeTextEntry3] : Geo Cuevas MD\par Attending\par [facility]\par  [DrLewis  ___] : Dr. JOHNSON [___] : [unfilled] [DrLewis ___] : Dr. JOHNSON

## 2019-05-04 NOTE — REASON FOR VISIT
[Follow-Up Visit] : a follow-up [Spouse] : spouse [Family Member] : family member [FreeTextEntry2] : follow up for metastatic ovarian cancer

## 2019-05-08 ENCOUNTER — RESULT REVIEW (OUTPATIENT)
Age: 69
End: 2019-05-08

## 2019-05-08 ENCOUNTER — APPOINTMENT (OUTPATIENT)
Dept: INFUSION THERAPY | Facility: HOSPITAL | Age: 69
End: 2019-05-08

## 2019-05-08 LAB
BASOPHILS # BLD AUTO: 0 K/UL — SIGNIFICANT CHANGE UP (ref 0–0.2)
BASOPHILS NFR BLD AUTO: 0.7 % — SIGNIFICANT CHANGE UP (ref 0–2)
EOSINOPHIL # BLD AUTO: 0.1 K/UL — SIGNIFICANT CHANGE UP (ref 0–0.5)
EOSINOPHIL NFR BLD AUTO: 2.2 % — SIGNIFICANT CHANGE UP (ref 0–6)
HCT VFR BLD CALC: 33.1 % — LOW (ref 34.5–45)
HGB BLD-MCNC: 11.8 G/DL — SIGNIFICANT CHANGE UP (ref 11.5–15.5)
LYMPHOCYTES # BLD AUTO: 0.9 K/UL — LOW (ref 1–3.3)
LYMPHOCYTES # BLD AUTO: 24 % — SIGNIFICANT CHANGE UP (ref 13–44)
MCHC RBC-ENTMCNC: 32.2 PG — SIGNIFICANT CHANGE UP (ref 27–34)
MCHC RBC-ENTMCNC: 35.6 G/DL — SIGNIFICANT CHANGE UP (ref 32–36)
MCV RBC AUTO: 90.5 FL — SIGNIFICANT CHANGE UP (ref 80–100)
MONOCYTES # BLD AUTO: 0 K/UL — SIGNIFICANT CHANGE UP (ref 0–0.9)
MONOCYTES NFR BLD AUTO: 0.8 % — LOW (ref 2–14)
NEUTROPHILS # BLD AUTO: 2.6 K/UL — SIGNIFICANT CHANGE UP (ref 1.8–7.4)
NEUTROPHILS NFR BLD AUTO: 72.2 % — SIGNIFICANT CHANGE UP (ref 43–77)
PLATELET # BLD AUTO: 299 K/UL — SIGNIFICANT CHANGE UP (ref 150–400)
RBC # BLD: 3.66 M/UL — LOW (ref 3.8–5.2)
RBC # FLD: 12.8 % — SIGNIFICANT CHANGE UP (ref 10.3–14.5)
WBC # BLD: 3.6 K/UL — LOW (ref 3.8–10.5)
WBC # FLD AUTO: 3.6 K/UL — LOW (ref 3.8–10.5)

## 2019-05-09 ENCOUNTER — APPOINTMENT (OUTPATIENT)
Dept: GYNECOLOGIC ONCOLOGY | Facility: CLINIC | Age: 69
End: 2019-05-09
Payer: MEDICARE

## 2019-05-09 VITALS
SYSTOLIC BLOOD PRESSURE: 105 MMHG | HEART RATE: 84 BPM | WEIGHT: 120 LBS | HEIGHT: 61 IN | BODY MASS INDEX: 22.66 KG/M2 | DIASTOLIC BLOOD PRESSURE: 72 MMHG

## 2019-05-09 DIAGNOSIS — Z51.11 ENCOUNTER FOR ANTINEOPLASTIC CHEMOTHERAPY: ICD-10-CM

## 2019-05-09 DIAGNOSIS — R11.2 NAUSEA WITH VOMITING, UNSPECIFIED: ICD-10-CM

## 2019-05-09 PROCEDURE — 99214 OFFICE O/P EST MOD 30 MIN: CPT

## 2019-05-22 ENCOUNTER — OUTPATIENT (OUTPATIENT)
Dept: OUTPATIENT SERVICES | Facility: HOSPITAL | Age: 69
LOS: 1 days | Discharge: HOME | End: 2019-05-22
Payer: MEDICARE

## 2019-05-22 DIAGNOSIS — Z98.890 OTHER SPECIFIED POSTPROCEDURAL STATES: Chronic | ICD-10-CM

## 2019-05-22 DIAGNOSIS — Z90.49 ACQUIRED ABSENCE OF OTHER SPECIFIED PARTS OF DIGESTIVE TRACT: Chronic | ICD-10-CM

## 2019-05-22 DIAGNOSIS — Z90.710 ACQUIRED ABSENCE OF BOTH CERVIX AND UTERUS: Chronic | ICD-10-CM

## 2019-05-22 DIAGNOSIS — Z90.722 ACQUIRED ABSENCE OF OVARIES, BILATERAL: Chronic | ICD-10-CM

## 2019-05-22 DIAGNOSIS — C56.2 MALIGNANT NEOPLASM OF LEFT OVARY: ICD-10-CM

## 2019-05-28 NOTE — ASU PREOP CHECKLIST - SELECT TESTS ORDERED
Hospital Medicine Discharge Summary    Patient ID: Lyndon Roca      Patient's PCP: An Mobley MD    Admit Date: 5/22/2019     Discharge Date: 5/28/2019      Admitting Physician: Jluis Zuniga MD     Discharge Physician: Gilford Marker, MD     Discharge Diagnoses: Active Hospital Problems    Diagnosis    Acute respiratory failure with hypoxemia ([facility] [address] [J96.01]       The patient was seen and examined on day of discharge and this discharge summary is in conjunction with any daily progress note from day of discharge. Hospital Course:   History Of Present Illness:    68 y.o. female who presented to [facility] with worsening SOB. Sx started couple weeks ago, saw PCP, CXR with suspected CHF, advised to double her Lasix x 3 days then reeval.  Notes some improvement in SOB with Lasix, incl 10 lb wt decline and less leg swelling. Despite this persistent SOB, ANN. Ongoing cough, difficulty clearing phlegm. Presented to ED, hypoxic on RA, baseline uses BIPAP with 02 qhs alone. Plan to admit for further workup/mgmt.           Pneumonia- started on doxycycline(drug allergies noted)  Diagnosed 5/25  Feels better today, after addition of ceftriaxone  Continued same, switched to po abx on dc  -added mucinex/tessalon perles     COPD exacerbation  Continue steroids and HHNs  Lungs are clear today  Continue same management, except switched to po prednisone and continued taper on dc     Acute kidney injury  Stable compared to yesterday  Nephrology following. Input appreciated     Dyslipidemia  Continue atorvastatin     afib- stable, on eliquis     Hypertension:  BP is elevated.  Patient is off ACEI due to FARHEEN  Nephrology following  Spironolactone added yesterday  Defer to nephrology          Physical Exam Performed:     BP (!) 151/78   Pulse 78   Temp 97.394 °F (36.3 °C) (Oral)   Resp 22   Ht 5' 6\" (1.676 m)   Wt 241 lb (109.3 kg)   SpO2 92%   BMI 38.90 kg/m²       General appearance: No apparent distress, appears stated age and cooperative. HEENT: Pupils equal, round, and reactive to light. Conjunctivae/corneas clear. Neck: Supple, with full range of motion. No jugular venous distention. Trachea midline. Respiratory:  Normal respiratory effort. Clear to auscultation, bilaterally without signif Rales/Wheezes/Rhonchi. Cardiovascular: Regular rate and rhythm with normal S1/S2 without murmurs, rubs or gallops. Abdomen: Soft, non-tender, non-distended with normal bowel sounds. Musculoskeletal: No clubbing, cyanosis or edema bilaterally. Full range of motion without deformity. Skin: Skin color, texture, turgor normal.  No rashes or lesions. Neurologic:  Neurovascularly intact without any focal sensory/motor deficits. Cranial nerves: II-XII intact, grossly non-focal.  Psychiatric: Alert and oriented, thought content appropriate, normal insight  Capillary Refill: Brisk,< 3 seconds   Peripheral Pulses: +2 palpable, equal bilaterally            Labs: For convenience and continuity at follow-up the following most recent labs are provided:      CBC:    Lab Results   Component Value Date    WBC 15.5 05/27/2019    HGB 11.2 05/27/2019    HCT 35.0 05/27/2019     05/27/2019       Renal:    Lab Results   Component Value Date     05/27/2019    K 4.8 05/27/2019    K 5.4 05/23/2019    CL 94 05/27/2019    CO2 31 05/27/2019    BUN 54 05/27/2019    CREATININE 1.5 05/27/2019    CALCIUM 9.3 05/27/2019    PHOS 3.5 05/13/2019         Significant Diagnostic Studies    Radiology:   XR CHEST PORTABLE   Final Result   Increased opacity left upper lung possibly pneumonia. Follow-up after   appropriate therapy is advised. XR CHEST PORTABLE   Final Result   No evidence of acute cardiopulmonary disease.                 Consults:     IP CONSULT TO HOSPITALIST  IP CONSULT TO NEPHROLOGY    Disposition:  SNF     Condition at Discharge: Stable    Discharge Instructions/Follow-up:  PCP as outpt    Code Status: Full Code     Activity: activity as tolerated    Diet: regular diet      Discharge Medications:     Discharge Medication List as of 5/28/2019  5:31 PM           Details   predniSONE (DELTASONE) 20 MG tablet Starting 5/29, take 20mg daily x 3 days, then take 10mg daily x 3 days to finish taper, take with food, Disp-9 tablet, R-0NO PRINT      doxycycline hyclate (VIBRA-TABS) 100 MG tablet Take 1 tablet by mouth every 12 hours for 3 days, Disp-6 tablet, R-0NO PRINT      guaiFENesin (MUCINEX) 600 MG extended release tablet Take 1 tablet by mouth 2 times daily, Disp-6 tablet, R-0NO PRINT      NIFEdipine (ADALAT CC) 60 MG extended release tablet Take 1 tablet by mouth daily, Disp-30 tablet, R-1NO PRINT      benzonatate (TESSALON) 100 MG capsule Take 1 capsule by mouth 3 times daily as needed for Cough, Disp-6 capsule, R-0NO PRINT      spironolactone (ALDACTONE) 25 MG tablet Take 1 tablet by mouth daily, Disp-30 tablet, R-1NO PRINT      cefdinir (OMNICEF) 300 MG capsule Take 1 capsule by mouth every 12 hours for 3 days, Disp-6 capsule, R-0NO PRINT              Details   furosemide (LASIX) 40 MG tablet Take 1 tablet by mouth 2 times daily, Disp-60 tablet, R-3NO PRINT              Details   lidocaine (XYLOCAINE) 5 % ointment Apply 1 Applicatorful topically as needed for Pain Apply topically as needed., Topical, PRN, Historical Med      ELIQUIS 2.5 MG TABS tablet Take 1 tablet by mouth 2 times daily, Disp-180 tablet, R-0Normal      propafenone (RYTHMOL) 150 MG tablet Take 1 tablet by mouth every 8 hours, Disp-90 tablet, R-3Normal      metoprolol tartrate (LOPRESSOR) 50 MG tablet Take 1 tablet by mouth 2 times daily, Disp-180 tablet, R-0Normal      DULoxetine (CYMBALTA) 60 MG extended release capsule Take 1 capsule by mouth daily, Disp-90 capsule, R-0Normal      gabapentin (NEURONTIN) 100 MG capsule Take 1 capsule by mouth every evening for 90 days. ., Disp-90 capsule, R-0Normal      ipratropium-albuterol (DUONEB) 0.5-2.5 (3) MG/3ML SOLN nebulizer solution Inhale 3 mLs into the lungs every 6 hours as needed for Shortness of Breath DX: J45.40, Disp-360 mL, R-1Normal      budesonide (PULMICORT FLEXHALER) 180 MCG/ACT AEPB inhaler Inhale 2 puffs into the lungs 2 times daily, Disp-3 each, R-1Normal      carbidopa-levodopa (SINEMET)  MG per tablet TAKE 1 TABLET BY MOUTH 3 TIMES A DAY, Disp-90 tablet, R-1Normal      atorvastatin (LIPITOR) 40 MG tablet Take 1 and 1/2 tablets by mouth every evening, Disp-135 tablet, R-1Normal      montelukast (SINGULAIR) 10 MG tablet Take 1 tablet by mouth daily. , Disp-90 tablet, R-1Normal      albuterol sulfate HFA (VENTOLIN HFA) 108 (90 Base) MCG/ACT inhaler Inhale 2 puffs into the lungs every 6 hours as needed for Wheezing, Disp-3 Inhaler, R-1Normal      fluticasone (FLONASE) 50 MCG/ACT nasal spray USE 2 PUFFS IN EACH NOSTRIL DAILY, Disp-1 Bottle, R-12Normal      salmeterol (SEREVENT) 50 MCG/DOSE diskus inhaler Inhale 1 puff by mouth 2 times a day., Disp-180 each, R-1Normal      lamoTRIgine (LAMICTAL) 25 MG tablet Take 50 mg by mouth 2 times daily Historical Med      Multiple Vitamins-Minerals (THERAPEUTIC MULTIVITAMIN-MINERALS) tablet Take 1 tablet by mouth daily      Alpha-Lipoic Acid 300 MG CAPS Take 300 mg by mouth daily      Handicap Placard MISC Starting 12/12/2014, Until Discontinued, Disp-1 each, R-0, Print12/12/14-12/12/19      aspirin EC 81 MG EC tablet Take 1 tablet by mouth daily. Start taking next week. Indications: OTC, Disp-30 tablet, R-2             Time Spent on discharge is more than 30 minutes in the examination, evaluation, counseling and review of medications and discharge plan. Signed:    Colette To MD   5/28/2019      Thank you Demetris Mcnair MD for the opportunity to be involved in this patient's care. If you have any questions or concerns please feel free to contact me at [phone]. Type and Screen/CXR/Urinalysis/CBC/PT/PTT/INR/EKG/BMP/Type and Cross

## 2019-05-29 ENCOUNTER — OTHER (OUTPATIENT)
Age: 69
End: 2019-05-29

## 2019-05-29 ENCOUNTER — APPOINTMENT (OUTPATIENT)
Dept: INFUSION THERAPY | Facility: HOSPITAL | Age: 69
End: 2019-05-29

## 2019-05-29 ENCOUNTER — APPOINTMENT (OUTPATIENT)
Dept: HEMATOLOGY ONCOLOGY | Facility: CLINIC | Age: 69
End: 2019-05-29
Payer: MEDICARE

## 2019-05-29 ENCOUNTER — RESULT REVIEW (OUTPATIENT)
Age: 69
End: 2019-05-29

## 2019-05-29 ENCOUNTER — LABORATORY RESULT (OUTPATIENT)
Age: 69
End: 2019-05-29

## 2019-05-29 DIAGNOSIS — Z88.9 ALLERGY STATUS TO UNSPECIFIED DRUGS, MEDICAMENTS AND BIOLOGICAL SUBSTANCES: ICD-10-CM

## 2019-05-29 LAB
BASOPHILS # BLD AUTO: 0 K/UL — SIGNIFICANT CHANGE UP (ref 0–0.2)
BASOPHILS NFR BLD AUTO: 0.3 % — SIGNIFICANT CHANGE UP (ref 0–2)
EOSINOPHIL # BLD AUTO: 0.1 K/UL — SIGNIFICANT CHANGE UP (ref 0–0.5)
EOSINOPHIL NFR BLD AUTO: 0.9 % — SIGNIFICANT CHANGE UP (ref 0–6)
HCT VFR BLD CALC: 31.5 % — LOW (ref 34.5–45)
HGB BLD-MCNC: 11.2 G/DL — LOW (ref 11.5–15.5)
LYMPHOCYTES # BLD AUTO: 1.9 K/UL — SIGNIFICANT CHANGE UP (ref 1–3.3)
LYMPHOCYTES # BLD AUTO: 30.5 % — SIGNIFICANT CHANGE UP (ref 13–44)
MCHC RBC-ENTMCNC: 31.8 PG — SIGNIFICANT CHANGE UP (ref 27–34)
MCHC RBC-ENTMCNC: 35.4 G/DL — SIGNIFICANT CHANGE UP (ref 32–36)
MCV RBC AUTO: 89.8 FL — SIGNIFICANT CHANGE UP (ref 80–100)
MONOCYTES # BLD AUTO: 0.5 K/UL — SIGNIFICANT CHANGE UP (ref 0–0.9)
MONOCYTES NFR BLD AUTO: 8 % — SIGNIFICANT CHANGE UP (ref 2–14)
NEUTROPHILS # BLD AUTO: 3.7 K/UL — SIGNIFICANT CHANGE UP (ref 1.8–7.4)
NEUTROPHILS NFR BLD AUTO: 60.3 % — SIGNIFICANT CHANGE UP (ref 43–77)
PLATELET # BLD AUTO: 178 K/UL — SIGNIFICANT CHANGE UP (ref 150–400)
RBC # BLD: 3.51 M/UL — LOW (ref 3.8–5.2)
RBC # FLD: 13.3 % — SIGNIFICANT CHANGE UP (ref 10.3–14.5)
WBC # BLD: 6.2 K/UL — SIGNIFICANT CHANGE UP (ref 3.8–10.5)
WBC # FLD AUTO: 6.2 K/UL — SIGNIFICANT CHANGE UP (ref 3.8–10.5)

## 2019-05-29 PROCEDURE — 93010 ELECTROCARDIOGRAM REPORT: CPT

## 2019-05-29 PROCEDURE — 99214 OFFICE O/P EST MOD 30 MIN: CPT

## 2019-05-29 RX ORDER — DIPHENHYDRAMINE HCL 25 MG/1
25 CAPSULE ORAL EVERY 6 HOURS
Qty: 20 | Refills: 0 | Status: DISCONTINUED | COMMUNITY
Start: 2019-05-29 | End: 2019-05-29

## 2019-05-29 RX ORDER — DEXAMETHASONE 4 MG/1
4 TABLET ORAL
Qty: 10 | Refills: 0 | Status: DISCONTINUED | COMMUNITY
Start: 2019-05-01 | End: 2019-05-29

## 2019-05-30 DIAGNOSIS — Z51.11 ENCOUNTER FOR ANTINEOPLASTIC CHEMOTHERAPY: ICD-10-CM

## 2019-05-30 DIAGNOSIS — R11.2 NAUSEA WITH VOMITING, UNSPECIFIED: ICD-10-CM

## 2019-05-30 NOTE — HISTORY OF PRESENT ILLNESS
[de-identified] : Age 67: Stage I NSCLC adenocarcinoma s/p left VATs robotic assisted FERMÍN lobectomy and MLND 8/16/17 with Dr Ken Sol\par Age 67: Stage IV poorly differentiated ovarian cancer s/p AVEL/ BSO/ total omentectomy, bilateral ureterolysis, resection of abdominal anterior wall masses, repair of cystostomy\par She initially presented with hemoptysis and had evaluation consisting of bronchoscopy and FNA. The bronchoscopy was negative and the FNA was positive for malignant cells: TTF1+ and PAX 8 negative. She had Pet/ CT on 7/6/17 which showed 2.3 cm left upper lobe nodule with SUV of 23 and 4.2 x 3.1 cm left ovarian lesion SUV of 7.9 and left common iliac lymph nodes measuring up to 8mm with SUV of 2.2, 6 mm perihepatic implant SUV of 3.1. She initially had laparoscopic evaluation with left ovary biopsy which showed poorly differentiated carcinoma that was ER, WT1, PAX 8 positive and TTF1 negative. She initially had the left VATs. Then she subsequently had exploratory laparotomy with  AVEL, BSO, radical dissection of tumor with total omentectomy, resection of anterior abdominal wall masses, lysis of adhesions, and repair of cystostomy. She had CT imaging done after 5th cycle of chemotherapy to evaluate abdominal pain and CT showed no evidence of disease. She had abdominal pain in 3/2019 and had follow up CT which showed new small pelvic implant and enlarged sita-caval LN. She had headache and went to [facility] 4/10/19. She had imaging that showed predominantly cystic peripherally enhancing mass within the left cerebellar hemisphere. She had left suboccipital craniotomy with Dr Tatyana Ortiz. \par  [de-identified] : poorly differentiated carcinoma: ER positive  [de-identified] : carbo/ taxol: 10/24/17 to 2/12/18 with cumulative fatigue and neuropathy\par bevacizumab added on 12/8/17 to 4/2018 (pt stopped coming for treatment and lost to follow up until 12/2018)\par retreat carbo/ taxol 5/7/19 \par C2  5/29/19 - CARBO REACTION - Flushing, Chest palpitations (EKG NSR) Flat erythematic rash at neck and torso  [FreeTextEntry1] : C2   5/29/19  \par C/T/A [de-identified] : Pt presents for follow up after C1 retreat Carbo/Taxol/Avastin \par Noted intermittent neuropathy to fingertips \par Mild expected arthralgias that are diffuse: shoulders, arms, and legs.  \par She has scalp tenderness with expected alopecia\par Eating and drinking without GI distress \par She is planned for MRI of the brain next Wednesday with neurology.

## 2019-05-30 NOTE — REVIEW OF SYSTEMS
[Joint Pain] : joint pain [Muscle Pain] : muscle pain [Abdominal Pain] : no abdominal pain [Vomiting] : no vomiting [Constipation] : no constipation [Diarrhea] : no diarrhea [Joint Stiffness] : no joint stiffness [Muscle Weakness] : no muscle weakness [Skin Rash] : no skin rash [Skin Wound] : no skin wound [de-identified] : scalp tenderness

## 2019-05-30 NOTE — CONSULT LETTER
[FreeTextEntry2] : Tatyana Ortiz MD\par [address] [facility]\[place] [address] [FreeTextEntry3] : Geo Cuevas MD\par Attending\par [facility]\par

## 2019-05-30 NOTE — PHYSICAL EXAM
[Ulcers] : no ulcers [Mucositis] : no mucositis [Vesicles] : no vesicles [de-identified] : postsurgical site: stitches over the occipital area C/D/I  [de-identified] : + BS, mild tenderness on palpation of the LLQ; L groin tenderness  [de-identified] : L groin 2 cm palpable nodule  [de-identified] : L lower back tightness  [de-identified] : occipital area healed , alopecia

## 2019-05-30 NOTE — ASSESSMENT
[FreeTextEntry1] : She is a 67 y/o F with Stage IV ovarian cancer and Stage I NSCLC diagnosed simultaneously. She has completed 6 cycles of carboplatin/ paclitaxel with bevacizumab and did not continue with bevacizumab maintenance: off since 4/2018. She completed SRS to the craniotomy site and started with palliative chemotherapy: retreat carboplatin/ paclitaxel 5/7/19. She tolerated with expected alopecia, arthralgias, myalgias and mild intermittent neuropathic complaints. We discussed supportive measures with Tylenol or Aleve.  Encouraged follow up if no relief. Clobetasol solution for scalp itch/tenderness.  Discussed with family expectation of side effects, management.   We reviewed potential side effects including but not limited to: fatigue, allergic reaction, myalgias, neuropathy, constipation, and nausea:  Addition of Avastin for C2.  to We reviewed repeat imaging After C3   Questions answered to her and her families' satisfaction. \par \par \par #Addendum- PT with Carbo reaction at near completion of bag # 2  (9%) \par She had flushing, flat erythematous rash to cervical region and torso \par Chemo stopped\par Received Supportive medications with Solucortef and Benadryl at standard reaction dose \par She maintained her VSS\par She had EKG for chest discomfort and no acute changes. \par She had resolution of redness/rash within 20-30 minutes and was subsequently observed for additional 30 minutes\par She was monitored and assessed by MD Cuevas/RADHA Alvarado \par She was deemed safe for d/c\par We discussed the reaction with her family as well as cessation of Carboplatinum \par She will continue C3 with Taxol/Avastin only \par She was advised on low dose benadryl for any additional itching but cautioned on safety. \par We will check in the AM for follow up \par She advised us of follow up MRI brain 6/5/19\par She will be evaluated  6/5 in office\par Reminded of appts for followup and needs. \par

## 2019-05-30 NOTE — REASON FOR VISIT
[FreeTextEntry2] : follow up for metastatic ovarian cancer - S/P C1 [place] Carbo/Taxol/Avastin

## 2019-06-05 ENCOUNTER — RESULT REVIEW (OUTPATIENT)
Age: 69
End: 2019-06-05

## 2019-06-05 ENCOUNTER — APPOINTMENT (OUTPATIENT)
Dept: HEMATOLOGY ONCOLOGY | Facility: CLINIC | Age: 69
End: 2019-06-05
Payer: MEDICARE

## 2019-06-05 VITALS
HEART RATE: 77 BPM | SYSTOLIC BLOOD PRESSURE: 107 MMHG | WEIGHT: 120.59 LBS | BODY MASS INDEX: 22.79 KG/M2 | TEMPERATURE: 98.8 F | DIASTOLIC BLOOD PRESSURE: 70 MMHG | OXYGEN SATURATION: 98 % | RESPIRATION RATE: 16 BRPM

## 2019-06-05 DIAGNOSIS — T78.40XD ALLERGY, UNSPECIFIED, SUBSEQUENT ENCOUNTER: ICD-10-CM

## 2019-06-05 DIAGNOSIS — R14.0 ABDOMINAL DISTENSION (GASEOUS): ICD-10-CM

## 2019-06-05 LAB
BASOPHILS # BLD AUTO: 0 K/UL — SIGNIFICANT CHANGE UP (ref 0–0.2)
BASOPHILS NFR BLD AUTO: 0.3 % — SIGNIFICANT CHANGE UP (ref 0–2)
EOSINOPHIL # BLD AUTO: 0.1 K/UL — SIGNIFICANT CHANGE UP (ref 0–0.5)
EOSINOPHIL NFR BLD AUTO: 1.5 % — SIGNIFICANT CHANGE UP (ref 0–6)
HCT VFR BLD CALC: 30 % — LOW (ref 34.5–45)
HGB BLD-MCNC: 10.8 G/DL — LOW (ref 11.5–15.5)
LYMPHOCYTES # BLD AUTO: 1.6 K/UL — SIGNIFICANT CHANGE UP (ref 1–3.3)
LYMPHOCYTES # BLD AUTO: 44.9 % — HIGH (ref 13–44)
MCHC RBC-ENTMCNC: 32.4 PG — SIGNIFICANT CHANGE UP (ref 27–34)
MCHC RBC-ENTMCNC: 35.9 G/DL — SIGNIFICANT CHANGE UP (ref 32–36)
MCV RBC AUTO: 90.4 FL — SIGNIFICANT CHANGE UP (ref 80–100)
MONOCYTES # BLD AUTO: 0.1 K/UL — SIGNIFICANT CHANGE UP (ref 0–0.9)
MONOCYTES NFR BLD AUTO: 3.7 % — SIGNIFICANT CHANGE UP (ref 2–14)
NEUTROPHILS # BLD AUTO: 1.8 K/UL — SIGNIFICANT CHANGE UP (ref 1.8–7.4)
NEUTROPHILS NFR BLD AUTO: 49.6 % — SIGNIFICANT CHANGE UP (ref 43–77)
PLATELET # BLD AUTO: 148 K/UL — LOW (ref 150–400)
RBC # BLD: 3.32 M/UL — LOW (ref 3.8–5.2)
RBC # FLD: 12.8 % — SIGNIFICANT CHANGE UP (ref 10.3–14.5)
WBC # BLD: 3.6 K/UL — LOW (ref 3.8–10.5)
WBC # FLD AUTO: 3.6 K/UL — LOW (ref 3.8–10.5)

## 2019-06-05 PROCEDURE — 99215 OFFICE O/P EST HI 40 MIN: CPT

## 2019-06-05 NOTE — ASSESSMENT
[FreeTextEntry1] : She is a 67 y/o F with Stage IV ovarian cancer and Stage I NSCLC diagnosed simultaneously. She has completed 6 cycles of carboplatin/ paclitaxel with bevacizumab and did not continue with bevacizumab maintenance: off from 4/2018 to 4/2019. 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 She completed SRS to the craniotomy site. CT of the chest/ abd/ pelvis showed adenopathy and started with palliative chemotherapy: retreat carboplatin/ paclitaxel 5/7/19. She had allergic reaction with C2 bag 2 of carboplatin and we explained that rechallenge would only be done in the hospital. We will continue with Avastin/ paclitaxel treatment. Clinically with expected mild fatigue, scalp pain, leucopenia, and neuropathy from treatment. Reviewed supportive measures with massage and B12 supplement. Reviewed rinsing mouth after each meal to prevent any dental infections. Currently leucopenia grade 1 and reviewed precautions. Questions answered to their satisfaction. Next follow up in 3 weeks.

## 2019-06-05 NOTE — HISTORY OF PRESENT ILLNESS
[Disease: _____________________] : Disease: [unfilled] [T: ___] : T[unfilled] [N: ___] : N[unfilled] [M: ___] : M[unfilled] [AJCC Stage: ____] : AJCC Stage: [unfilled] [de-identified] : Age 67: Stage I NSCLC adenocarcinoma s/p left VATs robotic assisted FERMÍN lobectomy and MLND 8/16/17 with Dr Ken Sol\par Age 67: Stage IV poorly differentiated ovarian cancer s/p AVEL/ BSO/ total omentectomy, bilateral ureterolysis, resection of abdominal anterior wall masses, repair of cystostomy\par She initially presented with hemoptysis and had evaluation consisting of bronchoscopy and FNA. The bronchoscopy was negative and the FNA was positive for malignant cells: TTF1+ and PAX 8 negative. She had Pet/ CT on 7/6/17 which showed 2.3 cm left upper lobe nodule with SUV of 23 and 4.2 x 3.1 cm left ovarian lesion SUV of 7.9 and left common iliac lymph nodes measuring up to 8mm with SUV of 2.2, 6 mm perihepatic implant SUV of 3.1. She initially had laparoscopic evaluation with left ovary biopsy which showed poorly differentiated carcinoma that was ER, WT1, PAX 8 positive and TTF1 negative. She initially had the left VATs. Then she subsequently had exploratory laparotomy with  AVEL, BSO, radical dissection of tumor with total omentectomy, resection of anterior abdominal wall masses, lysis of adhesions, and repair of cystostomy. She had CT imaging done after 5th cycle of chemotherapy to evaluate abdominal pain and CT showed no evidence of disease. She had abdominal pain in 3/2019 and had follow up CT which showed new small pelvic implant and enlarged sita-caval LN. She had headache and went to [facility] 4[facility]10/[facility]. She had imaging that showed predominantly cystic peripherally enhancing mass within the left cerebellar hemisphere. She had left suboccipital craniotomy with Dr Tatyana Ortiz. Had carboplatin retreat with paclitaxel/ Avastin started and had carboplatin reaction on 5/29/19 during bag 2 Cycle 2: redness over entire face and uncomfortable sensation over face/ arms. \par  [de-identified] : poorly differentiated carcinoma: ER positive  [de-identified] : carbo/ taxol: 10/24/17 to 2/12/18 with cumulative fatigue and neuropathy\par bevacizumab added on 12/8/17 to 4/2018 (pt stopped coming for treatment and lost to follow up until 12/2018)\par retreat carbo/ taxol 5/7/19 [de-identified] : She feels the pain/ redness resolved after 1 hour and was fine when she went home. Denied any rash or itching or pain recur at home. She has numbness in the index finger and toes but no pain. She denies any trouble with manual dexterity of fingers. Scalp pain still prominent over the left scalp: did not  the clobetasol solution yet. Groin and abdominal pain is improved. She is sitting without any pain. She denies any constipation or vomiting or dizziness. Ambulating without any assist and will be getting MRI follow up today. Also seeing her neurosurgeon today. Has gum tenderness over the left lower and left upper where canine teeth missing.  [1] : 1, Mild [D] : probable [FreeTextEntry3] : neuropathy  [FreeTextEntry5] : taxol  [FreeTextEntry8] : alopecia  [de-identified] : taxol

## 2019-06-05 NOTE — REASON FOR VISIT
[Follow-Up Visit] : a follow-up [Spouse] : spouse [Family Member] : family member [FreeTextEntry2] : follow up for ovarian cancer s/p allergic reaction with Cycle 2

## 2019-06-05 NOTE — REVIEW OF SYSTEMS
[Dysphagia] : no dysphagia [Nosebleeds] : no nosebleeds [Loss of Hearing] : no loss of hearing [Hoarseness] : no hoarseness [Odynophagia] : no odynophagia [Mucosal Pain] : mucosal pain [Skin Rash] : no skin rash [Skin Wound] : no skin wound [Confused] : no confusion [Dizziness] : no dizziness [Fainting] : no fainting [Difficulty Walking] : no difficulty walking [FreeTextEntry4] : gum tenderness over the left upper and lower molars/ canine  [Negative] : Allergic/Immunologic [de-identified] : numbness of the index finger/ toes  [de-identified] : scalp tenderness over the left side

## 2019-06-05 NOTE — PHYSICAL EXAM
[Fully active, able to carry on all pre-disease performance without restriction] : Status 0 - Fully active, able to carry on all pre-disease performance without restriction [Thin] : thin [Mucositis] : no mucositis [Ulcers] : no ulcers [Vesicles] : no vesicles [Thrush] : no thrush [de-identified] : postsurgical site: stitches over the occipital area C/D/I  [Normal] : affect appropriate [de-identified] : t [de-identified] : right upper canine missing with dental wire; left lower molar missing; no eryyhema or swelling  [de-identified] : L groin 2 cm palpable nodule  [de-identified] : + BS, mild tenderness on palpation of the LLQ; L groin tenderness  [de-identified] : L lower back tightness  [de-identified] : occipital area healed

## 2019-06-05 NOTE — CONSULT LETTER
[Dear  ___] : Dear  [unfilled], [Courtesy Letter:] : I had the pleasure of seeing your patient, [unfilled], in my office today. [Please see my note below.] : Please see my note below. [Sincerely,] : Sincerely, [Consult Closing:] : Thank you very much for allowing me to participate in the care of this patient.  If you have any questions, please do not hesitate to contact me. [FreeTextEntry3] : Geo Cuevas MD\par Attending\par [facility]\par  [FreeTextEntry2] : Tatyana Ortiz MD\par [id number] [facility]\[place] [address] [DrLewis  ___] : Dr. JOHNSON [DrLewis ___] : Dr. JOHNSON

## 2019-06-06 LAB
ALBUMIN SERPL ELPH-MCNC: 4.3 G/DL
ALP BLD-CCNC: 65 U/L
ALT SERPL-CCNC: 11 U/L
ANION GAP SERPL CALC-SCNC: 13 MMOL/L
AST SERPL-CCNC: 13 U/L
BILIRUB SERPL-MCNC: 0.4 MG/DL
BUN SERPL-MCNC: 12 MG/DL
CALCIUM SERPL-MCNC: 9.6 MG/DL
CANCER AG125 SERPL-ACNC: 21 U/ML
CHLORIDE SERPL-SCNC: 103 MMOL/L
CO2 SERPL-SCNC: 26 MMOL/L
CREAT SERPL-MCNC: 0.46 MG/DL
GLUCOSE SERPL-MCNC: 203 MG/DL
MAGNESIUM SERPL-MCNC: 1.8 MG/DL
POTASSIUM SERPL-SCNC: 4 MMOL/L
PROT SERPL-MCNC: 6.7 G/DL
SODIUM SERPL-SCNC: 142 MMOL/L

## 2019-06-06 NOTE — ASU PATIENT PROFILE, ADULT - MEDICATIONS BROUGHT TO HOSPITAL, PROFILE
Dr. Dafne Sandoval:    Notes: This patient has an upcoming appointment with you for Diabetes, Hyperlipidemia and Hypertension. In planning for that visit I have completed the following pre-visit planning:     Pre-Visit Planning Checklist:  Patient contacted: yes  Verified patient by name and date of birth: yes    Health Maintenance items reviewed:    No pre-visit planning health maintenance topics to review at this time    Labs and procedures pended:     Labs and procedures discussed with patient: yes  Reminded patient to check with their insurance company about coverage for lab tests and lab location: no    Preliminary Medication Reconciliation: was not performed. Reminded patient to arrive early: yes    Please complete the med-reconciliation and sign the appropriate labs as soon as possible.       Mary Singh MA  Pre-Services Specialist
no

## 2019-06-19 ENCOUNTER — RESULT REVIEW (OUTPATIENT)
Age: 69
End: 2019-06-19

## 2019-06-19 ENCOUNTER — LABORATORY RESULT (OUTPATIENT)
Age: 69
End: 2019-06-19

## 2019-06-19 ENCOUNTER — APPOINTMENT (OUTPATIENT)
Dept: INFUSION THERAPY | Facility: HOSPITAL | Age: 69
End: 2019-06-19

## 2019-06-19 LAB
BASOPHILS # BLD AUTO: 0 K/UL — SIGNIFICANT CHANGE UP (ref 0–0.2)
BASOPHILS NFR BLD AUTO: 0.4 % — SIGNIFICANT CHANGE UP (ref 0–2)
EOSINOPHIL # BLD AUTO: 0 K/UL — SIGNIFICANT CHANGE UP (ref 0–0.5)
EOSINOPHIL NFR BLD AUTO: 0.9 % — SIGNIFICANT CHANGE UP (ref 0–6)
HCT VFR BLD CALC: 32.9 % — LOW (ref 34.5–45)
HGB BLD-MCNC: 11.9 G/DL — SIGNIFICANT CHANGE UP (ref 11.5–15.5)
LYMPHOCYTES # BLD AUTO: 2 K/UL — SIGNIFICANT CHANGE UP (ref 1–3.3)
LYMPHOCYTES # BLD AUTO: 37.8 % — SIGNIFICANT CHANGE UP (ref 13–44)
MCHC RBC-ENTMCNC: 32.7 PG — SIGNIFICANT CHANGE UP (ref 27–34)
MCHC RBC-ENTMCNC: 36.1 G/DL — HIGH (ref 32–36)
MCV RBC AUTO: 90.7 FL — SIGNIFICANT CHANGE UP (ref 80–100)
MONOCYTES # BLD AUTO: 0.5 K/UL — SIGNIFICANT CHANGE UP (ref 0–0.9)
MONOCYTES NFR BLD AUTO: 10.3 % — SIGNIFICANT CHANGE UP (ref 2–14)
NEUTROPHILS # BLD AUTO: 2.7 K/UL — SIGNIFICANT CHANGE UP (ref 1.8–7.4)
NEUTROPHILS NFR BLD AUTO: 50.6 % — SIGNIFICANT CHANGE UP (ref 43–77)
PLATELET # BLD AUTO: 192 K/UL — SIGNIFICANT CHANGE UP (ref 150–400)
RBC # BLD: 3.63 M/UL — LOW (ref 3.8–5.2)
RBC # FLD: 13.7 % — SIGNIFICANT CHANGE UP (ref 10.3–14.5)
WBC # BLD: 5.4 K/UL — SIGNIFICANT CHANGE UP (ref 3.8–10.5)
WBC # FLD AUTO: 5.4 K/UL — SIGNIFICANT CHANGE UP (ref 3.8–10.5)

## 2019-06-20 DIAGNOSIS — R81 GLYCOSURIA: ICD-10-CM

## 2019-06-21 ENCOUNTER — APPOINTMENT (OUTPATIENT)
Dept: HEMATOLOGY ONCOLOGY | Facility: CLINIC | Age: 69
End: 2019-06-21
Payer: MEDICARE

## 2019-06-21 VITALS
RESPIRATION RATE: 18 BRPM | BODY MASS INDEX: 23.12 KG/M2 | TEMPERATURE: 98.5 F | OXYGEN SATURATION: 98 % | HEART RATE: 74 BPM | DIASTOLIC BLOOD PRESSURE: 78 MMHG | SYSTOLIC BLOOD PRESSURE: 123 MMHG | WEIGHT: 122.33 LBS

## 2019-06-21 DIAGNOSIS — Z86.19 PERSONAL HISTORY OF OTHER INFECTIOUS AND PARASITIC DISEASES: ICD-10-CM

## 2019-06-21 DIAGNOSIS — R73.9 HYPERGLYCEMIA, UNSPECIFIED: ICD-10-CM

## 2019-06-21 PROCEDURE — 99215 OFFICE O/P EST HI 40 MIN: CPT

## 2019-06-21 RX ORDER — NYSTATIN 100000 [USP'U]/ML
100000 SUSPENSION ORAL 4 TIMES DAILY
Qty: 140 | Refills: 1 | Status: DISCONTINUED | COMMUNITY
Start: 2019-04-17 | End: 2019-06-21

## 2019-06-21 RX ORDER — APREPITANT 80 MG/1
80 CAPSULE ORAL DAILY
Qty: 2 | Refills: 6 | Status: DISCONTINUED | COMMUNITY
Start: 2019-05-01 | End: 2019-06-21

## 2019-06-21 NOTE — HISTORY OF PRESENT ILLNESS
[Disease: _____________________] : Disease: [unfilled] [T: ___] : T[unfilled] [N: ___] : N[unfilled] [M: ___] : M[unfilled] [AJCC Stage: ____] : AJCC Stage: [unfilled] [de-identified] : Age 67: Stage I NSCLC adenocarcinoma s/p left VATs robotic assisted FERMÍN lobectomy and MLND 8/16/17 with Dr Ken Sol\par Age 67: Stage IV poorly differentiated ovarian cancer s/p AVEL/ BSO/ total omentectomy, bilateral ureterolysis, resection of abdominal anterior wall masses, repair of cystostomy\par She initially presented with hemoptysis and had evaluation consisting of bronchoscopy and FNA. The bronchoscopy was negative and the FNA was positive for malignant cells: TTF1+ and PAX 8 negative. She had Pet/ CT on 7/6/17 which showed 2.3 cm left upper lobe nodule with SUV of 23 and 4.2 x 3.1 cm left ovarian lesion SUV of 7.9 and left common iliac lymph nodes measuring up to 8mm with SUV of 2.2, 6 mm perihepatic implant SUV of 3.1. She initially had laparoscopic evaluation with left ovary biopsy which showed poorly differentiated carcinoma that was ER, WT1, PAX 8 positive and TTF1 negative. She initially had the left VATs. Then she subsequently had exploratory laparotomy with  AVEL, BSO, radical dissection of tumor with total omentectomy, resection of anterior abdominal wall masses, lysis of adhesions, and repair of cystostomy. She had CT imaging done after 5th cycle of chemotherapy to evaluate abdominal pain and CT showed no evidence of disease. She had abdominal pain in 3/2019 and had follow up CT which showed new small pelvic implant and enlarged sita-caval LN. She had headache and went to [facility] 4/10/19. She had imaging that showed predominantly cystic peripherally enhancing mass within the left cerebellar hemisphere. She had left suboccipital craniotomy with Dr Tatyana Ortiz. Had carboplatin retreat with paclitaxel/ Avastin started and had carboplatin reaction on 5/29/19 during bag 2 Cycle 2: redness over entire face and uncomfortable sensation over face/ arms. \par  [de-identified] : poorly differentiated carcinoma: ER positive  [de-identified] : carbo/ taxol: 10/24/17 to 2/12/18 with cumulative fatigue and neuropathy\par bevacizumab added on 12/8/17 to 4/2018 (pt stopped coming for treatment and lost to follow up until 12/2018)\par retreat carbo/ taxol 5/7/19\par bevacizumab added to Cycle 2 of retreat carboplatin/ paclitaxel 5/29/19 and allergic reaction with 2nd bag of carboplatin\par bevacizumab and paclitaxel 6/19/19 to present  [de-identified] : She tolerated bevacizumab/ paclitaxel infusion without any reaction. She is still feeling well. Has tingling sensation over the fingers and toes that is intermittent. Denies any nausea or vomiting. No further abdominal pain since the start of chemotherapy. She has energy for all ADLs. She eats rice three times a day along with fruit: pears. She takes herbal Chinese supplement. Denies any frequent urination. No headaches or vision changes: feels deformity at back of head. No pain.  [1] : 1, Mild [D] : probable [B] : unlikely [FreeTextEntry3] : neuropathy  [FreeTextEntry5] : taxol  [FreeTextEntry6] : bevacizumab

## 2019-06-21 NOTE — REVIEW OF SYSTEMS
[Fever] : no fever [Chills] : no chills [Night Sweats] : no night sweats [Fatigue] : fatigue [Recent Change In Weight] : ~T no recent weight change [Dysphagia] : no dysphagia [Loss of Hearing] : no loss of hearing [Nosebleeds] : no nosebleeds [Hoarseness] : no hoarseness [Odynophagia] : no odynophagia [Mucosal Pain] : no mucosal pain [Confused] : no confusion [Dizziness] : no dizziness [Fainting] : no fainting [Difficulty Walking] : no difficulty walking [FreeTextEntry4] : ear itchiness after chemotherapy  [Negative] : Heme/Lymph [de-identified] : tingling of the fingers and toes

## 2019-06-21 NOTE — CONSULT LETTER
[Dear  ___] : Dear  [unfilled], [Courtesy Letter:] : I had the pleasure of seeing your patient, [unfilled], in my office today. [Please see my note below.] : Please see my note below. [Sincerely,] : Sincerely, [Consult Closing:] : Thank you very much for allowing me to participate in the care of this patient.  If you have any questions, please do not hesitate to contact me. [DrLewis  ___] : Dr. JOHNSON [FreeTextEntry3] : Geo Cuevas MD\par Attending\par [facility]\par  [FreeTextEntry2] : Pipo Grove MD\par fax [phone]

## 2019-06-21 NOTE — PHYSICAL EXAM
[Restricted in physically strenuous activity but ambulatory and able to carry out work of a light or sedentary nature] : Status 1- Restricted in physically strenuous activity but ambulatory and able to carry out work of a light or sedentary nature, e.g., light house work, office work [Thin] : thin [Ulcers] : no ulcers [Mucositis] : no mucositis [Vesicles] : no vesicles [Thrush] : no thrush [Normal] : affect appropriate [de-identified] : postsurgical site: stitches over the occipital area C/D/I  [de-identified] : right upper canine missing with dental wire; left lower molar missing; posterior cranial surgical scar well healed  [de-identified] : occipital area healed

## 2019-06-21 NOTE — REASON FOR VISIT
[Family Member] : family member [Follow-Up Visit] : a follow-up [FreeTextEntry2] : follow up for metastatic ovarian cancer to the brain on avastin/ paclitaxel

## 2019-06-21 NOTE — ASSESSMENT
[FreeTextEntry1] : She is a 67 y/o F with Stage IV ovarian cancer and Stage I NSCLC diagnosed simultaneously. She has completed 6 cycles of carboplatin/ paclitaxel with bevacizumab and did not continue with bevacizumab maintenance: off from 4/2018 to 4/2019. 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 She completed SRS to the craniotomy site. CT of the chest/ abd/ pelvis showed adenopathy and started with palliative chemotherapy: retreat carboplatin/ paclitaxel 5/7/19. She had allergic reaction with C2 bag 2 of carboplatin and has been on bevacizumab/ paclitaxel for recent C3. We reviewed expectations of disease control and maintenance therapy. We will repeat CT of the chest/ abd/ pelvis. \par \par Brain metastases: she will follow up with neurosurgery and RT. Off dexamethasone. \par \par Hyperglycemia: sugars 200s and urine with >1000 glucose. We reviewed modification of diet to cut down on rice. Will check HgbA1c. \par \par Thrush resolved. 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  [Palliative] : Goals of care discussed with patient: Palliative

## 2019-06-25 ENCOUNTER — FORM ENCOUNTER (OUTPATIENT)
Age: 69
End: 2019-06-25

## 2019-06-26 ENCOUNTER — APPOINTMENT (OUTPATIENT)
Dept: CT IMAGING | Facility: IMAGING CENTER | Age: 69
End: 2019-06-26
Payer: MEDICARE

## 2019-06-26 ENCOUNTER — OUTPATIENT (OUTPATIENT)
Dept: OUTPATIENT SERVICES | Facility: HOSPITAL | Age: 69
LOS: 1 days | End: 2019-06-26
Payer: MEDICARE

## 2019-06-26 DIAGNOSIS — Z90.710 ACQUIRED ABSENCE OF BOTH CERVIX AND UTERUS: Chronic | ICD-10-CM

## 2019-06-26 DIAGNOSIS — Z90.49 ACQUIRED ABSENCE OF OTHER SPECIFIED PARTS OF DIGESTIVE TRACT: Chronic | ICD-10-CM

## 2019-06-26 DIAGNOSIS — C34.92 MALIGNANT NEOPLASM OF UNSPECIFIED PART OF LEFT BRONCHUS OR LUNG: ICD-10-CM

## 2019-06-26 DIAGNOSIS — C76.2 MALIGNANT NEOPLASM OF ABDOMEN: ICD-10-CM

## 2019-06-26 DIAGNOSIS — Z90.722 ACQUIRED ABSENCE OF OVARIES, BILATERAL: Chronic | ICD-10-CM

## 2019-06-26 DIAGNOSIS — Z98.890 OTHER SPECIFIED POSTPROCEDURAL STATES: Chronic | ICD-10-CM

## 2019-06-26 DIAGNOSIS — C34.12 MALIGNANT NEOPLASM OF UPPER LOBE, LEFT BRONCHUS OR LUNG: ICD-10-CM

## 2019-06-26 PROCEDURE — 74177 CT ABD & PELVIS W/CONTRAST: CPT | Mod: 26

## 2019-06-26 PROCEDURE — 71260 CT THORAX DX C+: CPT | Mod: 26

## 2019-06-26 PROCEDURE — 74177 CT ABD & PELVIS W/CONTRAST: CPT

## 2019-06-26 PROCEDURE — 71260 CT THORAX DX C+: CPT

## 2019-07-11 ENCOUNTER — OUTPATIENT (OUTPATIENT)
Dept: OUTPATIENT SERVICES | Facility: HOSPITAL | Age: 69
LOS: 1 days | Discharge: HOME | End: 2019-07-11

## 2019-07-11 DIAGNOSIS — Z90.722 ACQUIRED ABSENCE OF OVARIES, BILATERAL: Chronic | ICD-10-CM

## 2019-07-11 DIAGNOSIS — Z98.890 OTHER SPECIFIED POSTPROCEDURAL STATES: Chronic | ICD-10-CM

## 2019-07-11 DIAGNOSIS — C56.2 MALIGNANT NEOPLASM OF LEFT OVARY: ICD-10-CM

## 2019-07-11 DIAGNOSIS — Z90.710 ACQUIRED ABSENCE OF BOTH CERVIX AND UTERUS: Chronic | ICD-10-CM

## 2019-07-11 DIAGNOSIS — Z90.49 ACQUIRED ABSENCE OF OTHER SPECIFIED PARTS OF DIGESTIVE TRACT: Chronic | ICD-10-CM

## 2019-07-16 ENCOUNTER — APPOINTMENT (OUTPATIENT)
Dept: INFUSION THERAPY | Facility: HOSPITAL | Age: 69
End: 2019-07-16

## 2019-07-16 ENCOUNTER — LABORATORY RESULT (OUTPATIENT)
Age: 69
End: 2019-07-16

## 2019-07-16 ENCOUNTER — RESULT REVIEW (OUTPATIENT)
Age: 69
End: 2019-07-16

## 2019-07-16 LAB
BASOPHILS # BLD AUTO: 0.1 K/UL — SIGNIFICANT CHANGE UP (ref 0–0.2)
BASOPHILS NFR BLD AUTO: 0.7 % — SIGNIFICANT CHANGE UP (ref 0–2)
EOSINOPHIL # BLD AUTO: 0.1 K/UL — SIGNIFICANT CHANGE UP (ref 0–0.5)
EOSINOPHIL NFR BLD AUTO: 0.9 % — SIGNIFICANT CHANGE UP (ref 0–6)
HCT VFR BLD CALC: 35.9 % — SIGNIFICANT CHANGE UP (ref 34.5–45)
HGB BLD-MCNC: 12.2 G/DL — SIGNIFICANT CHANGE UP (ref 11.5–15.5)
LYMPHOCYTES # BLD AUTO: 2.1 K/UL — SIGNIFICANT CHANGE UP (ref 1–3.3)
LYMPHOCYTES # BLD AUTO: 29.1 % — SIGNIFICANT CHANGE UP (ref 13–44)
MCHC RBC-ENTMCNC: 31.2 PG — SIGNIFICANT CHANGE UP (ref 27–34)
MCHC RBC-ENTMCNC: 34 G/DL — SIGNIFICANT CHANGE UP (ref 32–36)
MCV RBC AUTO: 91.6 FL — SIGNIFICANT CHANGE UP (ref 80–100)
MONOCYTES # BLD AUTO: 0.7 K/UL — SIGNIFICANT CHANGE UP (ref 0–0.9)
MONOCYTES NFR BLD AUTO: 9.9 % — SIGNIFICANT CHANGE UP (ref 2–14)
NEUTROPHILS # BLD AUTO: 4.4 K/UL — SIGNIFICANT CHANGE UP (ref 1.8–7.4)
NEUTROPHILS NFR BLD AUTO: 59.3 % — SIGNIFICANT CHANGE UP (ref 43–77)
PLATELET # BLD AUTO: 176 K/UL — SIGNIFICANT CHANGE UP (ref 150–400)
RBC # BLD: 3.92 M/UL — SIGNIFICANT CHANGE UP (ref 3.8–5.2)
RBC # FLD: 12.8 % — SIGNIFICANT CHANGE UP (ref 10.3–14.5)
WBC # BLD: 7.4 K/UL — SIGNIFICANT CHANGE UP (ref 3.8–10.5)
WBC # FLD AUTO: 7.4 K/UL — SIGNIFICANT CHANGE UP (ref 3.8–10.5)

## 2019-07-17 DIAGNOSIS — R11.2 NAUSEA WITH VOMITING, UNSPECIFIED: ICD-10-CM

## 2019-07-17 DIAGNOSIS — Z51.11 ENCOUNTER FOR ANTINEOPLASTIC CHEMOTHERAPY: ICD-10-CM

## 2019-07-29 ENCOUNTER — APPOINTMENT (OUTPATIENT)
Dept: HEMATOLOGY ONCOLOGY | Facility: CLINIC | Age: 69
End: 2019-07-29
Payer: MEDICARE

## 2019-07-29 VITALS
OXYGEN SATURATION: 98 % | DIASTOLIC BLOOD PRESSURE: 82 MMHG | WEIGHT: 116.62 LBS | HEART RATE: 64 BPM | SYSTOLIC BLOOD PRESSURE: 123 MMHG | RESPIRATION RATE: 16 BRPM | TEMPERATURE: 98 F | BODY MASS INDEX: 22.04 KG/M2

## 2019-07-29 PROCEDURE — 99214 OFFICE O/P EST MOD 30 MIN: CPT

## 2019-08-01 NOTE — REVIEW OF SYSTEMS
[Fever] : no fever [Chills] : no chills [Night Sweats] : no night sweats [Fatigue] : no fatigue [Recent Change In Weight] : ~T no recent weight change [Dysphagia] : no dysphagia [Loss of Hearing] : no loss of hearing [Nosebleeds] : no nosebleeds [Hoarseness] : no hoarseness [Odynophagia] : no odynophagia [Mucosal Pain] : no mucosal pain [Confused] : no confusion [Dizziness] : no dizziness [Fainting] : no fainting [Difficulty Walking] : no difficulty walking [FreeTextEntry4] : ear itchiness after chemotherapy  [de-identified] : tingling of the fingers and toes  stable

## 2019-08-01 NOTE — HISTORY OF PRESENT ILLNESS
[de-identified] : Age 67: Stage I NSCLC adenocarcinoma s/p left VATs robotic assisted FERMÍN lobectomy and MLND 8/16/17 with Dr Ken Sol\par Age 67: Stage IV poorly differentiated ovarian cancer s/p AVEL/ BSO/ total omentectomy, bilateral ureterolysis, resection of abdominal anterior wall masses, repair of cystostomy\par She initially presented with hemoptysis and had evaluation consisting of bronchoscopy and FNA. The bronchoscopy was negative and the FNA was positive for malignant cells: TTF1+ and PAX 8 negative. She had Pet/ CT on 7/6/17 which showed 2.3 cm left upper lobe nodule with SUV of 23 and 4.2 x 3.1 cm left ovarian lesion SUV of 7.9 and left common iliac lymph nodes measuring up to 8mm with SUV of 2.2, 6 mm perihepatic implant SUV of 3.1. She initially had laparoscopic evaluation with left ovary biopsy which showed poorly differentiated carcinoma that was ER, WT1, PAX 8 positive and TTF1 negative. She initially had the left VATs. Then she subsequently had exploratory laparotomy with  AVEL, BSO, radical dissection of tumor with total omentectomy, resection of anterior abdominal wall masses, lysis of adhesions, and repair of cystostomy. She had CT imaging done after 5th cycle of chemotherapy to evaluate abdominal pain and CT showed no evidence of disease. She had abdominal pain in 3/2019 and had follow up CT which showed new small pelvic implant and enlarged sita-caval LN. She had headache and went to [facility] 4/10/19. She had imaging that showed predominantly cystic peripherally enhancing mass within the left cerebellar hemisphere. She had left suboccipital craniotomy with Dr Tatyana Ortiz. Had carboplatin retreat with paclitaxel/ Avastin started and had carboplatin reaction on 5/29/19 during bag 2 Cycle 2: redness over entire face and uncomfortable sensation over face/ arms. \par  [de-identified] : poorly differentiated carcinoma: ER positive  [de-identified] : carbo/ taxol: 10/24/17 to 2/12/18 with cumulative fatigue and neuropathy\par bevacizumab added on 12/8/17 to 4/2018 (pt stopped coming for treatment and lost to follow up until 12/2018)\par retreat carbo/ taxol 5/7/19\par bevacizumab added to Cycle 2 of retreat carboplatin/ paclitaxel 5/29/19 and allergic reaction with 2nd bag of carboplatin\par bevacizumab and paclitaxel 6/19/19 to present  [FreeTextEntry1] : Avastin 15 mg/Kg /Taxol 175 mg   7/16/19   [de-identified] : 7/29/19 \par She tolerated bevacizumab/ paclitaxel infusion without any reaction. She is still feeling well. Has tingling sensation over the fingers and toes that is intermittent. \par She feels that the tingling sensation to the feet is still present but may be better after the Taxol adjustment\par She is going to have repeat MRI of the brain with her Radiation Oncologist in the [facility]\par She denies nausea, emesis, headaches, dizzy sensation, blurred vision, recent falls or trauma. \par She does note some intermittent arthralgias\par Denies any skin changes, bleeding,rash \par Denies vaginal.rectal bleeding\par She saw her PCP and had repeat BW with HGB A1C 6.1%\par She is eating less rice daily. \par Denies abdominal pain, + constipation relieved with Colace OTC.  Noted softening stool and unsure of how much colace to take . .\par She takes herbal Chinese supplement. \par Denies any frequent urination. No headaches or vision changes: feels deformity at back of head.  [FreeTextEntry3] : neuropathy  [FreeTextEntry5] : taxol  [FreeTextEntry6] : bevacizumab

## 2019-08-01 NOTE — ASSESSMENT
[FreeTextEntry1] : She is a 69 y/o F with Stage IV ovarian cancer and Stage I NSCLC diagnosed simultaneously. She has completed 6 cycles of carboplatin/ paclitaxel with bevacizumab and did not continue with bevacizumab maintenance: off from 4/2018 to 4/2019.  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 She completed SRS to the craniotomy site. CT of the chest/ abd/ pelvis showed adenopathy and started with palliative chemotherapy: retreat carboplatin/ paclitaxel 5/7/19. She had allergic reaction with C2 bag 2 of carboplatin and has been on bevacizumab/ paclitaxel for recent C4 treatment . We reviewed expectations of disease control and maintenance therapy. We will repeat CT of the chest/ abd/ pelvis.  She has no worsening of her neuropathy.  She feels that when her family massages her feet it feels better but notes neuropathy is most present at bedtime.  at this she is refusing Neurontin.  She is willing to try Alpha Lipoic Acid 200 mg daily.  She may reduce her Colace intake with too soft stools. She will continue with gentle massage and report worsening neuropathy complaints.   We have asked her to please bring the CD/Results of MRI Brain with her on her follow up visit with MD. Cuevas in three weeks.  Questions answered to the sons and patients apparent satisfaction. \par \par  \par \par Hyperglycemia: sugars 200s and urine with >1000 glucose. We reviewed modification of diet to cut down on rice.  HgbA1c 6.1% . \par \par Thrush resolved. 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    Will continue to monitor.

## 2019-08-01 NOTE — PHYSICAL EXAM
[Ulcers] : no ulcers [Mucositis] : no mucositis [Thrush] : no thrush [Vesicles] : no vesicles [de-identified] : postsurgical site: stitches over the occipital area C/D/I  [de-identified] : right upper canine missing with dental wire; left lower molar missing; posterior cranial surgical scar well healed  [de-identified] : occipital area healed

## 2019-08-01 NOTE — REASON FOR VISIT
[FreeTextEntry2] : follow up for metastatic ovarian cancer to the brain on Avastin/ Paclitaxel  [FreeTextEntry3] : Charlie Carty NP present

## 2019-08-01 NOTE — CONSULT LETTER
[FreeTextEntry2] : Pipo Grove MD\par fax [phone] [FreeTextEntry3] : Geo Cuevas MD\par Attending\par [facility]\par

## 2019-08-05 ENCOUNTER — RESULT REVIEW (OUTPATIENT)
Age: 69
End: 2019-08-05

## 2019-08-05 ENCOUNTER — APPOINTMENT (OUTPATIENT)
Dept: INFUSION THERAPY | Facility: HOSPITAL | Age: 69
End: 2019-08-05

## 2019-08-05 ENCOUNTER — LABORATORY RESULT (OUTPATIENT)
Age: 69
End: 2019-08-05

## 2019-08-05 LAB
BASOPHILS # BLD AUTO: 0 K/UL — SIGNIFICANT CHANGE UP (ref 0–0.2)
BASOPHILS NFR BLD AUTO: 0.2 % — SIGNIFICANT CHANGE UP (ref 0–2)
EOSINOPHIL # BLD AUTO: 0.1 K/UL — SIGNIFICANT CHANGE UP (ref 0–0.5)
EOSINOPHIL NFR BLD AUTO: 1 % — SIGNIFICANT CHANGE UP (ref 0–6)
HCT VFR BLD CALC: 36.5 % — SIGNIFICANT CHANGE UP (ref 34.5–45)
HGB BLD-MCNC: 12.4 G/DL — SIGNIFICANT CHANGE UP (ref 11.5–15.5)
LYMPHOCYTES # BLD AUTO: 2.1 K/UL — SIGNIFICANT CHANGE UP (ref 1–3.3)
LYMPHOCYTES # BLD AUTO: 31.3 % — SIGNIFICANT CHANGE UP (ref 13–44)
MCHC RBC-ENTMCNC: 31.2 PG — SIGNIFICANT CHANGE UP (ref 27–34)
MCHC RBC-ENTMCNC: 33.8 G/DL — SIGNIFICANT CHANGE UP (ref 32–36)
MCV RBC AUTO: 92.3 FL — SIGNIFICANT CHANGE UP (ref 80–100)
MONOCYTES # BLD AUTO: 0.6 K/UL — SIGNIFICANT CHANGE UP (ref 0–0.9)
MONOCYTES NFR BLD AUTO: 9.2 % — SIGNIFICANT CHANGE UP (ref 2–14)
NEUTROPHILS # BLD AUTO: 3.9 K/UL — SIGNIFICANT CHANGE UP (ref 1.8–7.4)
NEUTROPHILS NFR BLD AUTO: 58.2 % — SIGNIFICANT CHANGE UP (ref 43–77)
PLATELET # BLD AUTO: 209 K/UL — SIGNIFICANT CHANGE UP (ref 150–400)
RBC # BLD: 3.96 M/UL — SIGNIFICANT CHANGE UP (ref 3.8–5.2)
RBC # FLD: 13.6 % — SIGNIFICANT CHANGE UP (ref 10.3–14.5)
WBC # BLD: 6.6 K/UL — SIGNIFICANT CHANGE UP (ref 3.8–10.5)
WBC # FLD AUTO: 6.6 K/UL — SIGNIFICANT CHANGE UP (ref 3.8–10.5)

## 2019-08-09 ENCOUNTER — MESSAGE (OUTPATIENT)
Age: 69
End: 2019-08-09

## 2019-08-19 ENCOUNTER — OUTPATIENT (OUTPATIENT)
Dept: OUTPATIENT SERVICES | Facility: HOSPITAL | Age: 69
LOS: 1 days | Discharge: HOME | End: 2019-08-19

## 2019-08-19 DIAGNOSIS — Z90.49 ACQUIRED ABSENCE OF OTHER SPECIFIED PARTS OF DIGESTIVE TRACT: Chronic | ICD-10-CM

## 2019-08-19 DIAGNOSIS — Z98.890 OTHER SPECIFIED POSTPROCEDURAL STATES: Chronic | ICD-10-CM

## 2019-08-19 DIAGNOSIS — C56.2 MALIGNANT NEOPLASM OF LEFT OVARY: ICD-10-CM

## 2019-08-19 DIAGNOSIS — Z90.710 ACQUIRED ABSENCE OF BOTH CERVIX AND UTERUS: Chronic | ICD-10-CM

## 2019-08-19 DIAGNOSIS — Z90.722 ACQUIRED ABSENCE OF OVARIES, BILATERAL: Chronic | ICD-10-CM

## 2019-08-21 ENCOUNTER — RESULT REVIEW (OUTPATIENT)
Age: 69
End: 2019-08-21

## 2019-08-21 ENCOUNTER — APPOINTMENT (OUTPATIENT)
Dept: HEMATOLOGY ONCOLOGY | Facility: CLINIC | Age: 69
End: 2019-08-21
Payer: MEDICARE

## 2019-08-21 VITALS
TEMPERATURE: 98.6 F | HEART RATE: 75 BPM | OXYGEN SATURATION: 98 % | SYSTOLIC BLOOD PRESSURE: 131 MMHG | RESPIRATION RATE: 16 BRPM | DIASTOLIC BLOOD PRESSURE: 78 MMHG | WEIGHT: 117 LBS | BODY MASS INDEX: 22.11 KG/M2

## 2019-08-21 LAB
HCT VFR BLD CALC: 36.9 % — SIGNIFICANT CHANGE UP (ref 34.5–45)
HGB BLD-MCNC: 12.7 G/DL — SIGNIFICANT CHANGE UP (ref 11.5–15.5)
MCHC RBC-ENTMCNC: 31.2 PG — SIGNIFICANT CHANGE UP (ref 27–34)
MCHC RBC-ENTMCNC: 34.5 G/DL — SIGNIFICANT CHANGE UP (ref 32–36)
MCV RBC AUTO: 90.5 FL — SIGNIFICANT CHANGE UP (ref 80–100)
PLATELET # BLD AUTO: 192 K/UL — SIGNIFICANT CHANGE UP (ref 150–400)
RBC # BLD: 4.08 M/UL — SIGNIFICANT CHANGE UP (ref 3.8–5.2)
RBC # FLD: 12.4 % — SIGNIFICANT CHANGE UP (ref 10.3–14.5)
WBC # BLD: 4.7 K/UL — SIGNIFICANT CHANGE UP (ref 3.8–10.5)
WBC # FLD AUTO: 4.7 K/UL — SIGNIFICANT CHANGE UP (ref 3.8–10.5)

## 2019-08-21 PROCEDURE — 99214 OFFICE O/P EST MOD 30 MIN: CPT

## 2019-08-21 RX ORDER — HALOBETASOL PROPIONATE 0.5 MG/G
0.05 CREAM TOPICAL
Qty: 50 | Refills: 0 | Status: DISCONTINUED | COMMUNITY
Start: 2019-06-29

## 2019-08-21 RX ORDER — PANTOPRAZOLE 40 MG/1
40 TABLET, DELAYED RELEASE ORAL
Qty: 30 | Refills: 0 | Status: DISCONTINUED | COMMUNITY
Start: 2019-04-16

## 2019-08-21 RX ORDER — SENNOSIDES 8.6 MG
8.6 TABLET ORAL
Qty: 28 | Refills: 0 | Status: DISCONTINUED | COMMUNITY
Start: 2019-04-16

## 2019-08-21 RX ORDER — DEXAMETHASONE 2 MG/1
2 TABLET ORAL
Qty: 25 | Refills: 0 | Status: DISCONTINUED | COMMUNITY
Start: 2019-04-16

## 2019-08-21 RX ORDER — CEPHALEXIN 500 MG/1
500 CAPSULE ORAL
Qty: 20 | Refills: 0 | Status: DISCONTINUED | COMMUNITY
Start: 2019-06-29

## 2019-08-21 NOTE — REVIEW OF SYSTEMS
[Confused] : no confusion [Dizziness] : no dizziness [Fainting] : no fainting [Difficulty Walking] : no difficulty walking [Negative] : Allergic/Immunologic [de-identified] : numbness of the feet

## 2019-08-21 NOTE — PHYSICAL EXAM
[Restricted in physically strenuous activity but ambulatory and able to carry out work of a light or sedentary nature] : Status 1- Restricted in physically strenuous activity but ambulatory and able to carry out work of a light or sedentary nature, e.g., light house work, office work [Thin] : thin [Ulcers] : no ulcers [Mucositis] : no mucositis [Thrush] : no thrush [Vesicles] : no vesicles [Normal] : affect appropriate [de-identified] : postsurgical site: stitches over the occipital area C/D/I  [de-identified] : right upper canine missing with dental wire; left lower molar missing; posterior cranial surgical scar well healed  [de-identified] : occipital area healed

## 2019-08-21 NOTE — HISTORY OF PRESENT ILLNESS
[Disease: _____________________] : Disease: [unfilled] [T: ___] : T[unfilled] [N: ___] : N[unfilled] [M: ___] : M[unfilled] [AJCC Stage: ____] : AJCC Stage: [unfilled] [de-identified] : Age 67: Stage I NSCLC adenocarcinoma s/p left VATs robotic assisted FERMÍN lobectomy and MLND 8/16/17 with Dr Ken Sol\par Age 67: Stage IV poorly differentiated ovarian cancer s/p AVEL/ BSO/ total omentectomy, bilateral ureterolysis, resection of abdominal anterior wall masses, repair of cystostomy\par She initially presented with hemoptysis and had evaluation consisting of bronchoscopy and FNA. The bronchoscopy was negative and the FNA was positive for malignant cells: TTF1+ and PAX 8 negative. She had Pet/ CT on 7/6/17 which showed 2.3 cm left upper lobe nodule with SUV of 23 and 4.2 x 3.1 cm left ovarian lesion SUV of 7.9 and left common iliac lymph nodes measuring up to 8mm with SUV of 2.2, 6 mm perihepatic implant SUV of 3.1. She initially had laparoscopic evaluation with left ovary biopsy which showed poorly differentiated carcinoma that was ER, WT1, PAX 8 positive and TTF1 negative. She initially had the left VATs. Then she subsequently had exploratory laparotomy with  AVEL, BSO, radical dissection of tumor with total omentectomy, resection of anterior abdominal wall masses, lysis of adhesions, and repair of cystostomy. She had CT imaging done after 5th cycle of chemotherapy to evaluate abdominal pain and CT showed no evidence of disease. She had abdominal pain in 3/2019 and had follow up CT which showed new small pelvic implant and enlarged sita-caval LN. She had headache and went to [facility] 4/10/19. She had imaging that showed predominantly cystic peripherally enhancing mass within the left cerebellar hemisphere. She had left suboccipital craniotomy with Dr Tatyana Ortiz. Had carboplatin retreat with paclitaxel/ Avastin started and had carboplatin reaction on 5/29/19 during bag 2 Cycle 2: redness over entire face and uncomfortable sensation over face/ arms. \par  [de-identified] : carbo/ taxol: 10/24/17 to 2/12/18 with cumulative fatigue and neuropathy\par bevacizumab added on 12/8/17 to 4/2018 (pt stopped coming for treatment and lost to follow up until 12/2018)\par retreat carbo/ taxol 5/7/19\par bevacizumab added to Cycle 2 of retreat carboplatin/ paclitaxel 5/29/19 and allergic reaction with 2nd bag of carboplatin\par bevacizumab and paclitaxel 6/19/19 to present  [de-identified] : poorly differentiated carcinoma: ER positive  [de-identified] : She feels numbness over the balls of her feet: denies any falls or unsteadiness with walking. She has occasional tingling sensation but no pain. She has scalp tenderness but much better. She has good energy and doing housework. Denies any new medications.

## 2019-08-21 NOTE — ASSESSMENT
[FreeTextEntry1] : She is a 70 y/o F with Stage IV ovarian cancer and Stage I NSCLC diagnosed simultaneously. She has completed 6 cycles of carboplatin/ paclitaxel with bevacizumab and did not continue with bevacizumab maintenance: off from 4/2018 to 4/2019. 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 She completed SRS to the craniotomy site. CT of the chest/ abd/ pelvis showed adenopathy and started with palliative chemotherapy: retreat carboplatin/ paclitaxel 5/7/19. She had allergic reaction with C2 bag 2 of carboplatin and has been on bevacizumab/ paclitaxel s/p C5. She is tolerating therapy with Grade 1 fatigue and neuropathy of the feet. She will have CT repeat done after C6 of treatment. We reviewed supportive measures for fatigue. She will continue with exercise and oral intake. She had recent MRI of the brain which was negative. She will continue with maintenance bevacizumab if response to therapy. CBC stable. Next follow up in 3 weeks.

## 2019-08-22 LAB
ALBUMIN SERPL ELPH-MCNC: 4.3 G/DL
ALP BLD-CCNC: 56 U/L
ALT SERPL-CCNC: 9 U/L
ANION GAP SERPL CALC-SCNC: 14 MMOL/L
AST SERPL-CCNC: 13 U/L
BILIRUB SERPL-MCNC: 0.2 MG/DL
BUN SERPL-MCNC: 17 MG/DL
CALCIUM SERPL-MCNC: 9.3 MG/DL
CANCER AG125 SERPL-ACNC: 13 U/ML
CHLORIDE SERPL-SCNC: 107 MMOL/L
CO2 SERPL-SCNC: 23 MMOL/L
CREAT SERPL-MCNC: 0.54 MG/DL
GLUCOSE SERPL-MCNC: 122 MG/DL
MAGNESIUM SERPL-MCNC: 2.1 MG/DL
POTASSIUM SERPL-SCNC: 3.9 MMOL/L
PROT SERPL-MCNC: 6.7 G/DL
SODIUM SERPL-SCNC: 144 MMOL/L

## 2019-08-27 ENCOUNTER — FORM ENCOUNTER (OUTPATIENT)
Age: 69
End: 2019-08-27

## 2019-08-27 ENCOUNTER — RESULT REVIEW (OUTPATIENT)
Age: 69
End: 2019-08-27

## 2019-08-27 ENCOUNTER — APPOINTMENT (OUTPATIENT)
Dept: INFUSION THERAPY | Facility: HOSPITAL | Age: 69
End: 2019-08-27

## 2019-08-27 LAB
BASOPHILS # BLD AUTO: 0 K/UL — SIGNIFICANT CHANGE UP (ref 0–0.2)
BASOPHILS NFR BLD AUTO: 0.5 % — SIGNIFICANT CHANGE UP (ref 0–2)
EOSINOPHIL # BLD AUTO: 0.2 K/UL — SIGNIFICANT CHANGE UP (ref 0–0.5)
EOSINOPHIL NFR BLD AUTO: 2.4 % — SIGNIFICANT CHANGE UP (ref 0–6)
HCT VFR BLD CALC: 38.9 % — SIGNIFICANT CHANGE UP (ref 34.5–45)
HGB BLD-MCNC: 13.2 G/DL — SIGNIFICANT CHANGE UP (ref 11.5–15.5)
LYMPHOCYTES # BLD AUTO: 1.9 K/UL — SIGNIFICANT CHANGE UP (ref 1–3.3)
LYMPHOCYTES # BLD AUTO: 25.4 % — SIGNIFICANT CHANGE UP (ref 13–44)
MCHC RBC-ENTMCNC: 30.5 PG — SIGNIFICANT CHANGE UP (ref 27–34)
MCHC RBC-ENTMCNC: 34 G/DL — SIGNIFICANT CHANGE UP (ref 32–36)
MCV RBC AUTO: 89.8 FL — SIGNIFICANT CHANGE UP (ref 80–100)
MONOCYTES # BLD AUTO: 0.8 K/UL — SIGNIFICANT CHANGE UP (ref 0–0.9)
MONOCYTES NFR BLD AUTO: 10 % — SIGNIFICANT CHANGE UP (ref 2–14)
NEUTROPHILS # BLD AUTO: 4.6 K/UL — SIGNIFICANT CHANGE UP (ref 1.8–7.4)
NEUTROPHILS NFR BLD AUTO: 61.7 % — SIGNIFICANT CHANGE UP (ref 43–77)
PLATELET # BLD AUTO: 201 K/UL — SIGNIFICANT CHANGE UP (ref 150–400)
RBC # BLD: 4.33 M/UL — SIGNIFICANT CHANGE UP (ref 3.8–5.2)
RBC # FLD: 12.5 % — SIGNIFICANT CHANGE UP (ref 10.3–14.5)
WBC # BLD: 7.5 K/UL — SIGNIFICANT CHANGE UP (ref 3.8–10.5)
WBC # FLD AUTO: 7.5 K/UL — SIGNIFICANT CHANGE UP (ref 3.8–10.5)

## 2019-08-28 ENCOUNTER — OUTPATIENT (OUTPATIENT)
Dept: OUTPATIENT SERVICES | Facility: HOSPITAL | Age: 69
LOS: 1 days | End: 2019-08-28
Payer: MEDICARE

## 2019-08-28 ENCOUNTER — APPOINTMENT (OUTPATIENT)
Dept: CT IMAGING | Facility: IMAGING CENTER | Age: 69
End: 2019-08-28
Payer: MEDICARE

## 2019-08-28 DIAGNOSIS — Z98.890 OTHER SPECIFIED POSTPROCEDURAL STATES: Chronic | ICD-10-CM

## 2019-08-28 DIAGNOSIS — C76.2 MALIGNANT NEOPLASM OF ABDOMEN: ICD-10-CM

## 2019-08-28 DIAGNOSIS — Z90.722 ACQUIRED ABSENCE OF OVARIES, BILATERAL: Chronic | ICD-10-CM

## 2019-08-28 DIAGNOSIS — Z90.49 ACQUIRED ABSENCE OF OTHER SPECIFIED PARTS OF DIGESTIVE TRACT: Chronic | ICD-10-CM

## 2019-08-28 DIAGNOSIS — Z90.710 ACQUIRED ABSENCE OF BOTH CERVIX AND UTERUS: Chronic | ICD-10-CM

## 2019-08-28 DIAGNOSIS — C56.2 MALIGNANT NEOPLASM OF LEFT OVARY: ICD-10-CM

## 2019-08-28 DIAGNOSIS — Z51.11 ENCOUNTER FOR ANTINEOPLASTIC CHEMOTHERAPY: ICD-10-CM

## 2019-08-28 DIAGNOSIS — R11.2 NAUSEA WITH VOMITING, UNSPECIFIED: ICD-10-CM

## 2019-08-28 DIAGNOSIS — C79.31 SECONDARY MALIGNANT NEOPLASM OF BRAIN: ICD-10-CM

## 2019-08-28 PROCEDURE — 74177 CT ABD & PELVIS W/CONTRAST: CPT

## 2019-08-28 PROCEDURE — 71260 CT THORAX DX C+: CPT

## 2019-08-28 PROCEDURE — 82565 ASSAY OF CREATININE: CPT

## 2019-08-28 PROCEDURE — 74177 CT ABD & PELVIS W/CONTRAST: CPT | Mod: 26

## 2019-08-28 PROCEDURE — 71260 CT THORAX DX C+: CPT | Mod: 26

## 2019-09-05 ENCOUNTER — APPOINTMENT (OUTPATIENT)
Dept: THORACIC SURGERY | Facility: CLINIC | Age: 69
End: 2019-09-05
Payer: MEDICARE

## 2019-09-05 VITALS
WEIGHT: 118 LBS | TEMPERATURE: 98.1 F | BODY MASS INDEX: 22.3 KG/M2 | HEART RATE: 88 BPM | OXYGEN SATURATION: 98 % | SYSTOLIC BLOOD PRESSURE: 125 MMHG | RESPIRATION RATE: 16 BRPM | DIASTOLIC BLOOD PRESSURE: 78 MMHG

## 2019-09-05 PROCEDURE — 99213 OFFICE O/P EST LOW 20 MIN: CPT

## 2019-09-09 ENCOUNTER — APPOINTMENT (OUTPATIENT)
Dept: HEMATOLOGY ONCOLOGY | Facility: CLINIC | Age: 69
End: 2019-09-09
Payer: MEDICARE

## 2019-09-09 VITALS
WEIGHT: 116.82 LBS | BODY MASS INDEX: 22.08 KG/M2 | HEART RATE: 72 BPM | SYSTOLIC BLOOD PRESSURE: 121 MMHG | RESPIRATION RATE: 16 BRPM | OXYGEN SATURATION: 98 % | DIASTOLIC BLOOD PRESSURE: 79 MMHG | TEMPERATURE: 98.3 F

## 2019-09-09 PROCEDURE — 99215 OFFICE O/P EST HI 40 MIN: CPT

## 2019-09-09 NOTE — PHYSICAL EXAM
[Auscultation Breath Sounds / Voice Sounds] : lungs were clear to auscultation bilaterally [Heart Rate And Rhythm] : heart rate was normal and rhythm regular [Heart Sounds] : normal S1 and S2 [Heart Sounds Gallop] : no gallops [Murmurs] : no murmurs [Heart Sounds Pericardial Friction Rub] : no pericardial rub [Examination Of The Chest] : the chest was normal in appearance [Chest Visual Inspection Thoracic Asymmetry] : no chest asymmetry [Diminished Respiratory Excursion] : normal chest expansion [Bowel Sounds] : normal bowel sounds [Abdomen Soft] : soft [Abdomen Tenderness] : non-tender [Abdomen Mass (___ Cm)] : no abdominal mass palpated [Abnormal Walk] : normal gait [Musculoskeletal - Swelling] : no joint swelling seen [Nail Clubbing] : no clubbing  or cyanosis of the fingernails [Motor Tone] : muscle strength and tone were normal [Skin Color & Pigmentation] : normal skin color and pigmentation [Skin Turgor] : normal skin turgor [] : no rash [Deep Tendon Reflexes (DTR)] : deep tendon reflexes were 2+ and symmetric [No Focal Deficits] : no focal deficits [Sensation] : the sensory exam was normal to light touch and pinprick [Oriented To Time, Place, And Person] : oriented to person, place, and time [Impaired Insight] : insight and judgment were intact [Affect] : the affect was normal

## 2019-09-09 NOTE — ASSESSMENT
[FreeTextEntry1] : 70 y/o F, never smoker, w/ hx of HTN, DM, Lt ovarian tumor c/w mullerian origin, and Lung CA.\par \par Now 2yr 2mo s/p FB w/ BAL of the LLL bronchus, Navigational Bronch, FNA of the FERMÍN mass, Brushing of the FERMÍN lung mass and biopsy of the FERMÍN mass on 7/6/2017. Path revealed NSCLC, favoring AdenoCA, +TTF-1, +Napsin.\par \par Pt is s/p Lt ovarian tumor excision and fallopian tube excision on 7/21/17 by Dr. Tiffanie Brooks. Path revealed poorly-diff CA, +WT-1, PAX-8 and ER, c/w mullerian origin. Pt is s/p Total Hysterectomy mid-Sept, 2017 by Dr. Brooks.\par \par Now 2yr s/p Lt VATS Robotic-assisted LULobectomy, MLND on 8/16/17. Path revealed AdenoCA, acinar (90%) and solid (10%), 2 x 1.8 x 0.5cm, margins, visceral pleura and LNs negative, pT1aN0 Stg IA.\par \par CT Chest on 2/7/19:\par - new 2mm RUL nodule (series 4 image 50)\par - new 2mm RUL nodule (series 4 image 71)\par - new 2mm FERMÍN nodule (series 4 image 33)\par \par CT Chest on 8/28/19:\par - stable 7 mm nodule abuts the serosal surface of the fundus of the bladder\par - interval decreased in size of portacaval low-density LN, 8 mm\par \par I have reviewed the patient's medical records and diagnostic images at time of this office consultation and have made the following recommendation:\par 1. CT scan showed no evidence of recurrence, recommended patient to return to office in 6mo w/ CT Chest w/out contrast\par \par \par Written by Keyanna Lawson NP, acting as a scribe for Dr. Ken Sol. \par \par The documentation recorded by the scribe accurately reflects the service I personally performed and the decisions made by me. KEN SOL MD\par

## 2019-09-09 NOTE — DATA REVIEWED
[FreeTextEntry1] : CT Chest on 8/28/19:\par - stable 7 mm nodule abuts the serosal surface of the fundus of the bladder\par - interval decreased in size of portacaval low-density LN, 8 mm

## 2019-09-09 NOTE — HISTORY OF PRESENT ILLNESS
[FreeTextEntry1] : 70 y/o F, never smoker, w/ hx of HTN, DM, Lt ovarian tumor c/w mullerian origin, and Lung CA.\par \par Now 2yr 2mo s/p FB w/ BAL of the LLL bronchus, Navigational Bronch, FNA of the FERMÍN mass, Brushing of the FERMÍN lung mass and biopsy of the FERMÍN mass on 7/6/2017. Path revealed NSCLC, favoring AdenoCA, +TTF-1, +Napsin.\par \par Pt is s/p Lt ovarian tumor excision and fallopian tube excision on 7/21/17 by Dr. Tiffanie Brooks. Path revealed poorly-diff CA, +WT-1, PAX-8 and ER, c/w mullerian origin. Pt is s/p Total Hysterectomy mid-Sept, 2017 by Dr. Brooks.\par \par Now 2yr s/p Lt VATS Robotic-assisted LULobectomy, MLND on 8/16/17. Path revealed AdenoCA, acinar (90%) and solid (10%), 2 x 1.8 x 0.5cm, margins, visceral pleura and LNs negative, pT1aN0 Stg IA.\par \par CT Chest on 8/7/18:\par - post-op changes \par - MOODY\par \par CT Chest on 2/7/19:\par - new 2mm RUL nodule (series 4 image 50)\par - new 2mm RUL nodule (series 4 image 71)\par - new 2mm FERMÍN nodule (series 4 image 33)\par \par CT Chest on 8/28/19:\par - stable 7 mm nodule abuts the serosal surface of the fundus of the bladder\par - interval decreased in size of portacaval low-density LN, 8 mm\par \par Pt presents today for follow up. Denies SOB, CP, cough.\par

## 2019-09-11 NOTE — HISTORY OF PRESENT ILLNESS
[Disease: _____________________] : Disease: [unfilled] [T: ___] : T[unfilled] [N: ___] : N[unfilled] [M: ___] : M[unfilled] [de-identified] : Age 67: Stage I NSCLC adenocarcinoma s/p left VATs robotic assisted FERMÍN lobectomy and MLND 8/16/17 with Dr Ken Sol\par Age 67: Stage IV poorly differentiated ovarian cancer s/p AVEL/ BSO/ total omentectomy, bilateral ureterolysis, resection of abdominal anterior wall masses, repair of cystostomy\par She initially presented with hemoptysis and had evaluation consisting of bronchoscopy and FNA. The bronchoscopy was negative and the FNA was positive for malignant cells: TTF1+ and PAX 8 negative. She had Pet/ CT on 7/6/17 which showed 2.3 cm left upper lobe nodule with SUV of 23 and 4.2 x 3.1 cm left ovarian lesion SUV of 7.9 and left common iliac lymph nodes measuring up to 8mm with SUV of 2.2, 6 mm perihepatic implant SUV of 3.1. She initially had laparoscopic evaluation with left ovary biopsy which showed poorly differentiated carcinoma that was ER, WT1, PAX 8 positive and TTF1 negative. She initially had the left VATs. Then she subsequently had exploratory laparotomy with  AVEL, BSO, radical dissection of tumor with total omentectomy, resection of anterior abdominal wall masses, lysis of adhesions, and repair of cystostomy. She had CT imaging done after 5th cycle of chemotherapy to evaluate abdominal pain and CT showed no evidence of disease. She had abdominal pain in 3/2019 and had follow up CT which showed new small pelvic implant and enlarged sita-caval LN. She had headache and went to [facility] 4/10/19. She had imaging that showed predominantly cystic peripherally enhancing mass within the left cerebellar hemisphere. She had left suboccipital craniotomy with Dr Tatyana Ortiz. Had carboplatin retreat with paclitaxel/ Avastin started and had carboplatin reaction on 5/29/19 during bag 2 Cycle 2: redness over entire face and uncomfortable sensation over face/ arms. \par  [AJCC Stage: ____] : AJCC Stage: [unfilled] [de-identified] : poorly differentiated carcinoma: ER positive  [de-identified] : carbo/ taxol: 10/24/17 to 2/12/18 with cumulative fatigue and neuropathy\par bevacizumab added on 12/8/17 to 4/2018 (pt stopped coming for treatment and lost to follow up until 12/2018)\par retreat carbo/ taxol 5/7/19\par bevacizumab added to Cycle 2 of retreat carboplatin/ paclitaxel 5/29/19 and allergic reaction with 2nd bag of carboplatin\par bevacizumab and paclitaxel 6/19/19 to present  [de-identified] : She has numbness in the feet more than hands but denies pain or falls or unsteadiness. She has pain over the L groin occasionally: worse after chemotherapy. She denies any vaginal discharge or cough or new abdominal pain. She is present with her family to review next steps to her palliative therapy. She saw Dr Sol and will continue follow up for lung cancer. She has good appetite and energy.

## 2019-09-11 NOTE — REASON FOR VISIT
[Follow-Up Visit] : a follow-up [Family Member] : family member [FreeTextEntry2] : follow up of ovarian cancer s/p 6 cycles of bevacizumab/ paclitaxel

## 2019-09-11 NOTE — ASSESSMENT
[FreeTextEntry1] : She is a 70 y/o F with Stage IV ovarian cancer and Stage I NSCLC diagnosed simultaneously. She has completed 6 cycles of carboplatin/ paclitaxel with bevacizumab and did not continue with bevacizumab maintenance: off from 4/2018 to 4/2019. 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 She completed SRS to the craniotomy site. CT of the chest/ abd/ pelvis showed adenopathy and started with palliative chemotherapy: retreat carboplatin/ paclitaxel 5/7/19. She had allergic reaction with C2 bag 2 of carboplatin and has been on bevacizumab/ paclitaxel s/p total of 6 cycles. She had interval CT showing continued response to therapy. Given Grade 2 neuropathy, will plan to have maintenance bevacizumab every 3 weeks with interval imaging in 4 months. We reviewed with her and her family expectations for ovarian cancer and will eventually go back on chemotherapy once her cancer progresses. We reviewed goals of care to maintain her QOL and control disease. We reviewed follow up every 3 weeks during her bevacizumab appointments and appointments were made for her. Questions answered to their satisfaction and they are agreeable to bevacizumab maintenance.

## 2019-09-11 NOTE — REVIEW OF SYSTEMS
[Fever] : no fever [Chills] : no chills [Night Sweats] : no night sweats [Fatigue] : fatigue [Recent Change In Weight] : ~T no recent weight change [Dysuria] : no dysuria [Vaginal Discharge] : no vaginal discharge [Incontinence] : no incontinence [Dysmenorrhea/Abn Vaginal Bleeding] : no dysmenorrhea/abnormal vaginal bleeding [Negative] : Allergic/Immunologic [FreeTextEntry8] : left groin tenderness sensation after chemotherapy

## 2019-09-11 NOTE — PHYSICAL EXAM
[Restricted in physically strenuous activity but ambulatory and able to carry out work of a light or sedentary nature] : Status 1- Restricted in physically strenuous activity but ambulatory and able to carry out work of a light or sedentary nature, e.g., light house work, office work [Thin] : thin [Mucositis] : no mucositis [Ulcers] : no ulcers [Vesicles] : no vesicles [Thrush] : no thrush [Normal] : affect appropriate [de-identified] : postsurgical site: stitches over the occipital area C/D/I  [de-identified] : right upper canine missing with dental wire; left lower molar missing; posterior cranial surgical scar well healed  [de-identified] : occipital scar C/D/I

## 2019-09-18 ENCOUNTER — RESULT REVIEW (OUTPATIENT)
Age: 69
End: 2019-09-18

## 2019-09-18 ENCOUNTER — APPOINTMENT (OUTPATIENT)
Dept: INFUSION THERAPY | Facility: HOSPITAL | Age: 69
End: 2019-09-18

## 2019-09-18 ENCOUNTER — LABORATORY RESULT (OUTPATIENT)
Age: 69
End: 2019-09-18

## 2019-09-18 LAB
BASOPHILS # BLD AUTO: 0 K/UL — SIGNIFICANT CHANGE UP (ref 0–0.2)
BASOPHILS NFR BLD AUTO: 0.1 % — SIGNIFICANT CHANGE UP (ref 0–2)
EOSINOPHIL # BLD AUTO: 0.1 K/UL — SIGNIFICANT CHANGE UP (ref 0–0.5)
EOSINOPHIL NFR BLD AUTO: 2 % — SIGNIFICANT CHANGE UP (ref 0–6)
HCT VFR BLD CALC: 37.8 % — SIGNIFICANT CHANGE UP (ref 34.5–45)
HGB BLD-MCNC: 12.8 G/DL — SIGNIFICANT CHANGE UP (ref 11.5–15.5)
LYMPHOCYTES # BLD AUTO: 2.1 K/UL — SIGNIFICANT CHANGE UP (ref 1–3.3)
LYMPHOCYTES # BLD AUTO: 30.6 % — SIGNIFICANT CHANGE UP (ref 13–44)
MCHC RBC-ENTMCNC: 30.6 PG — SIGNIFICANT CHANGE UP (ref 27–34)
MCHC RBC-ENTMCNC: 33.9 G/DL — SIGNIFICANT CHANGE UP (ref 32–36)
MCV RBC AUTO: 90.4 FL — SIGNIFICANT CHANGE UP (ref 80–100)
MONOCYTES # BLD AUTO: 0.7 K/UL — SIGNIFICANT CHANGE UP (ref 0–0.9)
MONOCYTES NFR BLD AUTO: 9.9 % — SIGNIFICANT CHANGE UP (ref 2–14)
NEUTROPHILS # BLD AUTO: 3.9 K/UL — SIGNIFICANT CHANGE UP (ref 1.8–7.4)
NEUTROPHILS NFR BLD AUTO: 57.5 % — SIGNIFICANT CHANGE UP (ref 43–77)
PLATELET # BLD AUTO: 192 K/UL — SIGNIFICANT CHANGE UP (ref 150–400)
RBC # BLD: 4.18 M/UL — SIGNIFICANT CHANGE UP (ref 3.8–5.2)
RBC # FLD: 13.9 % — SIGNIFICANT CHANGE UP (ref 10.3–14.5)
WBC # BLD: 6.8 K/UL — SIGNIFICANT CHANGE UP (ref 3.8–10.5)
WBC # FLD AUTO: 6.8 K/UL — SIGNIFICANT CHANGE UP (ref 3.8–10.5)

## 2019-09-30 NOTE — H&P PST ADULT - SYMPTOMS
Patient calls asking for a script for Prednisone. Patient was seen on 9-24-19 for sinusitis. Patient states she continues to have a cough and feels her chest is \"tight\". Patient denies fever. Patient is still currently taking the Amoxicillin. Patient uses [facility]. Patient states she was told she could call back for a script for Prednisone if she felt she needed it.    orthopnea/dyspnea

## 2019-10-07 ENCOUNTER — OUTPATIENT (OUTPATIENT)
Dept: OUTPATIENT SERVICES | Facility: HOSPITAL | Age: 69
LOS: 1 days | Discharge: HOME | End: 2019-10-07

## 2019-10-07 DIAGNOSIS — C56.2 MALIGNANT NEOPLASM OF LEFT OVARY: ICD-10-CM

## 2019-10-07 DIAGNOSIS — Z90.710 ACQUIRED ABSENCE OF BOTH CERVIX AND UTERUS: Chronic | ICD-10-CM

## 2019-10-07 DIAGNOSIS — Z90.722 ACQUIRED ABSENCE OF OVARIES, BILATERAL: Chronic | ICD-10-CM

## 2019-10-07 DIAGNOSIS — Z98.890 OTHER SPECIFIED POSTPROCEDURAL STATES: Chronic | ICD-10-CM

## 2019-10-07 DIAGNOSIS — Z90.49 ACQUIRED ABSENCE OF OTHER SPECIFIED PARTS OF DIGESTIVE TRACT: Chronic | ICD-10-CM

## 2019-10-09 ENCOUNTER — RESULT REVIEW (OUTPATIENT)
Age: 69
End: 2019-10-09

## 2019-10-09 ENCOUNTER — APPOINTMENT (OUTPATIENT)
Dept: HEMATOLOGY ONCOLOGY | Facility: CLINIC | Age: 69
End: 2019-10-09
Payer: MEDICARE

## 2019-10-09 ENCOUNTER — APPOINTMENT (OUTPATIENT)
Dept: INFUSION THERAPY | Facility: HOSPITAL | Age: 69
End: 2019-10-09

## 2019-10-09 VITALS
OXYGEN SATURATION: 99 % | WEIGHT: 116.93 LBS | HEART RATE: 67 BPM | TEMPERATURE: 98 F | SYSTOLIC BLOOD PRESSURE: 132 MMHG | RESPIRATION RATE: 16 BRPM | DIASTOLIC BLOOD PRESSURE: 84 MMHG | BODY MASS INDEX: 22.1 KG/M2

## 2019-10-09 DIAGNOSIS — R23.8 OTHER SKIN CHANGES: ICD-10-CM

## 2019-10-09 LAB
ALBUMIN SERPL ELPH-MCNC: 4.4 G/DL
ALP BLD-CCNC: 53 U/L
ALT SERPL-CCNC: 11 U/L
ANION GAP SERPL CALC-SCNC: 16 MMOL/L
AST SERPL-CCNC: 14 U/L
BASOPHILS # BLD AUTO: 0 K/UL — SIGNIFICANT CHANGE UP (ref 0–0.2)
BASOPHILS NFR BLD AUTO: 0.4 % — SIGNIFICANT CHANGE UP (ref 0–2)
BILIRUB SERPL-MCNC: 0.5 MG/DL
BUN SERPL-MCNC: 16 MG/DL
CALCIUM SERPL-MCNC: 9.8 MG/DL
CANCER AG125 SERPL-ACNC: 17 U/ML
CHLORIDE SERPL-SCNC: 104 MMOL/L
CO2 SERPL-SCNC: 23 MMOL/L
CREAT SERPL-MCNC: 0.56 MG/DL
EOSINOPHIL # BLD AUTO: 0.1 K/UL — SIGNIFICANT CHANGE UP (ref 0–0.5)
EOSINOPHIL NFR BLD AUTO: 1.5 % — SIGNIFICANT CHANGE UP (ref 0–6)
GLUCOSE SERPL-MCNC: 132 MG/DL
HCT VFR BLD CALC: 39.6 % — SIGNIFICANT CHANGE UP (ref 34.5–45)
HGB BLD-MCNC: 12.9 G/DL — SIGNIFICANT CHANGE UP (ref 11.5–15.5)
LYMPHOCYTES # BLD AUTO: 2.2 K/UL — SIGNIFICANT CHANGE UP (ref 1–3.3)
LYMPHOCYTES # BLD AUTO: 36 % — SIGNIFICANT CHANGE UP (ref 13–44)
MAGNESIUM SERPL-MCNC: 2.1 MG/DL
MCHC RBC-ENTMCNC: 29.8 PG — SIGNIFICANT CHANGE UP (ref 27–34)
MCHC RBC-ENTMCNC: 32.7 G/DL — SIGNIFICANT CHANGE UP (ref 32–36)
MCV RBC AUTO: 91.2 FL — SIGNIFICANT CHANGE UP (ref 80–100)
MONOCYTES # BLD AUTO: 0.4 K/UL — SIGNIFICANT CHANGE UP (ref 0–0.9)
MONOCYTES NFR BLD AUTO: 5.9 % — SIGNIFICANT CHANGE UP (ref 2–14)
NEUTROPHILS # BLD AUTO: 3.5 K/UL — SIGNIFICANT CHANGE UP (ref 1.8–7.4)
NEUTROPHILS NFR BLD AUTO: 56.1 % — SIGNIFICANT CHANGE UP (ref 43–77)
PLATELET # BLD AUTO: 174 K/UL — SIGNIFICANT CHANGE UP (ref 150–400)
POTASSIUM SERPL-SCNC: 4.3 MMOL/L
PROT SERPL-MCNC: 7.2 G/DL
RBC # BLD: 4.35 M/UL — SIGNIFICANT CHANGE UP (ref 3.8–5.2)
RBC # FLD: 12.8 % — SIGNIFICANT CHANGE UP (ref 10.3–14.5)
SODIUM SERPL-SCNC: 143 MMOL/L
WBC # BLD: 6.2 K/UL — SIGNIFICANT CHANGE UP (ref 3.8–10.5)
WBC # FLD AUTO: 6.2 K/UL — SIGNIFICANT CHANGE UP (ref 3.8–10.5)

## 2019-10-09 PROCEDURE — 99215 OFFICE O/P EST HI 40 MIN: CPT

## 2019-10-09 NOTE — REASON FOR VISIT
[Family Member] : family member [Follow-Up Visit] : a follow-up [FreeTextEntry2] : follow up of ovarian cancer on maintenance of bevacizumab

## 2019-10-09 NOTE — ASSESSMENT
[FreeTextEntry1] : She is a 68 y/o F with Stage IV ovarian cancer and Stage I NSCLC diagnosed simultaneously. She has completed 6 cycles of carboplatin/ paclitaxel with bevacizumab and did not continue with bevacizumab maintenance: off from 4/2018 to 4/2019. 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 She completed SRS to the craniotomy site. Pt is currently on maintenance Avastin therapy s/p C1 tolerating well and proceeded to C2 today with stable WNL CBC and physical exam. Pt had CT scan on 8/28/19 revealed interval decrease / resolution of portacaval and peritoneal lymph nodes, no evidence for disease recurrence. Her grade 1 neuropathy is improving significantly since she was off Taxol in past 1.5 months, encourage continue with massage and exercise.  she is following up with thoracic Dr. Ken Sol and her Orthopedic for fractured left distal radius due to fall on 9/18/19. Advised her to report any potential surgery plan regarding her fracture, and contact us for any worsening or new symptoms, will order CT in 3 months to reevaluate disease status and response of Avastin therapy accordingly. next f/u in  3 weeks.

## 2019-10-09 NOTE — PHYSICAL EXAM
[Restricted in physically strenuous activity but ambulatory and able to carry out work of a light or sedentary nature] : Status 1- Restricted in physically strenuous activity but ambulatory and able to carry out work of a light or sedentary nature, e.g., light house work, office work [Thin] : thin [Normal] : grossly intact [Ulcers] : no ulcers [Mucositis] : no mucositis [Thrush] : no thrush [Vesicles] : no vesicles [de-identified] : right upper canine missing with dental wire; left lower molar missing; posterior cranial surgical scar well healed  [de-identified] : left lower arm cast on due to left arm fracture from fall [de-identified] : occipital scar C/D/I

## 2019-10-09 NOTE — REVIEW OF SYSTEMS
[Fatigue] : fatigue [Negative] : Allergic/Immunologic [Joint Pain] : joint pain [Fever] : no fever [Chills] : no chills [Night Sweats] : no night sweats [Recent Change In Weight] : ~T no recent weight change [Dysuria] : no dysuria [Incontinence] : no incontinence [Vaginal Discharge] : no vaginal discharge [Dysmenorrhea/Abn Vaginal Bleeding] : no dysmenorrhea/abnormal vaginal bleeding [FreeTextEntry8] : left groin tenderness sensation after chemotherapy  [FreeTextEntry9] : left distal radius fracture due to fall since 9/18/19

## 2019-10-09 NOTE — HISTORY OF PRESENT ILLNESS
[Disease: _____________________] : Disease: [unfilled] [T: ___] : T[unfilled] [N: ___] : N[unfilled] [AJCC Stage: ____] : AJCC Stage: [unfilled] [M: ___] : M[unfilled] [Cycle: ___] : Cycle: [unfilled] [de-identified] : Age 67: Stage I NSCLC adenocarcinoma s/p left VATs robotic assisted FERMÍN lobectomy and MLND 8/16/17 with Dr Ken Sol\par Age 67: Stage IV poorly differentiated ovarian cancer s/p AVEL/ BSO/ total omentectomy, bilateral ureterolysis, resection of abdominal anterior wall masses, repair of cystostomy\par She initially presented with hemoptysis and had evaluation consisting of bronchoscopy and FNA. The bronchoscopy was negative and the FNA was positive for malignant cells: TTF1+ and PAX 8 negative. She had Pet/ CT on 7/6/17 which showed 2.3 cm left upper lobe nodule with SUV of 23 and 4.2 x 3.1 cm left ovarian lesion SUV of 7.9 and left common iliac lymph nodes measuring up to 8mm with SUV of 2.2, 6 mm perihepatic implant SUV of 3.1. She initially had laparoscopic evaluation with left ovary biopsy which showed poorly differentiated carcinoma that was ER, WT1, PAX 8 positive and TTF1 negative. She initially had the left VATs. Then she subsequently had exploratory laparotomy with  AVEL, BSO, radical dissection of tumor with total omentectomy, resection of anterior abdominal wall masses, lysis of adhesions, and repair of cystostomy. She had CT imaging done after 5th cycle of chemotherapy to evaluate abdominal pain and CT showed no evidence of disease. She had abdominal pain in 3/2019 and had follow up CT which showed new small pelvic implant and enlarged sita-caval LN. She had headache and went to [facility] 4/10/19. She had imaging that showed predominantly cystic peripherally enhancing mass within the left cerebellar hemisphere. She had left suboccipital craniotomy with Dr Tatyana Ortiz. Had carboplatin retreat with paclitaxel/ Avastin started and had carboplatin reaction on 5/29/19 during bag 2 Cycle 2: redness over entire face and uncomfortable sensation over face/ arms. \par  [de-identified] : poorly differentiated carcinoma: ER positive  [de-identified] : carbo/ taxol: 10/24/17 to 2/12/18 with cumulative fatigue and neuropathy\par bevacizumab added on 12/8/17 to 4/2018 (pt stopped coming for treatment and lost to follow up until 12/2018)\par retreat carbo/ taxol 5/7/19\par bevacizumab added to Cycle 2 of retreat carboplatin/ paclitaxel 5/29/19 and allergic reaction with 2nd bag of carboplatin\par Avastin maintenance Q 3 weekly started on 9/18/19 \par bevacizumab and paclitaxel 6/19/19 to present  [FreeTextEntry1] : Avastin maintenance therapy started on 9/18/19 [de-identified] : Pt reports feeling well despite had a fall on 9/18/19 in front of her home due to tripped by a chair, she went to ER of [facility] at same day and found to have left distal radius fracture with involvement to the articular surface, possible underlying lucent lesion from XRay report. She does not need surgery for now based her orthopedic doctor beside cast protection. Pt stated the pain is not too bad she refused any pain meds so far for this injury. She had CT scan on 8/28/19 revealed interval decrease / resolution of portacaval and peritoneal lymph nodes, no evidence for disease recurrence. Pt is able to eat and drink normally, BM daily, denies SOB/ Chest pain/ Palpitation/ fever/ chills/nausea / vomiting/ diarrhea / constipation/ abdominal bloating / vaginal bleeding or any new symptoms since last visit.\par

## 2019-10-10 DIAGNOSIS — Z51.11 ENCOUNTER FOR ANTINEOPLASTIC CHEMOTHERAPY: ICD-10-CM

## 2019-10-10 DIAGNOSIS — R11.2 NAUSEA WITH VOMITING, UNSPECIFIED: ICD-10-CM

## 2019-10-11 ENCOUNTER — APPOINTMENT (OUTPATIENT)
Dept: GYNECOLOGIC ONCOLOGY | Facility: CLINIC | Age: 69
End: 2019-10-11
Payer: MEDICARE

## 2019-10-11 VITALS
BODY MASS INDEX: 22.28 KG/M2 | DIASTOLIC BLOOD PRESSURE: 75 MMHG | HEART RATE: 75 BPM | SYSTOLIC BLOOD PRESSURE: 126 MMHG | HEIGHT: 61 IN | WEIGHT: 118 LBS

## 2019-10-11 PROCEDURE — 99214 OFFICE O/P EST MOD 30 MIN: CPT

## 2019-10-30 ENCOUNTER — RESULT REVIEW (OUTPATIENT)
Age: 69
End: 2019-10-30

## 2019-10-30 ENCOUNTER — LABORATORY RESULT (OUTPATIENT)
Age: 69
End: 2019-10-30

## 2019-10-30 ENCOUNTER — APPOINTMENT (OUTPATIENT)
Dept: HEMATOLOGY ONCOLOGY | Facility: CLINIC | Age: 69
End: 2019-10-30
Payer: MEDICARE

## 2019-10-30 ENCOUNTER — APPOINTMENT (OUTPATIENT)
Dept: INFUSION THERAPY | Facility: HOSPITAL | Age: 69
End: 2019-10-30

## 2019-10-30 VITALS
HEART RATE: 71 BPM | DIASTOLIC BLOOD PRESSURE: 79 MMHG | BODY MASS INDEX: 22.49 KG/M2 | SYSTOLIC BLOOD PRESSURE: 134 MMHG | OXYGEN SATURATION: 100 % | TEMPERATURE: 98.8 F | RESPIRATION RATE: 16 BRPM | WEIGHT: 119.05 LBS

## 2019-10-30 DIAGNOSIS — Z08 ENCOUNTER FOR FOLLOW-UP EXAMINATION AFTER COMPLETED TREATMENT FOR MALIGNANT NEOPLASM: ICD-10-CM

## 2019-10-30 DIAGNOSIS — Z85.118 ENCOUNTER FOR FOLLOW-UP EXAMINATION AFTER COMPLETED TREATMENT FOR MALIGNANT NEOPLASM: ICD-10-CM

## 2019-10-30 LAB
BASOPHILS # BLD AUTO: 0 K/UL — SIGNIFICANT CHANGE UP (ref 0–0.2)
BASOPHILS NFR BLD AUTO: 0.3 % — SIGNIFICANT CHANGE UP (ref 0–2)
EOSINOPHIL # BLD AUTO: 0.1 K/UL — SIGNIFICANT CHANGE UP (ref 0–0.5)
EOSINOPHIL NFR BLD AUTO: 1.9 % — SIGNIFICANT CHANGE UP (ref 0–6)
HCT VFR BLD CALC: 37.4 % — SIGNIFICANT CHANGE UP (ref 34.5–45)
HGB BLD-MCNC: 13.4 G/DL — SIGNIFICANT CHANGE UP (ref 11.5–15.5)
LYMPHOCYTES # BLD AUTO: 2.2 K/UL — SIGNIFICANT CHANGE UP (ref 1–3.3)
LYMPHOCYTES # BLD AUTO: 38.9 % — SIGNIFICANT CHANGE UP (ref 13–44)
MCHC RBC-ENTMCNC: 32.5 PG — SIGNIFICANT CHANGE UP (ref 27–34)
MCHC RBC-ENTMCNC: 35.8 G/DL — SIGNIFICANT CHANGE UP (ref 32–36)
MCV RBC AUTO: 90.8 FL — SIGNIFICANT CHANGE UP (ref 80–100)
MONOCYTES # BLD AUTO: 0.5 K/UL — SIGNIFICANT CHANGE UP (ref 0–0.9)
MONOCYTES NFR BLD AUTO: 8.2 % — SIGNIFICANT CHANGE UP (ref 2–14)
NEUTROPHILS # BLD AUTO: 2.8 K/UL — SIGNIFICANT CHANGE UP (ref 1.8–7.4)
NEUTROPHILS NFR BLD AUTO: 50.7 % — SIGNIFICANT CHANGE UP (ref 43–77)
PLATELET # BLD AUTO: 181 K/UL — SIGNIFICANT CHANGE UP (ref 150–400)
RBC # BLD: 4.13 M/UL — SIGNIFICANT CHANGE UP (ref 3.8–5.2)
RBC # FLD: 12.9 % — SIGNIFICANT CHANGE UP (ref 10.3–14.5)
WBC # BLD: 5.6 K/UL — SIGNIFICANT CHANGE UP (ref 3.8–10.5)
WBC # FLD AUTO: 5.6 K/UL — SIGNIFICANT CHANGE UP (ref 3.8–10.5)

## 2019-10-30 PROCEDURE — 99215 OFFICE O/P EST HI 40 MIN: CPT

## 2019-11-01 PROBLEM — Z08 ENCOUNTER FOR FOLLOW-UP SURVEILLANCE OF LUNG CANCER: Status: ACTIVE | Noted: 2018-09-04

## 2019-11-01 NOTE — PHYSICAL EXAM
[Restricted in physically strenuous activity but ambulatory and able to carry out work of a light or sedentary nature] : Status 1- Restricted in physically strenuous activity but ambulatory and able to carry out work of a light or sedentary nature, e.g., light house work, office work [Thin] : thin [Normal] : affect appropriate [Ulcers] : no ulcers [Mucositis] : no mucositis [Thrush] : no thrush [Vesicles] : no vesicles [de-identified] : left lower arm cast on due to left arm fracture from fall [de-identified] : right upper canine missing with dental wire; left lower molar missing; posterior cranial surgical scar well healed  [de-identified] : left arm wrapped with cast protection due to fracture [de-identified] : occipital scar C/D/I

## 2019-11-01 NOTE — HISTORY OF PRESENT ILLNESS
[Disease: _____________________] : Disease: [unfilled] [T: ___] : T[unfilled] [N: ___] : N[unfilled] [M: ___] : M[unfilled] [AJCC Stage: ____] : AJCC Stage: [unfilled] [Cycle: ___] : Cycle: [unfilled] [de-identified] : Age 67: Stage I NSCLC adenocarcinoma s/p left VATs robotic assisted FERMÍN lobectomy and MLND 8/16/17 with Dr Ken Sol\par Age 67: Stage IV poorly differentiated ovarian cancer s/p AVEL/ BSO/ total omentectomy, bilateral ureterolysis, resection of abdominal anterior wall masses, repair of cystostomy\par She initially presented with hemoptysis and had evaluation consisting of bronchoscopy and FNA. The bronchoscopy was negative and the FNA was positive for malignant cells: TTF1+ and PAX 8 negative. She had Pet/ CT on 7/6/17 which showed 2.3 cm left upper lobe nodule with SUV of 23 and 4.2 x 3.1 cm left ovarian lesion SUV of 7.9 and left common iliac lymph nodes measuring up to 8mm with SUV of 2.2, 6 mm perihepatic implant SUV of 3.1. She initially had laparoscopic evaluation with left ovary biopsy which showed poorly differentiated carcinoma that was ER, WT1, PAX 8 positive and TTF1 negative. She initially had the left VATs. Then she subsequently had exploratory laparotomy with  AVEL, BSO, radical dissection of tumor with total omentectomy, resection of anterior abdominal wall masses, lysis of adhesions, and repair of cystostomy. She had CT imaging done after 5th cycle of chemotherapy to evaluate abdominal pain and CT showed no evidence of disease. She had abdominal pain in 3/2019 and had follow up CT which showed new small pelvic implant and enlarged sita-caval LN. She had headache and went to [facility] 4/10/19. She had imaging that showed predominantly cystic peripherally enhancing mass within the left cerebellar hemisphere. She had left suboccipital craniotomy with Dr Tatyana Ortiz. Had carboplatin retreat with paclitaxel/ Avastin started and had carboplatin reaction on 5/29/19 during bag 2 Cycle 2: redness over entire face and uncomfortable sensation over face/ arms. \par  [de-identified] : poorly differentiated carcinoma: ER positive  [de-identified] : carbo/ taxol: 10/24/17 to 2/12/18 with cumulative fatigue and neuropathy\par bevacizumab added on 12/8/17 to 4/2018 (pt stopped coming for treatment and lost to follow up until 12/2018)\par retreat carbo/ taxol 5/7/19\par bevacizumab added to Cycle 2 of retreat carboplatin/ paclitaxel 5/29/19 and allergic reaction with 2nd bag of carboplatin\par Avastin maintenance Q 3 weekly started on 9/18/19 \par bevacizumab and paclitaxel 6/19/19 to present  [FreeTextEntry1] : Avastin maintenance therapy started on 9/18/19 [de-identified] : Pt reports feeling well and tolerating maintenance Avastin C2 well without major side effects. left arm still has cast protection due to left distal radius fracture from a fall on 9/18/19. Pt denies any pain medication  so far for this injury. Pt mentioned left groin needle poke sensation and left neck stiffness when turning from side to side occasionally in past a few weeks. Otherwise Pt is able to eat and drink normally, BM daily, denies SOB/ Chest pain/ Palpitation/ fever/ chills/nausea / vomiting/ diarrhea / constipation/ abdominal bloating / vaginal bleeding  since last visit.\par

## 2019-11-01 NOTE — REASON FOR VISIT
[Follow-Up Visit] : a follow-up [Family Member] : family member [FreeTextEntry2] : follow up of ovarian cancer on maintenance of bevacizumab

## 2019-11-01 NOTE — REVIEW OF SYSTEMS
[Fatigue] : fatigue [Joint Pain] : joint pain [Negative] : Allergic/Immunologic [Fever] : no fever [Chills] : no chills [Night Sweats] : no night sweats [Recent Change In Weight] : ~T no recent weight change [Dysuria] : no dysuria [Incontinence] : no incontinence [Vaginal Discharge] : no vaginal discharge [Dysmenorrhea/Abn Vaginal Bleeding] : no dysmenorrhea/abnormal vaginal bleeding [FreeTextEntry8] : left groin tenderness sensation after chemotherapy  [FreeTextEntry9] : left distal radius fracture due to fall since 9/18/19

## 2019-11-01 NOTE — ASSESSMENT
[FreeTextEntry1] : She is a 70 y/o F with Stage IV ovarian cancer and Stage I NSCLC diagnosed simultaneously. She has completed 6 cycles of carboplatin/ paclitaxel with bevacizumab and did not continue with bevacizumab maintenance: off from 4/2018 to 4/2019. 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 She completed SRS to the craniotomy site. Pt is currently on maintenance Avastin therapy s/p C2  tolerating well and proceeded to C3 today with stable WNL CBC and non-remarkable physical exam. She had brain MRI on 10/28/19 in [facility] for brain elias and post craniotomy f/u revealed post surgical change, no evidence of  disease recurrence. Her grade 1 neuropathy is improving significantly since she was off Taxol in past 2 months, encourage continue with massage and exercise.  Advised pt to f/u with Orthopedic doctor regarding her left arm internal fracture issue, and contact us for any worsening or new symptoms, will order CT in 3 months to reevaluate  response of Avastin therapy accordingly. next f/u in  3 weeks. all questions answered upon pt's satisfaction.\par

## 2019-11-13 ENCOUNTER — OUTPATIENT (OUTPATIENT)
Dept: OUTPATIENT SERVICES | Facility: HOSPITAL | Age: 69
LOS: 1 days | Discharge: HOME | End: 2019-11-13

## 2019-11-13 DIAGNOSIS — Z90.722 ACQUIRED ABSENCE OF OVARIES, BILATERAL: Chronic | ICD-10-CM

## 2019-11-13 DIAGNOSIS — C56.2 MALIGNANT NEOPLASM OF LEFT OVARY: ICD-10-CM

## 2019-11-13 DIAGNOSIS — Z90.49 ACQUIRED ABSENCE OF OTHER SPECIFIED PARTS OF DIGESTIVE TRACT: Chronic | ICD-10-CM

## 2019-11-13 DIAGNOSIS — Z90.710 ACQUIRED ABSENCE OF BOTH CERVIX AND UTERUS: Chronic | ICD-10-CM

## 2019-11-13 DIAGNOSIS — Z98.890 OTHER SPECIFIED POSTPROCEDURAL STATES: Chronic | ICD-10-CM

## 2019-11-20 ENCOUNTER — LABORATORY RESULT (OUTPATIENT)
Age: 69
End: 2019-11-20

## 2019-11-20 ENCOUNTER — RESULT REVIEW (OUTPATIENT)
Age: 69
End: 2019-11-20

## 2019-11-20 ENCOUNTER — APPOINTMENT (OUTPATIENT)
Dept: INFUSION THERAPY | Facility: HOSPITAL | Age: 69
End: 2019-11-20

## 2019-11-20 ENCOUNTER — APPOINTMENT (OUTPATIENT)
Dept: HEMATOLOGY ONCOLOGY | Facility: CLINIC | Age: 69
End: 2019-11-20
Payer: MEDICARE

## 2019-11-20 VITALS
HEART RATE: 86 BPM | OXYGEN SATURATION: 98 % | RESPIRATION RATE: 16 BRPM | WEIGHT: 121.67 LBS | SYSTOLIC BLOOD PRESSURE: 147 MMHG | BODY MASS INDEX: 22.99 KG/M2 | DIASTOLIC BLOOD PRESSURE: 88 MMHG | TEMPERATURE: 98.1 F

## 2019-11-20 LAB
BASOPHILS # BLD AUTO: 0 K/UL — SIGNIFICANT CHANGE UP (ref 0–0.2)
BASOPHILS NFR BLD AUTO: 0.2 % — SIGNIFICANT CHANGE UP (ref 0–2)
EOSINOPHIL # BLD AUTO: 0.1 K/UL — SIGNIFICANT CHANGE UP (ref 0–0.5)
EOSINOPHIL NFR BLD AUTO: 1.5 % — SIGNIFICANT CHANGE UP (ref 0–6)
HCT VFR BLD CALC: 38.6 % — SIGNIFICANT CHANGE UP (ref 34.5–45)
HGB BLD-MCNC: 13.3 G/DL — SIGNIFICANT CHANGE UP (ref 11.5–15.5)
LYMPHOCYTES # BLD AUTO: 2.6 K/UL — SIGNIFICANT CHANGE UP (ref 1–3.3)
LYMPHOCYTES # BLD AUTO: 33.4 % — SIGNIFICANT CHANGE UP (ref 13–44)
MCHC RBC-ENTMCNC: 30.9 PG — SIGNIFICANT CHANGE UP (ref 27–34)
MCHC RBC-ENTMCNC: 34.4 G/DL — SIGNIFICANT CHANGE UP (ref 32–36)
MCV RBC AUTO: 89.9 FL — SIGNIFICANT CHANGE UP (ref 80–100)
MONOCYTES # BLD AUTO: 0.5 K/UL — SIGNIFICANT CHANGE UP (ref 0–0.9)
MONOCYTES NFR BLD AUTO: 7.1 % — SIGNIFICANT CHANGE UP (ref 2–14)
NEUTROPHILS # BLD AUTO: 4.4 K/UL — SIGNIFICANT CHANGE UP (ref 1.8–7.4)
NEUTROPHILS NFR BLD AUTO: 57.7 % — SIGNIFICANT CHANGE UP (ref 43–77)
PLATELET # BLD AUTO: 198 K/UL — SIGNIFICANT CHANGE UP (ref 150–400)
RBC # BLD: 4.29 M/UL — SIGNIFICANT CHANGE UP (ref 3.8–5.2)
RBC # FLD: 12.4 % — SIGNIFICANT CHANGE UP (ref 10.3–14.5)
WBC # BLD: 7.6 K/UL — SIGNIFICANT CHANGE UP (ref 3.8–10.5)
WBC # FLD AUTO: 7.6 K/UL — SIGNIFICANT CHANGE UP (ref 3.8–10.5)

## 2019-11-20 PROCEDURE — 99215 OFFICE O/P EST HI 40 MIN: CPT

## 2019-11-21 DIAGNOSIS — R11.2 NAUSEA WITH VOMITING, UNSPECIFIED: ICD-10-CM

## 2019-11-21 DIAGNOSIS — Z51.11 ENCOUNTER FOR ANTINEOPLASTIC CHEMOTHERAPY: ICD-10-CM

## 2019-11-24 NOTE — PHYSICAL EXAM
[Restricted in physically strenuous activity but ambulatory and able to carry out work of a light or sedentary nature] : Status 1- Restricted in physically strenuous activity but ambulatory and able to carry out work of a light or sedentary nature, e.g., light house work, office work [Thin] : thin [Normal] : affect appropriate [Ulcers] : no ulcers [Mucositis] : no mucositis [Thrush] : no thrush [Vesicles] : no vesicles [de-identified] : left lower arm cast on due to left arm fracture from fall [de-identified] : right upper canine missing with dental wire; left lower molar missing; posterior cranial surgical scar well healed  [de-identified] : left arm wrapped with cast protection due to fracture [de-identified] : occipital scar C/D/I

## 2019-11-24 NOTE — ASSESSMENT
[FreeTextEntry1] : She is a 70 y/o F with Stage IV ovarian cancer and Stage I NSCLC diagnosed simultaneously. She has completed 6 cycles of carboplatin/ paclitaxel with bevacizumab and did not continue with bevacizumab maintenance: off from 4/2018 to 4/2019. 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 She completed SRS to the craniotomy site. Pt is currently on maintenance Avastin therapy s/p C3  tolerating well and proceeded to C4  today with stable WNL CBC and non-remarkable physical exam.  Her grade 1 neuropathy is no longer bothers her since she was off Taxol , encourage continue with massage and exercise.  Advised pt to f/u with Orthopedic doctor regarding her left arm internal fracture issue, and contact us for any worsening or new symptoms, will order CT in 3 months to reevaluate  response of Avastin therapy accordingly. next f/u in  3 weeks. all questions answered upon pt's satisfaction.\par

## 2019-11-24 NOTE — REASON FOR VISIT
[Family Member] : family member [Follow-Up Visit] : a follow-up [FreeTextEntry2] : follow up of ovarian cancer on maintenance of bevacizumab s/p C3

## 2019-11-24 NOTE — HISTORY OF PRESENT ILLNESS
[Disease: _____________________] : Disease: [unfilled] [T: ___] : T[unfilled] [N: ___] : N[unfilled] [M: ___] : M[unfilled] [AJCC Stage: ____] : AJCC Stage: [unfilled] [Cycle: ___] : Cycle: [unfilled] [de-identified] : Age 67: Stage I NSCLC adenocarcinoma s/p left VATs robotic assisted FERÍMN lobectomy and MLND 8/16/17 with Dr Ken Sol\par Age 67: Stage IV poorly differentiated ovarian cancer s/p AVLE/ BSO/ total omentectomy, bilateral ureterolysis, resection of abdominal anterior wall masses, repair of cystostomy\par She initially presented with hemoptysis and had evaluation consisting of bronchoscopy and FNA. The bronchoscopy was negative and the FNA was positive for malignant cells: TTF1+ and PAX 8 negative. She had Pet/ CT on 7/6/17 which showed 2.3 cm left upper lobe nodule with SUV of 23 and 4.2 x 3.1 cm left ovarian lesion SUV of 7.9 and left common iliac lymph nodes measuring up to 8mm with SUV of 2.2, 6 mm perihepatic implant SUV of 3.1. She initially had laparoscopic evaluation with left ovary biopsy which showed poorly differentiated carcinoma that was ER, WT1, PAX 8 positive and TTF1 negative. She initially had the left VATs. Then she subsequently had exploratory laparotomy with  AVEL, BSO, radical dissection of tumor with total omentectomy, resection of anterior abdominal wall masses, lysis of adhesions, and repair of cystostomy. She had CT imaging done after 5th cycle of chemotherapy to evaluate abdominal pain and CT showed no evidence of disease. She had abdominal pain in 3/2019 and had follow up CT which showed new small pelvic implant and enlarged sita-caval LN. She had headache and went to [facility] 4/10/19. She had imaging that showed predominantly cystic peripherally enhancing mass within the left cerebellar hemisphere. She had left suboccipital craniotomy with Dr Tatyana Ortiz. Had carboplatin retreat with paclitaxel/ Avastin started and had carboplatin reaction on 5/29/19 during bag 2 Cycle 2: redness over entire face and uncomfortable sensation over face/ arms. \par  [de-identified] : poorly differentiated carcinoma: ER positive  [de-identified] : carbo/ taxol: 10/24/17 to 2/12/18 with cumulative fatigue and neuropathy\par bevacizumab added on 12/8/17 to 4/2018 (pt stopped coming for treatment and lost to follow up until 12/2018)\par retreat carbo/ taxol 5/7/19\par bevacizumab added to Cycle 2 of retreat carboplatin/ paclitaxel 5/29/19 and allergic reaction with 2nd bag of carboplatin\par Avastin maintenance Q 3 weekly started on 9/18/19 \par bevacizumab and paclitaxel 6/19/19 to present  [FreeTextEntry1] : Avastin maintenance therapy started on 9/18/19 [de-identified] : Pt reports feeling well and tolerating maintenance Avastin C3 well without major side effects except 1 episode of left nose bleeding after 10 days of chemo. left arm still has cast protection due to left distal radius fracture from a fall on 9/18/19 but stated the motility is improving and feel much less pain comparing with last visit. Pt denies any pain medication  so far for this injury. Pt mentioned left groin needle poke sensation and left neck stiffness unchanged when turning from side to side in past a few month. Otherwise Pt is able to eat and drink normally, BM daily, denies SOB/ Chest pain/ Palpitation/ fever/ chills/nausea / vomiting/ diarrhea / constipation/ abdominal bloating / vaginal bleeding  since last visit.\par

## 2019-12-11 ENCOUNTER — LABORATORY RESULT (OUTPATIENT)
Age: 69
End: 2019-12-11

## 2019-12-11 ENCOUNTER — RESULT REVIEW (OUTPATIENT)
Age: 69
End: 2019-12-11

## 2019-12-11 ENCOUNTER — APPOINTMENT (OUTPATIENT)
Dept: INFUSION THERAPY | Facility: HOSPITAL | Age: 69
End: 2019-12-11

## 2019-12-11 ENCOUNTER — APPOINTMENT (OUTPATIENT)
Dept: HEMATOLOGY ONCOLOGY | Facility: CLINIC | Age: 69
End: 2019-12-11
Payer: MEDICARE

## 2019-12-11 VITALS
WEIGHT: 120.79 LBS | HEART RATE: 78 BPM | RESPIRATION RATE: 14 BRPM | DIASTOLIC BLOOD PRESSURE: 87 MMHG | SYSTOLIC BLOOD PRESSURE: 143 MMHG | BODY MASS INDEX: 22.83 KG/M2 | TEMPERATURE: 97.5 F | OXYGEN SATURATION: 96 %

## 2019-12-11 LAB
BASOPHILS # BLD AUTO: 0 K/UL — SIGNIFICANT CHANGE UP (ref 0–0.2)
BASOPHILS NFR BLD AUTO: 0.3 % — SIGNIFICANT CHANGE UP (ref 0–2)
EOSINOPHIL # BLD AUTO: 0.2 K/UL — SIGNIFICANT CHANGE UP (ref 0–0.5)
EOSINOPHIL NFR BLD AUTO: 2.7 % — SIGNIFICANT CHANGE UP (ref 0–6)
HCT VFR BLD CALC: 40.4 % — SIGNIFICANT CHANGE UP (ref 34.5–45)
HGB BLD-MCNC: 13.8 G/DL — SIGNIFICANT CHANGE UP (ref 11.5–15.5)
LYMPHOCYTES # BLD AUTO: 2.8 K/UL — SIGNIFICANT CHANGE UP (ref 1–3.3)
LYMPHOCYTES # BLD AUTO: 33.3 % — SIGNIFICANT CHANGE UP (ref 13–44)
MCHC RBC-ENTMCNC: 30.8 PG — SIGNIFICANT CHANGE UP (ref 27–34)
MCHC RBC-ENTMCNC: 34.1 G/DL — SIGNIFICANT CHANGE UP (ref 32–36)
MCV RBC AUTO: 90.2 FL — SIGNIFICANT CHANGE UP (ref 80–100)
MONOCYTES # BLD AUTO: 0.6 K/UL — SIGNIFICANT CHANGE UP (ref 0–0.9)
MONOCYTES NFR BLD AUTO: 7.4 % — SIGNIFICANT CHANGE UP (ref 2–14)
NEUTROPHILS # BLD AUTO: 4.7 K/UL — SIGNIFICANT CHANGE UP (ref 1.8–7.4)
NEUTROPHILS NFR BLD AUTO: 56.3 % — SIGNIFICANT CHANGE UP (ref 43–77)
PLATELET # BLD AUTO: 196 K/UL — SIGNIFICANT CHANGE UP (ref 150–400)
RBC # BLD: 4.48 M/UL — SIGNIFICANT CHANGE UP (ref 3.8–5.2)
RBC # FLD: 11.9 % — SIGNIFICANT CHANGE UP (ref 10.3–14.5)
WBC # BLD: 8.4 K/UL — SIGNIFICANT CHANGE UP (ref 3.8–10.5)
WBC # FLD AUTO: 8.4 K/UL — SIGNIFICANT CHANGE UP (ref 3.8–10.5)

## 2019-12-11 PROCEDURE — 99215 OFFICE O/P EST HI 40 MIN: CPT

## 2019-12-12 ENCOUNTER — APPOINTMENT (OUTPATIENT)
Dept: THORACIC SURGERY | Facility: CLINIC | Age: 69
End: 2019-12-12
Payer: MEDICARE

## 2019-12-12 VITALS
BODY MASS INDEX: 23.05 KG/M2 | RESPIRATION RATE: 14 BRPM | WEIGHT: 122 LBS | SYSTOLIC BLOOD PRESSURE: 144 MMHG | DIASTOLIC BLOOD PRESSURE: 85 MMHG | OXYGEN SATURATION: 94 % | TEMPERATURE: 98.3 F | HEART RATE: 77 BPM

## 2019-12-12 PROCEDURE — 99213 OFFICE O/P EST LOW 20 MIN: CPT

## 2019-12-12 NOTE — PHYSICAL EXAM
[Neck Appearance] : the appearance of the neck was normal [PERRL With Normal Accommodation] : pupils were equal in size, round, and reactive to light [Sclera] : the sclera and conjunctiva were normal [Respiration, Rhythm And Depth] : normal respiratory rhythm and effort [Heart Sounds] : normal S1 and S2 [Heart Rate And Rhythm] : heart rate was normal and rhythm regular [Auscultation Breath Sounds / Voice Sounds] : lungs were clear to auscultation bilaterally [2+] : left 2+ [Examination Of The Chest] : the chest was normal in appearance [No Pulse Delay] : no pulse delay [Bowel Sounds] : normal bowel sounds [Cervical Lymph Nodes Enlarged Posterior Bilaterally] : posterior cervical [Abdomen Soft] : soft [Abdomen Tenderness] : non-tender [No CVA Tenderness] : no ~M costovertebral angle tenderness [Abnormal Walk] : normal gait [Involuntary Movements] : no involuntary movements were seen [] : no rash [Skin Color & Pigmentation] : normal skin color and pigmentation [Skin Turgor] : normal skin turgor [Oriented To Time, Place, And Person] : oriented to person, place, and time [Affect] : the affect was normal [No Focal Deficits] : no focal deficits [Mood] : the mood was normal

## 2019-12-15 NOTE — ASSESSMENT
[FreeTextEntry1] : She is a 68 y/o F with Stage IV ovarian cancer and Stage I NSCLC diagnosed simultaneously. She has completed 6 cycles of carboplatin/ paclitaxel with bevacizumab and did not continue with bevacizumab maintenance: off from 2018 to 2019. 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 She completed SRS to the craniotomy site. Pt is currently on maintenance Avastin therapy s/p C4,  tolerating well and proceeded to C5  today with stable WNL CBC / non-remarkable physical exam/  resolved /  sized lung nodules in recent CT. However her  trending up to 61 but given the recently history of cold episode and non-remarkable physical findings we will keep close monitoring.  we  Advised pt and contact us for any worsening or new symptoms, will order CT ( a/p) in 2020  to reevaluate  response of Avastin therapy accordingly. Advised pt to use saline spray for nose bleeding / dryness issue and seeking ENT for further treatment if worsening despite pt has long history of periods of nose bleeding since she was child. Continue to use moist cream for rest of body dry skin.  next f/u in  3 weeks. all questions answered upon pt's satisfaction.\par

## 2019-12-15 NOTE — REVIEW OF SYSTEMS
[Fatigue] : fatigue [Joint Pain] : joint pain [Negative] : Allergic/Immunologic [Fever] : no fever [Chills] : no chills [Night Sweats] : no night sweats [Dysuria] : no dysuria [Recent Change In Weight] : ~T no recent weight change [Vaginal Discharge] : no vaginal discharge [Dysmenorrhea/Abn Vaginal Bleeding] : no dysmenorrhea/abnormal vaginal bleeding [Incontinence] : no incontinence [FreeTextEntry8] : left groin tenderness sensation after chemotherapy  [FreeTextEntry9] : left distal radius fracture due to fall since 9/18/19

## 2019-12-15 NOTE — HISTORY OF PRESENT ILLNESS
[Disease: _____________________] : Disease: [unfilled] [T: ___] : T[unfilled] [N: ___] : N[unfilled] [M: ___] : M[unfilled] [AJCC Stage: ____] : AJCC Stage: [unfilled] [Cycle: ___] : Cycle: [unfilled] [de-identified] : poorly differentiated carcinoma: ER positive  [de-identified] : carbo/ taxol: 10/24/17 to 2/12/18 with cumulative fatigue and neuropathy\par bevacizumab added on 12/8/17 to 4/2018 (pt stopped coming for treatment and lost to follow up until 12/2018)\par retreat carbo/ taxol 5/7/19\par bevacizumab added to Cycle 2 of retreat carboplatin/ paclitaxel 5/29/19 and allergic reaction with 2nd bag of carboplatin\par Avastin maintenance Q 3 weekly started on 9/18/19 \par bevacizumab and paclitaxel 6/19/19 to present  [de-identified] : Age 67: Stage I NSCLC adenocarcinoma s/p left VATs robotic assisted FERMÍN lobectomy and MLND 8/16/17 with Dr Ken Sol\par Age 67: Stage IV poorly differentiated ovarian cancer s/p AVEL/ BSO/ total omentectomy, bilateral ureterolysis, resection of abdominal anterior wall masses, repair of cystostomy\par She initially presented with hemoptysis and had evaluation consisting of bronchoscopy and FNA. The bronchoscopy was negative and the FNA was positive for malignant cells: TTF1+ and PAX 8 negative. She had Pet/ CT on 7/6/17 which showed 2.3 cm left upper lobe nodule with SUV of 23 and 4.2 x 3.1 cm left ovarian lesion SUV of 7.9 and left common iliac lymph nodes measuring up to 8mm with SUV of 2.2, 6 mm perihepatic implant SUV of 3.1. She initially had laparoscopic evaluation with left ovary biopsy which showed poorly differentiated carcinoma that was ER, WT1, PAX 8 positive and TTF1 negative. She initially had the left VATs. Then she subsequently had exploratory laparotomy with  AVEL, BSO, radical dissection of tumor with total omentectomy, resection of anterior abdominal wall masses, lysis of adhesions, and repair of cystostomy. She had CT imaging done after 5th cycle of chemotherapy to evaluate abdominal pain and CT showed no evidence of disease. She had abdominal pain in 3/2019 and had follow up CT which showed new small pelvic implant and enlarged sita-caval LN. She had headache and went to [facility] 4/10/19. She had imaging that showed predominantly cystic peripherally enhancing mass within the left cerebellar hemisphere. She had left suboccipital craniotomy with Dr Tatyana Ortiz. Had carboplatin retreat with paclitaxel/ Avastin started and had carboplatin reaction on 5/29/19 during bag 2 Cycle 2: redness over entire face and uncomfortable sensation over face/ arms. \par  [FreeTextEntry1] : Avastin maintenance therapy started on 9/18/19 [de-identified] : Pt reports feeling well beside got cold in past one week and tolerating maintenance Avastin C4 well without major side effects except 1 episode of left nose bleeding after 10 days of chemo. She had CT chest on 12/9/19 showed improved / resolved lung nodules, no evidence of disease recurrence on lung. Pt's left arm still has cast protection due to left distal radius fracture from a fall on 9/18/19 but stated the motility is improving comparing with last visit. Her previous  left neck stiffness / uncomfortable sensation when moving the neck from side to side unchanged in past a few month. Otherwise Pt is able to eat and drink normally, BM daily, denies SOB/ Chest pain/ Palpitation/ fever/ chills/nausea / vomiting/ diarrhea / constipation/ abdominal bloating / vaginal bleeding  since last visit.\par

## 2019-12-15 NOTE — REASON FOR VISIT
[Family Member] : family member [Follow-Up Visit] : a follow-up [FreeTextEntry2] : follow up of ovarian cancer on maintenance of bevacizumab s/p C4

## 2019-12-15 NOTE — PHYSICAL EXAM
[Restricted in physically strenuous activity but ambulatory and able to carry out work of a light or sedentary nature] : Status 1- Restricted in physically strenuous activity but ambulatory and able to carry out work of a light or sedentary nature, e.g., light house work, office work [Thin] : thin [Normal] : affect appropriate [Ulcers] : no ulcers [Mucositis] : no mucositis [de-identified] : right upper canine missing with dental wire; left lower molar missing; posterior cranial surgical scar well healed  [Vesicles] : no vesicles [Thrush] : no thrush [de-identified] : left lower arm cast on due to left arm fracture from fall [de-identified] : left arm wrapped with cast protection due to fracture [de-identified] : occipital scar C/D/I

## 2019-12-16 NOTE — ASSESSMENT
[FreeTextEntry1] : 70 y/o F, never smoker, w/ hx of HTN, DM, Lt ovarian tumor c/w mullerian origin, and Lung CA.\par \par Now 2yr 5mo s/p FB w/ BAL of the LLL bronchus, Navigational Bronch, FNA of the FERMÍN mass, Brushing of the FERMÍN lung mass and biopsy of the FERMÍN mass on 7/6/2017. Path revealed NSCLC, favoring AdenoCA, +TTF-1, +Napsin.\par \par Pt is s/p Lt ovarian tumor excision and fallopian tube excision on 7/21/17 by Dr. Tiffanie Brooks. Path revealed poorly-diff CA, +WT-1, PAX-8 and ER, c/w mullerian origin. Pt is s/p Total Hysterectomy mid-Sept, 2017 by Dr. Brooks.\par \par Now 2yr 4mo s/p Lt VATS Robotic-assisted LULobectomy, MLND on 8/16/17. Path revealed AdenoCA, acinar (90%) and solid (10%), 2 x 1.8 x 0.5cm, margins, visceral pleura and LNs negative, pT1aN0 Stg IA.\par \par CT Chest on 12/9/19:\par - calcified hilar LNs\par - 1mm RUL nodule (3:23; previously 2mm)\par - resolution of the 2mm RUL nodule\par - resolution of the 2mm FERMÍN nodule\par \par I have reviewed the patient's medical records and diagnostic images at time of this office consultation and have made the following recommendation:\par 1. No CT evidence of recurrent disease.\par 2. RTC in 6 months CT chest w/o contrast. \par \par \par \par I personally performed the services described in the documentation, reviewed the documentation recorded by the scribe in my presence and it accurately and completely records my words and actions.\par \par I, Muriel Flores, PRIETO, am scribing for and the presence of CLAIR Long, the following sections HISTORY OF PRESENT ILLNESS, PAST MEDICAL/FAMILY/SOCIAL HISTORY; REVIEW OF SYSTEMS; VITAL SIGNS; PHYSICAL EXAM; DISPOSITION.

## 2019-12-16 NOTE — CONSULT LETTER
[Consult Letter:] : I had the pleasure of evaluating your patient, [unfilled]. [Dear  ___] : Dear  [unfilled], [Consult Closing:] : Thank you very much for allowing me to participate in the care of this patient.  If you have any questions, please do not hesitate to contact me. [( Thank you for referring [unfilled] for consultation for _____ )] : Thank you for referring [unfilled] for consultation for [unfilled] [Please see my note below.] : Please see my note below. [Sincerely,] : Sincerely, [DrLewis  ___] : Dr. JOHNSON [DrLewis ___] : Dr. JOHNSON [FreeTextEntry2] : Liliana Sanchez MD (PCP) \par Tiffanie Brooks MD (OB/GYN) \par Geo Cuevas MD (Hem/Onc)  [FreeTextEntry3] : Ken Sol MD, MPH \par System Director of Thoracic Surgery \par Director of Comprehensive Lung and Foregut [facility] \par Professor Cardiovascular & Thoracic Surgery  \par [employer] [facility] [employer] at [facility]\par

## 2019-12-16 NOTE — HISTORY OF PRESENT ILLNESS
[FreeTextEntry1] : 70 y/o F, never smoker, w/ hx of HTN, DM, Lt ovarian tumor c/w mullerian origin, and Lung CA.\par \par Now 2yr 5mo s/p FB w/ BAL of the LLL bronchus, Navigational Bronch, FNA of the FERMÍN mass, Brushing of the FERMÍN lung mass and biopsy of the FERMÍN mass on 7/6/2017. Path revealed NSCLC, favoring AdenoCA, +TTF-1, +Napsin.\par \par Pt is s/p Lt ovarian tumor excision and fallopian tube excision on 7/21/17 by Dr. Tiffanie Brooks. Path revealed poorly-diff CA, +WT-1, PAX-8 and ER, c/w mullerian origin. Pt is s/p Total Hysterectomy mid-Sept, 2017 by Dr. Brooks.\par \par Now 2yr 4mo s/p Lt VATS Robotic-assisted LULobectomy, MLND on 8/16/17. Path revealed AdenoCA, acinar (90%) and solid (10%), 2 x 1.8 x 0.5cm, margins, visceral pleura and LNs negative, pT1aN0 Stg IA.\par \par CT Chest on 8/7/18:\par - post-op changes \par - MOODY\par \par CT Chest on 2/7/19:\par - new 2mm RUL nodule (series 4 image 50)\par - new 2mm RUL nodule (series 4 image 71)\par - new 2mm FERMÍN nodule (series 4 image 33)\par \par CT Chest on 8/28/19:\par - stable 7 mm nodule abuts the serosal surface of the fundus of the bladder\par - interval decreased in size of portacaval low-density LN, 8 mm\par \par CT Chest on 12/9/19:\par - calcified hilar LNs\par - 1mm RUL nodule (3:23; previously 2mm)\par - resolution of the 2mm RUL nodule\par - resolution of the 2mm FERMÍN nodule\par \par Patient is here today for a follow up. The patient denies chest pain, hemoptysis, palpitation, fever, recent illness, hospitalization and significant weight loss. She admits cough. \par

## 2019-12-16 NOTE — DATA REVIEWED
[FreeTextEntry1] : CT Chest on 12/9/19:\par - calcified hilar LNs\par - 1mm RUL nodule (3:23; previously 2mm)\par - resolution of the 2mm RUL nodule\par - resolution of the 2mm FERMÍN nodule\par

## 2019-12-26 ENCOUNTER — OUTPATIENT (OUTPATIENT)
Dept: OUTPATIENT SERVICES | Facility: HOSPITAL | Age: 69
LOS: 1 days | Discharge: HOME | End: 2019-12-26

## 2019-12-26 DIAGNOSIS — Z90.49 ACQUIRED ABSENCE OF OTHER SPECIFIED PARTS OF DIGESTIVE TRACT: Chronic | ICD-10-CM

## 2019-12-26 DIAGNOSIS — C56.2 MALIGNANT NEOPLASM OF LEFT OVARY: ICD-10-CM

## 2019-12-26 DIAGNOSIS — Z90.722 ACQUIRED ABSENCE OF OVARIES, BILATERAL: Chronic | ICD-10-CM

## 2019-12-26 DIAGNOSIS — Z90.710 ACQUIRED ABSENCE OF BOTH CERVIX AND UTERUS: Chronic | ICD-10-CM

## 2019-12-26 DIAGNOSIS — Z98.890 OTHER SPECIFIED POSTPROCEDURAL STATES: Chronic | ICD-10-CM

## 2020-01-01 ENCOUNTER — APPOINTMENT (OUTPATIENT)
Dept: THORACIC SURGERY | Facility: CLINIC | Age: 70
End: 2020-01-01
Payer: MEDICARE

## 2020-01-01 ENCOUNTER — APPOINTMENT (OUTPATIENT)
Dept: CARDIOLOGY | Facility: CLINIC | Age: 70
End: 2020-01-01
Payer: MEDICARE

## 2020-01-01 ENCOUNTER — LABORATORY RESULT (OUTPATIENT)
Age: 70
End: 2020-01-01

## 2020-01-01 ENCOUNTER — NON-APPOINTMENT (OUTPATIENT)
Age: 70
End: 2020-01-01

## 2020-01-01 ENCOUNTER — RESULT REVIEW (OUTPATIENT)
Age: 70
End: 2020-01-01

## 2020-01-01 ENCOUNTER — APPOINTMENT (OUTPATIENT)
Dept: INFUSION THERAPY | Facility: HOSPITAL | Age: 70
End: 2020-01-01

## 2020-01-01 ENCOUNTER — APPOINTMENT (OUTPATIENT)
Dept: HEMATOLOGY ONCOLOGY | Facility: CLINIC | Age: 70
End: 2020-01-01
Payer: MEDICARE

## 2020-01-01 ENCOUNTER — OUTPATIENT (OUTPATIENT)
Dept: OUTPATIENT SERVICES | Facility: HOSPITAL | Age: 70
LOS: 1 days | Discharge: HOME | End: 2020-01-01

## 2020-01-01 VITALS
RESPIRATION RATE: 18 BRPM | DIASTOLIC BLOOD PRESSURE: 80 MMHG | WEIGHT: 112 LBS | BODY MASS INDEX: 21.15 KG/M2 | TEMPERATURE: 98.1 F | SYSTOLIC BLOOD PRESSURE: 123 MMHG | OXYGEN SATURATION: 97 % | HEART RATE: 76 BPM

## 2020-01-01 VITALS
SYSTOLIC BLOOD PRESSURE: 110 MMHG | HEART RATE: 75 BPM | WEIGHT: 112.44 LBS | BODY MASS INDEX: 21.23 KG/M2 | OXYGEN SATURATION: 98 % | TEMPERATURE: 98.6 F | DIASTOLIC BLOOD PRESSURE: 70 MMHG | HEIGHT: 61.02 IN | RESPIRATION RATE: 16 BRPM

## 2020-01-01 DIAGNOSIS — Z90.49 ACQUIRED ABSENCE OF OTHER SPECIFIED PARTS OF DIGESTIVE TRACT: Chronic | ICD-10-CM

## 2020-01-01 DIAGNOSIS — C56.2 MALIGNANT NEOPLASM OF LEFT OVARY: ICD-10-CM

## 2020-01-01 DIAGNOSIS — Z98.890 OTHER SPECIFIED POSTPROCEDURAL STATES: Chronic | ICD-10-CM

## 2020-01-01 DIAGNOSIS — R00.2 PALPITATIONS: ICD-10-CM

## 2020-01-01 DIAGNOSIS — Z90.710 ACQUIRED ABSENCE OF BOTH CERVIX AND UTERUS: Chronic | ICD-10-CM

## 2020-01-01 DIAGNOSIS — Z51.11 ENCOUNTER FOR ANTINEOPLASTIC CHEMOTHERAPY: ICD-10-CM

## 2020-01-01 DIAGNOSIS — R11.2 NAUSEA WITH VOMITING, UNSPECIFIED: ICD-10-CM

## 2020-01-01 DIAGNOSIS — Z90.722 ACQUIRED ABSENCE OF OVARIES, BILATERAL: Chronic | ICD-10-CM

## 2020-01-01 LAB
BASOPHILS # BLD AUTO: 0.04 K/UL — SIGNIFICANT CHANGE UP (ref 0–0.2)
BASOPHILS NFR BLD AUTO: 0.7 % — SIGNIFICANT CHANGE UP (ref 0–2)
EOSINOPHIL # BLD AUTO: 0.05 K/UL — SIGNIFICANT CHANGE UP (ref 0–0.5)
EOSINOPHIL NFR BLD AUTO: 0.8 % — SIGNIFICANT CHANGE UP (ref 0–6)
HCT VFR BLD CALC: 35.7 % — SIGNIFICANT CHANGE UP (ref 34.5–45)
HGB BLD-MCNC: 11.8 G/DL — SIGNIFICANT CHANGE UP (ref 11.5–15.5)
IMM GRANULOCYTES NFR BLD AUTO: 1.2 % — SIGNIFICANT CHANGE UP (ref 0–1.5)
LYMPHOCYTES # BLD AUTO: 1.67 K/UL — SIGNIFICANT CHANGE UP (ref 1–3.3)
LYMPHOCYTES # BLD AUTO: 27.6 % — SIGNIFICANT CHANGE UP (ref 13–44)
MCHC RBC-ENTMCNC: 31.6 PG — SIGNIFICANT CHANGE UP (ref 27–34)
MCHC RBC-ENTMCNC: 33.1 G/DL — SIGNIFICANT CHANGE UP (ref 32–36)
MCV RBC AUTO: 95.5 FL — SIGNIFICANT CHANGE UP (ref 80–100)
MONOCYTES # BLD AUTO: 0.78 K/UL — SIGNIFICANT CHANGE UP (ref 0–0.9)
MONOCYTES NFR BLD AUTO: 12.9 % — SIGNIFICANT CHANGE UP (ref 2–14)
NEUTROPHILS # BLD AUTO: 3.43 K/UL — SIGNIFICANT CHANGE UP (ref 1.8–7.4)
NEUTROPHILS NFR BLD AUTO: 56.8 % — SIGNIFICANT CHANGE UP (ref 43–77)
NRBC # BLD: 0 /100 WBCS — SIGNIFICANT CHANGE UP (ref 0–0)
PLATELET # BLD AUTO: 171 K/UL — SIGNIFICANT CHANGE UP (ref 150–400)
RBC # BLD: 3.74 M/UL — LOW (ref 3.8–5.2)
RBC # FLD: 13.3 % — SIGNIFICANT CHANGE UP (ref 10.3–14.5)
WBC # BLD: 6.04 K/UL — SIGNIFICANT CHANGE UP (ref 3.8–10.5)
WBC # FLD AUTO: 6.04 K/UL — SIGNIFICANT CHANGE UP (ref 3.8–10.5)

## 2020-01-01 PROCEDURE — 99213 OFFICE O/P EST LOW 20 MIN: CPT

## 2020-01-01 PROCEDURE — 99072 ADDL SUPL MATRL&STAF TM PHE: CPT

## 2020-01-01 PROCEDURE — 99215 OFFICE O/P EST HI 40 MIN: CPT

## 2020-01-01 PROCEDURE — 93306 TTE W/DOPPLER COMPLETE: CPT

## 2020-01-01 PROCEDURE — 93356 MYOCRD STRAIN IMG SPCKL TRCK: CPT

## 2020-01-01 RX ORDER — CLINDAMYCIN HYDROCHLORIDE 300 MG/1
300 CAPSULE ORAL
Qty: 21 | Refills: 0 | Status: DISCONTINUED | COMMUNITY
Start: 2020-07-02

## 2020-01-01 RX ORDER — PANTOPRAZOLE 20 MG/1
20 TABLET, DELAYED RELEASE ORAL
Qty: 90 | Refills: 0 | Status: ACTIVE | COMMUNITY
Start: 2020-07-13

## 2020-01-02 ENCOUNTER — RESULT REVIEW (OUTPATIENT)
Age: 70
End: 2020-01-02

## 2020-01-02 ENCOUNTER — APPOINTMENT (OUTPATIENT)
Dept: HEMATOLOGY ONCOLOGY | Facility: CLINIC | Age: 70
End: 2020-01-02
Payer: MEDICARE

## 2020-01-02 ENCOUNTER — LABORATORY RESULT (OUTPATIENT)
Age: 70
End: 2020-01-02

## 2020-01-02 ENCOUNTER — APPOINTMENT (OUTPATIENT)
Dept: INFUSION THERAPY | Facility: HOSPITAL | Age: 70
End: 2020-01-02

## 2020-01-02 VITALS
OXYGEN SATURATION: 98 % | HEART RATE: 69 BPM | RESPIRATION RATE: 16 BRPM | WEIGHT: 119.05 LBS | TEMPERATURE: 98.4 F | BODY MASS INDEX: 22.49 KG/M2 | DIASTOLIC BLOOD PRESSURE: 80 MMHG | SYSTOLIC BLOOD PRESSURE: 120 MMHG

## 2020-01-02 LAB
BASOPHILS # BLD AUTO: 0 K/UL — SIGNIFICANT CHANGE UP (ref 0–0.2)
BASOPHILS NFR BLD AUTO: 0.3 % — SIGNIFICANT CHANGE UP (ref 0–2)
EOSINOPHIL # BLD AUTO: 0.1 K/UL — SIGNIFICANT CHANGE UP (ref 0–0.5)
EOSINOPHIL NFR BLD AUTO: 1.8 % — SIGNIFICANT CHANGE UP (ref 0–6)
HCT VFR BLD CALC: 40.8 % — SIGNIFICANT CHANGE UP (ref 34.5–45)
HGB BLD-MCNC: 13.7 G/DL — SIGNIFICANT CHANGE UP (ref 11.5–15.5)
LYMPHOCYTES # BLD AUTO: 2.5 K/UL — SIGNIFICANT CHANGE UP (ref 1–3.3)
LYMPHOCYTES # BLD AUTO: 37.1 % — SIGNIFICANT CHANGE UP (ref 13–44)
MCHC RBC-ENTMCNC: 30.4 PG — SIGNIFICANT CHANGE UP (ref 27–34)
MCHC RBC-ENTMCNC: 33.5 G/DL — SIGNIFICANT CHANGE UP (ref 32–36)
MCV RBC AUTO: 90.8 FL — SIGNIFICANT CHANGE UP (ref 80–100)
MONOCYTES # BLD AUTO: 0.6 K/UL — SIGNIFICANT CHANGE UP (ref 0–0.9)
MONOCYTES NFR BLD AUTO: 8.2 % — SIGNIFICANT CHANGE UP (ref 2–14)
NEUTROPHILS # BLD AUTO: 3.6 K/UL — SIGNIFICANT CHANGE UP (ref 1.8–7.4)
NEUTROPHILS NFR BLD AUTO: 52.6 % — SIGNIFICANT CHANGE UP (ref 43–77)
PLATELET # BLD AUTO: 201 K/UL — SIGNIFICANT CHANGE UP (ref 150–400)
RBC # BLD: 4.49 M/UL — SIGNIFICANT CHANGE UP (ref 3.8–5.2)
RBC # FLD: 12 % — SIGNIFICANT CHANGE UP (ref 10.3–14.5)
WBC # BLD: 6.8 K/UL — SIGNIFICANT CHANGE UP (ref 3.8–10.5)
WBC # FLD AUTO: 6.8 K/UL — SIGNIFICANT CHANGE UP (ref 3.8–10.5)

## 2020-01-02 PROCEDURE — 99214 OFFICE O/P EST MOD 30 MIN: CPT

## 2020-01-02 RX ORDER — SODIUM CHLORIDE 0.65 %
0.65 AEROSOL, SPRAY (ML) NASAL TWICE DAILY
Qty: 1 | Refills: 2 | Status: ACTIVE | COMMUNITY
Start: 2018-01-31 | End: 1900-01-01

## 2020-01-02 NOTE — REVIEW OF SYSTEMS
[Dysphagia] : no dysphagia [Nosebleeds] : nosebleeds [Loss of Hearing] : no loss of hearing [Odynophagia] : no odynophagia [Hoarseness] : no hoarseness [Skin Rash] : no skin rash [Mucosal Pain] : no mucosal pain [Skin Wound] : no skin wound [Negative] : Allergic/Immunologic [de-identified] : dry skin

## 2020-01-02 NOTE — PHYSICAL EXAM
[Fully active, able to carry on all pre-disease performance without restriction] : Status 0 - Fully active, able to carry on all pre-disease performance without restriction [Thin] : thin [Mucositis] : no mucositis [Ulcers] : no ulcers [Thrush] : no thrush [Vesicles] : no vesicles [Normal] : affect appropriate [de-identified] : postsurgical site: stitches over the occipital area C/D/I  [de-identified] : occipital scar; dry skin patches  [de-identified] : right upper canine missing with dental wire; left lower molar missing; posterior cranial surgical scar well healed

## 2020-01-02 NOTE — ASSESSMENT
[FreeTextEntry1] : She is a 68 y/o F with Stage IV ovarian cancer and Stage I NSCLC diagnosed simultaneously. She has completed 6 cycles of carboplatin/ paclitaxel with bevacizumab and did not continue with bevacizumab maintenance: off from 4/2018 to 4/2019. 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 She completed SRS to the craniotomy site. CT of the chest/ abd/ pelvis showed adenopathy and started with palliative chemotherapy: retreat carboplatin/ paclitaxel 5/7/19. She had allergic reaction with C2 bag 2 of carboplatin and has been on bevacizumab/ paclitaxel s/p total of 6 cycles. She has been on maintenance bevacizumab therapy and tolerating with occasional nosebleeds: Grade 1. We reviewed supportive measures: saline nasal spray to keep nostril moist. We reviewed moisturizer for dry skin dermatitis. BP is WNL. She will continue with treatment. Will have follow up  and perform CT planned for February.

## 2020-01-02 NOTE — HISTORY OF PRESENT ILLNESS
[Disease: _____________________] : Disease: [unfilled] [T: ___] : T[unfilled] [N: ___] : N[unfilled] [M: ___] : M[unfilled] [AJCC Stage: ____] : AJCC Stage: [unfilled] [de-identified] : poorly differentiated carcinoma: ER positive  [de-identified] : Age 67: Stage I NSCLC adenocarcinoma s/p left VATs robotic assisted FERMÍN lobectomy and MLND 8/16/17 with Dr Ken Sol\par Age 67: Stage IV poorly differentiated ovarian cancer s/p AVEL/ BSO/ total omentectomy, bilateral ureterolysis, resection of abdominal anterior wall masses, repair of cystostomy\par She initially presented with hemoptysis and had evaluation consisting of bronchoscopy and FNA. The bronchoscopy was negative and the FNA was positive for malignant cells: TTF1+ and PAX 8 negative. She had Pet/ CT on 7/6/17 which showed 2.3 cm left upper lobe nodule with SUV of 23 and 4.2 x 3.1 cm left ovarian lesion SUV of 7.9 and left common iliac lymph nodes measuring up to 8mm with SUV of 2.2, 6 mm perihepatic implant SUV of 3.1. She initially had laparoscopic evaluation with left ovary biopsy which showed poorly differentiated carcinoma that was ER, WT1, PAX 8 positive and TTF1 negative. She initially had the left VATs. Then she subsequently had exploratory laparotomy with  AVEL, BSO, radical dissection of tumor with total omentectomy, resection of anterior abdominal wall masses, lysis of adhesions, and repair of cystostomy. She had CT imaging done after 5th cycle of chemotherapy to evaluate abdominal pain and CT showed no evidence of disease. She had abdominal pain in 3/2019 and had follow up CT which showed new small pelvic implant and enlarged sita-caval LN. She had headache and went to [facility] 4/10/19. She had imaging that showed predominantly cystic peripherally enhancing mass within the left cerebellar hemisphere. She had left suboccipital craniotomy with Dr Tatyana Ortiz. Had carboplatin retreat with paclitaxel/ Avastin started and had carboplatin reaction on 5/29/19 during bag 2 Cycle 2: redness over entire face and uncomfortable sensation over face/ arms. \par  [de-identified] : She continues to have good appetite and no new abdominal pain or distension. The pelvic/ groin pain she had prior to retreat chemotherapy has not recurred. She has good energy. She has noticed occasional nosebleeds and resolves with pressure. Happens few times a week. She also has dry skin: has been applying Vaseline to lower back: itchy from the dry skin.  [de-identified] : carbo/ taxol: 10/24/17 to 2/12/18 with cumulative fatigue and neuropathy\par bevacizumab added on 12/8/17 to 4/2018 (pt stopped coming for treatment and lost to follow up until 12/2018)\par retreat carbo/ taxol 5/7/19\par bevacizumab added to Cycle 2 of retreat carboplatin/ paclitaxel 5/29/19 and allergic reaction with 2nd bag of carboplatin\par bevacizumab and paclitaxel 6/19/19 to 8/27/19\par bevacizumab maintenance 9/2019 to present

## 2020-01-02 NOTE — REASON FOR VISIT
[Follow-Up Visit] : a follow-up [Family Member] : family member [FreeTextEntry2] : follow up for recurrent ovarian cancer on maintenance bevacizumab

## 2020-01-08 DIAGNOSIS — Z51.11 ENCOUNTER FOR ANTINEOPLASTIC CHEMOTHERAPY: ICD-10-CM

## 2020-01-22 ENCOUNTER — APPOINTMENT (OUTPATIENT)
Dept: INFUSION THERAPY | Facility: HOSPITAL | Age: 70
End: 2020-01-22

## 2020-01-22 ENCOUNTER — LABORATORY RESULT (OUTPATIENT)
Age: 70
End: 2020-01-22

## 2020-01-22 ENCOUNTER — RESULT REVIEW (OUTPATIENT)
Age: 70
End: 2020-01-22

## 2020-01-22 ENCOUNTER — APPOINTMENT (OUTPATIENT)
Dept: HEMATOLOGY ONCOLOGY | Facility: CLINIC | Age: 70
End: 2020-01-22
Payer: MEDICARE

## 2020-01-22 ENCOUNTER — APPOINTMENT (OUTPATIENT)
Dept: HEMATOLOGY ONCOLOGY | Facility: CLINIC | Age: 70
End: 2020-01-22

## 2020-01-22 VITALS
SYSTOLIC BLOOD PRESSURE: 133 MMHG | WEIGHT: 120.24 LBS | TEMPERATURE: 98.5 F | OXYGEN SATURATION: 98 % | BODY MASS INDEX: 22.72 KG/M2 | RESPIRATION RATE: 16 BRPM | DIASTOLIC BLOOD PRESSURE: 88 MMHG | HEART RATE: 74 BPM

## 2020-01-22 DIAGNOSIS — M25.511 PAIN IN RIGHT SHOULDER: ICD-10-CM

## 2020-01-22 LAB
BASOPHILS # BLD AUTO: 0 K/UL — SIGNIFICANT CHANGE UP (ref 0–0.2)
BASOPHILS NFR BLD AUTO: 0.5 % — SIGNIFICANT CHANGE UP (ref 0–2)
EOSINOPHIL # BLD AUTO: 0.1 K/UL — SIGNIFICANT CHANGE UP (ref 0–0.5)
EOSINOPHIL NFR BLD AUTO: 1.7 % — SIGNIFICANT CHANGE UP (ref 0–6)
HCT VFR BLD CALC: 44.8 % — SIGNIFICANT CHANGE UP (ref 34.5–45)
HGB BLD-MCNC: 15.8 G/DL — HIGH (ref 11.5–15.5)
LYMPHOCYTES # BLD AUTO: 2.3 K/UL — SIGNIFICANT CHANGE UP (ref 1–3.3)
LYMPHOCYTES # BLD AUTO: 32.9 % — SIGNIFICANT CHANGE UP (ref 13–44)
MCHC RBC-ENTMCNC: 31.9 PG — SIGNIFICANT CHANGE UP (ref 27–34)
MCHC RBC-ENTMCNC: 35.4 G/DL — SIGNIFICANT CHANGE UP (ref 32–36)
MCV RBC AUTO: 90.1 FL — SIGNIFICANT CHANGE UP (ref 80–100)
MONOCYTES # BLD AUTO: 0.4 K/UL — SIGNIFICANT CHANGE UP (ref 0–0.9)
MONOCYTES NFR BLD AUTO: 6 % — SIGNIFICANT CHANGE UP (ref 2–14)
NEUTROPHILS # BLD AUTO: 4.1 K/UL — SIGNIFICANT CHANGE UP (ref 1.8–7.4)
NEUTROPHILS NFR BLD AUTO: 58.8 % — SIGNIFICANT CHANGE UP (ref 43–77)
PLATELET # BLD AUTO: 187 K/UL — SIGNIFICANT CHANGE UP (ref 150–400)
RBC # BLD: 4.97 M/UL — SIGNIFICANT CHANGE UP (ref 3.8–5.2)
RBC # FLD: 11.9 % — SIGNIFICANT CHANGE UP (ref 10.3–14.5)
WBC # BLD: 7 K/UL — SIGNIFICANT CHANGE UP (ref 3.8–10.5)
WBC # FLD AUTO: 7 K/UL — SIGNIFICANT CHANGE UP (ref 3.8–10.5)

## 2020-01-22 PROCEDURE — 99214 OFFICE O/P EST MOD 30 MIN: CPT

## 2020-01-22 NOTE — REVIEW OF SYSTEMS
[Dysphagia] : no dysphagia [Nosebleeds] : nosebleeds [Loss of Hearing] : no loss of hearing [Hoarseness] : no hoarseness [Mucosal Pain] : no mucosal pain [Odynophagia] : no odynophagia [Joint Pain] : joint pain [Joint Stiffness] : joint stiffness [Muscle Weakness] : no muscle weakness [Muscle Pain] : no muscle pain [Negative] : Allergic/Immunologic [FreeTextEntry4] : occasional  [FreeTextEntry9] : LOIS

## 2020-01-22 NOTE — HISTORY OF PRESENT ILLNESS
[Disease: _____________________] : Disease: [unfilled] [T: ___] : T[unfilled] [N: ___] : N[unfilled] [M: ___] : M[unfilled] [AJCC Stage: ____] : AJCC Stage: [unfilled] [de-identified] : Age 67: Stage I NSCLC adenocarcinoma s/p left VATs robotic assisted FERMÍN lobectomy and MLND 8/16/17 with Dr Ken Sol\par Age 67: Stage IV poorly differentiated ovarian cancer s/p AVEL/ BSO/ total omentectomy, bilateral ureterolysis, resection of abdominal anterior wall masses, repair of cystostomy\par She initially presented with hemoptysis and had evaluation consisting of bronchoscopy and FNA. The bronchoscopy was negative and the FNA was positive for malignant cells: TTF1+ and PAX 8 negative. She had Pet/ CT on 7/6/17 which showed 2.3 cm left upper lobe nodule with SUV of 23 and 4.2 x 3.1 cm left ovarian lesion SUV of 7.9 and left common iliac lymph nodes measuring up to 8mm with SUV of 2.2, 6 mm perihepatic implant SUV of 3.1. She initially had laparoscopic evaluation with left ovary biopsy which showed poorly differentiated carcinoma that was ER, WT1, PAX 8 positive and TTF1 negative. She initially had the left VATs. Then she subsequently had exploratory laparotomy with  AVEL, BSO, radical dissection of tumor with total omentectomy, resection of anterior abdominal wall masses, lysis of adhesions, and repair of cystostomy. She had CT imaging done after 5th cycle of chemotherapy to evaluate abdominal pain and CT showed no evidence of disease. She had abdominal pain in 3/2019 and had follow up CT which showed new small pelvic implant and enlarged sita-caval LN. She had headache and went to [facility] 4/10/[facility]. She had imaging that showed predominantly cystic peripherally enhancing mass within the left cerebellar hemisphere. She had left suboccipital craniotomy with Dr Tatyana Ortiz. Had carboplatin retreat with paclitaxel/ Avastin started and had carboplatin reaction on 5/29/19 during bag 2 Cycle 2: redness over entire face and uncomfortable sensation over face/ arms. \par  [de-identified] : poorly differentiated carcinoma: ER positive  [de-identified] : carbo/ taxol: 10/24/17 to 2/12/18 with cumulative fatigue and neuropathy\par bevacizumab added on 12/8/17 to 4/2018 (pt stopped coming for treatment and lost to follow up until 12/2018)\par retreat carbo/ taxol 5/7/19\par bevacizumab added to Cycle 2 of retreat carboplatin/ paclitaxel 5/29/19 and allergic reaction with 2nd bag of carboplatin\par bevacizumab and paclitaxel 6/19/19 to 8/27/19\par bevacizumab maintenance 9/2019 to present  [de-identified] : Has remained on bevacizumab and continues to tolerate therapy. She had PT for the RUE s/p fall but still has stiffness and pain in the wrist, elbow and shoulder. Able to abduct at shoulder and grasp things. Lifting heavy items is more difficult. She has not tried any antiinflammatory medication. Has good appetite: gained 1 lb. She denies any new abdominal pain or cough or SOB. Normal BM. She has nosebleed: few drops occasionally for few weeks after treatment. She has not tried the saline nasal spray. Denies any new headache or unsteadiness or dizziness. Over the RUE fingertips: she notices more numbness sensation: her family helps to massage hand daily. PCP wants to start her on atorvastatin.

## 2020-01-22 NOTE — ASSESSMENT
[FreeTextEntry1] : She is a 68 y/o F with Stage IV ovarian cancer and Stage I NSCLC diagnosed simultaneously. She has completed 6 cycles of carboplatin/ paclitaxel with bevacizumab and did not continue with bevacizumab maintenance: off from 4/2018 to 4/2019. 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 She completed SRS to the craniotomy site. CT of the chest/ abd/ pelvis showed adenopathy and started with palliative chemotherapy: retreat carboplatin/ paclitaxel 5/7/19. She had allergic reaction with C2 bag 2 of carboplatin and has been on bevacizumab/ paclitaxel s/p total of 6 cycles. 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  We reviewed supportive measures: saline nasal spray to decrease nosebleed and continued massage for neuropathy. We reviewed interval CT in February. She will continue with bevacizumab: u/a and BP WNL. Will trend  and will evaluate for disease recurrence. Next follow up in 3 weeks.

## 2020-01-22 NOTE — PHYSICAL EXAM
[Fully active, able to carry on all pre-disease performance without restriction] : Status 0 - Fully active, able to carry on all pre-disease performance without restriction [Thin] : thin [Ulcers] : no ulcers [Mucositis] : no mucositis [Thrush] : no thrush [Vesicles] : no vesicles [de-identified] : postsurgical site: stitches over the occipital area C/D/I  [Normal] : affect appropriate [de-identified] : RUE wrist swelling chronic; able to abduct at shoulder and normal  strength

## 2020-01-22 NOTE — REASON FOR VISIT
[Follow-Up Visit] : a follow-up [Family Member] : family member [FreeTextEntry2] : follow up for ovarian cancer metastatic to the brain and LN

## 2020-01-24 NOTE — PATIENT PROFILE ADULT. - CAREGIVER
Patient with history of asthma is here for follow-up.   Overall patient is feeling well.  He needs albuterol about once a year.  He had lung function test in July of 2019 ordered by his primary care is that showed only air trapping.  He remains on Advair and Spiriva.      PFT: 7/29/19  Spirometry and flow volume loop are normal.    Lung volumes demonstrate air trapping.  ERV is low, likely due to body habitus.  Diffusing capacity is normal.  A 6-minute walk test on room air covering 360 meters, does not reveal a significant oxygen desaturation.  98%  When compared to the study of 6/26/14, there has been a significant improvement in FVC from 2.78-4.07 and in FEV1 from 1.65-3.07. However Residual volume has increased from 3.052 3.41 and total lung capacity from 5.89 7.38 with no significant change in diffusing capacity.    Spirometry 6/12/18  Spirometry and flow volume loop are normal.      Lung function test 6/26/14  Spirometry and Flow Volume Loop reveal a moderately severe  obstructive airway defect.  There is no significant improvement  post inhaled bronchodilator.  Lung Volumes demonstrate mild air trapping.  Diffusing Capacity is normal.      Objective:    Visit Vitals  /68   Pulse 96   Wt 88.9 kg   SpO2 99%   BMI 29.80 kg/m²      PHYSICAL EXAM:  General: Alert, in no acute distress  Skin: Warm with normal turgor  Throat: oropharynx is clear, no redness or exudate seen, good dental hygiene  Neck: Supple without lymphadenopathy, jugular venous distention, or thyromegaly. Trachea midline  Lymph Nodes: No palpable lymphadenopathy  Lungs: lungs clear to auscultation     Heart: Regular rhythm, no murmur present, PMI (point of maximum impulse) is not displaced  Extremities: No cyanosis, clubbing and No edema  Neurologic Exam: The patient is alert and oriented x3. The patient moves all 4 extremities with good strength and tone. The patient has a normal gait. Normal mood and affect    ASSESSMENT: Moderate  persistent asthma without complication  (primary encounter diagnosis)  Comment:  Well controlled.  Recommend discontinue Spiriva.  Continue Advair.  Patient is lifetime nonsmoker.    (I50.22) Chronic systolic CHF (congestive heart failure) (CMS/[facility])  Comment: Ischemic cardiomyopathy with ejection fraction of 20%.             Yes

## 2020-02-04 ENCOUNTER — FORM ENCOUNTER (OUTPATIENT)
Age: 70
End: 2020-02-04

## 2020-02-05 ENCOUNTER — APPOINTMENT (OUTPATIENT)
Dept: CT IMAGING | Facility: IMAGING CENTER | Age: 70
End: 2020-02-05
Payer: MEDICARE

## 2020-02-05 ENCOUNTER — OUTPATIENT (OUTPATIENT)
Dept: OUTPATIENT SERVICES | Facility: HOSPITAL | Age: 70
LOS: 1 days | End: 2020-02-05
Payer: MEDICARE

## 2020-02-05 DIAGNOSIS — Z90.710 ACQUIRED ABSENCE OF BOTH CERVIX AND UTERUS: Chronic | ICD-10-CM

## 2020-02-05 DIAGNOSIS — Z98.890 OTHER SPECIFIED POSTPROCEDURAL STATES: Chronic | ICD-10-CM

## 2020-02-05 DIAGNOSIS — Z90.49 ACQUIRED ABSENCE OF OTHER SPECIFIED PARTS OF DIGESTIVE TRACT: Chronic | ICD-10-CM

## 2020-02-05 DIAGNOSIS — C76.2 MALIGNANT NEOPLASM OF ABDOMEN: ICD-10-CM

## 2020-02-05 DIAGNOSIS — Z90.722 ACQUIRED ABSENCE OF OVARIES, BILATERAL: Chronic | ICD-10-CM

## 2020-02-05 PROCEDURE — 71260 CT THORAX DX C+: CPT | Mod: 26

## 2020-02-05 PROCEDURE — 71260 CT THORAX DX C+: CPT

## 2020-02-05 PROCEDURE — 74177 CT ABD & PELVIS W/CONTRAST: CPT | Mod: 26

## 2020-02-05 PROCEDURE — 74177 CT ABD & PELVIS W/CONTRAST: CPT

## 2020-02-08 ENCOUNTER — OUTPATIENT (OUTPATIENT)
Dept: OUTPATIENT SERVICES | Facility: HOSPITAL | Age: 70
LOS: 1 days | Discharge: HOME | End: 2020-02-08

## 2020-02-08 DIAGNOSIS — Z90.710 ACQUIRED ABSENCE OF BOTH CERVIX AND UTERUS: Chronic | ICD-10-CM

## 2020-02-08 DIAGNOSIS — Z90.722 ACQUIRED ABSENCE OF OVARIES, BILATERAL: Chronic | ICD-10-CM

## 2020-02-08 DIAGNOSIS — Z98.890 OTHER SPECIFIED POSTPROCEDURAL STATES: Chronic | ICD-10-CM

## 2020-02-08 DIAGNOSIS — Z90.49 ACQUIRED ABSENCE OF OTHER SPECIFIED PARTS OF DIGESTIVE TRACT: Chronic | ICD-10-CM

## 2020-02-08 DIAGNOSIS — C56.2 MALIGNANT NEOPLASM OF LEFT OVARY: ICD-10-CM

## 2020-02-12 ENCOUNTER — RESULT REVIEW (OUTPATIENT)
Age: 70
End: 2020-02-12

## 2020-02-12 ENCOUNTER — LABORATORY RESULT (OUTPATIENT)
Age: 70
End: 2020-02-12

## 2020-02-12 ENCOUNTER — APPOINTMENT (OUTPATIENT)
Dept: INFUSION THERAPY | Facility: HOSPITAL | Age: 70
End: 2020-02-12

## 2020-02-12 ENCOUNTER — APPOINTMENT (OUTPATIENT)
Dept: HEMATOLOGY ONCOLOGY | Facility: CLINIC | Age: 70
End: 2020-02-12
Payer: MEDICARE

## 2020-02-12 VITALS
WEIGHT: 122.35 LBS | TEMPERATURE: 97.8 F | BODY MASS INDEX: 23.12 KG/M2 | SYSTOLIC BLOOD PRESSURE: 133 MMHG | OXYGEN SATURATION: 98 % | HEART RATE: 73 BPM | RESPIRATION RATE: 18 BRPM | DIASTOLIC BLOOD PRESSURE: 83 MMHG

## 2020-02-12 DIAGNOSIS — M79.10 MYALGIA, UNSPECIFIED SITE: ICD-10-CM

## 2020-02-12 LAB
BASOPHILS # BLD AUTO: 0 K/UL — SIGNIFICANT CHANGE UP (ref 0–0.2)
BASOPHILS NFR BLD AUTO: 0.7 % — SIGNIFICANT CHANGE UP (ref 0–2)
EOSINOPHIL # BLD AUTO: 0.1 K/UL — SIGNIFICANT CHANGE UP (ref 0–0.5)
EOSINOPHIL NFR BLD AUTO: 1.6 % — SIGNIFICANT CHANGE UP (ref 0–6)
HCT VFR BLD CALC: 39.4 % — SIGNIFICANT CHANGE UP (ref 34.5–45)
HGB BLD-MCNC: 13.4 G/DL — SIGNIFICANT CHANGE UP (ref 11.5–15.5)
LYMPHOCYTES # BLD AUTO: 2.3 K/UL — SIGNIFICANT CHANGE UP (ref 1–3.3)
LYMPHOCYTES # BLD AUTO: 35.3 % — SIGNIFICANT CHANGE UP (ref 13–44)
MCHC RBC-ENTMCNC: 31 PG — SIGNIFICANT CHANGE UP (ref 27–34)
MCHC RBC-ENTMCNC: 34.1 G/DL — SIGNIFICANT CHANGE UP (ref 32–36)
MCV RBC AUTO: 90.9 FL — SIGNIFICANT CHANGE UP (ref 80–100)
MONOCYTES # BLD AUTO: 0.4 K/UL — SIGNIFICANT CHANGE UP (ref 0–0.9)
MONOCYTES NFR BLD AUTO: 6.6 % — SIGNIFICANT CHANGE UP (ref 2–14)
NEUTROPHILS # BLD AUTO: 3.6 K/UL — SIGNIFICANT CHANGE UP (ref 1.8–7.4)
NEUTROPHILS NFR BLD AUTO: 55.9 % — SIGNIFICANT CHANGE UP (ref 43–77)
PLATELET # BLD AUTO: 175 K/UL — SIGNIFICANT CHANGE UP (ref 150–400)
RBC # BLD: 4.33 M/UL — SIGNIFICANT CHANGE UP (ref 3.8–5.2)
RBC # FLD: 12.1 % — SIGNIFICANT CHANGE UP (ref 10.3–14.5)
WBC # BLD: 6.5 K/UL — SIGNIFICANT CHANGE UP (ref 3.8–10.5)
WBC # FLD AUTO: 6.5 K/UL — SIGNIFICANT CHANGE UP (ref 3.8–10.5)

## 2020-02-12 PROCEDURE — 99215 OFFICE O/P EST HI 40 MIN: CPT

## 2020-02-13 DIAGNOSIS — R11.2 NAUSEA WITH VOMITING, UNSPECIFIED: ICD-10-CM

## 2020-02-13 DIAGNOSIS — Z51.11 ENCOUNTER FOR ANTINEOPLASTIC CHEMOTHERAPY: ICD-10-CM

## 2020-02-14 ENCOUNTER — APPOINTMENT (OUTPATIENT)
Dept: GYNECOLOGIC ONCOLOGY | Facility: CLINIC | Age: 70
End: 2020-02-14
Payer: MEDICARE

## 2020-02-14 VITALS
HEIGHT: 61 IN | BODY MASS INDEX: 23.03 KG/M2 | HEART RATE: 86 BPM | DIASTOLIC BLOOD PRESSURE: 88 MMHG | SYSTOLIC BLOOD PRESSURE: 138 MMHG | WEIGHT: 122 LBS

## 2020-02-14 PROCEDURE — 99214 OFFICE O/P EST MOD 30 MIN: CPT

## 2020-02-14 NOTE — REASON FOR VISIT
[Follow-Up Visit] : a follow-up [Family Member] : family member [FreeTextEntry2] : follow up for ovarian cancer metastatic to the brain and LN on maintenance therapy Avastin

## 2020-02-14 NOTE — REVIEW OF SYSTEMS
[Joint Pain] : joint pain [Joint Stiffness] : joint stiffness [Negative] : ENT [Dysphagia] : no dysphagia [Loss of Hearing] : no loss of hearing [Nosebleeds] : no nosebleeds [Hoarseness] : no hoarseness [Mucosal Pain] : no mucosal pain [Odynophagia] : no odynophagia [Muscle Pain] : no muscle pain [Muscle Weakness] : no muscle weakness [FreeTextEntry9] : LOIS

## 2020-02-14 NOTE — ASSESSMENT
[FreeTextEntry1] : She is a 68 y/o F with Stage IV ovarian cancer and Stage I NSCLC diagnosed simultaneously. She has completed 6 cycles of carboplatin/ paclitaxel with bevacizumab and did not continue with bevacizumab maintenance: off from 4/2018 to 4/2019. 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 She completed SRS to the craniotomy site. CT of the chest/ abd/ pelvis showed adenopathy and started with palliative chemotherapy: retreat carboplatin/ paclitaxel 5/7/19. She had allergic reaction with C2 bag 2 of carboplatin and has been on bevacizumab/ paclitaxel s/p total of 6 cycles. 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  Discussed the most recent CT ( 2/5/20) results and ordered PET/ CT to further evaluate the new 1.1 x 1.0 cm enhancing nodule at the posterior right hemipelvis for mets disease. Ordered 2D ECHO to evaluate cardiac function in preparing of possible new treatment plan. Today's BW is WNL, pt continue with Avastin as planned. advised pt to report any worsening or new symptoms. all questions answered upon pt's satisfaction. Next f/u will be after PET/ CT results posted and will adjust treatment plan accordingly.\par \par

## 2020-02-14 NOTE — HISTORY OF PRESENT ILLNESS
[Disease: _____________________] : Disease: [unfilled] [T: ___] : T[unfilled] [M: ___] : M[unfilled] [N: ___] : N[unfilled] [AJCC Stage: ____] : AJCC Stage: [unfilled] [de-identified] : Age 67: Stage I NSCLC adenocarcinoma s/p left VATs robotic assisted FERMÍN lobectomy and MLND 8/16/17 with Dr Ken Sol\par Age 67: Stage IV poorly differentiated ovarian cancer s/p AVEL/ BSO/ total omentectomy, bilateral ureterolysis, resection of abdominal anterior wall masses, repair of cystostomy\par She initially presented with hemoptysis and had evaluation consisting of bronchoscopy and FNA. The bronchoscopy was negative and the FNA was positive for malignant cells: TTF1+ and PAX 8 negative. She had Pet/ CT on 7/6/17 which showed 2.3 cm left upper lobe nodule with SUV of 23 and 4.2 x 3.1 cm left ovarian lesion SUV of 7.9 and left common iliac lymph nodes measuring up to 8mm with SUV of 2.2, 6 mm perihepatic implant SUV of 3.1. She initially had laparoscopic evaluation with left ovary biopsy which showed poorly differentiated carcinoma that was ER, WT1, PAX 8 positive and TTF1 negative. She initially had the left VATs. Then she subsequently had exploratory laparotomy with  AVEL, BSO, radical dissection of tumor with total omentectomy, resection of anterior abdominal wall masses, lysis of adhesions, and repair of cystostomy. She had CT imaging done after 5th cycle of chemotherapy to evaluate abdominal pain and CT showed no evidence of disease. She had abdominal pain in 3/2019 and had follow up CT which showed new small pelvic implant and enlarged sita-caval LN. She had headache and went to [facility] 4/10/19. She had imaging that showed predominantly cystic peripherally enhancing mass within the left cerebellar hemisphere. She had left suboccipital craniotomy with Dr Tatyana Ortiz. Had carboplatin retreat with paclitaxel/ Avastin started and had carboplatin reaction on 5/29/19 during bag 2 Cycle 2: redness over entire face and uncomfortable sensation over face/ arms. \par  [de-identified] : poorly differentiated carcinoma: ER positive  [de-identified] : carbo/ taxol: 10/24/17 to 2/12/18 with cumulative fatigue and neuropathy\par bevacizumab added on 12/8/17 to 4/2018 (pt stopped coming for treatment and lost to follow up until 12/2018)\par retreat carbo/ taxol 5/7/19\par bevacizumab added to Cycle 2 of retreat carboplatin/ paclitaxel 5/29/19 and allergic reaction with 2nd bag of carboplatin\par bevacizumab and paclitaxel 6/19/19 to 8/27/19\par bevacizumab maintenance 9/2019 to present  [de-identified] : Pt reports feeling well and just had MRI of brain in [facility] showed MOODY. Her previous chronic nose bleeding internally resolved since last visit which pt is very happy about. She had CT scan on 2/5/20 and revealed a new enhancing nodule at the posterior right hemipelvis 1.1 x 1.0 cm which suspicious for mets disease despite pt has no signs or symptoms. Otherwise patient is able to eat / drink normally, BM daily, no diarrhea, no constipation. denies SOB/ Chest pain/ Palpitation/ fever/ chills/nausea / vomiting/ pain or any new symptoms since last visit.\par \par \par \par

## 2020-02-14 NOTE — PHYSICAL EXAM
[Fully active, able to carry on all pre-disease performance without restriction] : Status 0 - Fully active, able to carry on all pre-disease performance without restriction [Thin] : thin [Normal] : affect appropriate [Ulcers] : no ulcers [Thrush] : no thrush [Mucositis] : no mucositis [Vesicles] : no vesicles [de-identified] : postsurgical site: stitches over the occipital area C/D/I  [de-identified] : RUE wrist swelling chronic; able to abduct at shoulder and normal  strength

## 2020-02-18 ENCOUNTER — FORM ENCOUNTER (OUTPATIENT)
Age: 70
End: 2020-02-18

## 2020-02-19 ENCOUNTER — APPOINTMENT (OUTPATIENT)
Dept: CARDIOLOGY | Facility: CLINIC | Age: 70
End: 2020-02-19
Payer: MEDICARE

## 2020-02-19 ENCOUNTER — APPOINTMENT (OUTPATIENT)
Dept: NUCLEAR MEDICINE | Facility: IMAGING CENTER | Age: 70
End: 2020-02-19
Payer: MEDICARE

## 2020-02-19 ENCOUNTER — OUTPATIENT (OUTPATIENT)
Dept: OUTPATIENT SERVICES | Facility: HOSPITAL | Age: 70
LOS: 1 days | End: 2020-02-19
Payer: MEDICARE

## 2020-02-19 DIAGNOSIS — Z90.710 ACQUIRED ABSENCE OF BOTH CERVIX AND UTERUS: Chronic | ICD-10-CM

## 2020-02-19 DIAGNOSIS — Z90.49 ACQUIRED ABSENCE OF OTHER SPECIFIED PARTS OF DIGESTIVE TRACT: Chronic | ICD-10-CM

## 2020-02-19 DIAGNOSIS — C76.2 MALIGNANT NEOPLASM OF ABDOMEN: ICD-10-CM

## 2020-02-19 DIAGNOSIS — Z98.890 OTHER SPECIFIED POSTPROCEDURAL STATES: Chronic | ICD-10-CM

## 2020-02-19 DIAGNOSIS — Z90.722 ACQUIRED ABSENCE OF OVARIES, BILATERAL: Chronic | ICD-10-CM

## 2020-02-19 PROCEDURE — 78815 PET IMAGE W/CT SKULL-THIGH: CPT

## 2020-02-19 PROCEDURE — 78815 PET IMAGE W/CT SKULL-THIGH: CPT | Mod: 26,PI

## 2020-02-19 PROCEDURE — 93356 MYOCRD STRAIN IMG SPCKL TRCK: CPT

## 2020-02-19 PROCEDURE — A9552: CPT

## 2020-02-19 PROCEDURE — 93306 TTE W/DOPPLER COMPLETE: CPT

## 2020-02-27 ENCOUNTER — APPOINTMENT (OUTPATIENT)
Dept: HEMATOLOGY ONCOLOGY | Facility: CLINIC | Age: 70
End: 2020-02-27
Payer: MEDICARE

## 2020-02-27 VITALS
RESPIRATION RATE: 16 BRPM | TEMPERATURE: 98 F | HEART RATE: 86 BPM | DIASTOLIC BLOOD PRESSURE: 87 MMHG | WEIGHT: 117.95 LBS | BODY MASS INDEX: 22.29 KG/M2 | OXYGEN SATURATION: 98 % | SYSTOLIC BLOOD PRESSURE: 137 MMHG

## 2020-02-27 PROCEDURE — 99215 OFFICE O/P EST HI 40 MIN: CPT

## 2020-02-27 NOTE — REASON FOR VISIT
[Follow-Up Visit] : a follow-up [Family Member] : family member [FreeTextEntry2] : follow up for ovarian cancer s/p Pet/ CT showing additional Pet avid disease

## 2020-02-27 NOTE — HISTORY OF PRESENT ILLNESS
[Disease: _____________________] : Disease: [unfilled] [T: ___] : T[unfilled] [N: ___] : N[unfilled] [M: ___] : M[unfilled] [AJCC Stage: ____] : AJCC Stage: [unfilled] [de-identified] : Age 67: Stage I NSCLC adenocarcinoma s/p left VATs robotic assisted FERMÍN lobectomy and MLND 8/16/17 with Dr Ken Sol\par Age 67: Stage IV poorly differentiated ovarian cancer s/p AVEL/ BSO/ total omentectomy, bilateral ureterolysis, resection of abdominal anterior wall masses, repair of cystostomy\par She initially presented with hemoptysis and had evaluation consisting of bronchoscopy and FNA. The bronchoscopy was negative and the FNA was positive for malignant cells: TTF1+ and PAX 8 negative. She had Pet/ CT on 7/6/17 which showed 2.3 cm left upper lobe nodule with SUV of 23 and 4.2 x 3.1 cm left ovarian lesion SUV of 7.9 and left common iliac lymph nodes measuring up to 8mm with SUV of 2.2, 6 mm perihepatic implant SUV of 3.1. She initially had laparoscopic evaluation with left ovary biopsy which showed poorly differentiated carcinoma that was ER, WT1, PAX 8 positive and TTF1 negative. She initially had the left VATs. Then she subsequently had exploratory laparotomy with  AVEL, BSO, radical dissection of tumor with total omentectomy, resection of anterior abdominal wall masses, lysis of adhesions, and repair of cystostomy. She had CT imaging done after 5th cycle of chemotherapy to evaluate abdominal pain and CT showed no evidence of disease. She had abdominal pain in 3/2019 and had follow up CT which showed new small pelvic implant and enlarged sita-caval LN. She had headache and went to [facility] 4/10/19. She had imaging that showed predominantly cystic peripherally enhancing mass within the left cerebellar hemisphere. She had left suboccipital craniotomy with Dr Tatyana Ortiz. Had carboplatin retreat with paclitaxel/ Avastin started and had carboplatin reaction on 5/29/19 during bag 2 Cycle 2: redness over entire face and uncomfortable sensation over face/ arms. She had slowly increasing  on maintenance bevacizumab and had CT done on 2/5/2020 which showed 1.1 x 1 cm enhancing nodule in the right hemipelvis. She had subsequent Pet/ CT which showed hypermetabolic right cardiophrenic and periportal lymphadenopathy, hypermetabolic implants over the liver capsule, serosal surface of the urinary bladder and posterior right hemipelvis. She was not a surgical candidate and proceeded to Doxil/ bevacizumab. \par  [de-identified] : poorly differentiated carcinoma: ER positive  [de-identified] : carbo/ taxol: 10/24/17 to 2/12/18 with cumulative fatigue and neuropathy\par bevacizumab added on 12/8/17 to 4/2018 (pt stopped coming for treatment and lost to follow up until 12/2018)\par retreat carbo/ taxol 5/7/19\par bevacizumab added to Cycle 2 of retreat carboplatin/ paclitaxel 5/29/19 and allergic reaction with 2nd bag of carboplatin\par bevacizumab and paclitaxel 6/19/19 to 8/27/19\par bevacizumab maintenance 9/2019 to 2/2020\par Doxil/ bevacizumab  [de-identified] : She has occasional aching sensation and tingling sensation over the suprapubic area and groin area. She denies any pain requiring pain medications. Eating and drinking without any GI upset. She denies any headaches or vision changes. She has noticed R supraclavicular nodule. No abdominal pain. Present with her family to review next steps.

## 2020-02-27 NOTE — CONSULT LETTER
[Dear  ___] : Dear  [unfilled], [Courtesy Letter:] : I had the pleasure of seeing your patient, [unfilled], in my office today. [Please see my note below.] : Please see my note below. [Consult Closing:] : Thank you very much for allowing me to participate in the care of this patient.  If you have any questions, please do not hesitate to contact me. [Sincerely,] : Sincerely, [FreeTextEntry2] : Pipo Grove MD\par fax [phone] [FreeTextEntry3] : Geo Cuevas MD\par Attending\par [facility]\par  [DrLewis  ___] : Dr. JOHNSON

## 2020-02-27 NOTE — ASSESSMENT
[FreeTextEntry1] : She is a 68 y/o F with Stage IV ovarian cancer and Stage I NSCLC diagnosed simultaneously. She has completed 6 cycles of carboplatin/ paclitaxel with bevacizumab and did not continue with bevacizumab maintenance: off from 4/2018 to 4/2019. 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 She completed SRS to the craniotomy site. CT of the chest/ abd/ pelvis showed adenopathy and started with palliative chemotherapy: retreat carboplatin/ paclitaxel 5/7/19. She had allergic reaction with C2 bag 2 of carboplatin and has been on bevacizumab/ paclitaxel s/p total of 6 cycles. While on maintenance bevacizumab, she has progression of disease. We reviewed her Pet/ CT results and reviewed goals of care. We reviewed goals of further chemotherapy to control disease and that the chemotherapy would have to be changed depending on tolerance and progression. We reviewed goal of maintaining QOL and decreasing symptoms from progressive disease. We reviewed Doxil/ Avastin every 3 weeks: reviewed potential side effects including but not limited to: fatigue, low blood counts, increased risk of infection, mouth sores, hand/ foot syndrome, and nausea. We reviewed small risk of cardiotoxicity and will follow: current 2 D Echo with normal EF. Questions answered to their satisfaction. We will follow TFTs and R supraclavicular nodule which was not Pet avid. She consented to therapy.  [Palliative] : Goals of care discussed with patient: Palliative

## 2020-02-27 NOTE — PHYSICAL EXAM
[Fully active, able to carry on all pre-disease performance without restriction] : Status 0 - Fully active, able to carry on all pre-disease performance without restriction [Thin] : thin [Ulcers] : no ulcers [Mucositis] : no mucositis [Thrush] : no thrush [Vesicles] : no vesicles [Normal] : affect appropriate [de-identified] : postsurgical site: stitches over the occipital area C/D/I  [de-identified] : R supraclavicular nodule 1 cm

## 2020-02-27 NOTE — REVIEW OF SYSTEMS
[Dysuria] : no dysuria [Incontinence] : no incontinence [Vaginal Discharge] : no vaginal discharge [Dysmenorrhea/Abn Vaginal Bleeding] : no dysmenorrhea/abnormal vaginal bleeding [Easy Bleeding] : no tendency for easy bleeding [Easy Bruising] : no tendency for easy bruising [Swollen Glands] : no swollen glands [Negative] : Allergic/Immunologic [FreeTextEntry8] : tingling sensation over the suprapubic area  [de-identified] : R nodule over the collar bone

## 2020-03-04 ENCOUNTER — LABORATORY RESULT (OUTPATIENT)
Age: 70
End: 2020-03-04

## 2020-03-04 ENCOUNTER — RESULT REVIEW (OUTPATIENT)
Age: 70
End: 2020-03-04

## 2020-03-04 ENCOUNTER — APPOINTMENT (OUTPATIENT)
Dept: INFUSION THERAPY | Facility: HOSPITAL | Age: 70
End: 2020-03-04

## 2020-03-04 LAB
BASOPHILS # BLD AUTO: 0.03 K/UL — SIGNIFICANT CHANGE UP (ref 0–0.2)
BASOPHILS NFR BLD AUTO: 0.4 % — SIGNIFICANT CHANGE UP (ref 0–2)
EOSINOPHIL # BLD AUTO: 0.04 K/UL — SIGNIFICANT CHANGE UP (ref 0–0.5)
EOSINOPHIL NFR BLD AUTO: 0.6 % — SIGNIFICANT CHANGE UP (ref 0–6)
HCT VFR BLD CALC: 38.8 % — SIGNIFICANT CHANGE UP (ref 34.5–45)
HGB BLD-MCNC: 12.9 G/DL — SIGNIFICANT CHANGE UP (ref 11.5–15.5)
IMM GRANULOCYTES NFR BLD AUTO: 0.4 % — SIGNIFICANT CHANGE UP (ref 0–1.5)
LYMPHOCYTES # BLD AUTO: 2.47 K/UL — SIGNIFICANT CHANGE UP (ref 1–3.3)
LYMPHOCYTES # BLD AUTO: 34.2 % — SIGNIFICANT CHANGE UP (ref 13–44)
MCHC RBC-ENTMCNC: 30.4 PG — SIGNIFICANT CHANGE UP (ref 27–34)
MCHC RBC-ENTMCNC: 33.2 GM/DL — SIGNIFICANT CHANGE UP (ref 32–36)
MCV RBC AUTO: 91.5 FL — SIGNIFICANT CHANGE UP (ref 80–100)
MONOCYTES # BLD AUTO: 0.68 K/UL — SIGNIFICANT CHANGE UP (ref 0–0.9)
MONOCYTES NFR BLD AUTO: 9.4 % — SIGNIFICANT CHANGE UP (ref 2–14)
NEUTROPHILS # BLD AUTO: 3.98 K/UL — SIGNIFICANT CHANGE UP (ref 1.8–7.4)
NEUTROPHILS NFR BLD AUTO: 55 % — SIGNIFICANT CHANGE UP (ref 43–77)
NRBC # BLD: 0 /100 WBCS — SIGNIFICANT CHANGE UP (ref 0–0)
PLATELET # BLD AUTO: 182 K/UL — SIGNIFICANT CHANGE UP (ref 150–400)
RBC # BLD: 4.24 M/UL — SIGNIFICANT CHANGE UP (ref 3.8–5.2)
RBC # FLD: 13.1 % — SIGNIFICANT CHANGE UP (ref 10.3–14.5)
WBC # BLD: 7.23 K/UL — SIGNIFICANT CHANGE UP (ref 3.8–10.5)
WBC # FLD AUTO: 7.23 K/UL — SIGNIFICANT CHANGE UP (ref 3.8–10.5)

## 2020-03-04 RX ORDER — ATENOLOL 25 MG/1
1 TABLET ORAL
Qty: 0 | Refills: 0 | DISCHARGE

## 2020-03-18 ENCOUNTER — OUTPATIENT (OUTPATIENT)
Dept: OUTPATIENT SERVICES | Facility: HOSPITAL | Age: 70
LOS: 1 days | Discharge: HOME | End: 2020-03-18

## 2020-03-18 DIAGNOSIS — C56.2 MALIGNANT NEOPLASM OF LEFT OVARY: ICD-10-CM

## 2020-03-18 DIAGNOSIS — Z98.890 OTHER SPECIFIED POSTPROCEDURAL STATES: Chronic | ICD-10-CM

## 2020-03-18 DIAGNOSIS — Z90.722 ACQUIRED ABSENCE OF OVARIES, BILATERAL: Chronic | ICD-10-CM

## 2020-03-18 DIAGNOSIS — Z90.49 ACQUIRED ABSENCE OF OTHER SPECIFIED PARTS OF DIGESTIVE TRACT: Chronic | ICD-10-CM

## 2020-03-18 DIAGNOSIS — Z90.710 ACQUIRED ABSENCE OF BOTH CERVIX AND UTERUS: Chronic | ICD-10-CM

## 2020-03-21 NOTE — H&P PST ADULT - HEIGHT IN FEET
Clinical Pharmacy Services: Medication History    Vivian López is a 63 y.o. female presenting to [facility] for Acute gallstone pancreatitis [K85.10]    She  has a past medical history of  3 para 3, Malignant neoplasm of breast (CMS/HCC), Osteoarthritis, and Vitamin D deficiency.    Allergies as of 2020    (No Known Allergies)       Medication information was obtained from: Patient  Pharmacy and Phone Number: [id number] [place], [place] - [address] [address] AT [facility] [address] - [phone] [facility] [phone]      Prior to Admission Medications       Prescriptions Last Dose Informant Patient Reported? Taking?    ibuprofen (ADVIL,MOTRIN) 200 MG tablet  Self Yes Yes    Take 200 mg by mouth Every 6 (Six) Hours As Needed for Mild Pain .    Omeprazole-Sodium Bicarbonate (Zegerid OTC)  MG capsule  Self Yes Yes    Take 1 capsule by mouth Daily As Needed.            Medication notes: Removed prednisone and added two medication above    This medication list is complete to the best of my knowledge as of 3/21/2020    Please call if questions.    Jamil Greco, PharmD, BCPS  Clinical Staff Pharmacist  Ext. [phone]  3/21/2020 09:55       5

## 2020-03-25 ENCOUNTER — RESULT REVIEW (OUTPATIENT)
Age: 70
End: 2020-03-25

## 2020-03-25 ENCOUNTER — APPOINTMENT (OUTPATIENT)
Dept: HEMATOLOGY ONCOLOGY | Facility: CLINIC | Age: 70
End: 2020-03-25
Payer: MEDICARE

## 2020-03-25 ENCOUNTER — LABORATORY RESULT (OUTPATIENT)
Age: 70
End: 2020-03-25

## 2020-03-25 ENCOUNTER — APPOINTMENT (OUTPATIENT)
Dept: INFUSION THERAPY | Facility: HOSPITAL | Age: 70
End: 2020-03-25

## 2020-03-25 VITALS
DIASTOLIC BLOOD PRESSURE: 89 MMHG | TEMPERATURE: 98.9 F | OXYGEN SATURATION: 98 % | RESPIRATION RATE: 18 BRPM | SYSTOLIC BLOOD PRESSURE: 152 MMHG | BODY MASS INDEX: 22.62 KG/M2 | HEART RATE: 88 BPM | WEIGHT: 119.71 LBS

## 2020-03-25 DIAGNOSIS — J30.9 ALLERGIC RHINITIS, UNSPECIFIED: ICD-10-CM

## 2020-03-25 LAB
ALBUMIN SERPL ELPH-MCNC: 4.6 G/DL
ALP BLD-CCNC: 59 U/L
ALT SERPL-CCNC: 11 U/L
ANION GAP SERPL CALC-SCNC: 16 MMOL/L
AST SERPL-CCNC: 16 U/L
BASOPHILS # BLD AUTO: 0.02 K/UL — SIGNIFICANT CHANGE UP (ref 0–0.2)
BASOPHILS NFR BLD AUTO: 0.4 % — SIGNIFICANT CHANGE UP (ref 0–2)
BILIRUB SERPL-MCNC: 0.6 MG/DL
BUN SERPL-MCNC: 13 MG/DL
CALCIUM SERPL-MCNC: 9.7 MG/DL
CANCER AG125 SERPL-ACNC: 197 U/ML
CHLORIDE SERPL-SCNC: 103 MMOL/L
CO2 SERPL-SCNC: 26 MMOL/L
CREAT SERPL-MCNC: 0.53 MG/DL
EOSINOPHIL # BLD AUTO: 0.02 K/UL — SIGNIFICANT CHANGE UP (ref 0–0.5)
EOSINOPHIL NFR BLD AUTO: 0.4 % — SIGNIFICANT CHANGE UP (ref 0–6)
GLUCOSE SERPL-MCNC: 128 MG/DL
HCT VFR BLD CALC: 40.4 % — SIGNIFICANT CHANGE UP (ref 34.5–45)
HGB BLD-MCNC: 13.5 G/DL — SIGNIFICANT CHANGE UP (ref 11.5–15.5)
IMM GRANULOCYTES NFR BLD AUTO: 0.8 % — SIGNIFICANT CHANGE UP (ref 0–1.5)
LYMPHOCYTES # BLD AUTO: 2.04 K/UL — SIGNIFICANT CHANGE UP (ref 1–3.3)
LYMPHOCYTES # BLD AUTO: 39.2 % — SIGNIFICANT CHANGE UP (ref 13–44)
MCHC RBC-ENTMCNC: 31 PG — SIGNIFICANT CHANGE UP (ref 27–34)
MCHC RBC-ENTMCNC: 33.4 GM/DL — SIGNIFICANT CHANGE UP (ref 32–36)
MCV RBC AUTO: 92.9 FL — SIGNIFICANT CHANGE UP (ref 80–100)
MONOCYTES # BLD AUTO: 0.45 K/UL — SIGNIFICANT CHANGE UP (ref 0–0.9)
MONOCYTES NFR BLD AUTO: 8.6 % — SIGNIFICANT CHANGE UP (ref 2–14)
NEUTROPHILS # BLD AUTO: 2.64 K/UL — SIGNIFICANT CHANGE UP (ref 1.8–7.4)
NEUTROPHILS NFR BLD AUTO: 50.6 % — SIGNIFICANT CHANGE UP (ref 43–77)
NRBC # BLD: 0 /100 WBCS — SIGNIFICANT CHANGE UP (ref 0–0)
PLATELET # BLD AUTO: 208 K/UL — SIGNIFICANT CHANGE UP (ref 150–400)
POTASSIUM SERPL-SCNC: 3.7 MMOL/L
PROT SERPL-MCNC: 7.5 G/DL
RBC # BLD: 4.35 M/UL — SIGNIFICANT CHANGE UP (ref 3.8–5.2)
RBC # FLD: 13.7 % — SIGNIFICANT CHANGE UP (ref 10.3–14.5)
SODIUM SERPL-SCNC: 145 MMOL/L
WBC # BLD: 5.21 K/UL — SIGNIFICANT CHANGE UP (ref 3.8–10.5)
WBC # FLD AUTO: 5.21 K/UL — SIGNIFICANT CHANGE UP (ref 3.8–10.5)

## 2020-03-25 PROCEDURE — 99215 OFFICE O/P EST HI 40 MIN: CPT

## 2020-03-25 RX ORDER — MOMETASONE 50 UG/1
50 SPRAY, METERED NASAL
Qty: 1 | Refills: 1 | Status: DISCONTINUED | COMMUNITY
Start: 2020-03-25 | End: 2020-03-25

## 2020-03-25 NOTE — REASON FOR VISIT
[Follow-Up Visit] : a follow-up [Patient Declined  Services] : - None: Patient declined  services [FreeTextEntry2] : follow up for ovarian cancer s/p 1st course of Doxil/ Avastin  [FreeTextEntry3] : Called pt's dtr in law per her request for translation  [TWNoteComboBox1] : Chinese

## 2020-03-25 NOTE — PHYSICAL EXAM
[Restricted in physically strenuous activity but ambulatory and able to carry out work of a light or sedentary nature] : Status 1- Restricted in physically strenuous activity but ambulatory and able to carry out work of a light or sedentary nature, e.g., light house work, office work [Thin] : thin [Ulcers] : no ulcers [Mucositis] : no mucositis [Thrush] : no thrush [Vesicles] : no vesicles [Normal] : affect appropriate [de-identified] : postsurgical site: stitches over the occipital area C/D/I  [de-identified] : L groin nodule 1 cm  [de-identified] : no rash

## 2020-03-25 NOTE — REVIEW OF SYSTEMS
[Dysphagia] : no dysphagia [Loss of Hearing] : no loss of hearing [Nosebleeds] : nosebleeds [Hoarseness] : no hoarseness [Odynophagia] : no odynophagia [Mucosal Pain] : no mucosal pain [Skin Rash] : no skin rash [Skin Wound] : no skin wound [Negative] : Allergic/Immunologic [FreeTextEntry4] : sneezing; nose bleed x 1 episode; scratchy throat  [de-identified] : dry skin

## 2020-03-25 NOTE — HISTORY OF PRESENT ILLNESS
[Disease: _____________________] : Disease: [unfilled] [T: ___] : T[unfilled] [N: ___] : N[unfilled] [M: ___] : M[unfilled] [AJCC Stage: ____] : AJCC Stage: [unfilled] [de-identified] : Age 67: Stage I NSCLC adenocarcinoma s/p left VATs robotic assisted FERMÍN lobectomy and MLND 8/16/17 with Dr Ken Sol\par Age 67: Stage IV poorly differentiated ovarian cancer s/p AVEL/ BSO/ total omentectomy, bilateral ureterolysis, resection of abdominal anterior wall masses, repair of cystostomy\par She initially presented with hemoptysis and had evaluation consisting of bronchoscopy and FNA. The bronchoscopy was negative and the FNA was positive for malignant cells: TTF1+ and PAX 8 negative. She had Pet/ CT on 7/6/17 which showed 2.3 cm left upper lobe nodule with SUV of 23 and 4.2 x 3.1 cm left ovarian lesion SUV of 7.9 and left common iliac lymph nodes measuring up to 8mm with SUV of 2.2, 6 mm perihepatic implant SUV of 3.1. She initially had laparoscopic evaluation with left ovary biopsy which showed poorly differentiated carcinoma that was ER, WT1, PAX 8 positive and TTF1 negative. She initially had the left VATs. Then she subsequently had exploratory laparotomy with  AVEL, BSO, radical dissection of tumor with total omentectomy, resection of anterior abdominal wall masses, lysis of adhesions, and repair of cystostomy. She had CT imaging done after 5th cycle of chemotherapy to evaluate abdominal pain and CT showed no evidence of disease. She had abdominal pain in 3/2019 and had follow up CT which showed new small pelvic implant and enlarged sita-caval LN. She had headache and went to [facility] 4/10/19. She had imaging that showed predominantly cystic peripherally enhancing mass within the left cerebellar hemisphere. She had left suboccipital craniotomy with Dr Tatyana Ortiz. Had carboplatin retreat with paclitaxel/ Avastin started and had carboplatin reaction on 5/29/19 during bag 2 Cycle 2: redness over entire face and uncomfortable sensation over face/ arms. She had slowly increasing  on maintenance bevacizumab and had CT done on 2/5/2020 which showed 1.1 x 1 cm enhancing nodule in the right hemipelvis. She had subsequent Pet/ CT which showed hypermetabolic right cardiophrenic and periportal lymphadenopathy, hypermetabolic implants over the liver capsule, serosal surface of the urinary bladder and posterior right hemipelvis. She was not a surgical candidate and proceeded to Doxil/ bevacizumab. \par  [de-identified] : poorly differentiated carcinoma: ER positive  [de-identified] : carbo/ taxol: 10/24/17 to 2/12/18 with cumulative fatigue and neuropathy\par bevacizumab added on 12/8/17 to 4/2018 (pt stopped coming for treatment and lost to follow up until 12/2018)\par retreat carbo/ taxol 5/7/19\par bevacizumab added to Cycle 2 of retreat carboplatin/ paclitaxel 5/29/19 and allergic reaction with 2nd bag of carboplatin\par bevacizumab and paclitaxel 6/19/19 to 8/27/19\par bevacizumab maintenance 9/2019 to 2/2020\par Doxil/ bevacizumab 3/4/2020 to present  [de-identified] : She noticed more tingling sensation over the left groin area after treatment. Had sore throat but continued ice chips and did not have any mouth sores. Had good appetite and normal BM. She feels more dry skin and had nosebleed last week: limited nosebleed. She denies any headaches or pain. Energy remains good on treatment. She is distressed that her family member could not come with her to visit and will need more visits made for treatment. Noticed more gas and sneezing since last treatment. Denies any seasonal allergies.

## 2020-03-25 NOTE — ASSESSMENT
[FreeTextEntry1] : She is a 70 y/o F with Stage IV ovarian cancer and Stage I NSCLC diagnosed simultaneously. She has completed 6 cycles of carboplatin/ paclitaxel with bevacizumab and did not continue with bevacizumab maintenance: off from 4/2018 to 4/2019. 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 She completed SRS to the craniotomy site. CT of the chest/ abd/ pelvis showed adenopathy and started with palliative chemotherapy: retreat carboplatin/ paclitaxel 5/7/19. She had allergic reaction with C2 bag 2 of carboplatin and has been on bevacizumab/ paclitaxel s/p total of 6 cycles. While on maintenance bevacizumab, she has progression of disease. Currently tolerated 1st cycle of Avastin/ Doxil: with expected skin dryness, throat soreness from treatment, and nose bleed. We reviewed supportive care and continued moisturization of the hands and feet. We reviewed nasal spray: Flonase covered by insurance each nostril once a day to decrease sneezing. We reviewed saline nasal spray to keep passages moist and decrease nosebleed severity. She will continue with ice chips for D1 to 3 of treatment to decrease mucositis. We reviewed precautions during coronavirus. Her family understands if she develops fever or cough, they will call us. She will continue with precautions at home. Questions answered to their satisfaction (dtr in law over phone). We set up additional appointments for follow up and treatment.

## 2020-03-26 DIAGNOSIS — Z51.11 ENCOUNTER FOR ANTINEOPLASTIC CHEMOTHERAPY: ICD-10-CM

## 2020-03-26 DIAGNOSIS — R11.2 NAUSEA WITH VOMITING, UNSPECIFIED: ICD-10-CM

## 2020-04-15 ENCOUNTER — LABORATORY RESULT (OUTPATIENT)
Age: 70
End: 2020-04-15

## 2020-04-15 ENCOUNTER — RESULT REVIEW (OUTPATIENT)
Age: 70
End: 2020-04-15

## 2020-04-15 ENCOUNTER — APPOINTMENT (OUTPATIENT)
Dept: HEMATOLOGY ONCOLOGY | Facility: CLINIC | Age: 70
End: 2020-04-15
Payer: MEDICARE

## 2020-04-15 ENCOUNTER — APPOINTMENT (OUTPATIENT)
Dept: INFUSION THERAPY | Facility: HOSPITAL | Age: 70
End: 2020-04-15

## 2020-04-15 VITALS
DIASTOLIC BLOOD PRESSURE: 70 MMHG | BODY MASS INDEX: 21.87 KG/M2 | SYSTOLIC BLOOD PRESSURE: 116 MMHG | OXYGEN SATURATION: 98 % | HEART RATE: 77 BPM | WEIGHT: 115.74 LBS | RESPIRATION RATE: 16 BRPM | TEMPERATURE: 98.2 F

## 2020-04-15 DIAGNOSIS — R10.32 LEFT LOWER QUADRANT PAIN: ICD-10-CM

## 2020-04-15 LAB
BASOPHILS # BLD AUTO: 0.03 K/UL — SIGNIFICANT CHANGE UP (ref 0–0.2)
BASOPHILS NFR BLD AUTO: 0.6 % — SIGNIFICANT CHANGE UP (ref 0–2)
EOSINOPHIL # BLD AUTO: 0.02 K/UL — SIGNIFICANT CHANGE UP (ref 0–0.5)
EOSINOPHIL NFR BLD AUTO: 0.4 % — SIGNIFICANT CHANGE UP (ref 0–6)
HCT VFR BLD CALC: 35.9 % — SIGNIFICANT CHANGE UP (ref 34.5–45)
HGB BLD-MCNC: 12.4 G/DL — SIGNIFICANT CHANGE UP (ref 11.5–15.5)
LYMPHOCYTES # BLD AUTO: 1.93 K/UL — SIGNIFICANT CHANGE UP (ref 1–3.3)
LYMPHOCYTES # BLD AUTO: 41.7 % — SIGNIFICANT CHANGE UP (ref 13–44)
MCHC RBC-ENTMCNC: 31.7 PG — SIGNIFICANT CHANGE UP (ref 27–34)
MCHC RBC-ENTMCNC: 34.5 GM/DL — SIGNIFICANT CHANGE UP (ref 32–36)
MCV RBC AUTO: 91.8 FL — SIGNIFICANT CHANGE UP (ref 80–100)
MONOCYTES # BLD AUTO: 0.53 K/UL — SIGNIFICANT CHANGE UP (ref 0–0.9)
MONOCYTES NFR BLD AUTO: 11.4 % — SIGNIFICANT CHANGE UP (ref 2–14)
NEUTROPHILS # BLD AUTO: 2.09 K/UL — SIGNIFICANT CHANGE UP (ref 1.8–7.4)
NEUTROPHILS NFR BLD AUTO: 45.3 % — SIGNIFICANT CHANGE UP (ref 43–77)
NRBC # BLD: 0 /100 WBCS — SIGNIFICANT CHANGE UP (ref 0–0)
PLATELET # BLD AUTO: 218 K/UL — SIGNIFICANT CHANGE UP (ref 150–400)
RBC # BLD: 3.91 M/UL — SIGNIFICANT CHANGE UP (ref 3.8–5.2)
RBC # FLD: 14.2 % — SIGNIFICANT CHANGE UP (ref 10.3–14.5)
WBC # BLD: 4.63 K/UL — SIGNIFICANT CHANGE UP (ref 3.8–10.5)
WBC # FLD AUTO: 4.63 K/UL — SIGNIFICANT CHANGE UP (ref 3.8–10.5)

## 2020-04-15 PROCEDURE — 99215 OFFICE O/P EST HI 40 MIN: CPT

## 2020-04-15 NOTE — REASON FOR VISIT
[Follow-Up Visit] : a follow-up [Patient Declined  Services] : - None: Patient declined  services [FreeTextEntry2] : Pt prefers dtr in law for translation  [TWNoteComboBox1] : Chinese

## 2020-04-15 NOTE — REVIEW OF SYSTEMS
[Dysphagia] : no dysphagia [Loss of Hearing] : no loss of hearing [Nosebleeds] : nosebleeds [Hoarseness] : no hoarseness [Odynophagia] : no odynophagia [Mucosal Pain] : mucosal pain [Skin Rash] : no skin rash [Skin Wound] : no skin wound [Negative] : Allergic/Immunologic [de-identified] : dry skin

## 2020-04-15 NOTE — HISTORY OF PRESENT ILLNESS
[Disease: _____________________] : Disease: [unfilled] [T: ___] : T[unfilled] [N: ___] : N[unfilled] [M: ___] : M[unfilled] [AJCC Stage: ____] : AJCC Stage: [unfilled] [de-identified] : Age 67: Stage I NSCLC adenocarcinoma s/p left VATs robotic assisted FERMÍN lobectomy and MLND 8/16/17 with Dr Ken Sol\par Age 67: Stage IV poorly differentiated ovarian cancer s/p AVEL/ BSO/ total omentectomy, bilateral ureterolysis, resection of abdominal anterior wall masses, repair of cystostomy\par She initially presented with hemoptysis and had evaluation consisting of bronchoscopy and FNA. The bronchoscopy was negative and the FNA was positive for malignant cells: TTF1+ and PAX 8 negative. She had Pet/ CT on 7/6/17 which showed 2.3 cm left upper lobe nodule with SUV of 23 and 4.2 x 3.1 cm left ovarian lesion SUV of 7.9 and left common iliac lymph nodes measuring up to 8mm with SUV of 2.2, 6 mm perihepatic implant SUV of 3.1. She initially had laparoscopic evaluation with left ovary biopsy which showed poorly differentiated carcinoma that was ER, WT1, PAX 8 positive and TTF1 negative. She initially had the left VATs. Then she subsequently had exploratory laparotomy with  AVEL, BSO, radical dissection of tumor with total omentectomy, resection of anterior abdominal wall masses, lysis of adhesions, and repair of cystostomy. She had CT imaging done after 5th cycle of chemotherapy to evaluate abdominal pain and CT showed no evidence of disease. She had abdominal pain in 3/2019 and had follow up CT which showed new small pelvic implant and enlarged sita-caval LN. She had headache and went to [facility] 4/10/19. She had imaging that showed predominantly cystic peripherally enhancing mass within the left cerebellar hemisphere. She had left suboccipital craniotomy with Dr Tatyana Ortiz. Had carboplatin retreat with paclitaxel/ Avastin started and had carboplatin reaction on 5/29/19 during bag 2 Cycle 2: redness over entire face and uncomfortable sensation over face/ arms. She had slowly increasing  on maintenance bevacizumab and had CT done on 2/5/2020 which showed 1.1 x 1 cm enhancing nodule in the right hemipelvis. She had subsequent Pet/ CT which showed hypermetabolic right cardiophrenic and periportal lymphadenopathy, hypermetabolic implants over the liver capsule, serosal surface of the urinary bladder and posterior right hemipelvis. She was not a surgical candidate and proceeded to Doxil/ bevacizumab. She developed mucositis that was persistent after C2 and reduced dose of Doxil for C3. \par  [de-identified] : poorly differentiated carcinoma: ER positive  [de-identified] : carbo/ taxol: 10/24/17 to 2/12/18 with cumulative fatigue and neuropathy\par bevacizumab added on 12/8/17 to 4/2018 (pt stopped coming for treatment and lost to follow up until 12/2018)\par retreat carbo/ taxol 5/7/19\par bevacizumab added to Cycle 2 of retreat carboplatin/ paclitaxel 5/29/19 and allergic reaction with 2nd bag of carboplatin\par bevacizumab and paclitaxel 6/19/19 to 8/27/19\par bevacizumab maintenance 9/2019 to 2/2020\par Doxil/ bevacizumab 3/4/2020 to present  [de-identified] : Called dtr in law on phone and reviewed questions and concerns since family not allowed in [facility] during coronavirus pandemic. She noticed sore throat that is still present and pain over the mouth for 10 days. Pain with rinsing mouth with salt water: felt the salt was stinging mouth. She was able to eat and able to do all her housework. She denies any lightheadedness or palpitations or headaches. She denies any redness or pain over the hands/ feet. She has dry mouth and nose and occasional nosebleed. Feels the inguinal pain/ lymph node has resolved. She denies any abdominal pain. No skin itching since last cycle.  (4) completely dependent

## 2020-04-15 NOTE — PHYSICAL EXAM
[Restricted in physically strenuous activity but ambulatory and able to carry out work of a light or sedentary nature] : Status 1- Restricted in physically strenuous activity but ambulatory and able to carry out work of a light or sedentary nature, e.g., light house work, office work [Thin] : thin [Ulcers] : ulcers [Mucositis] : no mucositis [Thrush] : no thrush [Vesicles] : no vesicles [Normal] : affect appropriate [de-identified] : postsurgical site: stitches over the occipital area C/D/I  [de-identified] : white plaque over healing ulcer over tongue 3 mm and back of throat 2 mm.  [de-identified] : L groin nodule nonpalpable  [de-identified] : no rash, dry skin

## 2020-04-15 NOTE — ASSESSMENT
[FreeTextEntry1] : She is a 68 y/o F with Stage IV ovarian cancer and Stage I NSCLC diagnosed simultaneously. She has completed 6 cycles of carboplatin/ paclitaxel with bevacizumab and did not continue with bevacizumab maintenance: off from 4/2018 to 4/2019. 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 She completed SRS to the craniotomy site. CT of the chest/ abd/ pelvis showed adenopathy and started with palliative chemotherapy: retreat carboplatin/ paclitaxel 5/7/19. She had allergic reaction with C2 bag 2 of carboplatin and has been on bevacizumab/ paclitaxel s/p total of 6 cycles. While on maintenance bevacizumab, she has progression of disease.\par \par Currently she is on C3 of Doxil and Avastin with Grade 2 mucositis. We reviewed cold therapy during and days after Doxil infusion. We prescribed Lidocaine swish and spit with benadryl and maalox to help with mouth sore pain. Will make adjustment of Doxil to decrease symptoms. Will see if will need to extend interval of chemotherapy from 3 to 4 weeks. We reviewed continued hydration during chemotherapy given dry skin and dry mouth. Samples of dry mouth rinse given for her to try at home. She will continue with saline nasal spray 3x day to decrease epistaxis during Avastin. Urine protein and BP WNL. Will continue to monitor on Avastin. Her counts are stable and therapy and will proceed to C3. Questions answered to her and her families' satisfaction.

## 2020-04-30 ENCOUNTER — OUTPATIENT (OUTPATIENT)
Dept: OUTPATIENT SERVICES | Facility: HOSPITAL | Age: 70
LOS: 1 days | Discharge: HOME | End: 2020-04-30

## 2020-04-30 DIAGNOSIS — Z90.722 ACQUIRED ABSENCE OF OVARIES, BILATERAL: Chronic | ICD-10-CM

## 2020-04-30 DIAGNOSIS — Z90.710 ACQUIRED ABSENCE OF BOTH CERVIX AND UTERUS: Chronic | ICD-10-CM

## 2020-04-30 DIAGNOSIS — C56.2 MALIGNANT NEOPLASM OF LEFT OVARY: ICD-10-CM

## 2020-04-30 DIAGNOSIS — Z98.890 OTHER SPECIFIED POSTPROCEDURAL STATES: Chronic | ICD-10-CM

## 2020-04-30 DIAGNOSIS — Z90.49 ACQUIRED ABSENCE OF OTHER SPECIFIED PARTS OF DIGESTIVE TRACT: Chronic | ICD-10-CM

## 2020-05-06 ENCOUNTER — LABORATORY RESULT (OUTPATIENT)
Age: 70
End: 2020-05-06

## 2020-05-06 ENCOUNTER — RESULT REVIEW (OUTPATIENT)
Age: 70
End: 2020-05-06

## 2020-05-06 ENCOUNTER — APPOINTMENT (OUTPATIENT)
Dept: HEMATOLOGY ONCOLOGY | Facility: CLINIC | Age: 70
End: 2020-05-06
Payer: MEDICARE

## 2020-05-06 ENCOUNTER — APPOINTMENT (OUTPATIENT)
Dept: INFUSION THERAPY | Facility: HOSPITAL | Age: 70
End: 2020-05-06

## 2020-05-06 VITALS
SYSTOLIC BLOOD PRESSURE: 121 MMHG | OXYGEN SATURATION: 97 % | RESPIRATION RATE: 16 BRPM | BODY MASS INDEX: 22.04 KG/M2 | DIASTOLIC BLOOD PRESSURE: 79 MMHG | HEART RATE: 69 BPM | WEIGHT: 116.62 LBS | TEMPERATURE: 98.9 F

## 2020-05-06 DIAGNOSIS — J30.9 ALLERGIC RHINITIS, UNSPECIFIED: ICD-10-CM

## 2020-05-06 LAB
BASOPHILS # BLD AUTO: 0.03 K/UL — SIGNIFICANT CHANGE UP (ref 0–0.2)
BASOPHILS NFR BLD AUTO: 0.5 % — SIGNIFICANT CHANGE UP (ref 0–2)
EOSINOPHIL # BLD AUTO: 0.05 K/UL — SIGNIFICANT CHANGE UP (ref 0–0.5)
EOSINOPHIL NFR BLD AUTO: 0.8 % — SIGNIFICANT CHANGE UP (ref 0–6)
HCT VFR BLD CALC: 35 % — SIGNIFICANT CHANGE UP (ref 34.5–45)
HGB BLD-MCNC: 12.5 G/DL — SIGNIFICANT CHANGE UP (ref 11.5–15.5)
IMM GRANULOCYTES NFR BLD AUTO: 1.1 % — SIGNIFICANT CHANGE UP (ref 0–1.5)
LYMPHOCYTES # BLD AUTO: 1.91 K/UL — SIGNIFICANT CHANGE UP (ref 1–3.3)
LYMPHOCYTES # BLD AUTO: 31.4 % — SIGNIFICANT CHANGE UP (ref 13–44)
MCHC RBC-ENTMCNC: 32.7 PG — SIGNIFICANT CHANGE UP (ref 27–34)
MCHC RBC-ENTMCNC: 35.7 GM/DL — SIGNIFICANT CHANGE UP (ref 32–36)
MCV RBC AUTO: 91.6 FL — SIGNIFICANT CHANGE UP (ref 80–100)
MONOCYTES # BLD AUTO: 0.91 K/UL — HIGH (ref 0–0.9)
MONOCYTES NFR BLD AUTO: 14.9 % — HIGH (ref 2–14)
NEUTROPHILS # BLD AUTO: 3.12 K/UL — SIGNIFICANT CHANGE UP (ref 1.8–7.4)
NEUTROPHILS NFR BLD AUTO: 51.3 % — SIGNIFICANT CHANGE UP (ref 43–77)
NRBC # BLD: 0 /100 WBCS — SIGNIFICANT CHANGE UP (ref 0–0)
PLATELET # BLD AUTO: 190 K/UL — SIGNIFICANT CHANGE UP (ref 150–400)
RBC # BLD: 3.82 M/UL — SIGNIFICANT CHANGE UP (ref 3.8–5.2)
RBC # FLD: 14.8 % — HIGH (ref 10.3–14.5)
WBC # BLD: 6.09 K/UL — SIGNIFICANT CHANGE UP (ref 3.8–10.5)
WBC # FLD AUTO: 6.09 K/UL — SIGNIFICANT CHANGE UP (ref 3.8–10.5)

## 2020-05-06 PROCEDURE — 99215 OFFICE O/P EST HI 40 MIN: CPT

## 2020-05-06 NOTE — PHYSICAL EXAM
[Restricted in physically strenuous activity but ambulatory and able to carry out work of a light or sedentary nature] : Status 1- Restricted in physically strenuous activity but ambulatory and able to carry out work of a light or sedentary nature, e.g., light house work, office work [Thin] : thin [Ulcers] : ulcers [Normal] : affect appropriate [Mucositis] : no mucositis [Thrush] : no thrush [Vesicles] : no vesicles [de-identified] : postsurgical site: stitches over the occipital area C/D/I  [de-identified] : white plaque over healing ulcer over tongue 3 mm.  [de-identified] : L groin nodule nonpalpable  [de-identified] : no rash, dry skin

## 2020-05-06 NOTE — HISTORY OF PRESENT ILLNESS
[Disease: _____________________] : Disease: [unfilled] [T: ___] : T[unfilled] [N: ___] : N[unfilled] [M: ___] : M[unfilled] [AJCC Stage: ____] : AJCC Stage: [unfilled] [de-identified] : Age 67: Stage I NSCLC adenocarcinoma s/p left VATs robotic assisted FERMÍN lobectomy and MLND 8/16/17 with Dr Ken Sol\par Age 67: Stage IV poorly differentiated ovarian cancer s/p AVEL/ BSO/ total omentectomy, bilateral ureterolysis, resection of abdominal anterior wall masses, repair of cystostomy\par She initially presented with hemoptysis and had evaluation consisting of bronchoscopy and FNA. The bronchoscopy was negative and the FNA was positive for malignant cells: TTF1+ and PAX 8 negative. She had Pet/ CT on 7/6/17 which showed 2.3 cm left upper lobe nodule with SUV of 23 and 4.2 x 3.1 cm left ovarian lesion SUV of 7.9 and left common iliac lymph nodes measuring up to 8mm with SUV of 2.2, 6 mm perihepatic implant SUV of 3.1. She initially had laparoscopic evaluation with left ovary biopsy which showed poorly differentiated carcinoma that was ER, WT1, PAX 8 positive and TTF1 negative. She initially had the left VATs. Then she subsequently had exploratory laparotomy with  AVEL, BSO, radical dissection of tumor with total omentectomy, resection of anterior abdominal wall masses, lysis of adhesions, and repair of cystostomy. She had CT imaging done after 5th cycle of chemotherapy to evaluate abdominal pain and CT showed no evidence of disease. She had abdominal pain in 3/2019 and had follow up CT which showed new small pelvic implant and enlarged sita-caval LN. She had headache and went to [facility] 4/10/19. She had imaging that showed predominantly cystic peripherally enhancing mass within the left cerebellar hemisphere. She had left suboccipital craniotomy with Dr Tatyana Ortiz. Had carboplatin retreat with paclitaxel/ Avastin started and had carboplatin reaction on 5/29/19 during bag 2 Cycle 2: redness over entire face and uncomfortable sensation over face/ arms. She had slowly increasing  on maintenance bevacizumab and had CT done on 2/5/2020 which showed 1.1 x 1 cm enhancing nodule in the right hemipelvis. She had subsequent Pet/ CT which showed hypermetabolic right cardiophrenic and periportal lymphadenopathy, hypermetabolic implants over the liver capsule, serosal surface of the urinary bladder and posterior right hemipelvis. She was not a surgical candidate and proceeded to Doxil/ bevacizumab. She developed mucositis that was persistent after C2 and reduced dose of Doxil for C3. \par  [de-identified] : poorly differentiated carcinoma: ER positive  [de-identified] : carbo/ taxol: 10/24/17 to 2/12/18 with cumulative fatigue and neuropathy\par bevacizumab added on 12/8/17 to 4/2018 (pt stopped coming for treatment and lost to follow up until 12/2018)\par retreat carbo/ taxol 5/7/19\par bevacizumab added to Cycle 2 of retreat carboplatin/ paclitaxel 5/29/19 and allergic reaction with 2nd bag of carboplatin\par bevacizumab and paclitaxel 6/19/19 to 8/27/19\par bevacizumab maintenance 9/2019 to 2/2020\par Doxil/ bevacizumab 3/4/2020 to present  [de-identified] : She feels mouth sores are much better with dose reduction. She used the oral rinse and able to eat and drink fluids. She denies any headaches or vision changes. She has been having sneezing from seasonal allergies: does not use allergy medications. She also noticed with this treatment; tingling sensation over the LLQ that comes and goes. No pain or vaginal bleeding. She has mild tenderness over the feet but no redness and continues to moisturize the hands and feet during chemotherapy.

## 2020-05-06 NOTE — REASON FOR VISIT
[Follow-Up Visit] : a follow-up [Patient Declined  Services] : - None: Patient declined  services [FreeTextEntry2] : follow up on Avastin/ Doxil C3  [FreeTextEntry3] : pt prefers dtr in law  [TWNoteComboBox1] : Chinese

## 2020-05-06 NOTE — ASSESSMENT
[Palliative] : Goals of care discussed with patient: Palliative [Palliative Care Plan] : Patient was apprised of incurable nature of disease.  Hospice and Palliative care options discussed. [FreeTextEntry1] : She is a 70 y/o F with Stage IV ovarian cancer and Stage I NSCLC diagnosed simultaneously. She has completed 6 cycles of carboplatin/ paclitaxel with bevacizumab and did not continue with bevacizumab maintenance: off from 4/2018 to 4/2019. 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 She completed SRS to the craniotomy site. CT of the chest/ abd/ pelvis showed adenopathy and started with palliative chemotherapy: retreat carboplatin/ paclitaxel 5/7/19. She had allergic reaction with C2 bag 2 of carboplatin and has been on bevacizumab/ paclitaxel s/p total of 6 cycles. While on maintenance bevacizumab, she has progression of disease.\par \par Currently she is on C4 of Avastin/ Doxil: will obtain imaging after today's dose. We reviewed  which has been improving on therapy. She has tingling sensation that may be postsurgical and after chemotherapy. We reviewed goals of palliative chemotherapy and will continue if she continues to have response to therapy. \par \par Brain mets: s/p surgery and no new clinical symptoms or findings. Last MRI done in 2019.\par \par oral mucositis due to Doxil: she will continue with oral rinse along with lidocaine/ maalox/ benadryl mouth solution. \par \par Hand/ foot: grade 1 sensation over feet; will continue with treatment.

## 2020-05-07 DIAGNOSIS — R11.2 NAUSEA WITH VOMITING, UNSPECIFIED: ICD-10-CM

## 2020-05-07 DIAGNOSIS — Z51.11 ENCOUNTER FOR ANTINEOPLASTIC CHEMOTHERAPY: ICD-10-CM

## 2020-05-19 NOTE — ASU PATIENT PROFILE, ADULT - DOES PATIENT HAVE ADVANCE DIRECTIVE
Yes Implemented All Universal Safety Interventions:  [place] to call system. Call bell, personal items and telephone within reach. Instruct patient to call for assistance. Room bathroom lighting operational. Non-slip footwear when patient is off stretcher. Physically safe environment: no spills, clutter or unnecessary equipment. Stretcher in lowest position, wheels locked, appropriate side rails in place.

## 2020-05-27 ENCOUNTER — RESULT REVIEW (OUTPATIENT)
Age: 70
End: 2020-05-27

## 2020-05-27 ENCOUNTER — LABORATORY RESULT (OUTPATIENT)
Age: 70
End: 2020-05-27

## 2020-05-27 ENCOUNTER — APPOINTMENT (OUTPATIENT)
Dept: INFUSION THERAPY | Facility: HOSPITAL | Age: 70
End: 2020-05-27

## 2020-05-27 ENCOUNTER — APPOINTMENT (OUTPATIENT)
Dept: HEMATOLOGY ONCOLOGY | Facility: CLINIC | Age: 70
End: 2020-05-27
Payer: MEDICARE

## 2020-05-27 LAB
BASOPHILS # BLD AUTO: 0.03 K/UL — SIGNIFICANT CHANGE UP (ref 0–0.2)
BASOPHILS NFR BLD AUTO: 0.6 % — SIGNIFICANT CHANGE UP (ref 0–2)
EOSINOPHIL # BLD AUTO: 0.06 K/UL — SIGNIFICANT CHANGE UP (ref 0–0.5)
EOSINOPHIL NFR BLD AUTO: 1.2 % — SIGNIFICANT CHANGE UP (ref 0–6)
HCT VFR BLD CALC: 36.1 % — SIGNIFICANT CHANGE UP (ref 34.5–45)
HGB BLD-MCNC: 12.3 G/DL — SIGNIFICANT CHANGE UP (ref 11.5–15.5)
IMM GRANULOCYTES NFR BLD AUTO: 0.8 % — SIGNIFICANT CHANGE UP (ref 0–1.5)
LYMPHOCYTES # BLD AUTO: 1.55 K/UL — SIGNIFICANT CHANGE UP (ref 1–3.3)
LYMPHOCYTES # BLD AUTO: 31 % — SIGNIFICANT CHANGE UP (ref 13–44)
MCHC RBC-ENTMCNC: 32.7 PG — SIGNIFICANT CHANGE UP (ref 27–34)
MCHC RBC-ENTMCNC: 34.1 GM/DL — SIGNIFICANT CHANGE UP (ref 32–36)
MCV RBC AUTO: 96 FL — SIGNIFICANT CHANGE UP (ref 80–100)
MONOCYTES # BLD AUTO: 0.6 K/UL — SIGNIFICANT CHANGE UP (ref 0–0.9)
MONOCYTES NFR BLD AUTO: 12 % — SIGNIFICANT CHANGE UP (ref 2–14)
NEUTROPHILS # BLD AUTO: 2.72 K/UL — SIGNIFICANT CHANGE UP (ref 1.8–7.4)
NEUTROPHILS NFR BLD AUTO: 54.4 % — SIGNIFICANT CHANGE UP (ref 43–77)
NRBC # BLD: 0 /100 WBCS — SIGNIFICANT CHANGE UP (ref 0–0)
PLATELET # BLD AUTO: 175 K/UL — SIGNIFICANT CHANGE UP (ref 150–400)
RBC # BLD: 3.76 M/UL — LOW (ref 3.8–5.2)
RBC # FLD: 13.9 % — SIGNIFICANT CHANGE UP (ref 10.3–14.5)
WBC # BLD: 5 K/UL — SIGNIFICANT CHANGE UP (ref 3.8–10.5)
WBC # FLD AUTO: 5 K/UL — SIGNIFICANT CHANGE UP (ref 3.8–10.5)

## 2020-05-27 PROCEDURE — 99215 OFFICE O/P EST HI 40 MIN: CPT

## 2020-06-04 ENCOUNTER — OUTPATIENT (OUTPATIENT)
Dept: OUTPATIENT SERVICES | Facility: HOSPITAL | Age: 70
LOS: 1 days | Discharge: HOME | End: 2020-06-04

## 2020-06-04 DIAGNOSIS — C56.2 MALIGNANT NEOPLASM OF LEFT OVARY: ICD-10-CM

## 2020-06-04 DIAGNOSIS — Z90.710 ACQUIRED ABSENCE OF BOTH CERVIX AND UTERUS: Chronic | ICD-10-CM

## 2020-06-04 DIAGNOSIS — Z90.722 ACQUIRED ABSENCE OF OVARIES, BILATERAL: Chronic | ICD-10-CM

## 2020-06-04 DIAGNOSIS — Z90.49 ACQUIRED ABSENCE OF OTHER SPECIFIED PARTS OF DIGESTIVE TRACT: Chronic | ICD-10-CM

## 2020-06-04 DIAGNOSIS — Z98.890 OTHER SPECIFIED POSTPROCEDURAL STATES: Chronic | ICD-10-CM

## 2020-06-04 NOTE — PHYSICAL EXAM
[Restricted in physically strenuous activity but ambulatory and able to carry out work of a light or sedentary nature] : Status 1- Restricted in physically strenuous activity but ambulatory and able to carry out work of a light or sedentary nature, e.g., light house work, office work [Thin] : thin [Ulcers] : ulcers [Normal] : affect appropriate [Mucositis] : no mucositis [Vesicles] : no vesicles [de-identified] : postsurgical site: stitches over the occipital area C/D/I  [de-identified] : white plaque over healing ulcer over tongue 3 mm. upper gum redness with painful sensation when eating and drinking [de-identified] : L groin nodule nonpalpable  [de-identified] : Rash around left outer breast / chest area since 7 days of last cycle of chemo with itching sensation; dark pigment in skin of right lower arm and abdominal / groin area no swelling no pain.

## 2020-06-04 NOTE — HISTORY OF PRESENT ILLNESS
[Disease: _____________________] : Disease: [unfilled] [T: ___] : T[unfilled] [N: ___] : N[unfilled] [M: ___] : M[unfilled] [AJCC Stage: ____] : AJCC Stage: [unfilled] [Therapy: ___] : Therapy: [unfilled] [Cycle: ___] : Cycle: [unfilled] [de-identified] : Age 67: Stage I NSCLC adenocarcinoma s/p left VATs robotic assisted FERMÍN lobectomy and MLND 8/16/17 with Dr Ken Sol\par Age 67: Stage IV poorly differentiated ovarian cancer s/p AVEL/ BSO/ total omentectomy, bilateral ureterolysis, resection of abdominal anterior wall masses, repair of cystostomy\par She initially presented with hemoptysis and had evaluation consisting of bronchoscopy and FNA. The bronchoscopy was negative and the FNA was positive for malignant cells: TTF1+ and PAX 8 negative. She had Pet/ CT on 7/6/17 which showed 2.3 cm left upper lobe nodule with SUV of 23 and 4.2 x 3.1 cm left ovarian lesion SUV of 7.9 and left common iliac lymph nodes measuring up to 8mm with SUV of 2.2, 6 mm perihepatic implant SUV of 3.1. She initially had laparoscopic evaluation with left ovary biopsy which showed poorly differentiated carcinoma that was ER, WT1, PAX 8 positive and TTF1 negative. She initially had the left VATs. Then she subsequently had exploratory laparotomy with  AVEL, BSO, radical dissection of tumor with total omentectomy, resection of anterior abdominal wall masses, lysis of adhesions, and repair of cystostomy. She had CT imaging done after 5th cycle of chemotherapy to evaluate abdominal pain and CT showed no evidence of disease. She had abdominal pain in 3/2019 and had follow up CT which showed new small pelvic implant and enlarged sita-caval LN. She had headache and went to [facility] 4/10/19. She had imaging that showed predominantly cystic peripherally enhancing mass within the left cerebellar hemisphere. She had left suboccipital craniotomy with Dr Tatyana Ortiz. Had carboplatin retreat with paclitaxel/ Avastin started and had carboplatin reaction on 5/29/19 during bag 2 Cycle 2: redness over entire face and uncomfortable sensation over face/ arms. She had slowly increasing  on maintenance bevacizumab and had CT done on 2/5/2020 which showed 1.1 x 1 cm enhancing nodule in the right hemipelvis. She had subsequent Pet/ CT which showed hypermetabolic right cardiophrenic and periportal lymphadenopathy, hypermetabolic implants over the liver capsule, serosal surface of the urinary bladder and posterior right hemipelvis. She was not a surgical candidate and proceeded to Doxil/ bevacizumab. She developed mucositis that was persistent after C2 and reduced dose of Doxil for C3. \par  [de-identified] : poorly differentiated carcinoma: ER positive  [de-identified] : carbo/ taxol: 10/24/17 to 2/12/18 with cumulative fatigue and neuropathy\par bevacizumab added on 12/8/17 to 4/2018 (pt stopped coming for treatment and lost to follow up until 12/2018)\par retreat carbo/ taxol 5/7/19\par bevacizumab added to Cycle 2 of retreat carboplatin/ paclitaxel 5/29/19 and allergic reaction with 2nd bag of carboplatin\par bevacizumab and paclitaxel 6/19/19 to 8/27/19\par bevacizumab maintenance 9/2019 to 2/2020\par Doxil/ bevacizumab 3/4/2020 to present  [de-identified] : Pt reports left outer breast /chest area rash / itching sensation started 7 days of last cycle ( C4) of chemo and mouth sores especially the tongue and upper gum area are getting worsening despite the magic mouth wash usage in past 2 weeks. Pt also noticed lower abdominal / groin area and right arm ( chemo infusion site) skin darkening since last chemo but denies itching or pain around the darkened skin. Due to painful mouth sore, pt had difficulty time to eat or drink normally, also experienced constipation but able to relieved by Senna. Otherwise denies SOB/ Chest pain/ Palpitation/ fever/ chills/nausea / vomiting since last visit.\par

## 2020-06-04 NOTE — END OF VISIT
[Time Spent: ___ minutes] : I have spent [unfilled] minutes of time on the encounter. [>50% of the face to face encounter time was spent on counseling and/or coordination of care for ___] : Greater than 50% of the face to face encounter time was spent on counseling and/or coordination of care for [unfilled] [FreeTextEntry3] : Pt was having mucositis on Doxil 30mg/ m2; she will come in for additional hydration and will consider prolonging interval from 3 weeks to 4 weeks.

## 2020-06-04 NOTE — REVIEW OF SYSTEMS
[Fatigue] : fatigue [Negative] : Allergic/Immunologic [Constipation] : constipation [Skin Rash] : skin rash [Fever] : no fever [Chills] : no chills [Night Sweats] : no night sweats [Recent Change In Weight] : ~T no recent weight change [Dysphagia] : no dysphagia [Loss of Hearing] : no loss of hearing [Nosebleeds] : no nosebleeds [Hoarseness] : no hoarseness [Odynophagia] : no odynophagia [Mucosal Pain] : no mucosal pain [Joint Pain] : no joint pain [Joint Stiffness] : no joint stiffness [Muscle Pain] : no muscle pain [Muscle Weakness] : no muscle weakness [Skin Wound] : no skin wound [FreeTextEntry4] :  mouth sores  [FreeTextEntry9] : tingling sensation over the LLQ  [de-identified] : left outer breast / chest areas skin rash after 7 days of C3 chemo, dark pigment in skin of right lower arm and lower abdominal / groin areas since last cycle of chemo

## 2020-06-04 NOTE — ASSESSMENT
[Palliative] : Goals of care discussed with patient: Palliative [Palliative Care Plan] : Patient was apprised of incurable nature of disease.  Hospice and Palliative care options discussed. [FreeTextEntry1] : She is a 70 y/o F with Stage IV ovarian cancer and Stage I NSCLC diagnosed simultaneously. She has completed 6 cycles of carboplatin/ paclitaxel with bevacizumab and did not continue with bevacizumab maintenance: off from 4/2018 to 4/2019. 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 She completed SRS to the craniotomy site. CT of the chest/ abd/ pelvis showed adenopathy and started with palliative chemotherapy: retreat carboplatin/ paclitaxel 5/7/19. She had allergic reaction with C2 bag 2 of carboplatin and has been on bevacizumab/ paclitaxel s/p total of 6 cycles. While on maintenance bevacizumab, she has progression of disease.\par \par Currently she is on C5 of Mvasi/ Doxil: prescribed hydrocortisone 1% external cream for itching skin rash, advised pt to keep skin dry and wear well ventilated cloth; Continue with magic mouth wash / lidocaine/ maalox/ benadry mixture to rinse mouth as needed. Continue with Senna to ease constipation since pt found Senna is helpful in addition to hydration. Advised pt to ingest soft / easy swallowing food during the painful mouth sore periods of time until it recovered. advised pt to report any worsening or new symptoms. all questions answered upon pt's satisfaction. We will continue monitoring pt's response to chemo and adjust the current chemo treatment plan accordingly. Today' CBC is WNL, next f/u in 3 weeks. pt scheduled CT scan on 6/9/20 to evaluate interval chemo response. \par \par \par

## 2020-06-04 NOTE — REASON FOR VISIT
[Follow-Up Visit] : a follow-up [Patient Declined  Services] : - None: Patient declined  services [FreeTextEntry2] : follow up on MVasi/ Doxil C5 today [FreeTextEntry3] : pt prefers dtr in law  [TWNoteComboBox1] : Chinese

## 2020-06-09 ENCOUNTER — RESULT REVIEW (OUTPATIENT)
Age: 70
End: 2020-06-09

## 2020-06-09 ENCOUNTER — LABORATORY RESULT (OUTPATIENT)
Age: 70
End: 2020-06-09

## 2020-06-09 ENCOUNTER — APPOINTMENT (OUTPATIENT)
Dept: INFUSION THERAPY | Facility: HOSPITAL | Age: 70
End: 2020-06-09

## 2020-06-09 ENCOUNTER — OUTPATIENT (OUTPATIENT)
Dept: OUTPATIENT SERVICES | Facility: HOSPITAL | Age: 70
LOS: 1 days | End: 2020-06-09
Payer: MEDICARE

## 2020-06-09 ENCOUNTER — APPOINTMENT (OUTPATIENT)
Dept: CT IMAGING | Facility: IMAGING CENTER | Age: 70
End: 2020-06-09
Payer: MEDICARE

## 2020-06-09 DIAGNOSIS — Z90.49 ACQUIRED ABSENCE OF OTHER SPECIFIED PARTS OF DIGESTIVE TRACT: Chronic | ICD-10-CM

## 2020-06-09 DIAGNOSIS — Z90.710 ACQUIRED ABSENCE OF BOTH CERVIX AND UTERUS: Chronic | ICD-10-CM

## 2020-06-09 DIAGNOSIS — C34.92 MALIGNANT NEOPLASM OF UNSPECIFIED PART OF LEFT BRONCHUS OR LUNG: ICD-10-CM

## 2020-06-09 DIAGNOSIS — C79.31 SECONDARY MALIGNANT NEOPLASM OF BRAIN: ICD-10-CM

## 2020-06-09 DIAGNOSIS — Z98.890 OTHER SPECIFIED POSTPROCEDURAL STATES: Chronic | ICD-10-CM

## 2020-06-09 DIAGNOSIS — C76.2 MALIGNANT NEOPLASM OF ABDOMEN: ICD-10-CM

## 2020-06-09 DIAGNOSIS — Z90.722 ACQUIRED ABSENCE OF OVARIES, BILATERAL: Chronic | ICD-10-CM

## 2020-06-09 DIAGNOSIS — R59.1 GENERALIZED ENLARGED LYMPH NODES: ICD-10-CM

## 2020-06-09 LAB
BASOPHILS # BLD AUTO: 0.03 K/UL — SIGNIFICANT CHANGE UP (ref 0–0.2)
BASOPHILS NFR BLD AUTO: 0.5 % — SIGNIFICANT CHANGE UP (ref 0–2)
EOSINOPHIL # BLD AUTO: 0.03 K/UL — SIGNIFICANT CHANGE UP (ref 0–0.5)
EOSINOPHIL NFR BLD AUTO: 0.5 % — SIGNIFICANT CHANGE UP (ref 0–6)
HCT VFR BLD CALC: 37 % — SIGNIFICANT CHANGE UP (ref 34.5–45)
HGB BLD-MCNC: 12.3 G/DL — SIGNIFICANT CHANGE UP (ref 11.5–15.5)
IMM GRANULOCYTES NFR BLD AUTO: 0.5 % — SIGNIFICANT CHANGE UP (ref 0–1.5)
LYMPHOCYTES # BLD AUTO: 1.35 K/UL — SIGNIFICANT CHANGE UP (ref 1–3.3)
LYMPHOCYTES # BLD AUTO: 23.4 % — SIGNIFICANT CHANGE UP (ref 13–44)
MCHC RBC-ENTMCNC: 32.6 PG — SIGNIFICANT CHANGE UP (ref 27–34)
MCHC RBC-ENTMCNC: 33.2 GM/DL — SIGNIFICANT CHANGE UP (ref 32–36)
MCV RBC AUTO: 98.1 FL — SIGNIFICANT CHANGE UP (ref 80–100)
MONOCYTES # BLD AUTO: 0.48 K/UL — SIGNIFICANT CHANGE UP (ref 0–0.9)
MONOCYTES NFR BLD AUTO: 8.3 % — SIGNIFICANT CHANGE UP (ref 2–14)
NEUTROPHILS # BLD AUTO: 3.84 K/UL — SIGNIFICANT CHANGE UP (ref 1.8–7.4)
NEUTROPHILS NFR BLD AUTO: 66.8 % — SIGNIFICANT CHANGE UP (ref 43–77)
NRBC # BLD: 0 /100 WBCS — SIGNIFICANT CHANGE UP (ref 0–0)
PLATELET # BLD AUTO: 171 K/UL — SIGNIFICANT CHANGE UP (ref 150–400)
RBC # BLD: 3.77 M/UL — LOW (ref 3.8–5.2)
RBC # FLD: 14 % — SIGNIFICANT CHANGE UP (ref 10.3–14.5)
WBC # BLD: 5.76 K/UL — SIGNIFICANT CHANGE UP (ref 3.8–10.5)
WBC # FLD AUTO: 5.76 K/UL — SIGNIFICANT CHANGE UP (ref 3.8–10.5)

## 2020-06-09 PROCEDURE — 71260 CT THORAX DX C+: CPT

## 2020-06-09 PROCEDURE — 74177 CT ABD & PELVIS W/CONTRAST: CPT

## 2020-06-09 PROCEDURE — 74177 CT ABD & PELVIS W/CONTRAST: CPT | Mod: 26

## 2020-06-09 PROCEDURE — 71260 CT THORAX DX C+: CPT | Mod: 26

## 2020-06-10 DIAGNOSIS — Z51.11 ENCOUNTER FOR ANTINEOPLASTIC CHEMOTHERAPY: ICD-10-CM

## 2020-06-17 ENCOUNTER — APPOINTMENT (OUTPATIENT)
Dept: HEMATOLOGY ONCOLOGY | Facility: CLINIC | Age: 70
End: 2020-06-17
Payer: MEDICARE

## 2020-06-17 ENCOUNTER — LABORATORY RESULT (OUTPATIENT)
Age: 70
End: 2020-06-17

## 2020-06-17 ENCOUNTER — APPOINTMENT (OUTPATIENT)
Dept: INFUSION THERAPY | Facility: HOSPITAL | Age: 70
End: 2020-06-17

## 2020-06-17 ENCOUNTER — RESULT REVIEW (OUTPATIENT)
Age: 70
End: 2020-06-17

## 2020-06-17 VITALS
DIASTOLIC BLOOD PRESSURE: 85 MMHG | OXYGEN SATURATION: 97 % | SYSTOLIC BLOOD PRESSURE: 128 MMHG | WEIGHT: 111.33 LBS | RESPIRATION RATE: 17 BRPM | TEMPERATURE: 98.5 F | BODY MASS INDEX: 21.04 KG/M2 | HEART RATE: 72 BPM

## 2020-06-17 DIAGNOSIS — L30.9 DERMATITIS, UNSPECIFIED: ICD-10-CM

## 2020-06-17 LAB
BASOPHILS # BLD AUTO: 0.01 K/UL — SIGNIFICANT CHANGE UP (ref 0–0.2)
BASOPHILS NFR BLD AUTO: 0.2 % — SIGNIFICANT CHANGE UP (ref 0–2)
EOSINOPHIL # BLD AUTO: 0.04 K/UL — SIGNIFICANT CHANGE UP (ref 0–0.5)
EOSINOPHIL NFR BLD AUTO: 0.9 % — SIGNIFICANT CHANGE UP (ref 0–6)
HCT VFR BLD CALC: 36.9 % — SIGNIFICANT CHANGE UP (ref 34.5–45)
HGB BLD-MCNC: 12.4 G/DL — SIGNIFICANT CHANGE UP (ref 11.5–15.5)
IMM GRANULOCYTES NFR BLD AUTO: 0.9 % — SIGNIFICANT CHANGE UP (ref 0–1.5)
LYMPHOCYTES # BLD AUTO: 1.52 K/UL — SIGNIFICANT CHANGE UP (ref 1–3.3)
LYMPHOCYTES # BLD AUTO: 34.1 % — SIGNIFICANT CHANGE UP (ref 13–44)
MCHC RBC-ENTMCNC: 32.5 PG — SIGNIFICANT CHANGE UP (ref 27–34)
MCHC RBC-ENTMCNC: 33.6 GM/DL — SIGNIFICANT CHANGE UP (ref 32–36)
MCV RBC AUTO: 96.6 FL — SIGNIFICANT CHANGE UP (ref 80–100)
MONOCYTES # BLD AUTO: 0.72 K/UL — SIGNIFICANT CHANGE UP (ref 0–0.9)
MONOCYTES NFR BLD AUTO: 16.1 % — HIGH (ref 2–14)
NEUTROPHILS # BLD AUTO: 2.13 K/UL — SIGNIFICANT CHANGE UP (ref 1.8–7.4)
NEUTROPHILS NFR BLD AUTO: 47.8 % — SIGNIFICANT CHANGE UP (ref 43–77)
NRBC # BLD: 0 /100 WBCS — SIGNIFICANT CHANGE UP (ref 0–0)
PLATELET # BLD AUTO: 176 K/UL — SIGNIFICANT CHANGE UP (ref 150–400)
RBC # BLD: 3.82 M/UL — SIGNIFICANT CHANGE UP (ref 3.8–5.2)
RBC # FLD: 13.5 % — SIGNIFICANT CHANGE UP (ref 10.3–14.5)
WBC # BLD: 4.46 K/UL — SIGNIFICANT CHANGE UP (ref 3.8–10.5)
WBC # FLD AUTO: 4.46 K/UL — SIGNIFICANT CHANGE UP (ref 3.8–10.5)

## 2020-06-17 PROCEDURE — 99215 OFFICE O/P EST HI 40 MIN: CPT

## 2020-06-17 NOTE — PHYSICAL EXAM
[Restricted in physically strenuous activity but ambulatory and able to carry out work of a light or sedentary nature] : Status 1- Restricted in physically strenuous activity but ambulatory and able to carry out work of a light or sedentary nature, e.g., light house work, office work [Thin] : thin [Ulcers] : no ulcers [Vesicles] : no vesicles [Mucositis] : no mucositis [Thrush] : no thrush [de-identified] : erythema over the posterior orophargnx; no visible sores but smooth tongue  [Normal] : affect appropriate [de-identified] : postsurgical site: stitches over the occipital area C/D/I  [de-identified] : L groin nodule nonpalpable  [de-identified] : rash over the trunk: B hips: macular rash with hyperpigmentation; no vesicles

## 2020-06-17 NOTE — HISTORY OF PRESENT ILLNESS
[Disease: _____________________] : Disease: [unfilled] [T: ___] : T[unfilled] [M: ___] : M[unfilled] [N: ___] : N[unfilled] [AJCC Stage: ____] : AJCC Stage: [unfilled] [de-identified] : Age 67: Stage I NSCLC adenocarcinoma s/p left VATs robotic assisted FERMÍN lobectomy and MLND 8/16/17 with Dr Ken Sol\par Age 67: Stage IV poorly differentiated ovarian cancer s/p AVEL/ BSO/ total omentectomy, bilateral ureterolysis, resection of abdominal anterior wall masses, repair of cystostomy\par She initially presented with hemoptysis and had evaluation consisting of bronchoscopy and FNA. The bronchoscopy was negative and the FNA was positive for malignant cells: TTF1+ and PAX 8 negative. She had Pet/ CT on 7/6/17 which showed 2.3 cm left upper lobe nodule with SUV of 23 and 4.2 x 3.1 cm left ovarian lesion SUV of 7.9 and left common iliac lymph nodes measuring up to 8mm with SUV of 2.2, 6 mm perihepatic implant SUV of 3.1. She initially had laparoscopic evaluation with left ovary biopsy which showed poorly differentiated carcinoma that was ER, WT1, PAX 8 positive and TTF1 negative. She initially had the left VATs. Then she subsequently had exploratory laparotomy with  AVEL, BSO, radical dissection of tumor with total omentectomy, resection of anterior abdominal wall masses, lysis of adhesions, and repair of cystostomy. She had CT imaging done after 5th cycle of chemotherapy to evaluate abdominal pain and CT showed no evidence of disease. She had abdominal pain in 3/2019 and had follow up CT which showed new small pelvic implant and enlarged sita-caval LN. She had headache and went to [facility] 4/10/19. She had imaging that showed predominantly cystic peripherally enhancing mass within the left cerebellar hemisphere. She had left suboccipital craniotomy with Dr Tatyana Ortiz. Had carboplatin retreat with paclitaxel/ Avastin started and had carboplatin reaction on 5/29/19 during bag 2 Cycle 2: redness over entire face and uncomfortable sensation over face/ arms. She had slowly increasing  on maintenance bevacizumab and had CT done on 2/5/2020 which showed 1.1 x 1 cm enhancing nodule in the right hemipelvis. She had subsequent Pet/ CT which showed hypermetabolic right cardiophrenic and periportal lymphadenopathy, hypermetabolic implants over the liver capsule, serosal surface of the urinary bladder and posterior right hemipelvis. She was not a surgical candidate and proceeded to Doxil/ bevacizumab. She developed mucositis that was persistent after C2 and reduced dose of Doxil for C3. Interval CT of the chest/ abd/ pelvis done on 6/9/2020 showed new RLL 5 mm lung nodule nonspecific but regression of abdominal adenopathy and right pelvic tumor implant. \par  [de-identified] : Since last evaluation, started having trouble swallowing 2 weeks after Avastin/ Doxil. She had mouth sores and used the lidocaine solution. She noticed also rash over the trunk with hyperpigmentation over the trunk. She has been using Benadryl over the skin with effect for itching. She denies rash spreading. She is eating less but able to eat. Feels sores in mouth currently better but still feels sore over back of throat. She is wondering if Doxil dose could be reduced. She denies any pain over the hands or feet or abdominal pain. Wondering if she will be alive in 6 months.  [de-identified] : carbo/ taxol: 10/24/17 to 2/12/18 with cumulative fatigue and neuropathy\par bevacizumab added on 12/8/17 to 4/2018 (pt stopped coming for treatment and lost to follow up until 12/2018)\par retreat carbo/ taxol 5/7/19\par bevacizumab added to Cycle 2 of retreat carboplatin/ paclitaxel 5/29/19 and allergic reaction with 2nd bag of carboplatin\par bevacizumab and paclitaxel 6/19/19 to 8/27/19\par bevacizumab maintenance 9/2019 to 2/2020\par Doxil/ bevacizumab 3/4/2020 to present  [de-identified] : poorly differentiated carcinoma: ER positive

## 2020-06-17 NOTE — REASON FOR VISIT
[FreeTextEntry2] : follow up for ovarian cancer recurrent to the lymph nodes and abdominal wall/ brain mets  [Follow-Up Visit] : a follow-up

## 2020-06-17 NOTE — REVIEW OF SYSTEMS
[Chills] : no chills [Fever] : no fever [Night Sweats] : no night sweats [Fatigue] : fatigue [Recent Change In Weight] : ~T no recent weight change [Loss of Hearing] : no loss of hearing [Dysphagia] : dysphagia [Nosebleeds] : no nosebleeds [Odynophagia] : odynophagia [Hoarseness] : no hoarseness [Skin Wound] : no skin wound [Skin Rash] : skin rash [Mucosal Pain] : no mucosal pain [Negative] : Endocrine [de-identified] : over the trunk

## 2020-06-17 NOTE — CONSULT LETTER
[Courtesy Letter:] : I had the pleasure of seeing your patient, [unfilled], in my office today. [Dear  ___] : Dear  [unfilled], [Please see my note below.] : Please see my note below. [Consult Closing:] : Thank you very much for allowing me to participate in the care of this patient.  If you have any questions, please do not hesitate to contact me. [FreeTextEntry3] : Geo Cuevas MD\par Attending\par [facility]\par  [Sincerely,] : Sincerely, [FreeTextEntry2] : Romy Sanchez MD\par [address] [facility]\[place] [address] [DrLewis  ___] : Dr. JOHNSON

## 2020-06-17 NOTE — ASSESSMENT
[FreeTextEntry1] : She is a 70 y/o F with Stage IV ovarian cancer and Stage I NSCLC diagnosed simultaneously. She has completed 6 cycles of carboplatin/ paclitaxel with bevacizumab and did not continue with bevacizumab maintenance: off from 4/2018 to 4/2019. 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 She completed SRS to the craniotomy site. CT of the chest/ abd/ pelvis showed adenopathy and started with palliative chemotherapy: retreat carboplatin/ paclitaxel 5/7/19. She had allergic reaction with C2 bag 2 of carboplatin and has been on bevacizumab/ paclitaxel s/p total of 6 cycles. While on maintenance bevacizumab, she has progression of disease.\par \par Currently she is on C5 of Avastin/ Doxil and we reviewed her CT of the chest/ abd/ pelvis. We explained the worsening dysphagia and odynophagia due to mucositis from Doxil and to hold dose today. We reviewed changing to Doxil next week single agent and monitoring for symptoms. She has dermatitis: Doxil induced and will start triamcinolone cream twice a day x 1 week and moisturizing cream. She will take sucralfate slurry to help with mucositis symptoms which is affecting back of throat and esophagus. Explained she can use lidocaine but should not swallow this. We reviewed with her dtr in law/ son on the phone expectations from treatment and overall survival with recurrent ovarian cancer to the brain. She will continue with palliative therapy. Questions answered to their satisfaction.

## 2020-06-18 ENCOUNTER — APPOINTMENT (OUTPATIENT)
Dept: THORACIC SURGERY | Facility: CLINIC | Age: 70
End: 2020-06-18
Payer: MEDICARE

## 2020-06-18 DIAGNOSIS — R91.8 OTHER NONSPECIFIC ABNORMAL FINDING OF LUNG FIELD: ICD-10-CM

## 2020-06-18 PROCEDURE — 99442: CPT

## 2020-06-19 NOTE — ASSESSMENT
[FreeTextEntry1] : 70 y/o F, never smoker, w/ hx of HTN, DM, Lt ovarian tumor c/w mullerian origin, and Lung CA.\par \par Now 3yrs s/p FB w/ BAL of the LLL bronchus, Navigational Bronch, FNA of the FERMÍN mass, Brushing of the FERMÍN lung mass and biopsy of the FERMÍN mass on 7/6/2017. Path revealed NSCLC, favoring AdenoCA, +TTF-1, +Napsin.\par \par Pt is s/p Lt ovarian tumor excision and fallopian tube excision on 7/21/17 by Dr. Tiffanie Brooks. Path revealed poorly-diff CA, +WT-1, PAX-8 and ER, c/w mullerian origin. Pt is s/p Total Hysterectomy mid-Sept, 2017 by Dr. Brooks.\par \par Now 2yr 10mons s/p Lt VATS Robotic-assisted LULobectomy, MLND on 8/16/17. Path revealed AdenoCA, acinar (90%) and solid (10%), 2 x 1.8 x 0.5cm, margins, visceral pleura and LNs negative, pT1aN0 Stg IA.\par \par CT Chest on 2/7/19:\par - new 2mm RUL nodule (series 4 image 50)\par - new 2mm RUL nodule (series 4 image 71)\par - new 2mm FERMÍN nodule (series 4 image 33)\par \par Of note, In 3/2019, a follow up CT showed new small pelvic implant and enlarged sita-caval LN. In 04/2019 MRI brain revealed predominantly cystic peripherally enhancing mass within the left cerebellar hemisphere s/p left suboccipital craniotomy with Dr Tatyana Ortiz on 04/12/2019. 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 She was restarted on palliative Chemotherapy since 05/2019. \par \par CT Chest on 8/28/19:\par - stable 7 mm nodule abuts the serosal surface of the fundus of the bladder\par - interval decreased in size of portacaval low-density LN, 8 mm\par \par CT Chest on 12/9/19:\par - calcified hilar LNs\par - 1mm RUL nodule (3:23; previously 2mm)\par - resolution of the 2mm RUL nodule\par - resolution of the 2mm FERMÍN nodule\par \par Of note, CT C/A/P on 2/5/2020 which showed 1.1 x 1 cm enhancing nodule in the right hemipelvis. Subsequent Pet/ CT on 02/19/2020 showed hypermetabolic right cardiophrenic and periportal lymphadenopathy, hypermetabolic implants over the liver capsule, serosal surface of the urinary bladder and posterior right hemipelvis. She was not a surgical candidate and proceeded to Doxil/ bevacizumab. \par \par CT chest 6/15/2020:\par - Focal mucoid impaction is visualized within RUL laterally measuring 5mm which is new finding\par - Subpleural nodular opacity is visualized within RLL medially, measuring 4mm not significantly changed, most likely a benign postinflammatory process. \par \par Currently, she is on Doxil/ bevacizumab since 03/04/2020. \par \par I have reviewed the patient's medical records and diagnostic images at time of this office consultation and have made the following recommendation:\par 1. CT chest reviewed and explained to patient, I recommended patient to return to office in 6 months with no scan. \par \par I have spent 15 minutes on the phone discussing above result and plan of care with patient.\par \par I personally performed the services described in the documentation, reviewed the documentation recorded by the scribe in my presence and it accurately and completely records my words and actions.\par \par I, Katt Zurita NP, am scribing for and the presence of CLAIR Long, the following sections HISTORY OF PRESENT ILLNESS, PAST MEDICAL/FAMILY/SOCIAL HISTORY; REVIEW OF SYSTEMS; VITAL SIGNS; PHYSICAL EXAM; DISPOSITION.

## 2020-06-19 NOTE — CONSULT LETTER
[Dear  ___] : Dear  [unfilled], [Consult Letter:] : I had the pleasure of evaluating your patient, [unfilled]. [Please see my note below.] : Please see my note below. [Consult Closing:] : Thank you very much for allowing me to participate in the care of this patient.  If you have any questions, please do not hesitate to contact me. [( Thank you for referring [unfilled] for consultation for _____ )] : Thank you for referring [unfilled] for consultation for [unfilled] [Sincerely,] : Sincerely, [DrLewis  ___] : Dr. JOHNSON [DrLewis ___] : Dr. JOHNSON [FreeTextEntry2] : Liliana Sanchez MD (PCP) \par Tiffanie Brooks MD (OB/GYN) \par Geo Cuevas MD (Hem/Onc)  [FreeTextEntry3] : Ken Sol MD, MPH \par System Director of Thoracic Surgery \par Director of [facility] [place] \par Professor Cardiovascular & Thoracic Surgery \par [facility] [employer] at [facility]\par \par

## 2020-06-19 NOTE — HISTORY OF PRESENT ILLNESS
[Verbal consent obtained from patient] : the patient, [unfilled] [Home] : at home, [unfilled] , at the time of the visit. [Medical Office: ([facility])___] : at the medical office located in  [FreeTextEntry1] : 68 y/o F, never smoker, w/ hx of HTN, DM, Lt ovarian tumor c/w mullerian origin, and Lung CA.\par \par Now 3yrs s/p FB w/ BAL of the LLL bronchus, Navigational Bronch, FNA of the FERMÍN mass, Brushing of the FERMÍN lung mass and biopsy of the FERMÍN mass on 7/6/2017. Path revealed NSCLC, favoring AdenoCA, +TTF-1, +Napsin.\par \par Pt is s/p Lt ovarian tumor excision and fallopian tube excision on 7/21/17 by Dr. Tiffanie Brooks. Path revealed poorly-diff CA, +WT-1, PAX-8 and ER, c/w mullerian origin. Pt is s/p Total Hysterectomy mid-Sept, 2017 by Dr. Brooks.\par \par Now 2yr 10mons s/p Lt VATS Robotic-assisted LULobectomy, MLND on 8/16/17. Path revealed AdenoCA, acinar (90%) and solid (10%), 2 x 1.8 x 0.5cm, margins, visceral pleura and LNs negative, pT1aN0 Stg IA.\par \par CT Chest on 2/7/19:\par - new 2mm RUL nodule (series 4 image 50)\par - new 2mm RUL nodule (series 4 image 71)\par - new 2mm FERMÍN nodule (series 4 image 33)\par \par Of note, In 3/2019, a follow up CT showed new small pelvic implant and enlarged sita-caval LN. In 04/2019 MRI brain revealed predominantly cystic peripherally enhancing mass within the left cerebellar hemisphere s/p left suboccipital craniotomy with Dr Tatyana Ortiz on 04/12/2019. 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 She was restarted on palliative Chemotherapy since 05/2019. \par \par CT Chest on 8/28/19:\par - stable 7 mm nodule abuts the serosal surface of the fundus of the bladder\par - interval decreased in size of portacaval low-density LN, 8 mm\par \par CT Chest on 12/9/19:\par - calcified hilar LNs\par - 1mm RUL nodule (3:23; previously 2mm)\par - resolution of the 2mm RUL nodule\par - resolution of the 2mm FERMÍN nodule\par \par Of note, CT C/A/P on 2/5/2020 which showed 1.1 x 1 cm enhancing nodule in the right hemipelvis. Subsequent Pet/ CT on 02/19/2020 showed hypermetabolic right cardiophrenic and periportal lymphadenopathy, hypermetabolic implants over the liver capsule, serosal surface of the urinary bladder and posterior right hemipelvis. She was not a surgical candidate and proceeded to Doxil/ bevacizumab. \par \par CT chest 6/15/2020:\par - Focal mucoid impaction is visualized within RUL laterally measuring 5mm which is new finding\par - Subpleural nodular opacity is visualized within RLL medially, measuring 4mm not significantly changed, most likely a benign postinflammatory process. \par \par Currently, she is on Doxil/ bevacizumab since 03/04/2020. \par \par Patient is followed today via Telephonic visit. Patient c/o dysphagia and diagnosed with mucositis due to chemotherapy, f/u with Dr. Geo Cuevas (Oncology), and prescribed lidocaine and sucralfate. Patient denies shortness of breath, cough, chest pain, fever, chills.

## 2020-06-19 NOTE — DATA REVIEWED
[FreeTextEntry1] : CT chest 6/15/2020:\par - Focal mucoid impaction is visualized within RUL laterally measuring 5mm which is new finding\par - Subpleural nodular opacity is visualized within RLL medially, measuring 4mm not significantly changed, most likely a benign postinflammatory process.

## 2020-06-24 ENCOUNTER — APPOINTMENT (OUTPATIENT)
Dept: INFUSION THERAPY | Facility: HOSPITAL | Age: 70
End: 2020-06-24

## 2020-06-24 ENCOUNTER — RESULT REVIEW (OUTPATIENT)
Age: 70
End: 2020-06-24

## 2020-06-24 ENCOUNTER — LABORATORY RESULT (OUTPATIENT)
Age: 70
End: 2020-06-24

## 2020-06-24 LAB
BASOPHILS # BLD AUTO: 0.03 K/UL — SIGNIFICANT CHANGE UP (ref 0–0.2)
BASOPHILS NFR BLD AUTO: 0.6 % — SIGNIFICANT CHANGE UP (ref 0–2)
EOSINOPHIL # BLD AUTO: 0.03 K/UL — SIGNIFICANT CHANGE UP (ref 0–0.5)
EOSINOPHIL NFR BLD AUTO: 0.6 % — SIGNIFICANT CHANGE UP (ref 0–6)
HCT VFR BLD CALC: 35.3 % — SIGNIFICANT CHANGE UP (ref 34.5–45)
HGB BLD-MCNC: 11.8 G/DL — SIGNIFICANT CHANGE UP (ref 11.5–15.5)
IMM GRANULOCYTES NFR BLD AUTO: 0.8 % — SIGNIFICANT CHANGE UP (ref 0–1.5)
LYMPHOCYTES # BLD AUTO: 1.94 K/UL — SIGNIFICANT CHANGE UP (ref 1–3.3)
LYMPHOCYTES # BLD AUTO: 36.5 % — SIGNIFICANT CHANGE UP (ref 13–44)
MCHC RBC-ENTMCNC: 32.6 PG — SIGNIFICANT CHANGE UP (ref 27–34)
MCHC RBC-ENTMCNC: 33.4 GM/DL — SIGNIFICANT CHANGE UP (ref 32–36)
MCV RBC AUTO: 97.5 FL — SIGNIFICANT CHANGE UP (ref 80–100)
MONOCYTES # BLD AUTO: 0.8 K/UL — SIGNIFICANT CHANGE UP (ref 0–0.9)
MONOCYTES NFR BLD AUTO: 15.1 % — HIGH (ref 2–14)
NEUTROPHILS # BLD AUTO: 2.47 K/UL — SIGNIFICANT CHANGE UP (ref 1.8–7.4)
NEUTROPHILS NFR BLD AUTO: 46.4 % — SIGNIFICANT CHANGE UP (ref 43–77)
NRBC # BLD: 0 /100 WBCS — SIGNIFICANT CHANGE UP (ref 0–0)
PLATELET # BLD AUTO: 180 K/UL — SIGNIFICANT CHANGE UP (ref 150–400)
RBC # BLD: 3.62 M/UL — LOW (ref 3.8–5.2)
RBC # FLD: 13 % — SIGNIFICANT CHANGE UP (ref 10.3–14.5)
WBC # BLD: 5.31 K/UL — SIGNIFICANT CHANGE UP (ref 3.8–10.5)
WBC # FLD AUTO: 5.31 K/UL — SIGNIFICANT CHANGE UP (ref 3.8–10.5)

## 2020-07-08 ENCOUNTER — APPOINTMENT (OUTPATIENT)
Dept: INFUSION THERAPY | Facility: HOSPITAL | Age: 70
End: 2020-07-08

## 2020-07-16 ENCOUNTER — OUTPATIENT (OUTPATIENT)
Dept: OUTPATIENT SERVICES | Facility: HOSPITAL | Age: 70
LOS: 1 days | Discharge: HOME | End: 2020-07-16

## 2020-07-16 DIAGNOSIS — Z90.49 ACQUIRED ABSENCE OF OTHER SPECIFIED PARTS OF DIGESTIVE TRACT: Chronic | ICD-10-CM

## 2020-07-16 DIAGNOSIS — Z90.722 ACQUIRED ABSENCE OF OVARIES, BILATERAL: Chronic | ICD-10-CM

## 2020-07-16 DIAGNOSIS — Z98.890 OTHER SPECIFIED POSTPROCEDURAL STATES: Chronic | ICD-10-CM

## 2020-07-16 DIAGNOSIS — Z90.710 ACQUIRED ABSENCE OF BOTH CERVIX AND UTERUS: Chronic | ICD-10-CM

## 2020-07-16 DIAGNOSIS — C56.2 MALIGNANT NEOPLASM OF LEFT OVARY: ICD-10-CM

## 2020-07-20 ENCOUNTER — APPOINTMENT (OUTPATIENT)
Dept: INFUSION THERAPY | Facility: HOSPITAL | Age: 70
End: 2020-07-20

## 2020-07-21 LAB — SARS-COV-2 N GENE NPH QL NAA+PROBE: NOT DETECTED

## 2020-07-22 ENCOUNTER — RESULT REVIEW (OUTPATIENT)
Age: 70
End: 2020-07-22

## 2020-07-22 ENCOUNTER — LABORATORY RESULT (OUTPATIENT)
Age: 70
End: 2020-07-22

## 2020-07-22 ENCOUNTER — APPOINTMENT (OUTPATIENT)
Dept: HEMATOLOGY ONCOLOGY | Facility: CLINIC | Age: 70
End: 2020-07-22

## 2020-07-22 ENCOUNTER — APPOINTMENT (OUTPATIENT)
Dept: HEMATOLOGY ONCOLOGY | Facility: CLINIC | Age: 70
End: 2020-07-22
Payer: MEDICARE

## 2020-07-22 ENCOUNTER — APPOINTMENT (OUTPATIENT)
Dept: INFUSION THERAPY | Facility: HOSPITAL | Age: 70
End: 2020-07-22

## 2020-07-22 VITALS
TEMPERATURE: 98 F | WEIGHT: 8.82 LBS | DIASTOLIC BLOOD PRESSURE: 92 MMHG | HEART RATE: 75 BPM | WEIGHT: 108.03 LBS | BODY MASS INDEX: 20.41 KG/M2 | SYSTOLIC BLOOD PRESSURE: 134 MMHG | BODY MASS INDEX: 1.67 KG/M2 | OXYGEN SATURATION: 98 % | RESPIRATION RATE: 16 BRPM

## 2020-07-22 DIAGNOSIS — R21 RASH AND OTHER NONSPECIFIC SKIN ERUPTION: ICD-10-CM

## 2020-07-22 DIAGNOSIS — M54.2 CERVICALGIA: ICD-10-CM

## 2020-07-22 LAB
APTT BLD: 36.8 SEC
BASOPHILS # BLD AUTO: 0.01 K/UL — SIGNIFICANT CHANGE UP (ref 0–0.2)
BASOPHILS NFR BLD AUTO: 0.2 % — SIGNIFICANT CHANGE UP (ref 0–2)
EOSINOPHIL # BLD AUTO: 0.03 K/UL — SIGNIFICANT CHANGE UP (ref 0–0.5)
EOSINOPHIL NFR BLD AUTO: 0.6 % — SIGNIFICANT CHANGE UP (ref 0–6)
HCT VFR BLD CALC: 35 % — SIGNIFICANT CHANGE UP (ref 34.5–45)
HGB BLD-MCNC: 11.8 G/DL — SIGNIFICANT CHANGE UP (ref 11.5–15.5)
IMM GRANULOCYTES NFR BLD AUTO: 0.4 % — SIGNIFICANT CHANGE UP (ref 0–1.5)
INR PPP: 1.11 RATIO
LYMPHOCYTES # BLD AUTO: 1.7 K/UL — SIGNIFICANT CHANGE UP (ref 1–3.3)
LYMPHOCYTES # BLD AUTO: 33.4 % — SIGNIFICANT CHANGE UP (ref 13–44)
MCHC RBC-ENTMCNC: 32.2 PG — SIGNIFICANT CHANGE UP (ref 27–34)
MCHC RBC-ENTMCNC: 33.7 GM/DL — SIGNIFICANT CHANGE UP (ref 32–36)
MCV RBC AUTO: 95.6 FL — SIGNIFICANT CHANGE UP (ref 80–100)
MONOCYTES # BLD AUTO: 0.66 K/UL — SIGNIFICANT CHANGE UP (ref 0–0.9)
MONOCYTES NFR BLD AUTO: 13 % — SIGNIFICANT CHANGE UP (ref 2–14)
NEUTROPHILS # BLD AUTO: 2.67 K/UL — SIGNIFICANT CHANGE UP (ref 1.8–7.4)
NEUTROPHILS NFR BLD AUTO: 52.4 % — SIGNIFICANT CHANGE UP (ref 43–77)
NRBC # BLD: 0 /100 WBCS — SIGNIFICANT CHANGE UP (ref 0–0)
PLATELET # BLD AUTO: 168 K/UL — SIGNIFICANT CHANGE UP (ref 150–400)
PT BLD: 13 SEC
RBC # BLD: 3.66 M/UL — LOW (ref 3.8–5.2)
RBC # FLD: 12.6 % — SIGNIFICANT CHANGE UP (ref 10.3–14.5)
WBC # BLD: 5.09 K/UL — SIGNIFICANT CHANGE UP (ref 3.8–10.5)
WBC # FLD AUTO: 5.09 K/UL — SIGNIFICANT CHANGE UP (ref 3.8–10.5)

## 2020-07-22 PROCEDURE — 99214 OFFICE O/P EST MOD 30 MIN: CPT

## 2020-07-23 DIAGNOSIS — Z51.11 ENCOUNTER FOR ANTINEOPLASTIC CHEMOTHERAPY: ICD-10-CM

## 2020-07-23 DIAGNOSIS — R11.2 NAUSEA WITH VOMITING, UNSPECIFIED: ICD-10-CM

## 2020-07-23 PROBLEM — R21 RASH, SKIN: Status: ACTIVE | Noted: 2020-05-27

## 2020-07-24 NOTE — ASSESSMENT
[Palliative] : Goals of care discussed with patient: Palliative [FreeTextEntry1] : She is a 68 y/o F with Stage IV ovarian cancer and Stage I NSCLC diagnosed simultaneously. She has completed 6 cycles of carboplatin/ paclitaxel with bevacizumab and did not continue with bevacizumab maintenance: off from 4/2018 to 4/2019. 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 She completed SRS to the craniotomy site. CT of the chest/ abd/ pelvis showed adenopathy and started with palliative chemotherapy: retreat carboplatin/ paclitaxel 5/7/19. She had allergic reaction with C2 bag 2 of carboplatin and has been on bevacizumab/ paclitaxel s/p total of 6 cycles. While on maintenance bevacizumab, she has progression of disease.\par \par Discussion with daughter over speaker today.  Currently she is on C5 of every 4 week  Resolved  dysphagia Continue with  Doxil single agent and monitoring for symptoms.  Encouraged to contact office for recurrent symptoms.  Resolved dermatitis: Doxil,  Has  triamcinolone cream available for  twice a day dosing if needed.   She will continue with palliative therapy. Questions answered to their satisfaction.   Return to office in 4 weeks.

## 2020-07-24 NOTE — REVIEW OF SYSTEMS
[Negative] : ENT [Chills] : no chills [Fever] : no fever [Night Sweats] : no night sweats [Recent Change In Weight] : ~T no recent weight change [Fatigue] : no fatigue [Loss of Hearing] : no loss of hearing [Dysphagia] : no dysphagia [Odynophagia] : no odynophagia [Hoarseness] : no hoarseness [Nosebleeds] : no nosebleeds [Mucosal Pain] : no mucosal pain [de-identified] : Resolved rash  to back and trunk

## 2020-07-24 NOTE — REASON FOR VISIT
[Follow-Up Visit] : a follow-up [FreeTextEntry2] : follow up for ovarian cancer recurrent to the lymph nodes and abdominal wall/ brain mets

## 2020-07-24 NOTE — PHYSICAL EXAM
[Restricted in physically strenuous activity but ambulatory and able to carry out work of a light or sedentary nature] : Status 1- Restricted in physically strenuous activity but ambulatory and able to carry out work of a light or sedentary nature, e.g., light house work, office work [Thin] : thin [Normal] : bilateral breasts without nipple retraction, skin dimpling or palpable masses; the bilateral axillae are without adenopathy [Mucositis] : no mucositis [Ulcers] : no ulcers [Vesicles] : no vesicles [Thrush] : no thrush [de-identified] : L groin nodule nonpalpable  [de-identified] : postsurgical site: stitches over the occipital area C/D/I  [de-identified] : no visible sores but smooth tongue , no thrush.

## 2020-07-24 NOTE — HISTORY OF PRESENT ILLNESS
[Disease: _____________________] : Disease: [unfilled] [T: ___] : T[unfilled] [N: ___] : N[unfilled] [M: ___] : M[unfilled] [AJCC Stage: ____] : AJCC Stage: [unfilled] [Cycle: ___] : Cycle: [unfilled] [de-identified] : Age 67: Stage I NSCLC adenocarcinoma s/p left VATs robotic assisted FERMÍN lobectomy and MLND 8/16/17 with Dr Ken Sol\par Age 67: Stage IV poorly differentiated ovarian cancer s/p AVEL/ BSO/ total omentectomy, bilateral ureterolysis, resection of abdominal anterior wall masses, repair of cystostomy\par She initially presented with hemoptysis and had evaluation consisting of bronchoscopy and FNA. The bronchoscopy was negative and the FNA was positive for malignant cells: TTF1+ and PAX 8 negative. She had Pet/ CT on 7/6/17 which showed 2.3 cm left upper lobe nodule with SUV of 23 and 4.2 x 3.1 cm left ovarian lesion SUV of 7.9 and left common iliac lymph nodes measuring up to 8mm with SUV of 2.2, 6 mm perihepatic implant SUV of 3.1. She initially had laparoscopic evaluation with left ovary biopsy which showed poorly differentiated carcinoma that was ER, WT1, PAX 8 positive and TTF1 negative. She initially had the left VATs. Then she subsequently had exploratory laparotomy with  AVEL, BSO, radical dissection of tumor with total omentectomy, resection of anterior abdominal wall masses, lysis of adhesions, and repair of cystostomy. She had CT imaging done after 5th cycle of chemotherapy to evaluate abdominal pain and CT showed no evidence of disease. She had abdominal pain in 3/2019 and had follow up CT which showed new small pelvic implant and enlarged sita-caval LN. She had headache and went to [facility] 4/10/19. She had imaging that showed predominantly cystic peripherally enhancing mass within the left cerebellar hemisphere. She had left suboccipital craniotomy with Dr Tatyana Ortiz. Had carboplatin retreat with paclitaxel/ Avastin started and had carboplatin reaction on 5/29/19 during bag 2 Cycle 2: redness over entire face and uncomfortable sensation over face/ arms. She had slowly increasing  on maintenance bevacizumab and had CT done on 2/5/2020 which showed 1.1 x 1 cm enhancing nodule in the right hemipelvis. She had subsequent Pet/ CT which showed hypermetabolic right cardiophrenic and periportal lymphadenopathy, hypermetabolic implants over the liver capsule, serosal surface of the urinary bladder and posterior right hemipelvis. She was not a surgical candidate and proceeded to Doxil/ bevacizumab. She developed mucositis that was persistent after C2 and reduced dose of Doxil for C3. Interval CT of the chest/ abd/ pelvis done on 6/9/2020 showed new RLL 5 mm lung nodule nonspecific but regression of abdominal adenopathy and right pelvic tumor implant. \par  [de-identified] : carbo/ taxol: 10/24/17 to 2/12/18 with cumulative fatigue and neuropathy\par bevacizumab added on 12/8/17 to 4/2018 (pt stopped coming for treatment and lost to follow up until 12/2018)\par retreat carbo/ taxol 5/7/19\par bevacizumab added to Cycle 2 of retreat carboplatin/ paclitaxel 5/29/19 and allergic reaction with 2nd bag of carboplatin\par bevacizumab and paclitaxel 6/19/19 to 8/27/19\par bevacizumab maintenance 9/2019 to 2/2020\par Doxil/ bevacizumab 3/4/2020 to present  [de-identified] : poorly differentiated carcinoma: ER positive  [de-identified] : 7/22/20\par Pt is s/p CT 6/9/20 revealing regression of pelvic lymphadenopathy \par she notes left lower inguinal discomfort which previously resolved but now has recurred intermittently. No pain to rate at this time\par She denies left leg swelling, leg pain.,  Ambulates well without assist. No paresthesias to extremities \par Has not needed Sucrulfate slurry X 1 week as mouth sores and esophageal discomfort has resolved\par Denies difficulty with chewing, swallowing or eating \par Daughter and patient report she is eating well and this has not been a problem for her\par Intermittent constipation but BM today + relief with Colace. \par denies hematuria, dysuria, vaginal bleeding or spotting.  \par After her last treatment she had constipation, she had + relief with the stool softener D\par denies fevers, chills, chest pain, SOB, ABERNATHY, emesis, nausea, Denies headaches, dizziness, falls or trauma. \par She is next due to see neurology on an as needed basis per the daughter \par She saw Neurologist may  2020 . Will return with any recurrent CNS symptoms \par Denies itching, dryness.  The rash to the trunk has resolved .  She used topical medication provided. \par Previous right  shoulder injury with residual pain to the right shoulder, below the clavicle \par  [FreeTextEntry1] : Doxil

## 2020-07-29 ENCOUNTER — APPOINTMENT (OUTPATIENT)
Dept: INFUSION THERAPY | Facility: HOSPITAL | Age: 70
End: 2020-07-29

## 2020-08-04 ENCOUNTER — RESULT REVIEW (OUTPATIENT)
Age: 70
End: 2020-08-04

## 2020-08-04 ENCOUNTER — APPOINTMENT (OUTPATIENT)
Dept: HEMATOLOGY ONCOLOGY | Facility: CLINIC | Age: 70
End: 2020-08-04
Payer: MEDICARE

## 2020-08-04 ENCOUNTER — APPOINTMENT (OUTPATIENT)
Dept: CARDIOLOGY | Facility: CLINIC | Age: 70
End: 2020-08-04
Payer: MEDICARE

## 2020-08-04 VITALS
TEMPERATURE: 99.2 F | BODY MASS INDEX: 20.41 KG/M2 | OXYGEN SATURATION: 98 % | HEART RATE: 68 BPM | SYSTOLIC BLOOD PRESSURE: 128 MMHG | RESPIRATION RATE: 17 BRPM | DIASTOLIC BLOOD PRESSURE: 85 MMHG | WEIGHT: 108.03 LBS

## 2020-08-04 PROBLEM — M54.2 NECK PAIN ON LEFT SIDE: Status: ACTIVE | Noted: 2020-08-04

## 2020-08-04 LAB
BASOPHILS # BLD AUTO: 0.01 K/UL — SIGNIFICANT CHANGE UP (ref 0–0.2)
BASOPHILS NFR BLD AUTO: 0.1 % — SIGNIFICANT CHANGE UP (ref 0–2)
EOSINOPHIL # BLD AUTO: 0.03 K/UL — SIGNIFICANT CHANGE UP (ref 0–0.5)
EOSINOPHIL NFR BLD AUTO: 0.4 % — SIGNIFICANT CHANGE UP (ref 0–6)
HCT VFR BLD CALC: 36.4 % — SIGNIFICANT CHANGE UP (ref 34.5–45)
HGB BLD-MCNC: 12.2 G/DL — SIGNIFICANT CHANGE UP (ref 11.5–15.5)
IMM GRANULOCYTES NFR BLD AUTO: 0.4 % — SIGNIFICANT CHANGE UP (ref 0–1.5)
LYMPHOCYTES # BLD AUTO: 1.66 K/UL — SIGNIFICANT CHANGE UP (ref 1–3.3)
LYMPHOCYTES # BLD AUTO: 23.9 % — SIGNIFICANT CHANGE UP (ref 13–44)
MCHC RBC-ENTMCNC: 32.4 PG — SIGNIFICANT CHANGE UP (ref 27–34)
MCHC RBC-ENTMCNC: 33.5 GM/DL — SIGNIFICANT CHANGE UP (ref 32–36)
MCV RBC AUTO: 96.6 FL — SIGNIFICANT CHANGE UP (ref 80–100)
MONOCYTES # BLD AUTO: 0.36 K/UL — SIGNIFICANT CHANGE UP (ref 0–0.9)
MONOCYTES NFR BLD AUTO: 5.2 % — SIGNIFICANT CHANGE UP (ref 2–14)
NEUTROPHILS # BLD AUTO: 4.87 K/UL — SIGNIFICANT CHANGE UP (ref 1.8–7.4)
NEUTROPHILS NFR BLD AUTO: 70 % — SIGNIFICANT CHANGE UP (ref 43–77)
NRBC # BLD: 0 /100 WBCS — SIGNIFICANT CHANGE UP (ref 0–0)
PLATELET # BLD AUTO: 146 K/UL — LOW (ref 150–400)
RBC # BLD: 3.77 M/UL — LOW (ref 3.8–5.2)
RBC # FLD: 12.6 % — SIGNIFICANT CHANGE UP (ref 10.3–14.5)
WBC # BLD: 6.96 K/UL — SIGNIFICANT CHANGE UP (ref 3.8–10.5)
WBC # FLD AUTO: 6.96 K/UL — SIGNIFICANT CHANGE UP (ref 3.8–10.5)

## 2020-08-04 PROCEDURE — 93306 TTE W/DOPPLER COMPLETE: CPT

## 2020-08-04 PROCEDURE — 99214 OFFICE O/P EST MOD 30 MIN: CPT

## 2020-08-04 PROCEDURE — 93356 MYOCRD STRAIN IMG SPCKL TRCK: CPT

## 2020-08-05 LAB
ALBUMIN SERPL ELPH-MCNC: 4.5 G/DL
ALP BLD-CCNC: 43 U/L
ALT SERPL-CCNC: 7 U/L
ANION GAP SERPL CALC-SCNC: 15 MMOL/L
AST SERPL-CCNC: 17 U/L
BILIRUB SERPL-MCNC: 0.4 MG/DL
BUN SERPL-MCNC: 17 MG/DL
CALCIUM SERPL-MCNC: 9.5 MG/DL
CANCER AG125 SERPL-ACNC: 63 U/ML
CHLORIDE SERPL-SCNC: 105 MMOL/L
CO2 SERPL-SCNC: 23 MMOL/L
CREAT SERPL-MCNC: 0.66 MG/DL
GLUCOSE SERPL-MCNC: 130 MG/DL
POTASSIUM SERPL-SCNC: 4.1 MMOL/L
PROT SERPL-MCNC: 6.9 G/DL
SODIUM SERPL-SCNC: 143 MMOL/L

## 2020-08-07 NOTE — REASON FOR VISIT
[Patient Declined  Services] : - None: Patient declined  services [FreeTextEntry2] : follow up for ovarian cancer with new left sided pelvic pain  [FreeTextEntry3] : Preferred dtr in law to translate on phone

## 2020-08-07 NOTE — REVIEW OF SYSTEMS
[Abdominal Pain] : abdominal pain [Negative] : Allergic/Immunologic [Dysphagia] : no dysphagia [Loss of Hearing] : no loss of hearing [Hoarseness] : no hoarseness [Nosebleeds] : no nosebleeds [Mucosal Pain] : no mucosal pain [Odynophagia] : no odynophagia [Vomiting] : no vomiting [Constipation] : no constipation [Diarrhea] : no diarrhea [FreeTextEntry4] : sores over tongue but not painful  [FreeTextEntry7] : groin pain worse with walking

## 2020-08-07 NOTE — HISTORY OF PRESENT ILLNESS
[de-identified] : Age 67: Stage I NSCLC adenocarcinoma s/p left VATs robotic assisted FERMÍN lobectomy and MLND 8/16/17 with Dr Ken Sol\par Age 67: Stage IV poorly differentiated ovarian cancer s/p AVEL/ BSO/ total omentectomy, bilateral ureterolysis, resection of abdominal anterior wall masses, repair of cystostomy\par She initially presented with hemoptysis and had evaluation consisting of bronchoscopy and FNA. The bronchoscopy was negative and the FNA was positive for malignant cells: TTF1+ and PAX 8 negative. She had Pet/ CT on 7/6/17 which showed 2.3 cm left upper lobe nodule with SUV of 23 and 4.2 x 3.1 cm left ovarian lesion SUV of 7.9 and left common iliac lymph nodes measuring up to 8mm with SUV of 2.2, 6 mm perihepatic implant SUV of 3.1. She initially had laparoscopic evaluation with left ovary biopsy which showed poorly differentiated carcinoma that was ER, WT1, PAX 8 positive and TTF1 negative. She initially had the left VATs. Then she subsequently had exploratory laparotomy with  AVEL, BSO, radical dissection of tumor with total omentectomy, resection of anterior abdominal wall masses, lysis of adhesions, and repair of cystostomy. She had CT imaging done after 5th cycle of chemotherapy to evaluate abdominal pain and CT showed no evidence of disease. She had abdominal pain in 3/2019 and had follow up CT which showed new small pelvic implant and enlarged sita-caval LN. She had headache and went to [facility] 4/10/19. She had imaging that showed predominantly cystic peripherally enhancing mass within the left cerebellar hemisphere. She had left suboccipital craniotomy with Dr Tatyana Ortiz. Had carboplatin retreat with paclitaxel/ Avastin started and had carboplatin reaction on 5/29/19 during bag 2 Cycle 2: redness over entire face and uncomfortable sensation over face/ arms. She had slowly increasing  on maintenance bevacizumab and had CT done on 2/5/2020 which showed 1.1 x 1 cm enhancing nodule in the right hemipelvis. She had subsequent Pet/ CT which showed hypermetabolic right cardiophrenic and periportal lymphadenopathy, hypermetabolic implants over the liver capsule, serosal surface of the urinary bladder and posterior right hemipelvis. She was not a surgical candidate and proceeded to Doxil/ bevacizumab. She developed mucositis that was persistent after C2 and reduced dose of Doxil for C3. Interval CT of the chest/ abd/ pelvis done on 6/9/2020 showed new RLL 5 mm lung nodule nonspecific but regression of abdominal adenopathy and right pelvic tumor implant. \par  [de-identified] : poorly differentiated carcinoma: ER positive  [de-identified] : carbo/ taxol: 10/24/17 to 2/12/18 with cumulative fatigue and neuropathy\par bevacizumab added on 12/8/17 to 4/2018 (pt stopped coming for treatment and lost to follow up until 12/2018)\par retreat carbo/ taxol 5/7/19\par bevacizumab added to Cycle 2 of retreat carboplatin/ paclitaxel 5/29/19 and allergic reaction with 2nd bag of carboplatin\par bevacizumab and paclitaxel 6/19/19 to 8/27/19\par bevacizumab maintenance 9/2019 to 2/2020\par Doxil/ bevacizumab 3/4/2020 to present  [de-identified] : Over the last week has been noticing more left sided groin pain. She feels it when she gets up and walks. Nervous her cancer is spreading. She denies needing any pain medication for this discomfort. She has mouth sores over tongue but not affecting eating or drinking. She is able to be active at home. No skin rashes over hands or feet or constipation. She had recent CT chest done by Dr Sol at [facility] [place]. Complaining of left sided neck pain; denies pain with chewing or with sleep. Denies any strain of muscles over area.

## 2020-08-07 NOTE — PHYSICAL EXAM
[Restricted in physically strenuous activity but ambulatory and able to carry out work of a light or sedentary nature] : Status 1- Restricted in physically strenuous activity but ambulatory and able to carry out work of a light or sedentary nature, e.g., light house work, office work [Thin] : thin [Normal] : affect appropriate [Ulcers] : no ulcers [Mucositis] : no mucositis [de-identified] : sores over top of tongue: 1 cm superficial; no bleeding [Thrush] : no thrush [Vesicles] : no vesicles [de-identified] : palpable L inguinal adenopathy  [de-identified] : no palpable mass or LN  [de-identified] : rash over the trunk: B hips: macular rash with hyperpigmentation; no vesicles

## 2020-08-07 NOTE — ASSESSMENT
[FreeTextEntry1] : She is a 70 y/o F with Stage IV ovarian cancer and Stage I NSCLC diagnosed simultaneously. She has completed 6 cycles of carboplatin/ paclitaxel with bevacizumab and did not continue with bevacizumab maintenance: off from 4/2018 to 4/2019. 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 She completed SRS to the craniotomy site. CT of the chest/ abd/ pelvis showed adenopathy and started with palliative chemotherapy: retreat carboplatin/ paclitaxel 5/7/19. She had allergic reaction with C2 bag 2 of carboplatin and has been on bevacizumab/ paclitaxel s/p total of 6 cycles. While on maintenance bevacizumab, she has progression of disease. We stopped bevacizumab and continued Doxil due to worsening mouth sores. She is s/p C5 of Doxil few weeks ago and will obtain interim imaging to evaluate disease in abdomen. If only inguinal adenopathy, will see if RT an option to treat locally given symptoms. We reviewed overall course with her and her dtr in law. She will continue with mouth rinse to help speed recovery of Grade 1 mouth sores on Doxil. Next follow up in 2 weeks. \par \par Neck pain: reviewed warm compress over muscles. Will obtain CT of the neck to further evaluate for additional adenopathy that may explain neck pain.

## 2020-08-11 ENCOUNTER — APPOINTMENT (OUTPATIENT)
Dept: CT IMAGING | Facility: IMAGING CENTER | Age: 70
End: 2020-08-11
Payer: MEDICARE

## 2020-08-11 ENCOUNTER — OUTPATIENT (OUTPATIENT)
Dept: OUTPATIENT SERVICES | Facility: HOSPITAL | Age: 70
LOS: 1 days | End: 2020-08-11
Payer: MEDICARE

## 2020-08-11 DIAGNOSIS — Z90.710 ACQUIRED ABSENCE OF BOTH CERVIX AND UTERUS: Chronic | ICD-10-CM

## 2020-08-11 DIAGNOSIS — R10.9 UNSPECIFIED ABDOMINAL PAIN: ICD-10-CM

## 2020-08-11 DIAGNOSIS — Z98.890 OTHER SPECIFIED POSTPROCEDURAL STATES: Chronic | ICD-10-CM

## 2020-08-11 DIAGNOSIS — C76.2 MALIGNANT NEOPLASM OF ABDOMEN: ICD-10-CM

## 2020-08-11 DIAGNOSIS — Z90.49 ACQUIRED ABSENCE OF OTHER SPECIFIED PARTS OF DIGESTIVE TRACT: Chronic | ICD-10-CM

## 2020-08-11 DIAGNOSIS — Z90.722 ACQUIRED ABSENCE OF OVARIES, BILATERAL: Chronic | ICD-10-CM

## 2020-08-11 DIAGNOSIS — C56.2 MALIGNANT NEOPLASM OF LEFT OVARY: ICD-10-CM

## 2020-08-11 PROCEDURE — 74177 CT ABD & PELVIS W/CONTRAST: CPT | Mod: 26

## 2020-08-11 PROCEDURE — 70491 CT SOFT TISSUE NECK W/DYE: CPT | Mod: 26

## 2020-08-11 PROCEDURE — 70491 CT SOFT TISSUE NECK W/DYE: CPT

## 2020-08-11 PROCEDURE — 74177 CT ABD & PELVIS W/CONTRAST: CPT

## 2020-08-14 ENCOUNTER — OUTPATIENT (OUTPATIENT)
Dept: OUTPATIENT SERVICES | Facility: HOSPITAL | Age: 70
LOS: 1 days | Discharge: HOME | End: 2020-08-14

## 2020-08-14 DIAGNOSIS — Z98.890 OTHER SPECIFIED POSTPROCEDURAL STATES: Chronic | ICD-10-CM

## 2020-08-14 DIAGNOSIS — Z90.710 ACQUIRED ABSENCE OF BOTH CERVIX AND UTERUS: Chronic | ICD-10-CM

## 2020-08-14 DIAGNOSIS — C56.2 MALIGNANT NEOPLASM OF LEFT OVARY: ICD-10-CM

## 2020-08-14 DIAGNOSIS — Z90.49 ACQUIRED ABSENCE OF OTHER SPECIFIED PARTS OF DIGESTIVE TRACT: Chronic | ICD-10-CM

## 2020-08-14 DIAGNOSIS — Z90.722 ACQUIRED ABSENCE OF OVARIES, BILATERAL: Chronic | ICD-10-CM

## 2020-08-19 ENCOUNTER — RESULT REVIEW (OUTPATIENT)
Age: 70
End: 2020-08-19

## 2020-08-19 ENCOUNTER — APPOINTMENT (OUTPATIENT)
Dept: HEMATOLOGY ONCOLOGY | Facility: CLINIC | Age: 70
End: 2020-08-19
Payer: MEDICARE

## 2020-08-19 ENCOUNTER — APPOINTMENT (OUTPATIENT)
Dept: INFUSION THERAPY | Facility: HOSPITAL | Age: 70
End: 2020-08-19

## 2020-08-19 ENCOUNTER — LABORATORY RESULT (OUTPATIENT)
Age: 70
End: 2020-08-19

## 2020-08-19 ENCOUNTER — APPOINTMENT (OUTPATIENT)
Dept: CT IMAGING | Facility: IMAGING CENTER | Age: 70
End: 2020-08-19

## 2020-08-19 LAB
BASOPHILS # BLD AUTO: 0.01 K/UL — SIGNIFICANT CHANGE UP (ref 0–0.2)
BASOPHILS NFR BLD AUTO: 0.2 % — SIGNIFICANT CHANGE UP (ref 0–2)
EOSINOPHIL # BLD AUTO: 0.04 K/UL — SIGNIFICANT CHANGE UP (ref 0–0.5)
EOSINOPHIL NFR BLD AUTO: 0.7 % — SIGNIFICANT CHANGE UP (ref 0–6)
HCT VFR BLD CALC: 34.8 % — SIGNIFICANT CHANGE UP (ref 34.5–45)
HGB BLD-MCNC: 11.8 G/DL — SIGNIFICANT CHANGE UP (ref 11.5–15.5)
IMM GRANULOCYTES NFR BLD AUTO: 0.3 % — SIGNIFICANT CHANGE UP (ref 0–1.5)
LYMPHOCYTES # BLD AUTO: 1.8 K/UL — SIGNIFICANT CHANGE UP (ref 1–3.3)
LYMPHOCYTES # BLD AUTO: 30.8 % — SIGNIFICANT CHANGE UP (ref 13–44)
MCHC RBC-ENTMCNC: 31.6 PG — SIGNIFICANT CHANGE UP (ref 27–34)
MCHC RBC-ENTMCNC: 33.9 GM/DL — SIGNIFICANT CHANGE UP (ref 32–36)
MCV RBC AUTO: 93 FL — SIGNIFICANT CHANGE UP (ref 80–100)
MONOCYTES # BLD AUTO: 0.76 K/UL — SIGNIFICANT CHANGE UP (ref 0–0.9)
MONOCYTES NFR BLD AUTO: 13 % — SIGNIFICANT CHANGE UP (ref 2–14)
NEUTROPHILS # BLD AUTO: 3.22 K/UL — SIGNIFICANT CHANGE UP (ref 1.8–7.4)
NEUTROPHILS NFR BLD AUTO: 55 % — SIGNIFICANT CHANGE UP (ref 43–77)
NRBC # BLD: 0 /100 WBCS — SIGNIFICANT CHANGE UP (ref 0–0)
PLATELET # BLD AUTO: 171 K/UL — SIGNIFICANT CHANGE UP (ref 150–400)
RBC # BLD: 3.74 M/UL — LOW (ref 3.8–5.2)
RBC # FLD: 12.8 % — SIGNIFICANT CHANGE UP (ref 10.3–14.5)
WBC # BLD: 5.85 K/UL — SIGNIFICANT CHANGE UP (ref 3.8–10.5)
WBC # FLD AUTO: 5.85 K/UL — SIGNIFICANT CHANGE UP (ref 3.8–10.5)

## 2020-08-19 PROCEDURE — 99214 OFFICE O/P EST MOD 30 MIN: CPT

## 2020-08-20 DIAGNOSIS — R11.2 NAUSEA WITH VOMITING, UNSPECIFIED: ICD-10-CM

## 2020-08-20 DIAGNOSIS — Z51.11 ENCOUNTER FOR ANTINEOPLASTIC CHEMOTHERAPY: ICD-10-CM

## 2020-08-21 NOTE — ASSESSMENT
[FreeTextEntry1] : She is a 68 y/o F with Stage IV ovarian cancer and Stage I NSCLC diagnosed simultaneously. She has completed 6 cycles of carboplatin/ paclitaxel with bevacizumab and did not continue with bevacizumab maintenance: off from 4/2018 to 4/2019. 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 She completed SRS to the craniotomy site. CT of the chest/ abd/ pelvis showed adenopathy and started with palliative chemotherapy: retreat carboplatin/ paclitaxel 5/7/19. She had allergic reaction with C2 bag 2 of carboplatin and has been on bevacizumab/ paclitaxel s/p total of 6 cycles. While on maintenance bevacizumab, she has progression of disease. We stopped bevacizumab and continued Doxil due to worsening mouth sores. She is s/p C5 of Doxil .  Interim  CT abdomen/pelvis imaging revealing no signficant interval change and no evidence of inguinal adenopathy.  CT Neck revealing heterogenous appearing right level 1 B node and mildly enlarged left level 4 LN. . We reviewed overall course with her and her dtr in law. She will continue with mouth rinse to help speed recovery of Grade 1 mouth sores on Doxil. Next follow up in 2 weeks.   Encouraged patient with no pulmonary complaints can hold on CT Chest at this time.  She agrees to hold but later proceded to imaging. \par \par

## 2020-08-21 NOTE — PHYSICAL EXAM
[Restricted in physically strenuous activity but ambulatory and able to carry out work of a light or sedentary nature] : Status 1- Restricted in physically strenuous activity but ambulatory and able to carry out work of a light or sedentary nature, e.g., light house work, office work [Thin] : thin [Normal] : affect appropriate [Ulcers] : no ulcers [Mucositis] : no mucositis [Thrush] : no thrush [Vesicles] : no vesicles [de-identified] : sores over top of tongue: 1 cm superficial; no bleeding [de-identified] : no palpable mass or LN  [de-identified] : palpable L inguinal adenopathy  approx 1.5 X 2 Cm on deep palpation  [de-identified] : rash over the trunk: B hips: macular rash with hyperpigmentation; no vesicles

## 2020-08-21 NOTE — REVIEW OF SYSTEMS
[Abdominal Pain] : abdominal pain [Negative] : Allergic/Immunologic [Dysphagia] : no dysphagia [Loss of Hearing] : no loss of hearing [Nosebleeds] : no nosebleeds [Hoarseness] : no hoarseness [Odynophagia] : no odynophagia [Mucosal Pain] : no mucosal pain [Vomiting] : no vomiting [Constipation] : no constipation [Diarrhea] : no diarrhea [FreeTextEntry4] : sores over tongue but not painful  [FreeTextEntry7] : groin pain worse with walking

## 2020-08-21 NOTE — REASON FOR VISIT
[Follow-Up Visit] : a follow-up [Patient Declined  Services] : - None: Patient declined  services [FreeTextEntry2] : follow up for ovarian cancer with  left sided pelvic pain  [FreeTextEntry3] : Preferred dtr in law to translate on phone

## 2020-08-21 NOTE — HISTORY OF PRESENT ILLNESS
[Disease: _____________________] : Disease: [unfilled] [T: ___] : T[unfilled] [N: ___] : N[unfilled] [M: ___] : M[unfilled] [AJCC Stage: ____] : AJCC Stage: [unfilled] [de-identified] : poorly differentiated carcinoma: ER positive  [de-identified] : Age 67: Stage I NSCLC adenocarcinoma s/p left VATs robotic assisted FERMÍN lobectomy and MLND 8/16/17 with Dr Ken Sol\par Age 67: Stage IV poorly differentiated ovarian cancer s/p AVEL/ BSO/ total omentectomy, bilateral ureterolysis, resection of abdominal anterior wall masses, repair of cystostomy\par She initially presented with hemoptysis and had evaluation consisting of bronchoscopy and FNA. The bronchoscopy was negative and the FNA was positive for malignant cells: TTF1+ and PAX 8 negative. She had Pet/ CT on 7/6/17 which showed 2.3 cm left upper lobe nodule with SUV of 23 and 4.2 x 3.1 cm left ovarian lesion SUV of 7.9 and left common iliac lymph nodes measuring up to 8mm with SUV of 2.2, 6 mm perihepatic implant SUV of 3.1. She initially had laparoscopic evaluation with left ovary biopsy which showed poorly differentiated carcinoma that was ER, WT1, PAX 8 positive and TTF1 negative. She initially had the left VATs. Then she subsequently had exploratory laparotomy with  AVEL, BSO, radical dissection of tumor with total omentectomy, resection of anterior abdominal wall masses, lysis of adhesions, and repair of cystostomy. She had CT imaging done after 5th cycle of chemotherapy to evaluate abdominal pain and CT showed no evidence of disease. She had abdominal pain in 3/2019 and had follow up CT which showed new small pelvic implant and enlarged sita-caval LN. She had headache and went to [facility] 4/10/19. She had imaging that showed predominantly cystic peripherally enhancing mass within the left cerebellar hemisphere. She had left suboccipital craniotomy with Dr Tatyana Ortiz. Had carboplatin retreat with paclitaxel/ Avastin started and had carboplatin reaction on 5/29/19 during bag 2 Cycle 2: redness over entire face and uncomfortable sensation over face/ arms. She had slowly increasing  on maintenance bevacizumab and had CT done on 2/5/2020 which showed 1.1 x 1 cm enhancing nodule in the right hemipelvis. She had subsequent Pet/ CT which showed hypermetabolic right cardiophrenic and periportal lymphadenopathy, hypermetabolic implants over the liver capsule, serosal surface of the urinary bladder and posterior right hemipelvis. She was not a surgical candidate and proceeded to Doxil/ bevacizumab. She developed mucositis that was persistent after C2 and reduced dose of Doxil for C3. Interval CT of the chest/ abd/ pelvis done on 6/9/2020 showed new RLL 5 mm lung nodule nonspecific but regression of abdominal adenopathy and right pelvic tumor implant. \par  [de-identified] : carbo/ taxol: 10/24/17 to 2/12/18 with cumulative fatigue and neuropathy\par bevacizumab added on 12/8/17 to 4/2018 (pt stopped coming for treatment and lost to follow up until 12/2018)\par retreat carbo/ taxol 5/7/19\par bevacizumab added to Cycle 2 of retreat carboplatin/ paclitaxel 5/29/19 and allergic reaction with 2nd bag of carboplatin\par bevacizumab and paclitaxel 6/19/19 to 8/27/19\par bevacizumab maintenance 9/2019 to 2/2020\par Doxil/ bevacizumab 3/4/2020 to present  [FreeTextEntry1] : Doxil \par *8.19.20  = 57   [de-identified] : Over the last week has been noticing more left sided groin pain and left neck pain.\par It is not currently paining her\par She denies any fever, chills chest pain, palpitations, night sweats, nausea, emesis, abdominal pain.\par She denies  lower extremity swelling, pain with ambulation, difficulties with urination or bowel issues \par  She denies needing any pain medication for this discomfort. \par She had one small mouth sores over tongue but not affecting eating,  drinking swallowing\par  She is able to be active at home. No skin rashes over hands or feet or constipation.\par  [place] [facility] [place] Complaining of left sided neck pain; denies pain with chewing or with sleep. Denies any strain of muscles over area.

## 2020-09-12 ENCOUNTER — OUTPATIENT (OUTPATIENT)
Dept: OUTPATIENT SERVICES | Facility: HOSPITAL | Age: 70
LOS: 1 days | Discharge: HOME | End: 2020-09-12

## 2020-09-12 DIAGNOSIS — Z90.710 ACQUIRED ABSENCE OF BOTH CERVIX AND UTERUS: Chronic | ICD-10-CM

## 2020-09-12 DIAGNOSIS — Z98.890 OTHER SPECIFIED POSTPROCEDURAL STATES: Chronic | ICD-10-CM

## 2020-09-12 DIAGNOSIS — Z90.722 ACQUIRED ABSENCE OF OVARIES, BILATERAL: Chronic | ICD-10-CM

## 2020-09-12 DIAGNOSIS — Z90.49 ACQUIRED ABSENCE OF OTHER SPECIFIED PARTS OF DIGESTIVE TRACT: Chronic | ICD-10-CM

## 2020-09-12 DIAGNOSIS — C56.2 MALIGNANT NEOPLASM OF LEFT OVARY: ICD-10-CM

## 2020-09-16 ENCOUNTER — RESULT REVIEW (OUTPATIENT)
Age: 70
End: 2020-09-16

## 2020-09-16 ENCOUNTER — LABORATORY RESULT (OUTPATIENT)
Age: 70
End: 2020-09-16

## 2020-09-16 ENCOUNTER — APPOINTMENT (OUTPATIENT)
Dept: HEMATOLOGY ONCOLOGY | Facility: CLINIC | Age: 70
End: 2020-09-16
Payer: MEDICARE

## 2020-09-16 ENCOUNTER — APPOINTMENT (OUTPATIENT)
Dept: INFUSION THERAPY | Facility: HOSPITAL | Age: 70
End: 2020-09-16
Payer: MEDICARE

## 2020-09-16 VITALS
HEIGHT: 61.02 IN | DIASTOLIC BLOOD PRESSURE: 80 MMHG | BODY MASS INDEX: 20.23 KG/M2 | SYSTOLIC BLOOD PRESSURE: 124 MMHG | WEIGHT: 107.14 LBS | OXYGEN SATURATION: 99 % | TEMPERATURE: 98.8 F | HEART RATE: 63 BPM | RESPIRATION RATE: 16 BRPM

## 2020-09-16 DIAGNOSIS — R59.9 ENLARGED LYMPH NODES, UNSPECIFIED: ICD-10-CM

## 2020-09-16 LAB
BASOPHILS # BLD AUTO: 0.01 K/UL — SIGNIFICANT CHANGE UP (ref 0–0.2)
BASOPHILS NFR BLD AUTO: 0.2 % — SIGNIFICANT CHANGE UP (ref 0–2)
EOSINOPHIL # BLD AUTO: 0.03 K/UL — SIGNIFICANT CHANGE UP (ref 0–0.5)
EOSINOPHIL NFR BLD AUTO: 0.6 % — SIGNIFICANT CHANGE UP (ref 0–6)
HCT VFR BLD CALC: 34.1 % — LOW (ref 34.5–45)
HGB BLD-MCNC: 11.4 G/DL — LOW (ref 11.5–15.5)
IMM GRANULOCYTES NFR BLD AUTO: 0.6 % — SIGNIFICANT CHANGE UP (ref 0–1.5)
LYMPHOCYTES # BLD AUTO: 1.67 K/UL — SIGNIFICANT CHANGE UP (ref 1–3.3)
LYMPHOCYTES # BLD AUTO: 30.6 % — SIGNIFICANT CHANGE UP (ref 13–44)
MCHC RBC-ENTMCNC: 31.7 PG — SIGNIFICANT CHANGE UP (ref 27–34)
MCHC RBC-ENTMCNC: 33.4 G/DL — SIGNIFICANT CHANGE UP (ref 32–36)
MCV RBC AUTO: 94.7 FL — SIGNIFICANT CHANGE UP (ref 80–100)
MONOCYTES # BLD AUTO: 0.67 K/UL — SIGNIFICANT CHANGE UP (ref 0–0.9)
MONOCYTES NFR BLD AUTO: 12.3 % — SIGNIFICANT CHANGE UP (ref 2–14)
NEUTROPHILS # BLD AUTO: 3.04 K/UL — SIGNIFICANT CHANGE UP (ref 1.8–7.4)
NEUTROPHILS NFR BLD AUTO: 55.7 % — SIGNIFICANT CHANGE UP (ref 43–77)
NRBC # BLD: 0 /100 WBCS — SIGNIFICANT CHANGE UP (ref 0–0)
PLATELET # BLD AUTO: 153 K/UL — SIGNIFICANT CHANGE UP (ref 150–400)
RBC # BLD: 3.6 M/UL — LOW (ref 3.8–5.2)
RBC # FLD: 13.1 % — SIGNIFICANT CHANGE UP (ref 10.3–14.5)
WBC # BLD: 5.45 K/UL — SIGNIFICANT CHANGE UP (ref 3.8–10.5)
WBC # FLD AUTO: 5.45 K/UL — SIGNIFICANT CHANGE UP (ref 3.8–10.5)

## 2020-09-16 PROCEDURE — G0008: CPT

## 2020-09-16 PROCEDURE — 99214 OFFICE O/P EST MOD 30 MIN: CPT

## 2020-09-16 RX ORDER — PROCHLORPERAZINE MALEATE 10 MG/1
10 TABLET ORAL EVERY 6 HOURS
Qty: 30 | Refills: 0 | Status: DISCONTINUED | COMMUNITY
Start: 2019-05-01 | End: 2020-09-16

## 2020-09-16 RX ORDER — ATORVASTATIN CALCIUM 10 MG/1
10 TABLET, FILM COATED ORAL
Qty: 30 | Refills: 0 | Status: DISCONTINUED | COMMUNITY
Start: 2020-01-06 | End: 2020-09-16

## 2020-09-16 RX ORDER — MONTELUKAST 10 MG/1
10 TABLET, FILM COATED ORAL
Qty: 30 | Refills: 0 | Status: DISCONTINUED | COMMUNITY
Start: 2018-09-06 | End: 2020-09-16

## 2020-09-16 RX ORDER — SODIUM CHLORIDE 0.65 %
0.65 AEROSOL, SPRAY (ML) NASAL 3 TIMES DAILY
Qty: 1 | Refills: 3 | Status: DISCONTINUED | COMMUNITY
Start: 2020-03-25 | End: 2020-09-16

## 2020-09-16 RX ORDER — DOCUSATE SODIUM 100 MG/1
100 CAPSULE, LIQUID FILLED ORAL
Qty: 90 | Refills: 0 | Status: DISCONTINUED | COMMUNITY
Start: 2019-04-16 | End: 2020-09-16

## 2020-09-16 RX ORDER — VALACYCLOVIR 500 MG/1
500 TABLET, FILM COATED ORAL
Qty: 10 | Refills: 0 | Status: DISCONTINUED | COMMUNITY
Start: 2019-12-17 | End: 2020-09-16

## 2020-09-16 RX ORDER — IBUPROFEN 200 MG/1
200 TABLET, COATED ORAL 3 TIMES DAILY
Refills: 0 | Status: DISCONTINUED | COMMUNITY
Start: 2017-10-13 | End: 2020-09-16

## 2020-09-16 RX ORDER — HYDROCORTISONE 10 MG/G
1 CREAM TOPICAL
Qty: 28 | Refills: 2 | Status: DISCONTINUED | COMMUNITY
Start: 2020-05-27 | End: 2020-09-16

## 2020-09-16 RX ORDER — MUPIROCIN 20 MG/G
2 OINTMENT TOPICAL
Qty: 22 | Refills: 0 | Status: DISCONTINUED | COMMUNITY
Start: 2018-06-07 | End: 2020-09-16

## 2020-09-16 RX ORDER — OMEPRAZOLE 20 MG/1
20 CAPSULE, DELAYED RELEASE ORAL
Qty: 30 | Refills: 0 | Status: DISCONTINUED | COMMUNITY
Start: 2020-01-11 | End: 2020-09-16

## 2020-09-16 RX ORDER — SUCRALFATE 1 G/10ML
1 SUSPENSION ORAL 3 TIMES DAILY
Qty: 240 | Refills: 1 | Status: DISCONTINUED | COMMUNITY
Start: 2020-06-17 | End: 2020-09-16

## 2020-09-16 RX ORDER — ESCITALOPRAM OXALATE 5 MG/1
5 TABLET ORAL
Qty: 90 | Refills: 0 | Status: DISCONTINUED | COMMUNITY
Start: 2018-10-24 | End: 2020-09-16

## 2020-09-16 RX ORDER — MELOXICAM 7.5 MG/1
7.5 TABLET ORAL
Qty: 15 | Refills: 0 | Status: DISCONTINUED | COMMUNITY
Start: 2020-01-22 | End: 2020-09-16

## 2020-09-16 RX ORDER — SENNOSIDES 8.6 MG TABLETS 8.6 MG/1
8.6 TABLET ORAL
Qty: 60 | Refills: 2 | Status: DISCONTINUED | COMMUNITY
Start: 2020-05-27 | End: 2020-09-16

## 2020-09-16 RX ORDER — GABAPENTIN 100 MG/1
100 CAPSULE ORAL
Qty: 30 | Refills: 0 | Status: DISCONTINUED | COMMUNITY
Start: 2019-12-17 | End: 2020-09-16

## 2020-09-16 RX ORDER — METOPROLOL SUCCINATE 25 MG/1
25 TABLET, EXTENDED RELEASE ORAL
Qty: 30 | Refills: 0 | Status: DISCONTINUED | COMMUNITY
Start: 2017-09-12 | End: 2020-09-16

## 2020-09-16 RX ORDER — CLOBETASOL PROPIONATE 0.5 MG/G
0.05 CREAM TOPICAL TWICE DAILY
Qty: 1 | Refills: 1 | Status: DISCONTINUED | COMMUNITY
Start: 2019-05-29 | End: 2020-09-16

## 2020-09-16 RX ORDER — FLUTICASONE PROPIONATE 50 UG/1
50 SPRAY, METERED NASAL DAILY
Qty: 1 | Refills: 3 | Status: DISCONTINUED | COMMUNITY
Start: 2020-03-25 | End: 2020-09-16

## 2020-09-16 RX ORDER — TRIAMCINOLONE ACETONIDE 1 MG/G
0.1 CREAM TOPICAL TWICE DAILY
Qty: 30 | Refills: 1 | Status: DISCONTINUED | COMMUNITY
Start: 2020-06-17 | End: 2020-09-16

## 2020-09-16 RX ORDER — DIPHENHYDRAMINE HCL 25 MG/1
25 CAPSULE ORAL EVERY 6 HOURS
Qty: 12 | Refills: 0 | Status: DISCONTINUED | COMMUNITY
Start: 2019-05-29 | End: 2020-09-16

## 2020-09-17 NOTE — HISTORY OF PRESENT ILLNESS
[Disease: _____________________] : Disease: [unfilled] [T: ___] : T[unfilled] [N: ___] : N[unfilled] [M: ___] : M[unfilled] [AJCC Stage: ____] : AJCC Stage: [unfilled] [de-identified] : Age 67: Stage I NSCLC adenocarcinoma s/p left VATs robotic assisted FERMÍN lobectomy and MLND 8/16/17 with Dr Ken Sol\par Age 67: Stage IV poorly differentiated ovarian cancer s/p AVEL/ BSO/ total omentectomy, bilateral ureterolysis, resection of abdominal anterior wall masses, repair of cystostomy\par She initially presented with hemoptysis and had evaluation consisting of bronchoscopy and FNA. The bronchoscopy was negative and the FNA was positive for malignant cells: TTF1+ and PAX 8 negative. She had Pet/ CT on 7/6/17 which showed 2.3 cm left upper lobe nodule with SUV of 23 and 4.2 x 3.1 cm left ovarian lesion SUV of 7.9 and left common iliac lymph nodes measuring up to 8mm with SUV of 2.2, 6 mm perihepatic implant SUV of 3.1. She initially had laparoscopic evaluation with left ovary biopsy which showed poorly differentiated carcinoma that was ER, WT1, PAX 8 positive and TTF1 negative. She initially had the left VATs. Then she subsequently had exploratory laparotomy with  AVEL, BSO, radical dissection of tumor with total omentectomy, resection of anterior abdominal wall masses, lysis of adhesions, and repair of cystostomy. She had CT imaging done after 5th cycle of chemotherapy to evaluate abdominal pain and CT showed no evidence of disease. She had abdominal pain in 3/2019 and had follow up CT which showed new small pelvic implant and enlarged sita-caval LN. She had headache and went to [facility] 4/10/19. She had imaging that showed predominantly cystic peripherally enhancing mass within the left cerebellar hemisphere. She had left suboccipital craniotomy with Dr Tatyana Ortiz. Had carboplatin retreat with paclitaxel/ Avastin started and had carboplatin reaction on 5/29/19 during bag 2 Cycle 2: redness over entire face and uncomfortable sensation over face/ arms. She had slowly increasing  on maintenance bevacizumab and had CT done on 2/5/2020 which showed 1.1 x 1 cm enhancing nodule in the right hemipelvis. She had subsequent Pet/ CT which showed hypermetabolic right cardiophrenic and periportal lymphadenopathy, hypermetabolic implants over the liver capsule, serosal surface of the urinary bladder and posterior right hemipelvis. She was not a surgical candidate and proceeded to Doxil/ bevacizumab. She developed mucositis that was persistent after C2 and reduced dose of Doxil for C3. Interval CT of the chest/ abd/ pelvis done on 6/9/2020 showed new RLL 5 mm lung nodule nonspecific but regression of abdominal adenopathy and right pelvic tumor implant. \par  [de-identified] : poorly differentiated carcinoma: ER positive  [de-identified] : carbo/ taxol: 10/24/17 to 2/12/18 with cumulative fatigue and neuropathy\par bevacizumab added on 12/8/17 to 4/2018 (pt stopped coming for treatment and lost to follow up until 12/2018)\par retreat carbo/ taxol 5/7/19\par bevacizumab added to Cycle 2 of retreat carboplatin/ paclitaxel 5/29/19 and allergic reaction with 2nd bag of carboplatin\par bevacizumab and paclitaxel 6/19/19 to 8/27/19\par bevacizumab maintenance 9/2019 to 2/2020\par Doxil/ bevacizumab 3/4/2020 to present  [de-identified] : Since last evaluation, she still feels there is something in the left groin when she is walking: uncomfortable but not needing pain medications. No oral sores since last dose adjustment and feels she is tolerating Doxil well. The darkness of the skin is improving and she feels her energy is good. She is able to do all her chores and has good appetite. She denies any new headache or dizziness. She has left neck discomfort but able to turn neck and bend neck down.

## 2020-09-17 NOTE — REVIEW OF SYSTEMS
[Fever] : no fever [Chills] : no chills [Night Sweats] : no night sweats [Fatigue] : fatigue [Recent Change In Weight] : ~T no recent weight change [Dysphagia] : no dysphagia [Loss of Hearing] : no loss of hearing [Nosebleeds] : no nosebleeds [Hoarseness] : no hoarseness [Odynophagia] : no odynophagia [Mucosal Pain] : no mucosal pain [Skin Rash] : no skin rash [Skin Wound] : no skin wound [Negative] : Allergic/Immunologic [FreeTextEntry4] : resolved oral sores  [de-identified] : hyperpigmentation over the skin of hands and abdomen; no cracking/ fissures over the hands or soles of feet

## 2020-09-17 NOTE — ASSESSMENT
[FreeTextEntry1] : She is a 70 y/o F with Stage IV ovarian cancer and Stage I NSCLC diagnosed simultaneously. She has completed 6 cycles of carboplatin/ paclitaxel with bevacizumab and did not continue with bevacizumab maintenance: off from 4/2018 to 4/2019. 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 She completed SRS to the craniotomy site. CT of the chest/ abd/ pelvis showed adenopathy and started with palliative chemotherapy: retreat carboplatin/ paclitaxel 5/7/19. She had allergic reaction with C2 bag 2 of carboplatin and has been on bevacizumab/ paclitaxel s/p total of 6 cycles. While on maintenance bevacizumab, she has progression of disease. We stopped bevacizumab and continued Doxil due to worsening mouth sores. Currently she is C7 of Doxil and we reviewed her symptoms and imaging from August. She has adenopathy over the left inguinal area and L cervical area that is stable. We reviewed supportive measures for the tenderness sensation. Her counts and  remain stable on therapy. She has maintained QOL of this therapy. We will plan to have repeat imaging after C9. Questions answered to her and her dtr in law's satisfaction. Next follow up in 1 month.

## 2020-09-17 NOTE — REASON FOR VISIT
[Follow-Up Visit] : a follow-up [Patient Declined  Services] : - None: Patient declined  services [FreeTextEntry2] : follow up for ovarian cancer on Doxil  [FreeTextEntry3] : Pt preferred dtr in law  [TWNoteComboBox1] : Chinese

## 2020-09-17 NOTE — PHYSICAL EXAM
[Restricted in physically strenuous activity but ambulatory and able to carry out work of a light or sedentary nature] : Status 1- Restricted in physically strenuous activity but ambulatory and able to carry out work of a light or sedentary nature, e.g., light house work, office work [Thin] : thin [Ulcers] : no ulcers [Mucositis] : no mucositis [Thrush] : no thrush [Vesicles] : no vesicles [Normal] : affect appropriate [de-identified] : no sores over tongue [de-identified] : no palpable mass or LN  [de-identified] : palpable L inguinal LN 1 cm

## 2020-10-10 ENCOUNTER — OUTPATIENT (OUTPATIENT)
Dept: OUTPATIENT SERVICES | Facility: HOSPITAL | Age: 70
LOS: 1 days | Discharge: HOME | End: 2020-10-10

## 2020-10-10 DIAGNOSIS — Z90.722 ACQUIRED ABSENCE OF OVARIES, BILATERAL: Chronic | ICD-10-CM

## 2020-10-10 DIAGNOSIS — Z98.890 OTHER SPECIFIED POSTPROCEDURAL STATES: Chronic | ICD-10-CM

## 2020-10-10 DIAGNOSIS — C56.2 MALIGNANT NEOPLASM OF LEFT OVARY: ICD-10-CM

## 2020-10-10 DIAGNOSIS — Z90.710 ACQUIRED ABSENCE OF BOTH CERVIX AND UTERUS: Chronic | ICD-10-CM

## 2020-10-10 DIAGNOSIS — Z90.49 ACQUIRED ABSENCE OF OTHER SPECIFIED PARTS OF DIGESTIVE TRACT: Chronic | ICD-10-CM

## 2020-10-14 ENCOUNTER — RESULT REVIEW (OUTPATIENT)
Age: 70
End: 2020-10-14

## 2020-10-14 ENCOUNTER — APPOINTMENT (OUTPATIENT)
Dept: HEMATOLOGY ONCOLOGY | Facility: CLINIC | Age: 70
End: 2020-10-14
Payer: MEDICARE

## 2020-10-14 ENCOUNTER — APPOINTMENT (OUTPATIENT)
Dept: INFUSION THERAPY | Facility: HOSPITAL | Age: 70
End: 2020-10-14

## 2020-10-14 ENCOUNTER — LABORATORY RESULT (OUTPATIENT)
Age: 70
End: 2020-10-14

## 2020-10-14 VITALS
TEMPERATURE: 99 F | WEIGHT: 110.23 LBS | BODY MASS INDEX: 20.81 KG/M2 | HEART RATE: 69 BPM | DIASTOLIC BLOOD PRESSURE: 76 MMHG | RESPIRATION RATE: 16 BRPM | OXYGEN SATURATION: 98 % | SYSTOLIC BLOOD PRESSURE: 129 MMHG

## 2020-10-14 LAB
BASOPHILS # BLD AUTO: 0.03 K/UL — SIGNIFICANT CHANGE UP (ref 0–0.2)
BASOPHILS NFR BLD AUTO: 0.5 % — SIGNIFICANT CHANGE UP (ref 0–2)
EOSINOPHIL # BLD AUTO: 0.04 K/UL — SIGNIFICANT CHANGE UP (ref 0–0.5)
EOSINOPHIL NFR BLD AUTO: 0.6 % — SIGNIFICANT CHANGE UP (ref 0–6)
HCT VFR BLD CALC: 35.2 % — SIGNIFICANT CHANGE UP (ref 34.5–45)
HGB BLD-MCNC: 11.6 G/DL — SIGNIFICANT CHANGE UP (ref 11.5–15.5)
IMM GRANULOCYTES NFR BLD AUTO: 0.9 % — SIGNIFICANT CHANGE UP (ref 0–1.5)
LYMPHOCYTES # BLD AUTO: 1.85 K/UL — SIGNIFICANT CHANGE UP (ref 1–3.3)
LYMPHOCYTES # BLD AUTO: 28 % — SIGNIFICANT CHANGE UP (ref 13–44)
MCHC RBC-ENTMCNC: 31.4 PG — SIGNIFICANT CHANGE UP (ref 27–34)
MCHC RBC-ENTMCNC: 33 G/DL — SIGNIFICANT CHANGE UP (ref 32–36)
MCV RBC AUTO: 95.1 FL — SIGNIFICANT CHANGE UP (ref 80–100)
MONOCYTES # BLD AUTO: 0.91 K/UL — HIGH (ref 0–0.9)
MONOCYTES NFR BLD AUTO: 13.8 % — SIGNIFICANT CHANGE UP (ref 2–14)
NEUTROPHILS # BLD AUTO: 3.71 K/UL — SIGNIFICANT CHANGE UP (ref 1.8–7.4)
NEUTROPHILS NFR BLD AUTO: 56.2 % — SIGNIFICANT CHANGE UP (ref 43–77)
NRBC # BLD: 0 /100 WBCS — SIGNIFICANT CHANGE UP (ref 0–0)
PLATELET # BLD AUTO: 169 K/UL — SIGNIFICANT CHANGE UP (ref 150–400)
RBC # BLD: 3.7 M/UL — LOW (ref 3.8–5.2)
RBC # FLD: 14 % — SIGNIFICANT CHANGE UP (ref 10.3–14.5)
WBC # BLD: 6.6 K/UL — SIGNIFICANT CHANGE UP (ref 3.8–10.5)
WBC # FLD AUTO: 6.6 K/UL — SIGNIFICANT CHANGE UP (ref 3.8–10.5)

## 2020-10-14 PROCEDURE — 99214 OFFICE O/P EST MOD 30 MIN: CPT

## 2020-10-14 NOTE — PHYSICAL EXAM
[Restricted in physically strenuous activity but ambulatory and able to carry out work of a light or sedentary nature] : Status 1- Restricted in physically strenuous activity but ambulatory and able to carry out work of a light or sedentary nature, e.g., light house work, office work [Thin] : thin [Ulcers] : no ulcers [Mucositis] : no mucositis [Thrush] : no thrush [Vesicles] : no vesicles [Normal] : normal appearance, no rash, nodules, vesicles, ulcers, erythema [de-identified] : no sores over tongue [de-identified] : palpable L inguinal LN 1 cm  [de-identified] : no palpable mass or LN

## 2020-10-14 NOTE — HISTORY OF PRESENT ILLNESS
[Disease: _____________________] : Disease: [unfilled] [N: ___] : N[unfilled] [T: ___] : T[unfilled] [AJCC Stage: ____] : AJCC Stage: [unfilled] [M: ___] : M[unfilled] [de-identified] : poorly differentiated carcinoma: ER positive  [de-identified] : Age 67: Stage I NSCLC adenocarcinoma s/p left VATs robotic assisted FERMÍN lobectomy and MLND 8/16/17 with Dr Ken Sol\par Age 67: Stage IV poorly differentiated ovarian cancer s/p AVEL/ BSO/ total omentectomy, bilateral ureterolysis, resection of abdominal anterior wall masses, repair of cystostomy\par She initially presented with hemoptysis and had evaluation consisting of bronchoscopy and FNA. The bronchoscopy was negative and the FNA was positive for malignant cells: TTF1+ and PAX 8 negative. She had Pet/ CT on 7/6/17 which showed 2.3 cm left upper lobe nodule with SUV of 23 and 4.2 x 3.1 cm left ovarian lesion SUV of 7.9 and left common iliac lymph nodes measuring up to 8mm with SUV of 2.2, 6 mm perihepatic implant SUV of 3.1. She initially had laparoscopic evaluation with left ovary biopsy which showed poorly differentiated carcinoma that was ER, WT1, PAX 8 positive and TTF1 negative. She initially had the left VATs. Then she subsequently had exploratory laparotomy with  AVEL, BSO, radical dissection of tumor with total omentectomy, resection of anterior abdominal wall masses, lysis of adhesions, and repair of cystostomy. She had CT imaging done after 5th cycle of chemotherapy to evaluate abdominal pain and CT showed no evidence of disease. She had abdominal pain in 3/2019 and had follow up CT which showed new small pelvic implant and enlarged sita-caval LN. She had headache and went to [facility] 4/10/19. She had imaging that showed predominantly cystic peripherally enhancing mass within the left cerebellar hemisphere. She had left suboccipital craniotomy with Dr Tatyana Ortiz. Had carboplatin retreat with paclitaxel/ Avastin started and had carboplatin reaction on 5/29/19 during bag 2 Cycle 2: redness over entire face and uncomfortable sensation over face/ arms. She had slowly increasing  on maintenance bevacizumab and had CT done on 2/5/2020 which showed 1.1 x 1 cm enhancing nodule in the right hemipelvis. She had subsequent Pet/ CT which showed hypermetabolic right cardiophrenic and periportal lymphadenopathy, hypermetabolic implants over the liver capsule, serosal surface of the urinary bladder and posterior right hemipelvis. She was not a surgical candidate and proceeded to Doxil/ bevacizumab. She developed mucositis that was persistent after C2 and reduced dose of Doxil for C3. Interval CT of the chest/ abd/ pelvis done on 6/9/2020 showed new RLL 5 mm lung nodule nonspecific but regression of abdominal adenopathy and right pelvic tumor implant. \par  [de-identified] : carbo/ taxol: 10/24/17 to 2/12/18 with cumulative fatigue and neuropathy\par bevacizumab added on 12/8/17 to 4/2018 (pt stopped coming for treatment and lost to follow up until 12/2018)\par retreat carbo/ taxol 5/7/19\par bevacizumab added to Cycle 2 of retreat carboplatin/ paclitaxel 5/29/19 and allergic reaction with 2nd bag of carboplatin\par bevacizumab and paclitaxel 6/19/19 to 8/27/19\par bevacizumab maintenance 9/2019 to 2/2020\par Doxil/ bevacizumab 3/4/2020 to present  [de-identified] : She had tongue sores after last treatment: did not use the lidocaine since she misunderstood her PCP: she thought the mouth rinse would cause dental issues. She denies any new abdominal pain or GI upset. She has good appetite. Did not try ice chips during Doxil infusion. She has occasional groin tingling sensation which comes and goes. She has good energy. She is taking herbal supplement daily. No new headaches or vision changes or cough.

## 2020-10-14 NOTE — REVIEW OF SYSTEMS
[Fever] : no fever [Chills] : no chills [Night Sweats] : no night sweats [Fatigue] : fatigue [Loss of Hearing] : no loss of hearing [Dysphagia] : no dysphagia [Recent Change In Weight] : ~T no recent weight change [Nosebleeds] : no nosebleeds [Hoarseness] : no hoarseness [Odynophagia] : no odynophagia [Skin Rash] : no skin rash [Mucosal Pain] : no mucosal pain [Skin Wound] : no skin wound [FreeTextEntry4] : sores over tongue  [Negative] : Allergic/Immunologic [de-identified] : tingling sensation over the left groin

## 2020-10-14 NOTE — ASSESSMENT
[FreeTextEntry1] : She is a 69 y/o F with Stage IV ovarian cancer and Stage I NSCLC diagnosed simultaneously. She has completed 6 cycles of carboplatin/ paclitaxel with bevacizumab and did not continue with bevacizumab maintenance: off from 4/2018 to 4/2019. 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 She completed SRS to the craniotomy site. CT of the chest/ abd/ pelvis showed adenopathy and started with palliative chemotherapy: retreat carboplatin/ paclitaxel 5/7/19. She had allergic reaction with C2 bag 2 of carboplatin and has been on bevacizumab/ paclitaxel s/p total of 6 cycles. While on maintenance bevacizumab, she has progression of disease. She has been on Doxil chemotherapy and continues to tolerate therapy with oral sores. We reviewed cryotherapy and continue with oral care along with lidocaine swish and spit as needed for pain. We reviewed repeat CT imaging after November for follow up. We reviewed her stable  and CBC on therapy. Questions answered to her and her family to her satisfaction. Next follow up in 1 to 2 months.

## 2020-10-15 DIAGNOSIS — R11.2 NAUSEA WITH VOMITING, UNSPECIFIED: ICD-10-CM

## 2020-10-15 DIAGNOSIS — Z51.11 ENCOUNTER FOR ANTINEOPLASTIC CHEMOTHERAPY: ICD-10-CM

## 2020-11-04 ENCOUNTER — OUTPATIENT (OUTPATIENT)
Dept: OUTPATIENT SERVICES | Facility: HOSPITAL | Age: 70
LOS: 1 days | End: 2020-11-04
Payer: MEDICARE

## 2020-11-04 ENCOUNTER — APPOINTMENT (OUTPATIENT)
Dept: CT IMAGING | Facility: IMAGING CENTER | Age: 70
End: 2020-11-04
Payer: MEDICARE

## 2020-11-04 DIAGNOSIS — Z90.710 ACQUIRED ABSENCE OF BOTH CERVIX AND UTERUS: Chronic | ICD-10-CM

## 2020-11-04 DIAGNOSIS — Z00.8 ENCOUNTER FOR OTHER GENERAL EXAMINATION: ICD-10-CM

## 2020-11-04 DIAGNOSIS — Z98.890 OTHER SPECIFIED POSTPROCEDURAL STATES: Chronic | ICD-10-CM

## 2020-11-04 DIAGNOSIS — Z90.49 ACQUIRED ABSENCE OF OTHER SPECIFIED PARTS OF DIGESTIVE TRACT: Chronic | ICD-10-CM

## 2020-11-04 DIAGNOSIS — Z90.722 ACQUIRED ABSENCE OF OVARIES, BILATERAL: Chronic | ICD-10-CM

## 2020-11-04 PROCEDURE — 71260 CT THORAX DX C+: CPT | Mod: 26

## 2020-11-04 PROCEDURE — 74177 CT ABD & PELVIS W/CONTRAST: CPT

## 2020-11-04 PROCEDURE — 74177 CT ABD & PELVIS W/CONTRAST: CPT | Mod: 26

## 2020-11-04 PROCEDURE — 71260 CT THORAX DX C+: CPT

## 2020-11-05 ENCOUNTER — OUTPATIENT (OUTPATIENT)
Dept: OUTPATIENT SERVICES | Facility: HOSPITAL | Age: 70
LOS: 1 days | Discharge: HOME | End: 2020-11-05

## 2020-11-05 DIAGNOSIS — Z90.722 ACQUIRED ABSENCE OF OVARIES, BILATERAL: Chronic | ICD-10-CM

## 2020-11-05 DIAGNOSIS — Z90.49 ACQUIRED ABSENCE OF OTHER SPECIFIED PARTS OF DIGESTIVE TRACT: Chronic | ICD-10-CM

## 2020-11-05 DIAGNOSIS — Z98.890 OTHER SPECIFIED POSTPROCEDURAL STATES: Chronic | ICD-10-CM

## 2020-11-05 DIAGNOSIS — C56.2 MALIGNANT NEOPLASM OF LEFT OVARY: ICD-10-CM

## 2020-11-05 DIAGNOSIS — Z90.710 ACQUIRED ABSENCE OF BOTH CERVIX AND UTERUS: Chronic | ICD-10-CM

## 2020-11-11 ENCOUNTER — LABORATORY RESULT (OUTPATIENT)
Age: 70
End: 2020-11-11

## 2020-11-11 ENCOUNTER — RESULT REVIEW (OUTPATIENT)
Age: 70
End: 2020-11-11

## 2020-11-11 ENCOUNTER — APPOINTMENT (OUTPATIENT)
Dept: INFUSION THERAPY | Facility: HOSPITAL | Age: 70
End: 2020-11-11

## 2020-11-11 LAB
BASOPHILS # BLD AUTO: 0.03 K/UL — SIGNIFICANT CHANGE UP (ref 0–0.2)
BASOPHILS NFR BLD AUTO: 0.5 % — SIGNIFICANT CHANGE UP (ref 0–2)
EOSINOPHIL # BLD AUTO: 0.06 K/UL — SIGNIFICANT CHANGE UP (ref 0–0.5)
EOSINOPHIL NFR BLD AUTO: 1 % — SIGNIFICANT CHANGE UP (ref 0–6)
HCT VFR BLD CALC: 34.5 % — SIGNIFICANT CHANGE UP (ref 34.5–45)
HGB BLD-MCNC: 11.6 G/DL — SIGNIFICANT CHANGE UP (ref 11.5–15.5)
IMM GRANULOCYTES NFR BLD AUTO: 0.7 % — SIGNIFICANT CHANGE UP (ref 0–1.5)
LYMPHOCYTES # BLD AUTO: 1.63 K/UL — SIGNIFICANT CHANGE UP (ref 1–3.3)
LYMPHOCYTES # BLD AUTO: 26.6 % — SIGNIFICANT CHANGE UP (ref 13–44)
MCHC RBC-ENTMCNC: 32 PG — SIGNIFICANT CHANGE UP (ref 27–34)
MCHC RBC-ENTMCNC: 33.6 G/DL — SIGNIFICANT CHANGE UP (ref 32–36)
MCV RBC AUTO: 95 FL — SIGNIFICANT CHANGE UP (ref 80–100)
MONOCYTES # BLD AUTO: 0.86 K/UL — SIGNIFICANT CHANGE UP (ref 0–0.9)
MONOCYTES NFR BLD AUTO: 14.1 % — HIGH (ref 2–14)
NEUTROPHILS # BLD AUTO: 3.5 K/UL — SIGNIFICANT CHANGE UP (ref 1.8–7.4)
NEUTROPHILS NFR BLD AUTO: 57.1 % — SIGNIFICANT CHANGE UP (ref 43–77)
NRBC # BLD: 0 /100 WBCS — SIGNIFICANT CHANGE UP (ref 0–0)
PLATELET # BLD AUTO: 153 K/UL — SIGNIFICANT CHANGE UP (ref 150–400)
RBC # BLD: 3.63 M/UL — LOW (ref 3.8–5.2)
RBC # FLD: 13.6 % — SIGNIFICANT CHANGE UP (ref 10.3–14.5)
WBC # BLD: 6.12 K/UL — SIGNIFICANT CHANGE UP (ref 3.8–10.5)
WBC # FLD AUTO: 6.12 K/UL — SIGNIFICANT CHANGE UP (ref 3.8–10.5)

## 2020-11-12 DIAGNOSIS — R11.2 NAUSEA WITH VOMITING, UNSPECIFIED: ICD-10-CM

## 2020-11-12 DIAGNOSIS — Z51.11 ENCOUNTER FOR ANTINEOPLASTIC CHEMOTHERAPY: ICD-10-CM

## 2020-12-04 ENCOUNTER — OUTPATIENT (OUTPATIENT)
Dept: OUTPATIENT SERVICES | Facility: HOSPITAL | Age: 70
LOS: 1 days | Discharge: HOME | End: 2020-12-04

## 2020-12-04 DIAGNOSIS — Z90.722 ACQUIRED ABSENCE OF OVARIES, BILATERAL: Chronic | ICD-10-CM

## 2020-12-04 DIAGNOSIS — Z90.49 ACQUIRED ABSENCE OF OTHER SPECIFIED PARTS OF DIGESTIVE TRACT: Chronic | ICD-10-CM

## 2020-12-04 DIAGNOSIS — Z90.710 ACQUIRED ABSENCE OF BOTH CERVIX AND UTERUS: Chronic | ICD-10-CM

## 2020-12-04 DIAGNOSIS — C56.2 MALIGNANT NEOPLASM OF LEFT OVARY: ICD-10-CM

## 2020-12-04 DIAGNOSIS — Z98.890 OTHER SPECIFIED POSTPROCEDURAL STATES: Chronic | ICD-10-CM

## 2020-12-09 PROBLEM — R00.2 INTERMITTENT PALPITATIONS: Status: ACTIVE | Noted: 2020-01-01

## 2020-12-10 NOTE — ASSESSMENT
[FreeTextEntry1] : She is a 69 y/o F with Stage IV ovarian cancer and Stage I NSCLC diagnosed simultaneously. She has completed 6 cycles of carboplatin/ paclitaxel with bevacizumab and did not continue with bevacizumab maintenance: off from 4/2018 to 4/2019. 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 She completed SRS to the craniotomy site. CT of the chest/ abd/ pelvis showed adenopathy and started with palliative chemotherapy: retreat carboplatin/ paclitaxel 5/7/19. She had allergic reaction with C2 bag 2 of carboplatin and has been on bevacizumab/ paclitaxel s/p total of 6 cycles. While on maintenance bevacizumab, she has progression of disease. She has been on Doxil chemotherapy currently C10. We reviewed with her and her dtr in law (on phone) precautions with getting up: reviewed her palpitations and potential orthostatics. We reviewed repeat 2 D Echo to evaluate heart function given Doxil treatment. We reviewed if palpitations worsen, we may change Doxil. We reviewed mucositis and hyperpigmentation of the skin which is Grade 1. We reviewed supportive measures including changing toothpaste. We reviewed repeat imaging February and potential change of treatment at that time. Questions answered to their satisfaction. Reviewed CBC which remains WNL on therapy.

## 2020-12-10 NOTE — PHYSICAL EXAM
[Restricted in physically strenuous activity but ambulatory and able to carry out work of a light or sedentary nature] : Status 1- Restricted in physically strenuous activity but ambulatory and able to carry out work of a light or sedentary nature, e.g., light house work, office work [Thin] : thin [Ulcers] : no ulcers [Mucositis] : no mucositis [Thrush] : no thrush [Vesicles] : no vesicles [Normal] : affect appropriate [de-identified] : L lateral tongue 1 cm ulceration  [de-identified] : no palpable mass or LN  [de-identified] : palpable R inguinal LN 2 cm  [de-identified] : dry skin and hyperpigmentation over the hands and trunk

## 2020-12-10 NOTE — HISTORY OF PRESENT ILLNESS
[Disease: _____________________] : Disease: [unfilled] [T: ___] : T[unfilled] [N: ___] : N[unfilled] [M: ___] : M[unfilled] [AJCC Stage: ____] : AJCC Stage: [unfilled] [de-identified] : Age 67: Stage I NSCLC adenocarcinoma s/p left VATs robotic assisted FERMÍN lobectomy and MLND 8/16/17 with Dr Ken Sol\par Age 67: Stage IV poorly differentiated ovarian cancer s/p AVEL/ BSO/ total omentectomy, bilateral ureterolysis, resection of abdominal anterior wall masses, repair of cystostomy\par She initially presented with hemoptysis and had evaluation consisting of bronchoscopy and FNA. The bronchoscopy was negative and the FNA was positive for malignant cells: TTF1+ and PAX 8 negative. She had Pet/ CT on 7/6/17 which showed 2.3 cm left upper lobe nodule with SUV of 23 and 4.2 x 3.1 cm left ovarian lesion SUV of 7.9 and left common iliac lymph nodes measuring up to 8mm with SUV of 2.2, 6 mm perihepatic implant SUV of 3.1. She initially had laparoscopic evaluation with left ovary biopsy which showed poorly differentiated carcinoma that was ER, WT1, PAX 8 positive and TTF1 negative. She initially had the left VATs. Then she subsequently had exploratory laparotomy with  AVEL, BSO, radical dissection of tumor with total omentectomy, resection of anterior abdominal wall masses, lysis of adhesions, and repair of cystostomy. She had CT imaging done after 5th cycle of chemotherapy to evaluate abdominal pain and CT showed no evidence of disease. She had abdominal pain in 3/2019 and had follow up CT which showed new small pelvic implant and enlarged sita-caval LN. She had headache and went to [facility] 4/10/19. She had imaging that showed predominantly cystic peripherally enhancing mass within the left cerebellar hemisphere. She had left suboccipital craniotomy with Dr Tatyana Ortiz. Had carboplatin retreat with paclitaxel/ Avastin started and had carboplatin reaction on 5/29/19 during bag 2 Cycle 2: redness over entire face and uncomfortable sensation over face/ arms. She had slowly increasing  on maintenance bevacizumab and had CT done on 2/5/2020 which showed 1.1 x 1 cm enhancing nodule in the right hemipelvis. She had subsequent Pet/ CT which showed hypermetabolic right cardiophrenic and periportal lymphadenopathy, hypermetabolic implants over the liver capsule, serosal surface of the urinary bladder and posterior right hemipelvis. She was not a surgical candidate and proceeded to Doxil/ bevacizumab. She developed mucositis that was persistent after C2 and reduced dose of Doxil for C3. Interval CT of the chest/ abd/ pelvis done on 6/9/2020 showed new RLL 5 mm lung nodule nonspecific but regression of abdominal adenopathy and right pelvic tumor implant. \par  [de-identified] : poorly differentiated carcinoma: ER positive  [de-identified] : carbo/ taxol: 10/24/17 to 2/12/18 with cumulative fatigue and neuropathy\par bevacizumab added on 12/8/17 to 4/2018 (pt stopped coming for treatment and lost to follow up until 12/2018)\par retreat carbo/ taxol 5/7/19\par bevacizumab added to Cycle 2 of retreat carboplatin/ paclitaxel 5/29/19 and allergic reaction with 2nd bag of carboplatin\par bevacizumab and paclitaxel 6/19/19 to 8/27/19\par bevacizumab maintenance 9/2019 to 2/2020\par Doxil/ bevacizumab 3/4/2020 to 6/2020\par Doxil 6/2020 to present  [Treatment Protocol] : Treatment Protocol [FreeTextEntry1] : Doxil C10 [de-identified] : She has been having palpitations: starts after getting up and resolves after few minutes. She denies any lightheadedness or chest pain. She denies any new abdominal pain or new groin pain. She has mild fatigue for few days after Doxil. She also has sensitivity while brushing teeth. She has dryness and darkness of the skin but denies any pain from the hands or feet or rashes. She is able to eat and do her housework. No headaches or vision changes.

## 2020-12-10 NOTE — REASON FOR VISIT
[Follow-Up Visit] : a follow-up [Patient Declined  Services] : - None: Patient declined  services [FreeTextEntry2] : follow up for ovarian cancer on Doxil  [FreeTextEntry3] : Pt preferred translation from her dtr in law  [TWNoteComboBox1] : Chinese

## 2020-12-10 NOTE — REVIEW OF SYSTEMS
[Fever] : no fever [Chills] : no chills [Night Sweats] : no night sweats [Fatigue] : fatigue [Recent Change In Weight] : ~T no recent weight change [Dysphagia] : no dysphagia [Loss of Hearing] : no loss of hearing [Nosebleeds] : no nosebleeds [Hoarseness] : no hoarseness [Odynophagia] : no odynophagia [Mucosal Pain] : no mucosal pain [Chest Pain] : no chest pain [Palpitations] : palpitations [Leg Claudication] : no intermittent leg claudication [Lower Ext Edema] : no lower extremity edema [Skin Rash] : no skin rash [Skin Wound] : no skin wound [Negative] : Allergic/Immunologic [FreeTextEntry4] : tenderness over the gums and tongue with teeth brushing  [de-identified] : dry skin and darkness over the abdomen

## 2020-12-29 NOTE — ASSESSMENT
[FreeTextEntry1] : 69 y/o F, never smoker, w/ hx of HTN, DM, Lt ovarian tumor c/w mullerian origin, and Lung CA.\par \par Now 3 yrs 5 mo s/p FB w/ BAL of the LLL bronchus, Navigational Bronch, FNA of the FERMÍN mass, Brushing of the FERMÍN lung mass and biopsy of the FERMÍN mass on 7/6/2017. Path revealed NSCLC, favoring AdenoCA, +TTF-1, +Napsin.\par \par Pt is s/p Lt ovarian tumor excision and fallopian tube excision on 7/21/17 by Dr. Tiffanie Brooks. Path revealed poorly-diff CA, +WT-1, PAX-8 and ER, c/w mullerian origin. Pt is s/p Total Hysterectomy mid-Sept, 2017 by Dr. Brooks.\par \par Now 3 yrs 4 mo s/p Lt VATS Robotic-assisted LULobectomy, MLND on 8/16/17. Path revealed AdenoCA, acinar (90%) and solid (10%), 2 x 1.8 x 0.5cm, margins, visceral pleura and LNs negative, pT1aN0 Stg IA.\par \par A follow up CT in 3/2019 showed new small pelvic implant and enlarged sita-caval LN. \par MRI brain in 4/2019 revealed predominantly cystic peripherally enhancing mass within the left cerebellar hemisphere s/p left suboccipital craniotomy with Dr Tatyana Ortiz on 04/12/2019. 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 Stg IV with mets to brain. She was restarted on palliative Chemotherapy since 05/2019. \par \par CT C/A/P on 2/5/2020 which showed 1.1 x 1 cm enhancing nodule in the right hemipelvis.\par PET/CT on 02/19/2020 showed hypermetabolic right cardiophrenic and periportal lymphadenopathy, hypermetabolic implants over the liver capsule, serosal surface of the urinary bladder and posterior right hemipelvis. She was not a surgical candidate and proceeded to Doxil/ bevacizumab, started since 3/4/2020. \par \par CT C/A/P w/ contrast on 11/6/2020:\par - increasing size Rt pelvic tumoral implant\par \par I have reviewed the patient's medical records and diagnostic images at time of this office consultation and have made the following recommendation:\par 1. CT scan reviewed, no evidence of recurrence to lung, RTC in 1 yr with CT Chest w/o contrast.\par 2. Patient also has Stg IV ovarian CA mets to brain, followed by Hem/Onc, I recommended patient to f/u with Hem/Onc.\par \par \par I personally performed the services described in the documentation, reviewed the documentation recorded by the scribe in my presence and it accurately and completely records my words and actions.\par \par I, Keyanna Lawson, PRIETO, am scribing for and the presence of CLAIR Long, the following sections HISTORY OF PRESENT ILLNESS, PAST MEDICAL/FAMILY/SOCIAL HISTORY; REVIEW OF SYSTEMS; VITAL SIGNS; PHYSICAL EXAM; DISPOSITION.\par \par

## 2020-12-29 NOTE — HISTORY OF PRESENT ILLNESS
[FreeTextEntry1] : 71 y/o F, never smoker, w/ hx of HTN, DM, Lt ovarian tumor c/w mullerian origin, and Lung CA.\par \par Now 3 yrs 5 mo s/p FB w/ BAL of the LLL bronchus, Navigational Bronch, FNA of the FERMÍN mass, Brushing of the FERMÍN lung mass and biopsy of the FERMÍN mass on 7/6/2017. Path revealed NSCLC, favoring AdenoCA, +TTF-1, +Napsin.\par \par Pt is s/p Lt ovarian tumor excision and fallopian tube excision on 7/21/17 by Dr. Tiffanie Brooks. Path revealed poorly-diff CA, +WT-1, PAX-8 and ER, c/w mullerian origin. Pt is s/p Total Hysterectomy mid-Sept, 2017 by Dr. Brooks.\par \par Now 3 yrs 4 mo s/p Lt VATS Robotic-assisted LULobectomy, MLND on 8/16/17. Path revealed AdenoCA, acinar (90%) and solid (10%), 2 x 1.8 x 0.5cm, margins, visceral pleura and LNs negative, pT1aN0 Stg IA.\par \par CT Chest on 2/7/19:\par - new 2mm RUL nodule (series 4 image 50)\par - new 2mm RUL nodule (series 4 image 71)\par - new 2mm FERMÍN nodule (series 4 image 33)\par \par A follow up CT in 3/2019 showed new small pelvic implant and enlarged sita-caval LN. \par MRI brain in 4/2019 revealed predominantly cystic peripherally enhancing mass within the left cerebellar hemisphere s/p left suboccipital craniotomy with Dr Tatyana Ortiz on 04/12/2019. 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 Stg IV with mets to brain. She was restarted on palliative Chemotherapy since 05/2019. \par \par CT Chest on 8/28/19:\par - stable 7 mm nodule abuts the serosal surface of the fundus of the bladder\par - interval decreased in size of portacaval low-density LN, 8 mm\par \par CT Chest on 12/9/19:\par - calcified hilar LNs\par - 1mm RUL nodule (3:23; previously 2mm)\par - resolution of the 2mm RUL nodule\par - resolution of the 2mm FERMÍN nodule\par \par CT C/A/P on 2/5/2020 which showed 1.1 x 1 cm enhancing nodule in the right hemipelvis.\par PET/CT on 02/19/2020 showed hypermetabolic right cardiophrenic and periportal lymphadenopathy, hypermetabolic implants over the liver capsule, serosal surface of the urinary bladder and posterior right hemipelvis. She was not a surgical candidate and proceeded to Doxil/ bevacizumab, started since 3/4/2020. \par \par CT chest 6/15/2020:\par - Focal mucoid impaction is visualized within RUL laterally measuring 5mm which is new finding\par - Subpleural nodular opacity is visualized within RLL medially, measuring 4mm not significantly changed, most likely a benign postinflammatory process. \par \par CT C/A/P w/ contrast on 11/6/2020:\par - increasing size Rt pelvic tumoral implant\par \par Patient is followed by Hem/Onc Dr. Geo Cuevas, who's aware of the pelvic tumoral implant.

## 2020-12-29 NOTE — DATA REVIEWED
[FreeTextEntry1] : CT C/A/P w/ contrast on 11/6/2020:\par - increasing size Rt pelvic tumoral implant

## 2020-12-29 NOTE — CONSULT LETTER
[Dear  ___] : Dear  [unfilled], [Consult Letter:] : I had the pleasure of evaluating your patient, [unfilled]. [( Thank you for referring [unfilled] for consultation for _____ )] : Thank you for referring [unfilled] for consultation for [unfilled] [Please see my note below.] : Please see my note below. [Consult Closing:] : Thank you very much for allowing me to participate in the care of this patient.  If you have any questions, please do not hesitate to contact me. [Sincerely,] : Sincerely, [DrLewis  ___] : Dr. JOHNSON [DrLewis ___] : Dr. JOHNSON [FreeTextEntry2] : Liliana Sanchez MD (PCP) \par Tiffanie Brooks MD (OB/GYN) \par Geo Cuevas MD (Hem/Onc) \par \par  [FreeTextEntry3] : Ken Sol MD, MPH \par System Director of Thoracic Surgery \par Director of Comprehensive Lung and Foregut [place] \par Professor Cardiovascular & Thoracic Surgery  \par [facility] [employer] [facility]\par [facility]\par [address]\par Oncology [address]\[place] [address]\par Tel: [phone]\par Fax: [phone]\par \par \par

## 2020-12-31 PROBLEM — R59.9 ADENOPATHY: Status: ACTIVE | Noted: 2019-04-17

## 2021-01-01 ENCOUNTER — RESULT REVIEW (OUTPATIENT)
Age: 71
End: 2021-01-01

## 2021-01-01 ENCOUNTER — LABORATORY RESULT (OUTPATIENT)
Age: 71
End: 2021-01-01

## 2021-01-01 ENCOUNTER — APPOINTMENT (OUTPATIENT)
Dept: INFUSION THERAPY | Facility: HOSPITAL | Age: 71
End: 2021-01-01

## 2021-01-01 ENCOUNTER — OUTPATIENT (OUTPATIENT)
Dept: OUTPATIENT SERVICES | Facility: HOSPITAL | Age: 71
LOS: 1 days | Discharge: HOME | End: 2021-01-01
Payer: MEDICAID

## 2021-01-01 ENCOUNTER — OUTPATIENT (OUTPATIENT)
Dept: OUTPATIENT SERVICES | Facility: HOSPITAL | Age: 71
LOS: 1 days | Discharge: HOME | End: 2021-01-01

## 2021-01-01 ENCOUNTER — INPATIENT (INPATIENT)
Facility: HOSPITAL | Age: 71
LOS: 7 days | Discharge: HOSPICE | End: 2021-10-29
Attending: STUDENT IN AN ORGANIZED HEALTH CARE EDUCATION/TRAINING PROGRAM | Admitting: STUDENT IN AN ORGANIZED HEALTH CARE EDUCATION/TRAINING PROGRAM
Payer: MEDICARE

## 2021-01-01 ENCOUNTER — NON-APPOINTMENT (OUTPATIENT)
Age: 71
End: 2021-01-01

## 2021-01-01 ENCOUNTER — APPOINTMENT (OUTPATIENT)
Dept: CT IMAGING | Facility: IMAGING CENTER | Age: 71
End: 2021-01-01
Payer: MEDICARE

## 2021-01-01 ENCOUNTER — TRANSCRIPTION ENCOUNTER (OUTPATIENT)
Age: 71
End: 2021-01-01

## 2021-01-01 ENCOUNTER — APPOINTMENT (OUTPATIENT)
Dept: HEMATOLOGY ONCOLOGY | Facility: CLINIC | Age: 71
End: 2021-01-01
Payer: MEDICARE

## 2021-01-01 ENCOUNTER — OUTPATIENT (OUTPATIENT)
Dept: OUTPATIENT SERVICES | Facility: HOSPITAL | Age: 71
LOS: 1 days | End: 2021-01-01
Payer: MEDICARE

## 2021-01-01 ENCOUNTER — APPOINTMENT (OUTPATIENT)
Dept: HEMATOLOGY ONCOLOGY | Facility: CLINIC | Age: 71
End: 2021-01-01

## 2021-01-01 VITALS
BODY MASS INDEX: 21.48 KG/M2 | RESPIRATION RATE: 16 BRPM | TEMPERATURE: 98.1 F | HEIGHT: 61.02 IN | DIASTOLIC BLOOD PRESSURE: 81 MMHG | HEART RATE: 74 BPM | WEIGHT: 113.76 LBS | OXYGEN SATURATION: 98 % | SYSTOLIC BLOOD PRESSURE: 148 MMHG

## 2021-01-01 VITALS
RESPIRATION RATE: 14 BRPM | HEIGHT: 61.02 IN | BODY MASS INDEX: 18.39 KG/M2 | WEIGHT: 97.42 LBS | HEART RATE: 105 BPM | SYSTOLIC BLOOD PRESSURE: 99 MMHG | TEMPERATURE: 99.1 F | DIASTOLIC BLOOD PRESSURE: 70 MMHG | OXYGEN SATURATION: 99 %

## 2021-01-01 VITALS
HEIGHT: 61.06 IN | WEIGHT: 108.03 LBS | SYSTOLIC BLOOD PRESSURE: 119 MMHG | DIASTOLIC BLOOD PRESSURE: 73 MMHG | BODY MASS INDEX: 20.4 KG/M2 | OXYGEN SATURATION: 96 % | HEART RATE: 89 BPM | RESPIRATION RATE: 16 BRPM | TEMPERATURE: 97.9 F

## 2021-01-01 VITALS
SYSTOLIC BLOOD PRESSURE: 116 MMHG | HEART RATE: 120 BPM | BODY MASS INDEX: 17.08 KG/M2 | OXYGEN SATURATION: 99 % | TEMPERATURE: 97.5 F | DIASTOLIC BLOOD PRESSURE: 79 MMHG | WEIGHT: 90.39 LBS | RESPIRATION RATE: 14 BRPM

## 2021-01-01 VITALS
TEMPERATURE: 97.7 F | BODY MASS INDEX: 19.56 KG/M2 | SYSTOLIC BLOOD PRESSURE: 108 MMHG | DIASTOLIC BLOOD PRESSURE: 69 MMHG | WEIGHT: 103.62 LBS | RESPIRATION RATE: 16 BRPM | HEIGHT: 61.02 IN | HEART RATE: 77 BPM | OXYGEN SATURATION: 99 %

## 2021-01-01 VITALS
HEART RATE: 86 BPM | DIASTOLIC BLOOD PRESSURE: 66 MMHG | RESPIRATION RATE: 14 BRPM | WEIGHT: 100.31 LBS | BODY MASS INDEX: 18.94 KG/M2 | SYSTOLIC BLOOD PRESSURE: 95 MMHG | OXYGEN SATURATION: 97 % | HEIGHT: 61.02 IN | TEMPERATURE: 98.5 F

## 2021-01-01 VITALS
DIASTOLIC BLOOD PRESSURE: 63 MMHG | HEART RATE: 90 BPM | SYSTOLIC BLOOD PRESSURE: 111 MMHG | TEMPERATURE: 98 F | RESPIRATION RATE: 18 BRPM | OXYGEN SATURATION: 98 %

## 2021-01-01 VITALS
OXYGEN SATURATION: 98 % | HEIGHT: 61 IN | TEMPERATURE: 99 F | RESPIRATION RATE: 16 BRPM | DIASTOLIC BLOOD PRESSURE: 71 MMHG | HEART RATE: 105 BPM | SYSTOLIC BLOOD PRESSURE: 109 MMHG

## 2021-01-01 VITALS
HEIGHT: 61 IN | BODY MASS INDEX: 18.11 KG/M2 | OXYGEN SATURATION: 99 % | HEART RATE: 103 BPM | WEIGHT: 95.9 LBS | SYSTOLIC BLOOD PRESSURE: 109 MMHG | RESPIRATION RATE: 14 BRPM | DIASTOLIC BLOOD PRESSURE: 74 MMHG | TEMPERATURE: 97.4 F

## 2021-01-01 VITALS
DIASTOLIC BLOOD PRESSURE: 73 MMHG | SYSTOLIC BLOOD PRESSURE: 104 MMHG | HEART RATE: 102 BPM | RESPIRATION RATE: 14 BRPM | OXYGEN SATURATION: 100 % | HEIGHT: 61.02 IN | WEIGHT: 92.59 LBS | BODY MASS INDEX: 17.48 KG/M2 | TEMPERATURE: 97.6 F

## 2021-01-01 VITALS
SYSTOLIC BLOOD PRESSURE: 112 MMHG | HEART RATE: 96 BPM | BODY MASS INDEX: 18.49 KG/M2 | OXYGEN SATURATION: 98 % | WEIGHT: 97.88 LBS | TEMPERATURE: 97.5 F | RESPIRATION RATE: 14 BRPM | DIASTOLIC BLOOD PRESSURE: 71 MMHG

## 2021-01-01 VITALS
RESPIRATION RATE: 17 BRPM | BODY MASS INDEX: 21.35 KG/M2 | TEMPERATURE: 97.5 F | WEIGHT: 113.1 LBS | SYSTOLIC BLOOD PRESSURE: 134 MMHG | HEART RATE: 82 BPM | DIASTOLIC BLOOD PRESSURE: 82 MMHG | HEIGHT: 61.06 IN | OXYGEN SATURATION: 98 %

## 2021-01-01 DIAGNOSIS — N30.80 OTHER CYSTITIS WITHOUT HEMATURIA: ICD-10-CM

## 2021-01-01 DIAGNOSIS — Z51.89 ENCOUNTER FOR OTHER SPECIFIED AFTERCARE: ICD-10-CM

## 2021-01-01 DIAGNOSIS — C34.92 MALIGNANT NEOPLASM OF UNSPECIFIED PART OF LEFT BRONCHUS OR LUNG: ICD-10-CM

## 2021-01-01 DIAGNOSIS — Z90.710 ACQUIRED ABSENCE OF BOTH CERVIX AND UTERUS: Chronic | ICD-10-CM

## 2021-01-01 DIAGNOSIS — R10.30 LOWER ABDOMINAL PAIN, UNSPECIFIED: ICD-10-CM

## 2021-01-01 DIAGNOSIS — Z90.49 ACQUIRED ABSENCE OF OTHER SPECIFIED PARTS OF DIGESTIVE TRACT: Chronic | ICD-10-CM

## 2021-01-01 DIAGNOSIS — R11.2 NAUSEA WITH VOMITING, UNSPECIFIED: ICD-10-CM

## 2021-01-01 DIAGNOSIS — Z90.722 ACQUIRED ABSENCE OF OVARIES, BILATERAL: Chronic | ICD-10-CM

## 2021-01-01 DIAGNOSIS — Z98.890 OTHER SPECIFIED POSTPROCEDURAL STATES: Chronic | ICD-10-CM

## 2021-01-01 DIAGNOSIS — C56.2 MALIGNANT NEOPLASM OF LEFT OVARY: ICD-10-CM

## 2021-01-01 DIAGNOSIS — R30.0 DYSURIA: ICD-10-CM

## 2021-01-01 DIAGNOSIS — K12.30 ORAL MUCOSITIS (ULCERATIVE), UNSPECIFIED: ICD-10-CM

## 2021-01-01 DIAGNOSIS — K59.00 CONSTIPATION, UNSPECIFIED: ICD-10-CM

## 2021-01-01 DIAGNOSIS — Z51.5 ENCOUNTER FOR PALLIATIVE CARE: ICD-10-CM

## 2021-01-01 DIAGNOSIS — Z51.11 ENCOUNTER FOR ANTINEOPLASTIC CHEMOTHERAPY: ICD-10-CM

## 2021-01-01 DIAGNOSIS — C78.7 SECONDARY MALIGNANT NEOPLASM OF LIVER AND INTRAHEPATIC BILE DUCT: ICD-10-CM

## 2021-01-01 DIAGNOSIS — G62.9 POLYNEUROPATHY, UNSPECIFIED: ICD-10-CM

## 2021-01-01 DIAGNOSIS — Z00.8 ENCOUNTER FOR OTHER GENERAL EXAMINATION: ICD-10-CM

## 2021-01-01 DIAGNOSIS — C76.2 MALIGNANT NEOPLASM OF ABDOMEN: ICD-10-CM

## 2021-01-01 DIAGNOSIS — C79.31 SECONDARY MALIGNANT NEOPLASM OF BRAIN: ICD-10-CM

## 2021-01-01 DIAGNOSIS — C34.90 MALIGNANT NEOPLASM OF UNSPECIFIED PART OF UNSPECIFIED BRONCHUS OR LUNG: ICD-10-CM

## 2021-01-01 DIAGNOSIS — Z29.9 ENCOUNTER FOR PROPHYLACTIC MEASURES, UNSPECIFIED: ICD-10-CM

## 2021-01-01 DIAGNOSIS — H04.123 DRY EYE SYNDROME OF BILATERAL LACRIMAL GLANDS: ICD-10-CM

## 2021-01-01 DIAGNOSIS — R52 PAIN, UNSPECIFIED: ICD-10-CM

## 2021-01-01 DIAGNOSIS — R53.81 OTHER MALAISE: ICD-10-CM

## 2021-01-01 DIAGNOSIS — R10.9 UNSPECIFIED ABDOMINAL PAIN: ICD-10-CM

## 2021-01-01 DIAGNOSIS — C56.9 MALIGNANT NEOPLASM OF UNSPECIFIED OVARY: ICD-10-CM

## 2021-01-01 DIAGNOSIS — Z71.89 OTHER SPECIFIED COUNSELING: ICD-10-CM

## 2021-01-01 DIAGNOSIS — Z79.899 OTHER LONG TERM (CURRENT) DRUG THERAPY: ICD-10-CM

## 2021-01-01 DIAGNOSIS — A41.9 SEPSIS, UNSPECIFIED ORGANISM: ICD-10-CM

## 2021-01-01 DIAGNOSIS — K13.79 OTHER LESIONS OF ORAL MUCOSA: ICD-10-CM

## 2021-01-01 DIAGNOSIS — G89.3 NEOPLASM RELATED PAIN (ACUTE) (CHRONIC): ICD-10-CM

## 2021-01-01 DIAGNOSIS — N32.1 VESICOINTESTINAL FISTULA: ICD-10-CM

## 2021-01-01 DIAGNOSIS — K06.8 OTHER SPECIFIED DISORDERS OF GINGIVA AND EDENTULOUS ALVEOLAR RIDGE: ICD-10-CM

## 2021-01-01 DIAGNOSIS — R53.83 OTHER FATIGUE: ICD-10-CM

## 2021-01-01 DIAGNOSIS — D64.9 ANEMIA, UNSPECIFIED: ICD-10-CM

## 2021-01-01 DIAGNOSIS — R63.4 ABNORMAL WEIGHT LOSS: ICD-10-CM

## 2021-01-01 DIAGNOSIS — C34.12 MALIGNANT NEOPLASM OF UPPER LOBE, LEFT BRONCHUS OR LUNG: ICD-10-CM

## 2021-01-01 DIAGNOSIS — E86.0 DEHYDRATION: ICD-10-CM

## 2021-01-01 LAB
ALBUMIN SERPL ELPH-MCNC: 2.8 G/DL — LOW (ref 3.3–5)
ALBUMIN SERPL ELPH-MCNC: 2.8 G/DL — LOW (ref 3.3–5)
ALBUMIN SERPL ELPH-MCNC: 3.1 G/DL — LOW (ref 3.3–5)
ALBUMIN SERPL ELPH-MCNC: 3.5 G/DL
ALP BLD-CCNC: 93 U/L
ALP SERPL-CCNC: 79 U/L — SIGNIFICANT CHANGE UP (ref 40–120)
ALP SERPL-CCNC: 87 U/L — SIGNIFICANT CHANGE UP (ref 40–120)
ALP SERPL-CCNC: 87 U/L — SIGNIFICANT CHANGE UP (ref 40–120)
ALT FLD-CCNC: 15 U/L — SIGNIFICANT CHANGE UP (ref 4–33)
ALT FLD-CCNC: 16 U/L — SIGNIFICANT CHANGE UP (ref 4–33)
ALT FLD-CCNC: 17 U/L — SIGNIFICANT CHANGE UP (ref 4–33)
ALT SERPL-CCNC: 14 U/L
ANION GAP SERPL CALC-SCNC: 11 MMOL/L — SIGNIFICANT CHANGE UP (ref 7–14)
ANION GAP SERPL CALC-SCNC: 12 MMOL/L — SIGNIFICANT CHANGE UP (ref 7–14)
ANION GAP SERPL CALC-SCNC: 13 MMOL/L — SIGNIFICANT CHANGE UP (ref 7–14)
ANION GAP SERPL CALC-SCNC: 13 MMOL/L — SIGNIFICANT CHANGE UP (ref 7–14)
ANION GAP SERPL CALC-SCNC: 14 MMOL/L
ANION GAP SERPL CALC-SCNC: 14 MMOL/L — SIGNIFICANT CHANGE UP (ref 7–14)
ANION GAP SERPL CALC-SCNC: 7 MMOL/L — SIGNIFICANT CHANGE UP (ref 7–14)
APPEARANCE: ABNORMAL
APTT BLD: 26.9 SEC — LOW (ref 27–36.3)
AST SERPL-CCNC: 12 U/L
AST SERPL-CCNC: 12 U/L — SIGNIFICANT CHANGE UP (ref 4–32)
AST SERPL-CCNC: 13 U/L — SIGNIFICANT CHANGE UP (ref 4–32)
AST SERPL-CCNC: 19 U/L — SIGNIFICANT CHANGE UP (ref 4–32)
B PERT DNA SPEC QL NAA+PROBE: SIGNIFICANT CHANGE UP
B PERT+PARAPERT DNA PNL SPEC NAA+PROBE: SIGNIFICANT CHANGE UP
BACTERIA UR CULT: NORMAL
BASOPHILS # BLD AUTO: 0 K/UL — SIGNIFICANT CHANGE UP (ref 0–0.2)
BASOPHILS # BLD AUTO: 0.01 K/UL — SIGNIFICANT CHANGE UP (ref 0–0.2)
BASOPHILS # BLD AUTO: 0.02 K/UL — SIGNIFICANT CHANGE UP (ref 0–0.2)
BASOPHILS # BLD AUTO: 0.03 K/UL — SIGNIFICANT CHANGE UP (ref 0–0.2)
BASOPHILS # BLD AUTO: 0.04 K/UL — SIGNIFICANT CHANGE UP (ref 0–0.2)
BASOPHILS NFR BLD AUTO: 0 % — SIGNIFICANT CHANGE UP (ref 0–2)
BASOPHILS NFR BLD AUTO: 0.1 % — SIGNIFICANT CHANGE UP (ref 0–2)
BASOPHILS NFR BLD AUTO: 0.2 % — SIGNIFICANT CHANGE UP (ref 0–2)
BASOPHILS NFR BLD AUTO: 0.3 % — SIGNIFICANT CHANGE UP (ref 0–2)
BASOPHILS NFR BLD AUTO: 0.4 % — SIGNIFICANT CHANGE UP (ref 0–2)
BASOPHILS NFR BLD AUTO: 0.5 % — SIGNIFICANT CHANGE UP (ref 0–2)
BASOPHILS NFR BLD AUTO: 0.5 % — SIGNIFICANT CHANGE UP (ref 0–2)
BASOPHILS NFR BLD AUTO: 0.7 % — SIGNIFICANT CHANGE UP (ref 0–2)
BASOPHILS NFR BLD AUTO: 0.7 % — SIGNIFICANT CHANGE UP (ref 0–2)
BASOPHILS NFR BLD AUTO: 0.8 % — SIGNIFICANT CHANGE UP (ref 0–2)
BILIRUB SERPL-MCNC: 0.2 MG/DL
BILIRUB SERPL-MCNC: 0.2 MG/DL — SIGNIFICANT CHANGE UP (ref 0.2–1.2)
BILIRUB SERPL-MCNC: 0.3 MG/DL — SIGNIFICANT CHANGE UP (ref 0.2–1.2)
BILIRUB SERPL-MCNC: 0.5 MG/DL — SIGNIFICANT CHANGE UP (ref 0.2–1.2)
BILIRUBIN URINE: NEGATIVE
BLD GP AB SCN SERPL QL: NEGATIVE — SIGNIFICANT CHANGE UP
BLOOD GAS VENOUS COMPREHENSIVE RESULT: SIGNIFICANT CHANGE UP
BLOOD URINE: ABNORMAL
BORDETELLA PARAPERTUSSIS (RAPRVP): SIGNIFICANT CHANGE UP
BUN SERPL-MCNC: 12 MG/DL — SIGNIFICANT CHANGE UP (ref 7–23)
BUN SERPL-MCNC: 15 MG/DL — SIGNIFICANT CHANGE UP (ref 7–23)
BUN SERPL-MCNC: 15 MG/DL — SIGNIFICANT CHANGE UP (ref 7–23)
BUN SERPL-MCNC: 16 MG/DL — SIGNIFICANT CHANGE UP (ref 7–23)
BUN SERPL-MCNC: 18 MG/DL — SIGNIFICANT CHANGE UP (ref 7–23)
BUN SERPL-MCNC: 19 MG/DL — SIGNIFICANT CHANGE UP (ref 7–23)
BUN SERPL-MCNC: 22 MG/DL — SIGNIFICANT CHANGE UP (ref 7–23)
BUN SERPL-MCNC: 27 MG/DL
C PNEUM DNA SPEC QL NAA+PROBE: SIGNIFICANT CHANGE UP
CALCIUM SERPL-MCNC: 8.6 MG/DL — SIGNIFICANT CHANGE UP (ref 8.4–10.5)
CALCIUM SERPL-MCNC: 8.6 MG/DL — SIGNIFICANT CHANGE UP (ref 8.4–10.5)
CALCIUM SERPL-MCNC: 8.7 MG/DL — SIGNIFICANT CHANGE UP (ref 8.4–10.5)
CALCIUM SERPL-MCNC: 8.9 MG/DL — SIGNIFICANT CHANGE UP (ref 8.4–10.5)
CALCIUM SERPL-MCNC: 9.1 MG/DL — SIGNIFICANT CHANGE UP (ref 8.4–10.5)
CALCIUM SERPL-MCNC: 9.3 MG/DL — SIGNIFICANT CHANGE UP (ref 8.4–10.5)
CALCIUM SERPL-MCNC: 9.3 MG/DL — SIGNIFICANT CHANGE UP (ref 8.4–10.5)
CALCIUM SERPL-MCNC: 9.5 MG/DL — SIGNIFICANT CHANGE UP (ref 8.4–10.5)
CALCIUM SERPL-MCNC: 9.5 MG/DL — SIGNIFICANT CHANGE UP (ref 8.4–10.5)
CALCIUM SERPL-MCNC: 9.7 MG/DL
CHLORIDE SERPL-SCNC: 101 MMOL/L — SIGNIFICANT CHANGE UP (ref 98–107)
CHLORIDE SERPL-SCNC: 102 MMOL/L — SIGNIFICANT CHANGE UP (ref 98–107)
CHLORIDE SERPL-SCNC: 103 MMOL/L — SIGNIFICANT CHANGE UP (ref 98–107)
CHLORIDE SERPL-SCNC: 104 MMOL/L — SIGNIFICANT CHANGE UP (ref 98–107)
CHLORIDE SERPL-SCNC: 104 MMOL/L — SIGNIFICANT CHANGE UP (ref 98–107)
CHLORIDE SERPL-SCNC: 93 MMOL/L — LOW (ref 98–107)
CHLORIDE SERPL-SCNC: 97 MMOL/L — LOW (ref 98–107)
CHLORIDE SERPL-SCNC: 99 MMOL/L
CHLORIDE SERPL-SCNC: 99 MMOL/L — SIGNIFICANT CHANGE UP (ref 98–107)
CHLORIDE SERPL-SCNC: 99 MMOL/L — SIGNIFICANT CHANGE UP (ref 98–107)
CO2 SERPL-SCNC: 23 MMOL/L — SIGNIFICANT CHANGE UP (ref 22–31)
CO2 SERPL-SCNC: 24 MMOL/L — SIGNIFICANT CHANGE UP (ref 22–31)
CO2 SERPL-SCNC: 25 MMOL/L
CO2 SERPL-SCNC: 25 MMOL/L — SIGNIFICANT CHANGE UP (ref 22–31)
CO2 SERPL-SCNC: 27 MMOL/L — SIGNIFICANT CHANGE UP (ref 22–31)
CO2 SERPL-SCNC: 29 MMOL/L — SIGNIFICANT CHANGE UP (ref 22–31)
COLOR: ABNORMAL
COVID-19 SPIKE DOMAIN AB INTERP: POSITIVE
COVID-19 SPIKE DOMAIN ANTIBODY RESULT: 1.83 U/ML — HIGH
CREAT ?TM UR-MCNC: 20 MG/DL — SIGNIFICANT CHANGE UP
CREAT SERPL-MCNC: 0.34 MG/DL — LOW (ref 0.5–1.3)
CREAT SERPL-MCNC: 0.35 MG/DL — LOW (ref 0.5–1.3)
CREAT SERPL-MCNC: 0.37 MG/DL — LOW (ref 0.5–1.3)
CREAT SERPL-MCNC: 0.39 MG/DL — LOW (ref 0.5–1.3)
CREAT SERPL-MCNC: 0.4 MG/DL — LOW (ref 0.5–1.3)
CREAT SERPL-MCNC: 0.41 MG/DL — LOW (ref 0.5–1.3)
CREAT SERPL-MCNC: 0.46 MG/DL — LOW (ref 0.5–1.3)
CREAT SERPL-MCNC: 0.48 MG/DL
CULTURE RESULTS: SIGNIFICANT CHANGE UP
EOSINOPHIL # BLD AUTO: 0 K/UL — SIGNIFICANT CHANGE UP (ref 0–0.5)
EOSINOPHIL # BLD AUTO: 0.01 K/UL — SIGNIFICANT CHANGE UP (ref 0–0.5)
EOSINOPHIL # BLD AUTO: 0.02 K/UL — SIGNIFICANT CHANGE UP (ref 0–0.5)
EOSINOPHIL # BLD AUTO: 0.03 K/UL — SIGNIFICANT CHANGE UP (ref 0–0.5)
EOSINOPHIL # BLD AUTO: 0.04 K/UL — SIGNIFICANT CHANGE UP (ref 0–0.5)
EOSINOPHIL # BLD AUTO: 0.05 K/UL — SIGNIFICANT CHANGE UP (ref 0–0.5)
EOSINOPHIL # BLD AUTO: 0.06 K/UL — SIGNIFICANT CHANGE UP (ref 0–0.5)
EOSINOPHIL # BLD AUTO: 0.07 K/UL — SIGNIFICANT CHANGE UP (ref 0–0.5)
EOSINOPHIL # BLD AUTO: 0.08 K/UL — SIGNIFICANT CHANGE UP (ref 0–0.5)
EOSINOPHIL # BLD AUTO: 0.13 K/UL — SIGNIFICANT CHANGE UP (ref 0–0.5)
EOSINOPHIL # BLD AUTO: 0.14 K/UL — SIGNIFICANT CHANGE UP (ref 0–0.5)
EOSINOPHIL NFR BLD AUTO: 0 % — SIGNIFICANT CHANGE UP (ref 0–6)
EOSINOPHIL NFR BLD AUTO: 0.1 % — SIGNIFICANT CHANGE UP (ref 0–6)
EOSINOPHIL NFR BLD AUTO: 0.2 % — SIGNIFICANT CHANGE UP (ref 0–6)
EOSINOPHIL NFR BLD AUTO: 0.3 % — SIGNIFICANT CHANGE UP (ref 0–6)
EOSINOPHIL NFR BLD AUTO: 0.4 % — SIGNIFICANT CHANGE UP (ref 0–6)
EOSINOPHIL NFR BLD AUTO: 0.5 % — SIGNIFICANT CHANGE UP (ref 0–6)
EOSINOPHIL NFR BLD AUTO: 0.6 % — SIGNIFICANT CHANGE UP (ref 0–6)
EOSINOPHIL NFR BLD AUTO: 0.7 % — SIGNIFICANT CHANGE UP (ref 0–6)
EOSINOPHIL NFR BLD AUTO: 1.1 % — SIGNIFICANT CHANGE UP (ref 0–6)
EOSINOPHIL NFR BLD AUTO: 1.5 % — SIGNIFICANT CHANGE UP (ref 0–6)
EOSINOPHIL NFR BLD AUTO: 2.3 % — SIGNIFICANT CHANGE UP (ref 0–6)
ESTIMATED AVERAGE GLUCOSE: 117 MG/DL
FLUAV SUBTYP SPEC NAA+PROBE: SIGNIFICANT CHANGE UP
FLUBV RNA SPEC QL NAA+PROBE: SIGNIFICANT CHANGE UP
GLUCOSE QUALITATIVE U: NEGATIVE
GLUCOSE SERPL-MCNC: 109 MG/DL — HIGH (ref 70–99)
GLUCOSE SERPL-MCNC: 142 MG/DL
GLUCOSE SERPL-MCNC: 76 MG/DL — SIGNIFICANT CHANGE UP (ref 70–99)
GLUCOSE SERPL-MCNC: 79 MG/DL — SIGNIFICANT CHANGE UP (ref 70–99)
GLUCOSE SERPL-MCNC: 83 MG/DL — SIGNIFICANT CHANGE UP (ref 70–99)
GLUCOSE SERPL-MCNC: 85 MG/DL — SIGNIFICANT CHANGE UP (ref 70–99)
GLUCOSE SERPL-MCNC: 87 MG/DL — SIGNIFICANT CHANGE UP (ref 70–99)
GLUCOSE SERPL-MCNC: 87 MG/DL — SIGNIFICANT CHANGE UP (ref 70–99)
GLUCOSE SERPL-MCNC: 93 MG/DL — SIGNIFICANT CHANGE UP (ref 70–99)
GLUCOSE SERPL-MCNC: 99 MG/DL — SIGNIFICANT CHANGE UP (ref 70–99)
HADV DNA SPEC QL NAA+PROBE: SIGNIFICANT CHANGE UP
HBA1C MFR BLD HPLC: 5.7 %
HCOV 229E RNA SPEC QL NAA+PROBE: SIGNIFICANT CHANGE UP
HCOV HKU1 RNA SPEC QL NAA+PROBE: SIGNIFICANT CHANGE UP
HCOV NL63 RNA SPEC QL NAA+PROBE: SIGNIFICANT CHANGE UP
HCOV OC43 RNA SPEC QL NAA+PROBE: SIGNIFICANT CHANGE UP
HCT VFR BLD CALC: 20.8 % — CRITICAL LOW (ref 34.5–45)
HCT VFR BLD CALC: 21.6 % — LOW (ref 34.5–45)
HCT VFR BLD CALC: 22.2 % — LOW (ref 34.5–45)
HCT VFR BLD CALC: 22.4 % — LOW (ref 34.5–45)
HCT VFR BLD CALC: 22.5 % — LOW (ref 34.5–45)
HCT VFR BLD CALC: 22.5 % — LOW (ref 34.5–45)
HCT VFR BLD CALC: 22.6 % — LOW (ref 34.5–45)
HCT VFR BLD CALC: 23 % — LOW (ref 34.5–45)
HCT VFR BLD CALC: 23.9 % — LOW (ref 34.5–45)
HCT VFR BLD CALC: 23.9 % — LOW (ref 34.5–45)
HCT VFR BLD CALC: 24.4 % — LOW (ref 34.5–45)
HCT VFR BLD CALC: 24.9 % — LOW (ref 34.5–45)
HCT VFR BLD CALC: 25.1 % — LOW (ref 34.5–45)
HCT VFR BLD CALC: 25.3 % — LOW (ref 34.5–45)
HCT VFR BLD CALC: 25.6 % — LOW (ref 34.5–45)
HCT VFR BLD CALC: 25.7 % — LOW (ref 34.5–45)
HCT VFR BLD CALC: 25.8 % — LOW (ref 34.5–45)
HCT VFR BLD CALC: 25.9 % — LOW (ref 34.5–45)
HCT VFR BLD CALC: 26 % — LOW (ref 34.5–45)
HCT VFR BLD CALC: 26.2 % — LOW (ref 34.5–45)
HCT VFR BLD CALC: 26.4 % — LOW (ref 34.5–45)
HCT VFR BLD CALC: 26.7 % — LOW (ref 34.5–45)
HCT VFR BLD CALC: 27.9 % — LOW (ref 34.5–45)
HCT VFR BLD CALC: 28 % — LOW (ref 34.5–45)
HCT VFR BLD CALC: 28.1 % — LOW (ref 34.5–45)
HCT VFR BLD CALC: 28.3 % — LOW (ref 34.5–45)
HCT VFR BLD CALC: 29.8 % — LOW (ref 34.5–45)
HCT VFR BLD CALC: 30.4 % — LOW (ref 34.5–45)
HCT VFR BLD CALC: 31 % — LOW (ref 34.5–45)
HCT VFR BLD CALC: 32.4 % — LOW (ref 34.5–45)
HCT VFR BLD CALC: 33.7 % — LOW (ref 34.5–45)
HCT VFR BLD CALC: 33.9 % — LOW (ref 34.5–45)
HCV AB S/CO SERPL IA: 0.18 S/CO — SIGNIFICANT CHANGE UP (ref 0–0.99)
HCV AB SERPL-IMP: SIGNIFICANT CHANGE UP
HGB BLD-MCNC: 10.2 G/DL — LOW (ref 11.5–15.5)
HGB BLD-MCNC: 10.3 G/DL — LOW (ref 11.5–15.5)
HGB BLD-MCNC: 10.6 G/DL — LOW (ref 11.5–15.5)
HGB BLD-MCNC: 11.6 G/DL — SIGNIFICANT CHANGE UP (ref 11.5–15.5)
HGB BLD-MCNC: 11.8 G/DL — SIGNIFICANT CHANGE UP (ref 11.5–15.5)
HGB BLD-MCNC: 6.6 G/DL — CRITICAL LOW (ref 11.5–15.5)
HGB BLD-MCNC: 6.8 G/DL — CRITICAL LOW (ref 11.5–15.5)
HGB BLD-MCNC: 7.1 G/DL — LOW (ref 11.5–15.5)
HGB BLD-MCNC: 7.2 G/DL — LOW (ref 11.5–15.5)
HGB BLD-MCNC: 7.2 G/DL — LOW (ref 11.5–15.5)
HGB BLD-MCNC: 7.3 G/DL — LOW (ref 11.5–15.5)
HGB BLD-MCNC: 7.7 G/DL — LOW (ref 11.5–15.5)
HGB BLD-MCNC: 7.9 G/DL — LOW (ref 11.5–15.5)
HGB BLD-MCNC: 8 G/DL — LOW (ref 11.5–15.5)
HGB BLD-MCNC: 8.1 G/DL — LOW (ref 11.5–15.5)
HGB BLD-MCNC: 8.1 G/DL — LOW (ref 11.5–15.5)
HGB BLD-MCNC: 8.2 G/DL — LOW (ref 11.5–15.5)
HGB BLD-MCNC: 8.3 G/DL — LOW (ref 11.5–15.5)
HGB BLD-MCNC: 8.6 G/DL — LOW (ref 11.5–15.5)
HGB BLD-MCNC: 8.6 G/DL — LOW (ref 11.5–15.5)
HGB BLD-MCNC: 8.9 G/DL — LOW (ref 11.5–15.5)
HGB BLD-MCNC: 9 G/DL — LOW (ref 11.5–15.5)
HGB BLD-MCNC: 9.1 G/DL — LOW (ref 11.5–15.5)
HGB BLD-MCNC: 9.4 G/DL — LOW (ref 11.5–15.5)
HGB BLD-MCNC: 9.6 G/DL — LOW (ref 11.5–15.5)
HGB BLD-MCNC: 9.9 G/DL — LOW (ref 11.5–15.5)
HMPV RNA SPEC QL NAA+PROBE: SIGNIFICANT CHANGE UP
HPIV1 RNA SPEC QL NAA+PROBE: SIGNIFICANT CHANGE UP
HPIV2 RNA SPEC QL NAA+PROBE: SIGNIFICANT CHANGE UP
HPIV3 RNA SPEC QL NAA+PROBE: SIGNIFICANT CHANGE UP
HPIV4 RNA SPEC QL NAA+PROBE: SIGNIFICANT CHANGE UP
IANC: 11.6 K/UL — HIGH (ref 1.5–8.5)
IANC: 14.33 K/UL — HIGH (ref 1.5–8.5)
IANC: 15.61 K/UL — HIGH (ref 1.5–8.5)
IANC: 22.78 K/UL — HIGH (ref 1.5–8.5)
IANC: 8.59 K/UL — HIGH (ref 1.5–8.5)
IANC: 9.91 K/UL — HIGH (ref 1.5–8.5)
IMM GRANULOCYTES NFR BLD AUTO: 0.4 % — SIGNIFICANT CHANGE UP (ref 0–1.5)
IMM GRANULOCYTES NFR BLD AUTO: 0.6 % — SIGNIFICANT CHANGE UP (ref 0–1.5)
IMM GRANULOCYTES NFR BLD AUTO: 0.7 % — SIGNIFICANT CHANGE UP (ref 0–1.5)
IMM GRANULOCYTES NFR BLD AUTO: 0.8 % — SIGNIFICANT CHANGE UP (ref 0–1.5)
IMM GRANULOCYTES NFR BLD AUTO: 0.8 % — SIGNIFICANT CHANGE UP (ref 0–1.5)
IMM GRANULOCYTES NFR BLD AUTO: 0.9 % — SIGNIFICANT CHANGE UP (ref 0–1.5)
IMM GRANULOCYTES NFR BLD AUTO: 1 % — SIGNIFICANT CHANGE UP (ref 0–1.5)
IMM GRANULOCYTES NFR BLD AUTO: 1.1 % — SIGNIFICANT CHANGE UP (ref 0–1.5)
IMM GRANULOCYTES NFR BLD AUTO: 1.2 % — SIGNIFICANT CHANGE UP (ref 0–1.5)
IMM GRANULOCYTES NFR BLD AUTO: 1.3 % — SIGNIFICANT CHANGE UP (ref 0–1.5)
IMM GRANULOCYTES NFR BLD AUTO: 1.4 % — SIGNIFICANT CHANGE UP (ref 0–1.5)
IMM GRANULOCYTES NFR BLD AUTO: 1.8 % — HIGH (ref 0–1.5)
IMM GRANULOCYTES NFR BLD AUTO: 1.8 % — HIGH (ref 0–1.5)
IMM GRANULOCYTES NFR BLD AUTO: 2.1 % — HIGH (ref 0–1.5)
IMM GRANULOCYTES NFR BLD AUTO: 2.2 % — HIGH (ref 0–1.5)
IMM GRANULOCYTES NFR BLD AUTO: 3 % — HIGH (ref 0–1.5)
INR BLD: 1.25 RATIO — HIGH (ref 0.88–1.16)
KETONES URINE: NEGATIVE
LEUKOCYTE ESTERASE URINE: ABNORMAL
LIDOCAIN IGE QN: 18 U/L — SIGNIFICANT CHANGE UP (ref 7–60)
LYMPHOCYTES # BLD AUTO: 0.23 K/UL — LOW (ref 1–3.3)
LYMPHOCYTES # BLD AUTO: 0.76 K/UL — LOW (ref 1–3.3)
LYMPHOCYTES # BLD AUTO: 1.14 K/UL — SIGNIFICANT CHANGE UP (ref 1–3.3)
LYMPHOCYTES # BLD AUTO: 1.18 K/UL — SIGNIFICANT CHANGE UP (ref 1–3.3)
LYMPHOCYTES # BLD AUTO: 1.2 K/UL — SIGNIFICANT CHANGE UP (ref 1–3.3)
LYMPHOCYTES # BLD AUTO: 1.23 K/UL — SIGNIFICANT CHANGE UP (ref 1–3.3)
LYMPHOCYTES # BLD AUTO: 1.27 K/UL — SIGNIFICANT CHANGE UP (ref 1–3.3)
LYMPHOCYTES # BLD AUTO: 1.3 K/UL — SIGNIFICANT CHANGE UP (ref 1–3.3)
LYMPHOCYTES # BLD AUTO: 1.31 K/UL — SIGNIFICANT CHANGE UP (ref 1–3.3)
LYMPHOCYTES # BLD AUTO: 1.33 K/UL — SIGNIFICANT CHANGE UP (ref 1–3.3)
LYMPHOCYTES # BLD AUTO: 1.4 K/UL — SIGNIFICANT CHANGE UP (ref 1–3.3)
LYMPHOCYTES # BLD AUTO: 1.43 K/UL — SIGNIFICANT CHANGE UP (ref 1–3.3)
LYMPHOCYTES # BLD AUTO: 1.47 K/UL — SIGNIFICANT CHANGE UP (ref 1–3.3)
LYMPHOCYTES # BLD AUTO: 1.56 K/UL — SIGNIFICANT CHANGE UP (ref 1–3.3)
LYMPHOCYTES # BLD AUTO: 1.58 K/UL — SIGNIFICANT CHANGE UP (ref 1–3.3)
LYMPHOCYTES # BLD AUTO: 1.58 K/UL — SIGNIFICANT CHANGE UP (ref 1–3.3)
LYMPHOCYTES # BLD AUTO: 1.6 K/UL — SIGNIFICANT CHANGE UP (ref 1–3.3)
LYMPHOCYTES # BLD AUTO: 1.63 K/UL — SIGNIFICANT CHANGE UP (ref 1–3.3)
LYMPHOCYTES # BLD AUTO: 1.67 K/UL — SIGNIFICANT CHANGE UP (ref 1–3.3)
LYMPHOCYTES # BLD AUTO: 1.68 K/UL — SIGNIFICANT CHANGE UP (ref 1–3.3)
LYMPHOCYTES # BLD AUTO: 1.7 K/UL — SIGNIFICANT CHANGE UP (ref 1–3.3)
LYMPHOCYTES # BLD AUTO: 1.71 K/UL — SIGNIFICANT CHANGE UP (ref 1–3.3)
LYMPHOCYTES # BLD AUTO: 1.72 K/UL — SIGNIFICANT CHANGE UP (ref 1–3.3)
LYMPHOCYTES # BLD AUTO: 1.75 K/UL — SIGNIFICANT CHANGE UP (ref 1–3.3)
LYMPHOCYTES # BLD AUTO: 1.78 K/UL — SIGNIFICANT CHANGE UP (ref 1–3.3)
LYMPHOCYTES # BLD AUTO: 1.88 K/UL — SIGNIFICANT CHANGE UP (ref 1–3.3)
LYMPHOCYTES # BLD AUTO: 1.93 K/UL — SIGNIFICANT CHANGE UP (ref 1–3.3)
LYMPHOCYTES # BLD AUTO: 11.8 % — LOW (ref 13–44)
LYMPHOCYTES # BLD AUTO: 11.9 % — LOW (ref 13–44)
LYMPHOCYTES # BLD AUTO: 12.1 % — LOW (ref 13–44)
LYMPHOCYTES # BLD AUTO: 12.6 % — LOW (ref 13–44)
LYMPHOCYTES # BLD AUTO: 12.6 % — LOW (ref 13–44)
LYMPHOCYTES # BLD AUTO: 13.5 % — SIGNIFICANT CHANGE UP (ref 13–44)
LYMPHOCYTES # BLD AUTO: 14.4 % — SIGNIFICANT CHANGE UP (ref 13–44)
LYMPHOCYTES # BLD AUTO: 14.8 % — SIGNIFICANT CHANGE UP (ref 13–44)
LYMPHOCYTES # BLD AUTO: 16.2 % — SIGNIFICANT CHANGE UP (ref 13–44)
LYMPHOCYTES # BLD AUTO: 16.8 % — SIGNIFICANT CHANGE UP (ref 13–44)
LYMPHOCYTES # BLD AUTO: 19.1 % — SIGNIFICANT CHANGE UP (ref 13–44)
LYMPHOCYTES # BLD AUTO: 2 % — LOW (ref 13–44)
LYMPHOCYTES # BLD AUTO: 20.7 % — SIGNIFICANT CHANGE UP (ref 13–44)
LYMPHOCYTES # BLD AUTO: 21.4 % — SIGNIFICANT CHANGE UP (ref 13–44)
LYMPHOCYTES # BLD AUTO: 22 % — SIGNIFICANT CHANGE UP (ref 13–44)
LYMPHOCYTES # BLD AUTO: 22.7 % — SIGNIFICANT CHANGE UP (ref 13–44)
LYMPHOCYTES # BLD AUTO: 24.9 % — SIGNIFICANT CHANGE UP (ref 13–44)
LYMPHOCYTES # BLD AUTO: 29 % — SIGNIFICANT CHANGE UP (ref 13–44)
LYMPHOCYTES # BLD AUTO: 29.3 % — SIGNIFICANT CHANGE UP (ref 13–44)
LYMPHOCYTES # BLD AUTO: 30.5 % — SIGNIFICANT CHANGE UP (ref 13–44)
LYMPHOCYTES # BLD AUTO: 37.3 % — SIGNIFICANT CHANGE UP (ref 13–44)
LYMPHOCYTES # BLD AUTO: 6.1 % — LOW (ref 13–44)
LYMPHOCYTES # BLD AUTO: 6.8 % — LOW (ref 13–44)
LYMPHOCYTES # BLD AUTO: 7.1 % — LOW (ref 13–44)
LYMPHOCYTES # BLD AUTO: 7.1 % — LOW (ref 13–44)
LYMPHOCYTES # BLD AUTO: 8.6 % — LOW (ref 13–44)
LYMPHOCYTES # BLD AUTO: 9.3 % — LOW (ref 13–44)
LYMPHOCYTES # BLD AUTO: 9.5 % — LOW (ref 13–44)
LYMPHOCYTES # BLD AUTO: 9.7 % — LOW (ref 13–44)
M PNEUMO DNA SPEC QL NAA+PROBE: SIGNIFICANT CHANGE UP
MAGNESIUM SERPL-MCNC: 2 MG/DL — SIGNIFICANT CHANGE UP (ref 1.6–2.6)
MAGNESIUM SERPL-MCNC: 2.1 MG/DL — SIGNIFICANT CHANGE UP (ref 1.6–2.6)
MAGNESIUM SERPL-MCNC: 2.1 MG/DL — SIGNIFICANT CHANGE UP (ref 1.6–2.6)
MAGNESIUM SERPL-MCNC: 2.3 MG/DL — SIGNIFICANT CHANGE UP (ref 1.6–2.6)
MAGNESIUM SERPL-MCNC: 2.3 MG/DL — SIGNIFICANT CHANGE UP (ref 1.6–2.6)
MAGNESIUM SERPL-MCNC: 2.5 MG/DL — SIGNIFICANT CHANGE UP (ref 1.6–2.6)
MCHC RBC-ENTMCNC: 27 PG — SIGNIFICANT CHANGE UP (ref 27–34)
MCHC RBC-ENTMCNC: 27.4 PG — SIGNIFICANT CHANGE UP (ref 27–34)
MCHC RBC-ENTMCNC: 27.5 PG — SIGNIFICANT CHANGE UP (ref 27–34)
MCHC RBC-ENTMCNC: 27.6 PG — SIGNIFICANT CHANGE UP (ref 27–34)
MCHC RBC-ENTMCNC: 27.7 PG — SIGNIFICANT CHANGE UP (ref 27–34)
MCHC RBC-ENTMCNC: 27.8 PG — SIGNIFICANT CHANGE UP (ref 27–34)
MCHC RBC-ENTMCNC: 27.8 PG — SIGNIFICANT CHANGE UP (ref 27–34)
MCHC RBC-ENTMCNC: 27.9 PG — SIGNIFICANT CHANGE UP (ref 27–34)
MCHC RBC-ENTMCNC: 28 PG — SIGNIFICANT CHANGE UP (ref 27–34)
MCHC RBC-ENTMCNC: 28.1 PG — SIGNIFICANT CHANGE UP (ref 27–34)
MCHC RBC-ENTMCNC: 28.2 PG — SIGNIFICANT CHANGE UP (ref 27–34)
MCHC RBC-ENTMCNC: 28.3 PG — SIGNIFICANT CHANGE UP (ref 27–34)
MCHC RBC-ENTMCNC: 28.4 PG — SIGNIFICANT CHANGE UP (ref 27–34)
MCHC RBC-ENTMCNC: 28.4 PG — SIGNIFICANT CHANGE UP (ref 27–34)
MCHC RBC-ENTMCNC: 28.5 PG — SIGNIFICANT CHANGE UP (ref 27–34)
MCHC RBC-ENTMCNC: 28.7 PG — SIGNIFICANT CHANGE UP (ref 27–34)
MCHC RBC-ENTMCNC: 28.9 PG — SIGNIFICANT CHANGE UP (ref 27–34)
MCHC RBC-ENTMCNC: 29.7 PG — SIGNIFICANT CHANGE UP (ref 27–34)
MCHC RBC-ENTMCNC: 30.1 G/DL — LOW (ref 32–36)
MCHC RBC-ENTMCNC: 30.2 PG — SIGNIFICANT CHANGE UP (ref 27–34)
MCHC RBC-ENTMCNC: 30.2 PG — SIGNIFICANT CHANGE UP (ref 27–34)
MCHC RBC-ENTMCNC: 30.4 PG — SIGNIFICANT CHANGE UP (ref 27–34)
MCHC RBC-ENTMCNC: 30.6 G/DL — LOW (ref 32–36)
MCHC RBC-ENTMCNC: 30.7 PG — SIGNIFICANT CHANGE UP (ref 27–34)
MCHC RBC-ENTMCNC: 30.8 G/DL — LOW (ref 32–36)
MCHC RBC-ENTMCNC: 30.9 G/DL — LOW (ref 32–36)
MCHC RBC-ENTMCNC: 30.9 GM/DL — LOW (ref 32–36)
MCHC RBC-ENTMCNC: 31.3 GM/DL — LOW (ref 32–36)
MCHC RBC-ENTMCNC: 31.5 PG — SIGNIFICANT CHANGE UP (ref 27–34)
MCHC RBC-ENTMCNC: 31.5 PG — SIGNIFICANT CHANGE UP (ref 27–34)
MCHC RBC-ENTMCNC: 31.6 G/DL — LOW (ref 32–36)
MCHC RBC-ENTMCNC: 31.6 GM/DL — LOW (ref 32–36)
MCHC RBC-ENTMCNC: 31.7 GM/DL — LOW (ref 32–36)
MCHC RBC-ENTMCNC: 31.8 G/DL — LOW (ref 32–36)
MCHC RBC-ENTMCNC: 31.9 G/DL — LOW (ref 32–36)
MCHC RBC-ENTMCNC: 31.9 GM/DL — LOW (ref 32–36)
MCHC RBC-ENTMCNC: 31.9 GM/DL — LOW (ref 32–36)
MCHC RBC-ENTMCNC: 32 G/DL — SIGNIFICANT CHANGE UP (ref 32–36)
MCHC RBC-ENTMCNC: 32 G/DL — SIGNIFICANT CHANGE UP (ref 32–36)
MCHC RBC-ENTMCNC: 32 GM/DL — SIGNIFICANT CHANGE UP (ref 32–36)
MCHC RBC-ENTMCNC: 32 PG — SIGNIFICANT CHANGE UP (ref 27–34)
MCHC RBC-ENTMCNC: 32.1 G/DL — SIGNIFICANT CHANGE UP (ref 32–36)
MCHC RBC-ENTMCNC: 32.2 GM/DL — SIGNIFICANT CHANGE UP (ref 32–36)
MCHC RBC-ENTMCNC: 32.3 G/DL — SIGNIFICANT CHANGE UP (ref 32–36)
MCHC RBC-ENTMCNC: 32.4 G/DL — SIGNIFICANT CHANGE UP (ref 32–36)
MCHC RBC-ENTMCNC: 32.4 GM/DL — SIGNIFICANT CHANGE UP (ref 32–36)
MCHC RBC-ENTMCNC: 32.4 PG — SIGNIFICANT CHANGE UP (ref 27–34)
MCHC RBC-ENTMCNC: 32.5 G/DL — SIGNIFICANT CHANGE UP (ref 32–36)
MCHC RBC-ENTMCNC: 32.6 G/DL — SIGNIFICANT CHANGE UP (ref 32–36)
MCHC RBC-ENTMCNC: 32.7 G/DL — SIGNIFICANT CHANGE UP (ref 32–36)
MCHC RBC-ENTMCNC: 32.8 PG — SIGNIFICANT CHANGE UP (ref 27–34)
MCHC RBC-ENTMCNC: 33.2 G/DL — SIGNIFICANT CHANGE UP (ref 32–36)
MCHC RBC-ENTMCNC: 33.3 G/DL — SIGNIFICANT CHANGE UP (ref 32–36)
MCHC RBC-ENTMCNC: 33.3 G/DL — SIGNIFICANT CHANGE UP (ref 32–36)
MCHC RBC-ENTMCNC: 34.2 G/DL — SIGNIFICANT CHANGE UP (ref 32–36)
MCHC RBC-ENTMCNC: 34.4 G/DL — SIGNIFICANT CHANGE UP (ref 32–36)
MCHC RBC-ENTMCNC: 34.4 G/DL — SIGNIFICANT CHANGE UP (ref 32–36)
MCHC RBC-ENTMCNC: 34.8 G/DL — SIGNIFICANT CHANGE UP (ref 32–36)
MCHC RBC-ENTMCNC: 34.9 G/DL — SIGNIFICANT CHANGE UP (ref 32–36)
MCV RBC AUTO: 84.8 FL — SIGNIFICANT CHANGE UP (ref 80–100)
MCV RBC AUTO: 85.9 FL — SIGNIFICANT CHANGE UP (ref 80–100)
MCV RBC AUTO: 86 FL — SIGNIFICANT CHANGE UP (ref 80–100)
MCV RBC AUTO: 86.3 FL — SIGNIFICANT CHANGE UP (ref 80–100)
MCV RBC AUTO: 86.7 FL — SIGNIFICANT CHANGE UP (ref 80–100)
MCV RBC AUTO: 86.8 FL — SIGNIFICANT CHANGE UP (ref 80–100)
MCV RBC AUTO: 87.1 FL — SIGNIFICANT CHANGE UP (ref 80–100)
MCV RBC AUTO: 87.1 FL — SIGNIFICANT CHANGE UP (ref 80–100)
MCV RBC AUTO: 87.2 FL — SIGNIFICANT CHANGE UP (ref 80–100)
MCV RBC AUTO: 87.8 FL — SIGNIFICANT CHANGE UP (ref 80–100)
MCV RBC AUTO: 87.9 FL — SIGNIFICANT CHANGE UP (ref 80–100)
MCV RBC AUTO: 88.6 FL — SIGNIFICANT CHANGE UP (ref 80–100)
MCV RBC AUTO: 88.8 FL — SIGNIFICANT CHANGE UP (ref 80–100)
MCV RBC AUTO: 88.8 FL — SIGNIFICANT CHANGE UP (ref 80–100)
MCV RBC AUTO: 89.2 FL — SIGNIFICANT CHANGE UP (ref 80–100)
MCV RBC AUTO: 89.7 FL — SIGNIFICANT CHANGE UP (ref 80–100)
MCV RBC AUTO: 90.5 FL — SIGNIFICANT CHANGE UP (ref 80–100)
MCV RBC AUTO: 90.6 FL — SIGNIFICANT CHANGE UP (ref 80–100)
MCV RBC AUTO: 91.1 FL — SIGNIFICANT CHANGE UP (ref 80–100)
MCV RBC AUTO: 91.5 FL — SIGNIFICANT CHANGE UP (ref 80–100)
MCV RBC AUTO: 91.8 FL — SIGNIFICANT CHANGE UP (ref 80–100)
MCV RBC AUTO: 91.9 FL — SIGNIFICANT CHANGE UP (ref 80–100)
MCV RBC AUTO: 92 FL — SIGNIFICANT CHANGE UP (ref 80–100)
MCV RBC AUTO: 92.5 FL — SIGNIFICANT CHANGE UP (ref 80–100)
MCV RBC AUTO: 92.7 FL — SIGNIFICANT CHANGE UP (ref 80–100)
MCV RBC AUTO: 93 FL — SIGNIFICANT CHANGE UP (ref 80–100)
MCV RBC AUTO: 93.1 FL — SIGNIFICANT CHANGE UP (ref 80–100)
MCV RBC AUTO: 93.2 FL — SIGNIFICANT CHANGE UP (ref 80–100)
MCV RBC AUTO: 94.5 FL — SIGNIFICANT CHANGE UP (ref 80–100)
MCV RBC AUTO: 95.2 FL — SIGNIFICANT CHANGE UP (ref 80–100)
MONOCYTES # BLD AUTO: 0 K/UL — SIGNIFICANT CHANGE UP (ref 0–0.9)
MONOCYTES # BLD AUTO: 0.43 K/UL — SIGNIFICANT CHANGE UP (ref 0–0.9)
MONOCYTES # BLD AUTO: 0.43 K/UL — SIGNIFICANT CHANGE UP (ref 0–0.9)
MONOCYTES # BLD AUTO: 0.44 K/UL — SIGNIFICANT CHANGE UP (ref 0–0.9)
MONOCYTES # BLD AUTO: 0.53 K/UL — SIGNIFICANT CHANGE UP (ref 0–0.9)
MONOCYTES # BLD AUTO: 0.53 K/UL — SIGNIFICANT CHANGE UP (ref 0–0.9)
MONOCYTES # BLD AUTO: 0.63 K/UL — SIGNIFICANT CHANGE UP (ref 0–0.9)
MONOCYTES # BLD AUTO: 0.67 K/UL — SIGNIFICANT CHANGE UP (ref 0–0.9)
MONOCYTES # BLD AUTO: 0.74 K/UL — SIGNIFICANT CHANGE UP (ref 0–0.9)
MONOCYTES # BLD AUTO: 0.75 K/UL — SIGNIFICANT CHANGE UP (ref 0–0.9)
MONOCYTES # BLD AUTO: 0.77 K/UL — SIGNIFICANT CHANGE UP (ref 0–0.9)
MONOCYTES # BLD AUTO: 0.8 K/UL — SIGNIFICANT CHANGE UP (ref 0–0.9)
MONOCYTES # BLD AUTO: 0.84 K/UL — SIGNIFICANT CHANGE UP (ref 0–0.9)
MONOCYTES # BLD AUTO: 0.9 K/UL — SIGNIFICANT CHANGE UP (ref 0–0.9)
MONOCYTES # BLD AUTO: 0.9 K/UL — SIGNIFICANT CHANGE UP (ref 0–0.9)
MONOCYTES # BLD AUTO: 0.94 K/UL — HIGH (ref 0–0.9)
MONOCYTES # BLD AUTO: 0.96 K/UL — HIGH (ref 0–0.9)
MONOCYTES # BLD AUTO: 1 K/UL — HIGH (ref 0–0.9)
MONOCYTES # BLD AUTO: 1.02 K/UL — HIGH (ref 0–0.9)
MONOCYTES # BLD AUTO: 1.05 K/UL — HIGH (ref 0–0.9)
MONOCYTES # BLD AUTO: 1.06 K/UL — HIGH (ref 0–0.9)
MONOCYTES # BLD AUTO: 1.08 K/UL — HIGH (ref 0–0.9)
MONOCYTES # BLD AUTO: 1.08 K/UL — HIGH (ref 0–0.9)
MONOCYTES # BLD AUTO: 1.1 K/UL — HIGH (ref 0–0.9)
MONOCYTES # BLD AUTO: 1.32 K/UL — HIGH (ref 0–0.9)
MONOCYTES # BLD AUTO: 1.34 K/UL — HIGH (ref 0–0.9)
MONOCYTES # BLD AUTO: 1.44 K/UL — HIGH (ref 0–0.9)
MONOCYTES # BLD AUTO: 1.44 K/UL — HIGH (ref 0–0.9)
MONOCYTES # BLD AUTO: 1.48 K/UL — HIGH (ref 0–0.9)
MONOCYTES NFR BLD AUTO: 0 % — LOW (ref 2–14)
MONOCYTES NFR BLD AUTO: 10 % — SIGNIFICANT CHANGE UP (ref 2–14)
MONOCYTES NFR BLD AUTO: 10.8 % — SIGNIFICANT CHANGE UP (ref 2–14)
MONOCYTES NFR BLD AUTO: 10.9 % — SIGNIFICANT CHANGE UP (ref 2–14)
MONOCYTES NFR BLD AUTO: 12.5 % — SIGNIFICANT CHANGE UP (ref 2–14)
MONOCYTES NFR BLD AUTO: 13 % — SIGNIFICANT CHANGE UP (ref 2–14)
MONOCYTES NFR BLD AUTO: 13.5 % — SIGNIFICANT CHANGE UP (ref 2–14)
MONOCYTES NFR BLD AUTO: 15.1 % — HIGH (ref 2–14)
MONOCYTES NFR BLD AUTO: 2.6 % — SIGNIFICANT CHANGE UP (ref 2–14)
MONOCYTES NFR BLD AUTO: 4.7 % — SIGNIFICANT CHANGE UP (ref 2–14)
MONOCYTES NFR BLD AUTO: 6.1 % — SIGNIFICANT CHANGE UP (ref 2–14)
MONOCYTES NFR BLD AUTO: 6.7 % — SIGNIFICANT CHANGE UP (ref 2–14)
MONOCYTES NFR BLD AUTO: 7.1 % — SIGNIFICANT CHANGE UP (ref 2–14)
MONOCYTES NFR BLD AUTO: 7.6 % — SIGNIFICANT CHANGE UP (ref 2–14)
MONOCYTES NFR BLD AUTO: 7.7 % — SIGNIFICANT CHANGE UP (ref 2–14)
MONOCYTES NFR BLD AUTO: 7.8 % — SIGNIFICANT CHANGE UP (ref 2–14)
MONOCYTES NFR BLD AUTO: 7.8 % — SIGNIFICANT CHANGE UP (ref 2–14)
MONOCYTES NFR BLD AUTO: 7.9 % — SIGNIFICANT CHANGE UP (ref 2–14)
MONOCYTES NFR BLD AUTO: 7.9 % — SIGNIFICANT CHANGE UP (ref 2–14)
MONOCYTES NFR BLD AUTO: 8.2 % — SIGNIFICANT CHANGE UP (ref 2–14)
MONOCYTES NFR BLD AUTO: 8.3 % — SIGNIFICANT CHANGE UP (ref 2–14)
MONOCYTES NFR BLD AUTO: 8.3 % — SIGNIFICANT CHANGE UP (ref 2–14)
MONOCYTES NFR BLD AUTO: 8.6 % — SIGNIFICANT CHANGE UP (ref 2–14)
MONOCYTES NFR BLD AUTO: 8.7 % — SIGNIFICANT CHANGE UP (ref 2–14)
MONOCYTES NFR BLD AUTO: 9.5 % — SIGNIFICANT CHANGE UP (ref 2–14)
MONOCYTES NFR BLD AUTO: 9.9 % — SIGNIFICANT CHANGE UP (ref 2–14)
MONOCYTES NFR BLD AUTO: 9.9 % — SIGNIFICANT CHANGE UP (ref 2–14)
NEUTROPHILS # BLD AUTO: 1.96 K/UL — SIGNIFICANT CHANGE UP (ref 1.8–7.4)
NEUTROPHILS # BLD AUTO: 11.03 K/UL — HIGH (ref 1.8–7.4)
NEUTROPHILS # BLD AUTO: 11.27 K/UL — HIGH (ref 1.8–7.4)
NEUTROPHILS # BLD AUTO: 11.6 K/UL — HIGH (ref 1.8–7.4)
NEUTROPHILS # BLD AUTO: 12.76 K/UL — HIGH (ref 1.8–7.4)
NEUTROPHILS # BLD AUTO: 13.73 K/UL — HIGH (ref 1.8–7.4)
NEUTROPHILS # BLD AUTO: 14.33 K/UL — HIGH (ref 1.8–7.4)
NEUTROPHILS # BLD AUTO: 15.04 K/UL — HIGH (ref 1.8–7.4)
NEUTROPHILS # BLD AUTO: 15.61 K/UL — HIGH (ref 1.8–7.4)
NEUTROPHILS # BLD AUTO: 2.95 K/UL — SIGNIFICANT CHANGE UP (ref 1.8–7.4)
NEUTROPHILS # BLD AUTO: 22.96 K/UL — HIGH (ref 1.8–7.4)
NEUTROPHILS # BLD AUTO: 3.29 K/UL — SIGNIFICANT CHANGE UP (ref 1.8–7.4)
NEUTROPHILS # BLD AUTO: 3.35 K/UL — SIGNIFICANT CHANGE UP (ref 1.8–7.4)
NEUTROPHILS # BLD AUTO: 3.65 K/UL — SIGNIFICANT CHANGE UP (ref 1.8–7.4)
NEUTROPHILS # BLD AUTO: 4.4 K/UL — SIGNIFICANT CHANGE UP (ref 1.8–7.4)
NEUTROPHILS # BLD AUTO: 5.08 K/UL — SIGNIFICANT CHANGE UP (ref 1.8–7.4)
NEUTROPHILS # BLD AUTO: 5.42 K/UL — SIGNIFICANT CHANGE UP (ref 1.8–7.4)
NEUTROPHILS # BLD AUTO: 6.05 K/UL — SIGNIFICANT CHANGE UP (ref 1.8–7.4)
NEUTROPHILS # BLD AUTO: 6.29 K/UL — SIGNIFICANT CHANGE UP (ref 1.8–7.4)
NEUTROPHILS # BLD AUTO: 6.57 K/UL — SIGNIFICANT CHANGE UP (ref 1.8–7.4)
NEUTROPHILS # BLD AUTO: 7.16 K/UL — SIGNIFICANT CHANGE UP (ref 1.8–7.4)
NEUTROPHILS # BLD AUTO: 7.17 K/UL — SIGNIFICANT CHANGE UP (ref 1.8–7.4)
NEUTROPHILS # BLD AUTO: 7.8 K/UL — HIGH (ref 1.8–7.4)
NEUTROPHILS # BLD AUTO: 7.92 K/UL — HIGH (ref 1.8–7.4)
NEUTROPHILS # BLD AUTO: 8.51 K/UL — HIGH (ref 1.8–7.4)
NEUTROPHILS # BLD AUTO: 8.58 K/UL — HIGH (ref 1.8–7.4)
NEUTROPHILS # BLD AUTO: 8.59 K/UL — HIGH (ref 1.8–7.4)
NEUTROPHILS # BLD AUTO: 9.75 K/UL — HIGH (ref 1.8–7.4)
NEUTROPHILS # BLD AUTO: 9.91 K/UL — HIGH (ref 1.8–7.4)
NEUTROPHILS NFR BLD AUTO: 49.7 % — SIGNIFICANT CHANGE UP (ref 43–77)
NEUTROPHILS NFR BLD AUTO: 53.7 % — SIGNIFICANT CHANGE UP (ref 43–77)
NEUTROPHILS NFR BLD AUTO: 55 % — SIGNIFICANT CHANGE UP (ref 43–77)
NEUTROPHILS NFR BLD AUTO: 59.7 % — SIGNIFICANT CHANGE UP (ref 43–77)
NEUTROPHILS NFR BLD AUTO: 61.8 % — SIGNIFICANT CHANGE UP (ref 43–77)
NEUTROPHILS NFR BLD AUTO: 65 % — SIGNIFICANT CHANGE UP (ref 43–77)
NEUTROPHILS NFR BLD AUTO: 65.7 % — SIGNIFICANT CHANGE UP (ref 43–77)
NEUTROPHILS NFR BLD AUTO: 66.6 % — SIGNIFICANT CHANGE UP (ref 43–77)
NEUTROPHILS NFR BLD AUTO: 67.3 % — SIGNIFICANT CHANGE UP (ref 43–77)
NEUTROPHILS NFR BLD AUTO: 70.7 % — SIGNIFICANT CHANGE UP (ref 43–77)
NEUTROPHILS NFR BLD AUTO: 71.1 % — SIGNIFICANT CHANGE UP (ref 43–77)
NEUTROPHILS NFR BLD AUTO: 73.4 % — SIGNIFICANT CHANGE UP (ref 43–77)
NEUTROPHILS NFR BLD AUTO: 73.6 % — SIGNIFICANT CHANGE UP (ref 43–77)
NEUTROPHILS NFR BLD AUTO: 73.9 % — SIGNIFICANT CHANGE UP (ref 43–77)
NEUTROPHILS NFR BLD AUTO: 74.3 % — SIGNIFICANT CHANGE UP (ref 43–77)
NEUTROPHILS NFR BLD AUTO: 75.8 % — SIGNIFICANT CHANGE UP (ref 43–77)
NEUTROPHILS NFR BLD AUTO: 77.7 % — HIGH (ref 43–77)
NEUTROPHILS NFR BLD AUTO: 78.1 % — HIGH (ref 43–77)
NEUTROPHILS NFR BLD AUTO: 78.6 % — HIGH (ref 43–77)
NEUTROPHILS NFR BLD AUTO: 78.7 % — HIGH (ref 43–77)
NEUTROPHILS NFR BLD AUTO: 80.4 % — HIGH (ref 43–77)
NEUTROPHILS NFR BLD AUTO: 81.5 % — HIGH (ref 43–77)
NEUTROPHILS NFR BLD AUTO: 82.8 % — HIGH (ref 43–77)
NEUTROPHILS NFR BLD AUTO: 83.8 % — HIGH (ref 43–77)
NEUTROPHILS NFR BLD AUTO: 84.1 % — HIGH (ref 43–77)
NEUTROPHILS NFR BLD AUTO: 84.4 % — HIGH (ref 43–77)
NEUTROPHILS NFR BLD AUTO: 84.9 % — HIGH (ref 43–77)
NEUTROPHILS NFR BLD AUTO: 87 % — HIGH (ref 43–77)
NEUTROPHILS NFR BLD AUTO: 98 % — HIGH (ref 43–77)
NITRITE URINE: NEGATIVE
NRBC # BLD: 0 /100 WBCS — SIGNIFICANT CHANGE UP
NRBC # BLD: 0 /100 WBCS — SIGNIFICANT CHANGE UP (ref 0–0)
NRBC # BLD: 0 /100 — SIGNIFICANT CHANGE UP (ref 0–0)
NRBC # BLD: 1 /100 — HIGH (ref 0–0)
NRBC # BLD: SIGNIFICANT CHANGE UP /100 WBCS (ref 0–0)
NRBC # BLD: SIGNIFICANT CHANGE UP /100 WBCS (ref 0–0)
NRBC # FLD: 0 K/UL — SIGNIFICANT CHANGE UP
PH URINE: 7.5
PHOSPHATE SERPL-MCNC: 2 MG/DL — LOW (ref 2.5–4.5)
PHOSPHATE SERPL-MCNC: 2.1 MG/DL — LOW (ref 2.5–4.5)
PHOSPHATE SERPL-MCNC: 2.4 MG/DL — LOW (ref 2.5–4.5)
PHOSPHATE SERPL-MCNC: 2.5 MG/DL — SIGNIFICANT CHANGE UP (ref 2.5–4.5)
PHOSPHATE SERPL-MCNC: 2.7 MG/DL — SIGNIFICANT CHANGE UP (ref 2.5–4.5)
PHOSPHATE SERPL-MCNC: 2.9 MG/DL — SIGNIFICANT CHANGE UP (ref 2.5–4.5)
PHOSPHATE SERPL-MCNC: 3.2 MG/DL — SIGNIFICANT CHANGE UP (ref 2.5–4.5)
PHOSPHATE SERPL-MCNC: 3.9 MG/DL — SIGNIFICANT CHANGE UP (ref 2.5–4.5)
PLAT MORPH BLD: NORMAL — SIGNIFICANT CHANGE UP
PLAT MORPH BLD: NORMAL — SIGNIFICANT CHANGE UP
PLATELET # BLD AUTO: 105 K/UL — LOW (ref 150–400)
PLATELET # BLD AUTO: 123 K/UL — LOW (ref 150–400)
PLATELET # BLD AUTO: 159 K/UL — SIGNIFICANT CHANGE UP (ref 150–400)
PLATELET # BLD AUTO: 162 K/UL — SIGNIFICANT CHANGE UP (ref 150–400)
PLATELET # BLD AUTO: 175 K/UL — SIGNIFICANT CHANGE UP (ref 150–400)
PLATELET # BLD AUTO: 222 K/UL — SIGNIFICANT CHANGE UP (ref 150–400)
PLATELET # BLD AUTO: 234 K/UL — SIGNIFICANT CHANGE UP (ref 150–400)
PLATELET # BLD AUTO: 244 K/UL — SIGNIFICANT CHANGE UP (ref 150–400)
PLATELET # BLD AUTO: 248 K/UL — SIGNIFICANT CHANGE UP (ref 150–400)
PLATELET # BLD AUTO: 266 K/UL — SIGNIFICANT CHANGE UP (ref 150–400)
PLATELET # BLD AUTO: 272 K/UL — SIGNIFICANT CHANGE UP (ref 150–400)
PLATELET # BLD AUTO: 275 K/UL — SIGNIFICANT CHANGE UP (ref 150–400)
PLATELET # BLD AUTO: 278 K/UL — SIGNIFICANT CHANGE UP (ref 150–400)
PLATELET # BLD AUTO: 278 K/UL — SIGNIFICANT CHANGE UP (ref 150–400)
PLATELET # BLD AUTO: 279 K/UL — SIGNIFICANT CHANGE UP (ref 150–400)
PLATELET # BLD AUTO: 297 K/UL — SIGNIFICANT CHANGE UP (ref 150–400)
PLATELET # BLD AUTO: 309 K/UL — SIGNIFICANT CHANGE UP (ref 150–400)
PLATELET # BLD AUTO: 318 K/UL — SIGNIFICANT CHANGE UP (ref 150–400)
PLATELET # BLD AUTO: 319 K/UL — SIGNIFICANT CHANGE UP (ref 150–400)
PLATELET # BLD AUTO: 323 K/UL — SIGNIFICANT CHANGE UP (ref 150–400)
PLATELET # BLD AUTO: 327 K/UL — SIGNIFICANT CHANGE UP (ref 150–400)
PLATELET # BLD AUTO: 329 K/UL — SIGNIFICANT CHANGE UP (ref 150–400)
PLATELET # BLD AUTO: 332 K/UL — SIGNIFICANT CHANGE UP (ref 150–400)
PLATELET # BLD AUTO: 334 K/UL — SIGNIFICANT CHANGE UP (ref 150–400)
PLATELET # BLD AUTO: 338 K/UL — SIGNIFICANT CHANGE UP (ref 150–400)
PLATELET # BLD AUTO: 342 K/UL — SIGNIFICANT CHANGE UP (ref 150–400)
PLATELET # BLD AUTO: 355 K/UL — SIGNIFICANT CHANGE UP (ref 150–400)
PLATELET # BLD AUTO: 402 K/UL — HIGH (ref 150–400)
PLATELET # BLD AUTO: 415 K/UL — HIGH (ref 150–400)
PLATELET # BLD AUTO: 496 K/UL — HIGH (ref 150–400)
POTASSIUM SERPL-MCNC: 3.5 MMOL/L — SIGNIFICANT CHANGE UP (ref 3.5–5.3)
POTASSIUM SERPL-MCNC: 3.6 MMOL/L — SIGNIFICANT CHANGE UP (ref 3.5–5.3)
POTASSIUM SERPL-MCNC: 3.7 MMOL/L — SIGNIFICANT CHANGE UP (ref 3.5–5.3)
POTASSIUM SERPL-MCNC: 3.8 MMOL/L — SIGNIFICANT CHANGE UP (ref 3.5–5.3)
POTASSIUM SERPL-MCNC: 3.8 MMOL/L — SIGNIFICANT CHANGE UP (ref 3.5–5.3)
POTASSIUM SERPL-MCNC: 4.2 MMOL/L — SIGNIFICANT CHANGE UP (ref 3.5–5.3)
POTASSIUM SERPL-MCNC: 4.2 MMOL/L — SIGNIFICANT CHANGE UP (ref 3.5–5.3)
POTASSIUM SERPL-SCNC: 3.5 MMOL/L — SIGNIFICANT CHANGE UP (ref 3.5–5.3)
POTASSIUM SERPL-SCNC: 3.6 MMOL/L — SIGNIFICANT CHANGE UP (ref 3.5–5.3)
POTASSIUM SERPL-SCNC: 3.7 MMOL/L — SIGNIFICANT CHANGE UP (ref 3.5–5.3)
POTASSIUM SERPL-SCNC: 3.8 MMOL/L — SIGNIFICANT CHANGE UP (ref 3.5–5.3)
POTASSIUM SERPL-SCNC: 3.8 MMOL/L — SIGNIFICANT CHANGE UP (ref 3.5–5.3)
POTASSIUM SERPL-SCNC: 3.9 MMOL/L
POTASSIUM SERPL-SCNC: 4.2 MMOL/L — SIGNIFICANT CHANGE UP (ref 3.5–5.3)
POTASSIUM SERPL-SCNC: 4.2 MMOL/L — SIGNIFICANT CHANGE UP (ref 3.5–5.3)
PROT SERPL-MCNC: 6 G/DL — SIGNIFICANT CHANGE UP (ref 6–8.3)
PROT SERPL-MCNC: 6.5 G/DL — SIGNIFICANT CHANGE UP (ref 6–8.3)
PROT SERPL-MCNC: 6.7 G/DL — SIGNIFICANT CHANGE UP (ref 6–8.3)
PROT SERPL-MCNC: 7 G/DL
PROTEIN URINE: ABNORMAL
PROTHROM AB SERPL-ACNC: 14.1 SEC — HIGH (ref 10.6–13.6)
RAPID RVP RESULT: SIGNIFICANT CHANGE UP
RBC # BLD: 2.35 M/UL — LOW (ref 3.8–5.2)
RBC # BLD: 2.4 M/UL — LOW (ref 3.8–5.2)
RBC # BLD: 2.53 M/UL — LOW (ref 3.8–5.2)
RBC # BLD: 2.55 M/UL — LOW (ref 3.8–5.2)
RBC # BLD: 2.57 M/UL — LOW (ref 3.8–5.2)
RBC # BLD: 2.57 M/UL — LOW (ref 3.8–5.2)
RBC # BLD: 2.58 M/UL — LOW (ref 3.8–5.2)
RBC # BLD: 2.58 M/UL — LOW (ref 3.8–5.2)
RBC # BLD: 2.62 M/UL — LOW (ref 3.8–5.2)
RBC # BLD: 2.77 M/UL — LOW (ref 3.8–5.2)
RBC # BLD: 2.79 M/UL — LOW (ref 3.8–5.2)
RBC # BLD: 2.79 M/UL — LOW (ref 3.8–5.2)
RBC # BLD: 2.8 M/UL — LOW (ref 3.8–5.2)
RBC # BLD: 2.82 M/UL — LOW (ref 3.8–5.2)
RBC # BLD: 2.85 M/UL — LOW (ref 3.8–5.2)
RBC # BLD: 2.86 M/UL — LOW (ref 3.8–5.2)
RBC # BLD: 2.92 M/UL — LOW (ref 3.8–5.2)
RBC # BLD: 2.93 M/UL — LOW (ref 3.8–5.2)
RBC # BLD: 2.95 M/UL — LOW (ref 3.8–5.2)
RBC # BLD: 2.96 M/UL — LOW (ref 3.8–5.2)
RBC # BLD: 2.98 M/UL — LOW (ref 3.8–5.2)
RBC # BLD: 2.98 M/UL — LOW (ref 3.8–5.2)
RBC # BLD: 3.01 M/UL — LOW (ref 3.8–5.2)
RBC # BLD: 3.03 M/UL — LOW (ref 3.8–5.2)
RBC # BLD: 3.05 M/UL — LOW (ref 3.8–5.2)
RBC # BLD: 3.06 M/UL — LOW (ref 3.8–5.2)
RBC # BLD: 3.24 M/UL — LOW (ref 3.8–5.2)
RBC # BLD: 3.31 M/UL — LOW (ref 3.8–5.2)
RBC # BLD: 3.49 M/UL — LOW (ref 3.8–5.2)
RBC # BLD: 3.54 M/UL — LOW (ref 3.8–5.2)
RBC # BLD: 3.64 M/UL — LOW (ref 3.8–5.2)
RBC # BLD: 3.82 M/UL — SIGNIFICANT CHANGE UP (ref 3.8–5.2)
RBC # FLD: 12.1 % — SIGNIFICANT CHANGE UP (ref 10.3–14.5)
RBC # FLD: 12.7 % — SIGNIFICANT CHANGE UP (ref 10.3–14.5)
RBC # FLD: 12.7 % — SIGNIFICANT CHANGE UP (ref 10.3–14.5)
RBC # FLD: 13 % — SIGNIFICANT CHANGE UP (ref 10.3–14.5)
RBC # FLD: 13.1 % — SIGNIFICANT CHANGE UP (ref 10.3–14.5)
RBC # FLD: 13.9 % — SIGNIFICANT CHANGE UP (ref 10.3–14.5)
RBC # FLD: 14.4 % — SIGNIFICANT CHANGE UP (ref 10.3–14.5)
RBC # FLD: 15.4 % — HIGH (ref 10.3–14.5)
RBC # FLD: 15.6 % — HIGH (ref 10.3–14.5)
RBC # FLD: 15.7 % — HIGH (ref 10.3–14.5)
RBC # FLD: 15.8 % — HIGH (ref 10.3–14.5)
RBC # FLD: 15.8 % — HIGH (ref 10.3–14.5)
RBC # FLD: 15.9 % — HIGH (ref 10.3–14.5)
RBC # FLD: 16.1 % — HIGH (ref 10.3–14.5)
RBC # FLD: 16.2 % — HIGH (ref 10.3–14.5)
RBC # FLD: 16.2 % — HIGH (ref 10.3–14.5)
RBC # FLD: 16.3 % — HIGH (ref 10.3–14.5)
RBC # FLD: 16.4 % — HIGH (ref 10.3–14.5)
RBC # FLD: 16.4 % — HIGH (ref 10.3–14.5)
RBC # FLD: 16.5 % — HIGH (ref 10.3–14.5)
RBC # FLD: 16.6 % — HIGH (ref 10.3–14.5)
RBC # FLD: 16.7 % — HIGH (ref 10.3–14.5)
RBC # FLD: 16.8 % — HIGH (ref 10.3–14.5)
RBC # FLD: 17.2 % — HIGH (ref 10.3–14.5)
RBC # FLD: 18.3 % — HIGH (ref 10.3–14.5)
RBC # FLD: 18.5 % — HIGH (ref 10.3–14.5)
RBC # FLD: 20.3 % — HIGH (ref 10.3–14.5)
RBC # FLD: 20.7 % — HIGH (ref 10.3–14.5)
RBC BLD AUTO: SIGNIFICANT CHANGE UP
RBC BLD AUTO: SIGNIFICANT CHANGE UP
RH IG SCN BLD-IMP: POSITIVE — SIGNIFICANT CHANGE UP
RSV RNA SPEC QL NAA+PROBE: SIGNIFICANT CHANGE UP
RV+EV RNA SPEC QL NAA+PROBE: SIGNIFICANT CHANGE UP
SARS-COV-2 IGG+IGM SERPL QL IA: 1.83 U/ML — HIGH
SARS-COV-2 IGG+IGM SERPL QL IA: POSITIVE
SARS-COV-2 N GENE NPH QL NAA+PROBE: NOT DETECTED
SARS-COV-2 RNA SPEC QL NAA+PROBE: SIGNIFICANT CHANGE UP
SARS-COV-2 RNA SPEC QL NAA+PROBE: SIGNIFICANT CHANGE UP
SODIUM SERPL-SCNC: 130 MMOL/L — LOW (ref 135–145)
SODIUM SERPL-SCNC: 133 MMOL/L — LOW (ref 135–145)
SODIUM SERPL-SCNC: 137 MMOL/L — SIGNIFICANT CHANGE UP (ref 135–145)
SODIUM SERPL-SCNC: 137 MMOL/L — SIGNIFICANT CHANGE UP (ref 135–145)
SODIUM SERPL-SCNC: 138 MMOL/L
SODIUM SERPL-SCNC: 138 MMOL/L — SIGNIFICANT CHANGE UP (ref 135–145)
SODIUM SERPL-SCNC: 138 MMOL/L — SIGNIFICANT CHANGE UP (ref 135–145)
SODIUM SERPL-SCNC: 139 MMOL/L — SIGNIFICANT CHANGE UP (ref 135–145)
SODIUM SERPL-SCNC: 141 MMOL/L — SIGNIFICANT CHANGE UP (ref 135–145)
SODIUM SERPL-SCNC: 142 MMOL/L — SIGNIFICANT CHANGE UP (ref 135–145)
SODIUM UR-SCNC: 30 MMOL/L — SIGNIFICANT CHANGE UP
SPECIFIC GRAVITY URINE: 1.03
SPECIMEN SOURCE: SIGNIFICANT CHANGE UP
TSH SERPL-ACNC: 0.73 UIU/ML
UROBILINOGEN URINE: NORMAL
VIT B12 SERPL-MCNC: >2000 PG/ML
WBC # BLD: 10.08 K/UL — SIGNIFICANT CHANGE UP (ref 3.8–10.5)
WBC # BLD: 10.59 K/UL — HIGH (ref 3.8–10.5)
WBC # BLD: 10.95 K/UL — HIGH (ref 3.8–10.5)
WBC # BLD: 11 K/UL — HIGH (ref 3.8–10.5)
WBC # BLD: 11.21 K/UL — HIGH (ref 3.8–10.5)
WBC # BLD: 11.26 K/UL — HIGH (ref 3.8–10.5)
WBC # BLD: 11.55 K/UL — HIGH (ref 3.8–10.5)
WBC # BLD: 12.16 K/UL — HIGH (ref 3.8–10.5)
WBC # BLD: 14 K/UL — HIGH (ref 3.8–10.5)
WBC # BLD: 14.88 K/UL — HIGH (ref 3.8–10.5)
WBC # BLD: 16.16 K/UL — HIGH (ref 3.8–10.5)
WBC # BLD: 16.23 K/UL — HIGH (ref 3.8–10.5)
WBC # BLD: 17.12 K/UL — HIGH (ref 3.8–10.5)
WBC # BLD: 17.51 K/UL — HIGH (ref 3.8–10.5)
WBC # BLD: 18.05 K/UL — HIGH (ref 3.8–10.5)
WBC # BLD: 18.54 K/UL — HIGH (ref 3.8–10.5)
WBC # BLD: 18.72 K/UL — HIGH (ref 3.8–10.5)
WBC # BLD: 21.14 K/UL — HIGH (ref 3.8–10.5)
WBC # BLD: 25.89 K/UL — HIGH (ref 3.8–10.5)
WBC # BLD: 3.94 K/UL — SIGNIFICANT CHANGE UP (ref 3.8–10.5)
WBC # BLD: 5.42 K/UL — SIGNIFICANT CHANGE UP (ref 3.8–10.5)
WBC # BLD: 5.5 K/UL — SIGNIFICANT CHANGE UP (ref 3.8–10.5)
WBC # BLD: 5.62 K/UL — SIGNIFICANT CHANGE UP (ref 3.8–10.5)
WBC # BLD: 5.98 K/UL — SIGNIFICANT CHANGE UP (ref 3.8–10.5)
WBC # BLD: 6.71 K/UL — SIGNIFICANT CHANGE UP (ref 3.8–10.5)
WBC # BLD: 7.82 K/UL — SIGNIFICANT CHANGE UP (ref 3.8–10.5)
WBC # BLD: 8.77 K/UL — SIGNIFICANT CHANGE UP (ref 3.8–10.5)
WBC # BLD: 8.9 K/UL — SIGNIFICANT CHANGE UP (ref 3.8–10.5)
WBC # BLD: 9.09 K/UL — SIGNIFICANT CHANGE UP (ref 3.8–10.5)
WBC # BLD: 9.26 K/UL — SIGNIFICANT CHANGE UP (ref 3.8–10.5)
WBC # BLD: 9.69 K/UL — SIGNIFICANT CHANGE UP (ref 3.8–10.5)
WBC # BLD: 9.77 K/UL — SIGNIFICANT CHANGE UP (ref 3.8–10.5)
WBC # FLD AUTO: 10.08 K/UL — SIGNIFICANT CHANGE UP (ref 3.8–10.5)
WBC # FLD AUTO: 10.59 K/UL — HIGH (ref 3.8–10.5)
WBC # FLD AUTO: 10.95 K/UL — HIGH (ref 3.8–10.5)
WBC # FLD AUTO: 11 K/UL — HIGH (ref 3.8–10.5)
WBC # FLD AUTO: 11.21 K/UL — HIGH (ref 3.8–10.5)
WBC # FLD AUTO: 11.26 K/UL — HIGH (ref 3.8–10.5)
WBC # FLD AUTO: 11.55 K/UL — HIGH (ref 3.8–10.5)
WBC # FLD AUTO: 12.16 K/UL — HIGH (ref 3.8–10.5)
WBC # FLD AUTO: 14 K/UL — HIGH (ref 3.8–10.5)
WBC # FLD AUTO: 14.88 K/UL — HIGH (ref 3.8–10.5)
WBC # FLD AUTO: 16.16 K/UL — HIGH (ref 3.8–10.5)
WBC # FLD AUTO: 16.23 K/UL — HIGH (ref 3.8–10.5)
WBC # FLD AUTO: 17.12 K/UL — HIGH (ref 3.8–10.5)
WBC # FLD AUTO: 17.51 K/UL — HIGH (ref 3.8–10.5)
WBC # FLD AUTO: 18.05 K/UL — HIGH (ref 3.8–10.5)
WBC # FLD AUTO: 18.54 K/UL — HIGH (ref 3.8–10.5)
WBC # FLD AUTO: 18.72 K/UL — HIGH (ref 3.8–10.5)
WBC # FLD AUTO: 21.14 K/UL — HIGH (ref 3.8–10.5)
WBC # FLD AUTO: 25.89 K/UL — HIGH (ref 3.8–10.5)
WBC # FLD AUTO: 3.94 K/UL — SIGNIFICANT CHANGE UP (ref 3.8–10.5)
WBC # FLD AUTO: 5.42 K/UL — SIGNIFICANT CHANGE UP (ref 3.8–10.5)
WBC # FLD AUTO: 5.5 K/UL — SIGNIFICANT CHANGE UP (ref 3.8–10.5)
WBC # FLD AUTO: 5.62 K/UL — SIGNIFICANT CHANGE UP (ref 3.8–10.5)
WBC # FLD AUTO: 5.98 K/UL — SIGNIFICANT CHANGE UP (ref 3.8–10.5)
WBC # FLD AUTO: 6.71 K/UL — SIGNIFICANT CHANGE UP (ref 3.8–10.5)
WBC # FLD AUTO: 7.82 K/UL — SIGNIFICANT CHANGE UP (ref 3.8–10.5)
WBC # FLD AUTO: 8.77 K/UL — SIGNIFICANT CHANGE UP (ref 3.8–10.5)
WBC # FLD AUTO: 8.9 K/UL — SIGNIFICANT CHANGE UP (ref 3.8–10.5)
WBC # FLD AUTO: 9.09 K/UL — SIGNIFICANT CHANGE UP (ref 3.8–10.5)
WBC # FLD AUTO: 9.26 K/UL — SIGNIFICANT CHANGE UP (ref 3.8–10.5)
WBC # FLD AUTO: 9.69 K/UL — SIGNIFICANT CHANGE UP (ref 3.8–10.5)
WBC # FLD AUTO: 9.77 K/UL — SIGNIFICANT CHANGE UP (ref 3.8–10.5)

## 2021-01-01 PROCEDURE — 74177 CT ABD & PELVIS W/CONTRAST: CPT

## 2021-01-01 PROCEDURE — 99072 ADDL SUPL MATRL&STAF TM PHE: CPT

## 2021-01-01 PROCEDURE — 86901 BLOOD TYPING SEROLOGIC RH(D): CPT

## 2021-01-01 PROCEDURE — 99214 OFFICE O/P EST MOD 30 MIN: CPT

## 2021-01-01 PROCEDURE — 99223 1ST HOSP IP/OBS HIGH 75: CPT

## 2021-01-01 PROCEDURE — 86923 COMPATIBILITY TEST ELECTRIC: CPT

## 2021-01-01 PROCEDURE — 99233 SBSQ HOSP IP/OBS HIGH 50: CPT

## 2021-01-01 PROCEDURE — 99233 SBSQ HOSP IP/OBS HIGH 50: CPT | Mod: GC

## 2021-01-01 PROCEDURE — 74177 CT ABD & PELVIS W/CONTRAST: CPT | Mod: 26

## 2021-01-01 PROCEDURE — 99497 ADVNCD CARE PLAN 30 MIN: CPT | Mod: 25

## 2021-01-01 PROCEDURE — 12345: CPT | Mod: NC

## 2021-01-01 PROCEDURE — 99222 1ST HOSP IP/OBS MODERATE 55: CPT

## 2021-01-01 PROCEDURE — 99232 SBSQ HOSP IP/OBS MODERATE 35: CPT

## 2021-01-01 PROCEDURE — 99232 SBSQ HOSP IP/OBS MODERATE 35: CPT | Mod: GC

## 2021-01-01 PROCEDURE — 99215 OFFICE O/P EST HI 40 MIN: CPT

## 2021-01-01 PROCEDURE — 74177 CT ABD & PELVIS W/CONTRAST: CPT | Mod: 26,MA

## 2021-01-01 PROCEDURE — 88360 TUMOR IMMUNOHISTOCHEM/MANUAL: CPT | Mod: 26

## 2021-01-01 PROCEDURE — 99213 OFFICE O/P EST LOW 20 MIN: CPT

## 2021-01-01 PROCEDURE — 71260 CT THORAX DX C+: CPT | Mod: 26

## 2021-01-01 PROCEDURE — 86850 RBC ANTIBODY SCREEN: CPT

## 2021-01-01 PROCEDURE — 86900 BLOOD TYPING SEROLOGIC ABO: CPT

## 2021-01-01 PROCEDURE — 99239 HOSP IP/OBS DSCHRG MGMT >30: CPT | Mod: GC

## 2021-01-01 PROCEDURE — 99358 PROLONG SERVICE W/O CONTACT: CPT

## 2021-01-01 PROCEDURE — 71045 X-RAY EXAM CHEST 1 VIEW: CPT | Mod: 26

## 2021-01-01 PROCEDURE — 71260 CT THORAX DX C+: CPT

## 2021-01-01 PROCEDURE — 99223 1ST HOSP IP/OBS HIGH 75: CPT | Mod: GC

## 2021-01-01 PROCEDURE — 99231 SBSQ HOSP IP/OBS SF/LOW 25: CPT | Mod: 1L

## 2021-01-01 PROCEDURE — 99285 EMERGENCY DEPT VISIT HI MDM: CPT

## 2021-01-01 PROCEDURE — 99498 ADVNCD CARE PLAN ADDL 30 MIN: CPT | Mod: 25

## 2021-01-01 PROCEDURE — 82565 ASSAY OF CREATININE: CPT

## 2021-01-01 RX ORDER — HYDROMORPHONE HYDROCHLORIDE 2 MG/ML
1 INJECTION INTRAMUSCULAR; INTRAVENOUS; SUBCUTANEOUS
Qty: 42 | Refills: 0
Start: 2021-01-01 | End: 2021-01-01

## 2021-01-01 RX ORDER — SODIUM,POTASSIUM PHOSPHATES 278-250MG
1 POWDER IN PACKET (EA) ORAL
Refills: 0 | Status: COMPLETED | OUTPATIENT
Start: 2021-01-01 | End: 2021-01-01

## 2021-01-01 RX ORDER — SODIUM CHLORIDE 9 MG/ML
1000 INJECTION INTRAMUSCULAR; INTRAVENOUS; SUBCUTANEOUS
Refills: 0 | Status: DISCONTINUED | OUTPATIENT
Start: 2021-01-01 | End: 2021-01-01

## 2021-01-01 RX ORDER — DRONABINOL 2.5 MG
1 CAPSULE ORAL
Qty: 0 | Refills: 0 | DISCHARGE

## 2021-01-01 RX ORDER — MEROPENEM 1 G/30ML
1000 INJECTION INTRAVENOUS ONCE
Refills: 0 | Status: COMPLETED | OUTPATIENT
Start: 2021-01-01 | End: 2021-01-01

## 2021-01-01 RX ORDER — POLYETHYLENE GLYCOL 3350 17 G/17G
17 POWDER, FOR SOLUTION ORAL
Refills: 0 | Status: DISCONTINUED | OUTPATIENT
Start: 2021-01-01 | End: 2021-01-01

## 2021-01-01 RX ORDER — ACETAMINOPHEN 500 MG
650 TABLET ORAL EVERY 6 HOURS
Refills: 0 | Status: DISCONTINUED | OUTPATIENT
Start: 2021-01-01 | End: 2021-01-01

## 2021-01-01 RX ORDER — CYANOCOBALAMIN (VITAMIN B-12) 1000 MCG
1000 TABLET ORAL DAILY
Qty: 30 | Refills: 1 | Status: ACTIVE | COMMUNITY
Start: 2021-01-01 | End: 1900-01-01

## 2021-01-01 RX ORDER — MORPHINE SULFATE 50 MG/1
1 CAPSULE, EXTENDED RELEASE ORAL
Qty: 7 | Refills: 0
Start: 2021-01-01 | End: 2021-01-01

## 2021-01-01 RX ORDER — LIDOCAINE HCL 4 %
4 CREAM (GRAM) TOPICAL 3 TIMES DAILY
Qty: 1 | Refills: 3 | Status: ACTIVE | COMMUNITY
Start: 2021-01-01 | End: 1900-01-01

## 2021-01-01 RX ORDER — SENNA PLUS 8.6 MG/1
2 TABLET ORAL
Qty: 60 | Refills: 1
Start: 2021-01-01 | End: 2021-12-27

## 2021-01-01 RX ORDER — DOCUSATE SODIUM 100 MG
1 CAPSULE ORAL
Qty: 0 | Refills: 0 | DISCHARGE

## 2021-01-01 RX ORDER — PREGABALIN 225 MG/1
1 CAPSULE ORAL
Qty: 30 | Refills: 1
Start: 2021-01-01 | End: 2021-12-27

## 2021-01-01 RX ORDER — DOCUSATE SODIUM 100 MG/1
100 CAPSULE, LIQUID FILLED ORAL TWICE DAILY
Qty: 60 | Refills: 2 | Status: ACTIVE | COMMUNITY
Start: 2021-01-01 | End: 1900-01-01

## 2021-01-01 RX ORDER — MORPHINE SULFATE 50 MG/1
2 CAPSULE, EXTENDED RELEASE ORAL ONCE
Refills: 0 | Status: DISCONTINUED | OUTPATIENT
Start: 2021-01-01 | End: 2021-01-01

## 2021-01-01 RX ORDER — FOLIC ACID 1 MG/1
1 TABLET ORAL DAILY
Qty: 30 | Refills: 2 | Status: ACTIVE | COMMUNITY
Start: 2021-01-01 | End: 1900-01-01

## 2021-01-01 RX ORDER — PANTOPRAZOLE SODIUM 20 MG/1
1 TABLET, DELAYED RELEASE ORAL
Qty: 0 | Refills: 0 | DISCHARGE

## 2021-01-01 RX ORDER — SODIUM,POTASSIUM PHOSPHATES 278-250MG
1 POWDER IN PACKET (EA) ORAL
Refills: 0 | Status: DISCONTINUED | OUTPATIENT
Start: 2021-01-01 | End: 2021-01-01

## 2021-01-01 RX ORDER — ONDANSETRON 8 MG/1
1 TABLET, FILM COATED ORAL
Qty: 40 | Refills: 1
Start: 2021-01-01 | End: 2021-01-01

## 2021-01-01 RX ORDER — PREGABALIN 225 MG/1
1 CAPSULE ORAL
Qty: 0 | Refills: 0 | DISCHARGE

## 2021-01-01 RX ORDER — PANTOPRAZOLE SODIUM 20 MG/1
40 TABLET, DELAYED RELEASE ORAL
Refills: 0 | Status: DISCONTINUED | OUTPATIENT
Start: 2021-01-01 | End: 2021-01-01

## 2021-01-01 RX ORDER — SENNA PLUS 8.6 MG/1
1 TABLET ORAL
Qty: 0 | Refills: 0 | DISCHARGE

## 2021-01-01 RX ORDER — CHLORHEXIDINE GLUCONATE, 0.12% ORAL RINSE 1.2 MG/ML
0.12 SOLUTION DENTAL
Qty: 240 | Refills: 2 | Status: ACTIVE | COMMUNITY
Start: 2021-01-01 | End: 1900-01-01

## 2021-01-01 RX ORDER — POLYVINYL ALCOHOL 14 MG/ML
1.4 SOLUTION/ DROPS OPHTHALMIC 4 TIMES DAILY
Qty: 1 | Refills: 5 | Status: ACTIVE | COMMUNITY
Start: 2021-01-01 | End: 1900-01-01

## 2021-01-01 RX ORDER — DEXAMETHASONE 2 MG/1
2 TABLET ORAL DAILY
Qty: 30 | Refills: 1 | Status: ACTIVE | COMMUNITY
Start: 2021-01-01 | End: 1900-01-01

## 2021-01-01 RX ORDER — DRONABINOL 2.5 MG/1
2.5 CAPSULE ORAL
Qty: 60 | Refills: 1 | Status: ACTIVE | COMMUNITY
Start: 2021-01-01 | End: 1900-01-01

## 2021-01-01 RX ORDER — HYDROMORPHONE HYDROCHLORIDE 2 MG/ML
0.5 INJECTION INTRAMUSCULAR; INTRAVENOUS; SUBCUTANEOUS EVERY 4 HOURS
Refills: 0 | Status: DISCONTINUED | OUTPATIENT
Start: 2021-01-01 | End: 2021-01-01

## 2021-01-01 RX ORDER — OXYCODONE 5 MG/1
5 TABLET ORAL
Qty: 20 | Refills: 0 | Status: ACTIVE | COMMUNITY
Start: 2021-01-01 | End: 1900-01-01

## 2021-01-01 RX ORDER — GABAPENTIN 300 MG/1
300 CAPSULE ORAL
Qty: 30 | Refills: 2 | Status: ACTIVE | COMMUNITY
Start: 2021-01-01 | End: 1900-01-01

## 2021-01-01 RX ORDER — CIPROFLOXACIN HYDROCHLORIDE 500 MG/1
500 TABLET, FILM COATED ORAL
Qty: 14 | Refills: 0 | Status: ACTIVE | COMMUNITY
Start: 2021-01-01 | End: 1900-01-01

## 2021-01-01 RX ORDER — HYDROMORPHONE HYDROCHLORIDE 2 MG/ML
0.5 INJECTION INTRAMUSCULAR; INTRAVENOUS; SUBCUTANEOUS ONCE
Refills: 0 | Status: DISCONTINUED | OUTPATIENT
Start: 2021-01-01 | End: 2021-01-01

## 2021-01-01 RX ORDER — PREGABALIN 225 MG/1
1000 CAPSULE ORAL DAILY
Refills: 0 | Status: DISCONTINUED | OUTPATIENT
Start: 2021-01-01 | End: 2021-01-01

## 2021-01-01 RX ORDER — GABAPENTIN 400 MG/1
1 CAPSULE ORAL
Qty: 0 | Refills: 0 | DISCHARGE

## 2021-01-01 RX ORDER — DRONABINOL 2.5 MG
2.5 CAPSULE ORAL
Refills: 0 | Status: DISCONTINUED | OUTPATIENT
Start: 2021-01-01 | End: 2021-01-01

## 2021-01-01 RX ORDER — MEROPENEM 1 G/30ML
1000 INJECTION INTRAVENOUS EVERY 12 HOURS
Refills: 0 | Status: DISCONTINUED | OUTPATIENT
Start: 2021-01-01 | End: 2021-01-01

## 2021-01-01 RX ORDER — ONDANSETRON 8 MG/1
4 TABLET, FILM COATED ORAL EVERY 6 HOURS
Refills: 0 | Status: DISCONTINUED | OUTPATIENT
Start: 2021-01-01 | End: 2021-01-01

## 2021-01-01 RX ORDER — SODIUM CHLORIDE 9 MG/ML
500 INJECTION INTRAMUSCULAR; INTRAVENOUS; SUBCUTANEOUS ONCE
Refills: 0 | Status: COMPLETED | OUTPATIENT
Start: 2021-01-01 | End: 2021-01-01

## 2021-01-01 RX ORDER — ENOXAPARIN SODIUM 100 MG/ML
40 INJECTION SUBCUTANEOUS DAILY
Refills: 0 | Status: DISCONTINUED | OUTPATIENT
Start: 2021-01-01 | End: 2021-01-01

## 2021-01-01 RX ORDER — POLYETHYLENE GLYCOL 3350 17 G/17G
17 POWDER, FOR SOLUTION ORAL
Qty: 1020 | Refills: 1
Start: 2021-01-01 | End: 2021-12-27

## 2021-01-01 RX ORDER — LIDOCAINE HYDROCHLORIDE 20 MG/ML
2 SOLUTION ORAL; TOPICAL
Qty: 240 | Refills: 1 | Status: ACTIVE | COMMUNITY
Start: 2020-04-15 | End: 1900-01-01

## 2021-01-01 RX ORDER — TRAMADOL HYDROCHLORIDE 50 MG/1
1 TABLET ORAL
Qty: 0 | Refills: 0 | DISCHARGE

## 2021-01-01 RX ORDER — DEXAMETHASONE 0.5 MG/5ML
1 ELIXIR ORAL
Qty: 0 | Refills: 0 | DISCHARGE

## 2021-01-01 RX ORDER — MORPHINE SULFATE 50 MG/1
15 CAPSULE, EXTENDED RELEASE ORAL
Refills: 0 | Status: DISCONTINUED | OUTPATIENT
Start: 2021-01-01 | End: 2021-01-01

## 2021-01-01 RX ORDER — FOLIC ACID 0.8 MG
1 TABLET ORAL
Qty: 30 | Refills: 1
Start: 2021-01-01 | End: 2021-12-27

## 2021-01-01 RX ORDER — OXYCODONE HYDROCHLORIDE 5 MG/1
5 TABLET ORAL EVERY 6 HOURS
Refills: 0 | Status: DISCONTINUED | OUTPATIENT
Start: 2021-01-01 | End: 2021-01-01

## 2021-01-01 RX ORDER — GABAPENTIN 100 MG/1
100 CAPSULE ORAL DAILY
Qty: 30 | Refills: 2 | Status: ACTIVE | COMMUNITY
Start: 2021-01-01 | End: 1900-01-01

## 2021-01-01 RX ORDER — PROCHLORPERAZINE MALEATE 10 MG/1
10 TABLET ORAL EVERY 6 HOURS
Qty: 60 | Refills: 2 | Status: ACTIVE | COMMUNITY
Start: 2021-01-01 | End: 1900-01-01

## 2021-01-01 RX ORDER — PROCHLORPERAZINE MALEATE 5 MG
1 TABLET ORAL
Qty: 0 | Refills: 0 | DISCHARGE

## 2021-01-01 RX ORDER — SENNA PLUS 8.6 MG/1
2 TABLET ORAL AT BEDTIME
Refills: 0 | Status: DISCONTINUED | OUTPATIENT
Start: 2021-01-01 | End: 2021-01-01

## 2021-01-01 RX ORDER — TRAMADOL HYDROCHLORIDE 50 MG/1
50 TABLET, COATED ORAL
Qty: 80 | Refills: 0 | Status: ACTIVE | COMMUNITY
Start: 2021-01-01 | End: 1900-01-01

## 2021-01-01 RX ORDER — FOLIC ACID 0.8 MG
1 TABLET ORAL
Qty: 0 | Refills: 0 | DISCHARGE

## 2021-01-01 RX ORDER — DEXAMETHASONE 0.5 MG/5ML
2 ELIXIR ORAL DAILY
Refills: 0 | Status: DISCONTINUED | OUTPATIENT
Start: 2021-01-01 | End: 2021-01-01

## 2021-01-01 RX ORDER — MIRTAZAPINE 45 MG/1
1 TABLET, ORALLY DISINTEGRATING ORAL
Qty: 30 | Refills: 2
Start: 2021-01-01 | End: 2022-01-26

## 2021-01-01 RX ORDER — FOLIC ACID 0.8 MG
1 TABLET ORAL DAILY
Refills: 0 | Status: DISCONTINUED | OUTPATIENT
Start: 2021-01-01 | End: 2021-01-01

## 2021-01-01 RX ORDER — HYDROMORPHONE HYDROCHLORIDE 2 MG/ML
2 INJECTION INTRAMUSCULAR; INTRAVENOUS; SUBCUTANEOUS EVERY 4 HOURS
Refills: 0 | Status: DISCONTINUED | OUTPATIENT
Start: 2021-01-01 | End: 2021-01-01

## 2021-01-01 RX ORDER — HYDROMORPHONE HYDROCHLORIDE 2 MG/ML
2 INJECTION INTRAMUSCULAR; INTRAVENOUS; SUBCUTANEOUS
Refills: 0 | Status: DISCONTINUED | OUTPATIENT
Start: 2021-01-01 | End: 2021-01-01

## 2021-01-01 RX ORDER — MORPHINE SULFATE 50 MG/1
15 CAPSULE, EXTENDED RELEASE ORAL DAILY
Refills: 0 | Status: DISCONTINUED | OUTPATIENT
Start: 2021-01-01 | End: 2021-01-01

## 2021-01-01 RX ADMIN — Medication 2 MILLIGRAM(S): at 06:24

## 2021-01-01 RX ADMIN — HYDROMORPHONE HYDROCHLORIDE 2 MILLIGRAM(S): 2 INJECTION INTRAMUSCULAR; INTRAVENOUS; SUBCUTANEOUS at 05:24

## 2021-01-01 RX ADMIN — ENOXAPARIN SODIUM 40 MILLIGRAM(S): 100 INJECTION SUBCUTANEOUS at 11:35

## 2021-01-01 RX ADMIN — HYDROMORPHONE HYDROCHLORIDE 0.5 MILLIGRAM(S): 2 INJECTION INTRAMUSCULAR; INTRAVENOUS; SUBCUTANEOUS at 10:31

## 2021-01-01 RX ADMIN — HYDROMORPHONE HYDROCHLORIDE 0.5 MILLIGRAM(S): 2 INJECTION INTRAMUSCULAR; INTRAVENOUS; SUBCUTANEOUS at 17:18

## 2021-01-01 RX ADMIN — Medication 1 MILLIGRAM(S): at 11:36

## 2021-01-01 RX ADMIN — Medication 2.5 MILLIGRAM(S): at 06:09

## 2021-01-01 RX ADMIN — Medication 2 MILLIGRAM(S): at 05:20

## 2021-01-01 RX ADMIN — OXYCODONE HYDROCHLORIDE 5 MILLIGRAM(S): 5 TABLET ORAL at 06:27

## 2021-01-01 RX ADMIN — SENNA PLUS 2 TABLET(S): 8.6 TABLET ORAL at 21:46

## 2021-01-01 RX ADMIN — OXYCODONE HYDROCHLORIDE 5 MILLIGRAM(S): 5 TABLET ORAL at 11:34

## 2021-01-01 RX ADMIN — HYDROMORPHONE HYDROCHLORIDE 2 MILLIGRAM(S): 2 INJECTION INTRAMUSCULAR; INTRAVENOUS; SUBCUTANEOUS at 20:40

## 2021-01-01 RX ADMIN — OXYCODONE HYDROCHLORIDE 5 MILLIGRAM(S): 5 TABLET ORAL at 04:19

## 2021-01-01 RX ADMIN — PREGABALIN 1000 MICROGRAM(S): 225 CAPSULE ORAL at 11:52

## 2021-01-01 RX ADMIN — MORPHINE SULFATE 15 MILLIGRAM(S): 50 CAPSULE, EXTENDED RELEASE ORAL at 13:36

## 2021-01-01 RX ADMIN — HYDROMORPHONE HYDROCHLORIDE 0.5 MILLIGRAM(S): 2 INJECTION INTRAMUSCULAR; INTRAVENOUS; SUBCUTANEOUS at 16:13

## 2021-01-01 RX ADMIN — MEROPENEM 100 MILLIGRAM(S): 1 INJECTION INTRAVENOUS at 17:36

## 2021-01-01 RX ADMIN — MEROPENEM 100 MILLIGRAM(S): 1 INJECTION INTRAVENOUS at 05:14

## 2021-01-01 RX ADMIN — Medication 2.5 MILLIGRAM(S): at 17:18

## 2021-01-01 RX ADMIN — Medication 2.5 MILLIGRAM(S): at 05:28

## 2021-01-01 RX ADMIN — Medication 2 MILLIGRAM(S): at 05:17

## 2021-01-01 RX ADMIN — MEROPENEM 100 MILLIGRAM(S): 1 INJECTION INTRAVENOUS at 17:18

## 2021-01-01 RX ADMIN — PANTOPRAZOLE SODIUM 40 MILLIGRAM(S): 20 TABLET, DELAYED RELEASE ORAL at 05:35

## 2021-01-01 RX ADMIN — SODIUM CHLORIDE 500 MILLILITER(S): 9 INJECTION INTRAMUSCULAR; INTRAVENOUS; SUBCUTANEOUS at 12:24

## 2021-01-01 RX ADMIN — HYDROMORPHONE HYDROCHLORIDE 2 MILLIGRAM(S): 2 INJECTION INTRAMUSCULAR; INTRAVENOUS; SUBCUTANEOUS at 17:27

## 2021-01-01 RX ADMIN — Medication 2.5 MILLIGRAM(S): at 05:19

## 2021-01-01 RX ADMIN — PANTOPRAZOLE SODIUM 40 MILLIGRAM(S): 20 TABLET, DELAYED RELEASE ORAL at 06:24

## 2021-01-01 RX ADMIN — MEROPENEM 100 MILLIGRAM(S): 1 INJECTION INTRAVENOUS at 05:17

## 2021-01-01 RX ADMIN — Medication 5 MILLIGRAM(S): at 22:18

## 2021-01-01 RX ADMIN — PREGABALIN 1000 MICROGRAM(S): 225 CAPSULE ORAL at 12:38

## 2021-01-01 RX ADMIN — HYDROMORPHONE HYDROCHLORIDE 2 MILLIGRAM(S): 2 INJECTION INTRAMUSCULAR; INTRAVENOUS; SUBCUTANEOUS at 18:16

## 2021-01-01 RX ADMIN — SENNA PLUS 2 TABLET(S): 8.6 TABLET ORAL at 21:52

## 2021-01-01 RX ADMIN — Medication 2 MILLIGRAM(S): at 05:23

## 2021-01-01 RX ADMIN — Medication 5 MILLIGRAM(S): at 21:32

## 2021-01-01 RX ADMIN — HYDROMORPHONE HYDROCHLORIDE 2 MILLIGRAM(S): 2 INJECTION INTRAMUSCULAR; INTRAVENOUS; SUBCUTANEOUS at 18:55

## 2021-01-01 RX ADMIN — MEROPENEM 100 MILLIGRAM(S): 1 INJECTION INTRAVENOUS at 05:27

## 2021-01-01 RX ADMIN — SENNA PLUS 2 TABLET(S): 8.6 TABLET ORAL at 21:10

## 2021-01-01 RX ADMIN — SENNA PLUS 2 TABLET(S): 8.6 TABLET ORAL at 21:45

## 2021-01-01 RX ADMIN — MORPHINE SULFATE 15 MILLIGRAM(S): 50 CAPSULE, EXTENDED RELEASE ORAL at 12:31

## 2021-01-01 RX ADMIN — HYDROMORPHONE HYDROCHLORIDE 2 MILLIGRAM(S): 2 INJECTION INTRAMUSCULAR; INTRAVENOUS; SUBCUTANEOUS at 11:00

## 2021-01-01 RX ADMIN — Medication 2 MILLIGRAM(S): at 05:35

## 2021-01-01 RX ADMIN — ENOXAPARIN SODIUM 40 MILLIGRAM(S): 100 INJECTION SUBCUTANEOUS at 12:09

## 2021-01-01 RX ADMIN — POLYETHYLENE GLYCOL 3350 17 GRAM(S): 17 POWDER, FOR SOLUTION ORAL at 05:18

## 2021-01-01 RX ADMIN — MORPHINE SULFATE 2 MILLIGRAM(S): 50 CAPSULE, EXTENDED RELEASE ORAL at 12:24

## 2021-01-01 RX ADMIN — Medication 1 MILLIGRAM(S): at 11:53

## 2021-01-01 RX ADMIN — OXYCODONE HYDROCHLORIDE 5 MILLIGRAM(S): 5 TABLET ORAL at 05:28

## 2021-01-01 RX ADMIN — Medication 2.5 MILLIGRAM(S): at 17:27

## 2021-01-01 RX ADMIN — SENNA PLUS 2 TABLET(S): 8.6 TABLET ORAL at 21:32

## 2021-01-01 RX ADMIN — Medication 1 MILLIGRAM(S): at 12:37

## 2021-01-01 RX ADMIN — ENOXAPARIN SODIUM 40 MILLIGRAM(S): 100 INJECTION SUBCUTANEOUS at 12:31

## 2021-01-01 RX ADMIN — MEROPENEM 100 MILLIGRAM(S): 1 INJECTION INTRAVENOUS at 05:23

## 2021-01-01 RX ADMIN — Medication 1 TABLET(S): at 17:27

## 2021-01-01 RX ADMIN — PREGABALIN 1000 MICROGRAM(S): 225 CAPSULE ORAL at 12:31

## 2021-01-01 RX ADMIN — HYDROMORPHONE HYDROCHLORIDE 0.5 MILLIGRAM(S): 2 INJECTION INTRAMUSCULAR; INTRAVENOUS; SUBCUTANEOUS at 17:33

## 2021-01-01 RX ADMIN — Medication 2.5 MILLIGRAM(S): at 17:36

## 2021-01-01 RX ADMIN — HYDROMORPHONE HYDROCHLORIDE 0.5 MILLIGRAM(S): 2 INJECTION INTRAMUSCULAR; INTRAVENOUS; SUBCUTANEOUS at 05:13

## 2021-01-01 RX ADMIN — Medication 1 TABLET(S): at 11:52

## 2021-01-01 RX ADMIN — SENNA PLUS 2 TABLET(S): 8.6 TABLET ORAL at 22:20

## 2021-01-01 RX ADMIN — HYDROMORPHONE HYDROCHLORIDE 0.5 MILLIGRAM(S): 2 INJECTION INTRAMUSCULAR; INTRAVENOUS; SUBCUTANEOUS at 21:23

## 2021-01-01 RX ADMIN — Medication 2.5 MILLIGRAM(S): at 05:17

## 2021-01-01 RX ADMIN — POLYETHYLENE GLYCOL 3350 17 GRAM(S): 17 POWDER, FOR SOLUTION ORAL at 05:28

## 2021-01-01 RX ADMIN — MORPHINE SULFATE 15 MILLIGRAM(S): 50 CAPSULE, EXTENDED RELEASE ORAL at 05:36

## 2021-01-01 RX ADMIN — Medication 1 MILLIGRAM(S): at 12:31

## 2021-01-01 RX ADMIN — Medication 5 MILLIGRAM(S): at 05:34

## 2021-01-01 RX ADMIN — HYDROMORPHONE HYDROCHLORIDE 2 MILLIGRAM(S): 2 INJECTION INTRAMUSCULAR; INTRAVENOUS; SUBCUTANEOUS at 13:06

## 2021-01-01 RX ADMIN — POLYETHYLENE GLYCOL 3350 17 GRAM(S): 17 POWDER, FOR SOLUTION ORAL at 05:15

## 2021-01-01 RX ADMIN — POLYETHYLENE GLYCOL 3350 17 GRAM(S): 17 POWDER, FOR SOLUTION ORAL at 17:36

## 2021-01-01 RX ADMIN — Medication 2 MILLIGRAM(S): at 05:27

## 2021-01-01 RX ADMIN — MEROPENEM 100 MILLIGRAM(S): 1 INJECTION INTRAVENOUS at 17:26

## 2021-01-01 RX ADMIN — ENOXAPARIN SODIUM 40 MILLIGRAM(S): 100 INJECTION SUBCUTANEOUS at 13:36

## 2021-01-01 RX ADMIN — MORPHINE SULFATE 15 MILLIGRAM(S): 50 CAPSULE, EXTENDED RELEASE ORAL at 11:53

## 2021-01-01 RX ADMIN — SODIUM CHLORIDE 50 MILLILITER(S): 9 INJECTION INTRAMUSCULAR; INTRAVENOUS; SUBCUTANEOUS at 03:42

## 2021-01-01 RX ADMIN — Medication 1 TABLET(S): at 12:08

## 2021-01-01 RX ADMIN — HYDROMORPHONE HYDROCHLORIDE 2 MILLIGRAM(S): 2 INJECTION INTRAMUSCULAR; INTRAVENOUS; SUBCUTANEOUS at 06:39

## 2021-01-01 RX ADMIN — HYDROMORPHONE HYDROCHLORIDE 2 MILLIGRAM(S): 2 INJECTION INTRAMUSCULAR; INTRAVENOUS; SUBCUTANEOUS at 19:57

## 2021-01-01 RX ADMIN — OXYCODONE HYDROCHLORIDE 5 MILLIGRAM(S): 5 TABLET ORAL at 22:45

## 2021-01-01 RX ADMIN — PREGABALIN 1000 MICROGRAM(S): 225 CAPSULE ORAL at 21:45

## 2021-01-01 RX ADMIN — MEROPENEM 100 MILLIGRAM(S): 1 INJECTION INTRAVENOUS at 14:39

## 2021-01-01 RX ADMIN — MORPHINE SULFATE 15 MILLIGRAM(S): 50 CAPSULE, EXTENDED RELEASE ORAL at 05:41

## 2021-01-01 RX ADMIN — Medication 2 MILLIGRAM(S): at 05:14

## 2021-01-01 RX ADMIN — POLYETHYLENE GLYCOL 3350 17 GRAM(S): 17 POWDER, FOR SOLUTION ORAL at 05:21

## 2021-01-01 RX ADMIN — Medication 1 TABLET(S): at 13:36

## 2021-01-01 RX ADMIN — PANTOPRAZOLE SODIUM 40 MILLIGRAM(S): 20 TABLET, DELAYED RELEASE ORAL at 06:16

## 2021-01-01 RX ADMIN — MEROPENEM 100 MILLIGRAM(S): 1 INJECTION INTRAVENOUS at 06:24

## 2021-01-01 RX ADMIN — PANTOPRAZOLE SODIUM 40 MILLIGRAM(S): 20 TABLET, DELAYED RELEASE ORAL at 06:40

## 2021-01-01 RX ADMIN — Medication 2.5 MILLIGRAM(S): at 17:50

## 2021-01-01 RX ADMIN — Medication 1 TABLET(S): at 21:32

## 2021-01-01 RX ADMIN — HYDROMORPHONE HYDROCHLORIDE 2 MILLIGRAM(S): 2 INJECTION INTRAMUSCULAR; INTRAVENOUS; SUBCUTANEOUS at 18:43

## 2021-01-01 RX ADMIN — Medication 2.5 MILLIGRAM(S): at 06:24

## 2021-01-01 RX ADMIN — SENNA PLUS 2 TABLET(S): 8.6 TABLET ORAL at 21:23

## 2021-01-01 RX ADMIN — HYDROMORPHONE HYDROCHLORIDE 2 MILLIGRAM(S): 2 INJECTION INTRAMUSCULAR; INTRAVENOUS; SUBCUTANEOUS at 04:24

## 2021-01-01 RX ADMIN — Medication 1 MILLIGRAM(S): at 11:19

## 2021-01-01 RX ADMIN — SODIUM CHLORIDE 50 MILLILITER(S): 9 INJECTION INTRAMUSCULAR; INTRAVENOUS; SUBCUTANEOUS at 12:56

## 2021-01-01 RX ADMIN — Medication 2.5 MILLIGRAM(S): at 17:28

## 2021-01-01 RX ADMIN — HYDROMORPHONE HYDROCHLORIDE 0.5 MILLIGRAM(S): 2 INJECTION INTRAMUSCULAR; INTRAVENOUS; SUBCUTANEOUS at 10:32

## 2021-01-01 RX ADMIN — PANTOPRAZOLE SODIUM 40 MILLIGRAM(S): 20 TABLET, DELAYED RELEASE ORAL at 07:15

## 2021-01-01 RX ADMIN — Medication 1 TABLET(S): at 08:39

## 2021-01-01 RX ADMIN — MEROPENEM 100 MILLIGRAM(S): 1 INJECTION INTRAVENOUS at 05:19

## 2021-01-01 RX ADMIN — PREGABALIN 1000 MICROGRAM(S): 225 CAPSULE ORAL at 12:37

## 2021-01-01 RX ADMIN — Medication 1 TABLET(S): at 21:09

## 2021-01-01 RX ADMIN — HYDROMORPHONE HYDROCHLORIDE 0.5 MILLIGRAM(S): 2 INJECTION INTRAMUSCULAR; INTRAVENOUS; SUBCUTANEOUS at 21:42

## 2021-01-01 RX ADMIN — Medication 5 MILLIGRAM(S): at 21:55

## 2021-01-01 RX ADMIN — PANTOPRAZOLE SODIUM 40 MILLIGRAM(S): 20 TABLET, DELAYED RELEASE ORAL at 05:17

## 2021-01-01 RX ADMIN — HYDROMORPHONE HYDROCHLORIDE 0.5 MILLIGRAM(S): 2 INJECTION INTRAMUSCULAR; INTRAVENOUS; SUBCUTANEOUS at 02:15

## 2021-01-01 RX ADMIN — Medication 1 MILLIGRAM(S): at 13:36

## 2021-01-01 RX ADMIN — SENNA PLUS 2 TABLET(S): 8.6 TABLET ORAL at 22:56

## 2021-01-01 RX ADMIN — OXYCODONE HYDROCHLORIDE 5 MILLIGRAM(S): 5 TABLET ORAL at 21:45

## 2021-01-01 RX ADMIN — Medication 2 MILLIGRAM(S): at 05:33

## 2021-01-01 RX ADMIN — ENOXAPARIN SODIUM 40 MILLIGRAM(S): 100 INJECTION SUBCUTANEOUS at 11:53

## 2021-01-01 RX ADMIN — SODIUM CHLORIDE 500 MILLILITER(S): 9 INJECTION INTRAMUSCULAR; INTRAVENOUS; SUBCUTANEOUS at 14:00

## 2021-01-01 RX ADMIN — HYDROMORPHONE HYDROCHLORIDE 2 MILLIGRAM(S): 2 INJECTION INTRAMUSCULAR; INTRAVENOUS; SUBCUTANEOUS at 12:08

## 2021-01-01 RX ADMIN — PANTOPRAZOLE SODIUM 40 MILLIGRAM(S): 20 TABLET, DELAYED RELEASE ORAL at 06:09

## 2021-01-01 RX ADMIN — MORPHINE SULFATE 2 MILLIGRAM(S): 50 CAPSULE, EXTENDED RELEASE ORAL at 16:34

## 2021-01-01 RX ADMIN — ENOXAPARIN SODIUM 40 MILLIGRAM(S): 100 INJECTION SUBCUTANEOUS at 11:18

## 2021-01-01 RX ADMIN — POLYETHYLENE GLYCOL 3350 17 GRAM(S): 17 POWDER, FOR SOLUTION ORAL at 05:36

## 2021-01-01 RX ADMIN — SODIUM CHLORIDE 50 MILLILITER(S): 9 INJECTION INTRAMUSCULAR; INTRAVENOUS; SUBCUTANEOUS at 11:41

## 2021-01-01 RX ADMIN — Medication 2.5 MILLIGRAM(S): at 05:30

## 2021-01-01 RX ADMIN — ENOXAPARIN SODIUM 40 MILLIGRAM(S): 100 INJECTION SUBCUTANEOUS at 12:37

## 2021-01-01 RX ADMIN — HYDROMORPHONE HYDROCHLORIDE 0.5 MILLIGRAM(S): 2 INJECTION INTRAMUSCULAR; INTRAVENOUS; SUBCUTANEOUS at 15:58

## 2021-01-01 RX ADMIN — MORPHINE SULFATE 2 MILLIGRAM(S): 50 CAPSULE, EXTENDED RELEASE ORAL at 16:50

## 2021-01-01 RX ADMIN — POLYETHYLENE GLYCOL 3350 17 GRAM(S): 17 POWDER, FOR SOLUTION ORAL at 05:20

## 2021-01-01 RX ADMIN — MORPHINE SULFATE 2 MILLIGRAM(S): 50 CAPSULE, EXTENDED RELEASE ORAL at 12:23

## 2021-01-01 RX ADMIN — PREGABALIN 1000 MICROGRAM(S): 225 CAPSULE ORAL at 11:36

## 2021-01-01 RX ADMIN — MEROPENEM 100 MILLIGRAM(S): 1 INJECTION INTRAVENOUS at 03:55

## 2021-01-01 RX ADMIN — HYDROMORPHONE HYDROCHLORIDE 2 MILLIGRAM(S): 2 INJECTION INTRAMUSCULAR; INTRAVENOUS; SUBCUTANEOUS at 18:02

## 2021-01-01 RX ADMIN — HYDROMORPHONE HYDROCHLORIDE 0.5 MILLIGRAM(S): 2 INJECTION INTRAMUSCULAR; INTRAVENOUS; SUBCUTANEOUS at 10:45

## 2021-01-01 RX ADMIN — HYDROMORPHONE HYDROCHLORIDE 2 MILLIGRAM(S): 2 INJECTION INTRAMUSCULAR; INTRAVENOUS; SUBCUTANEOUS at 12:55

## 2021-01-01 RX ADMIN — HYDROMORPHONE HYDROCHLORIDE 2 MILLIGRAM(S): 2 INJECTION INTRAMUSCULAR; INTRAVENOUS; SUBCUTANEOUS at 10:19

## 2021-01-01 RX ADMIN — Medication 1 MILLIGRAM(S): at 12:38

## 2021-01-01 RX ADMIN — Medication 2.5 MILLIGRAM(S): at 05:13

## 2021-01-01 RX ADMIN — MEROPENEM 100 MILLIGRAM(S): 1 INJECTION INTRAVENOUS at 17:27

## 2021-01-01 RX ADMIN — ENOXAPARIN SODIUM 40 MILLIGRAM(S): 100 INJECTION SUBCUTANEOUS at 12:39

## 2021-01-01 RX ADMIN — Medication 1 MILLIGRAM(S): at 21:45

## 2021-01-01 RX ADMIN — PREGABALIN 1000 MICROGRAM(S): 225 CAPSULE ORAL at 13:36

## 2021-01-01 RX ADMIN — HYDROMORPHONE HYDROCHLORIDE 2 MILLIGRAM(S): 2 INJECTION INTRAMUSCULAR; INTRAVENOUS; SUBCUTANEOUS at 19:30

## 2021-01-01 RX ADMIN — Medication 1 MILLIGRAM(S): at 12:08

## 2021-01-01 RX ADMIN — HYDROMORPHONE HYDROCHLORIDE 0.5 MILLIGRAM(S): 2 INJECTION INTRAMUSCULAR; INTRAVENOUS; SUBCUTANEOUS at 01:58

## 2021-01-01 RX ADMIN — HYDROMORPHONE HYDROCHLORIDE 2 MILLIGRAM(S): 2 INJECTION INTRAMUSCULAR; INTRAVENOUS; SUBCUTANEOUS at 06:02

## 2021-01-01 RX ADMIN — HYDROMORPHONE HYDROCHLORIDE 0.5 MILLIGRAM(S): 2 INJECTION INTRAMUSCULAR; INTRAVENOUS; SUBCUTANEOUS at 10:16

## 2021-01-01 RX ADMIN — PREGABALIN 1000 MICROGRAM(S): 225 CAPSULE ORAL at 12:09

## 2021-01-01 RX ADMIN — Medication 1 TABLET(S): at 17:49

## 2021-01-01 RX ADMIN — HYDROMORPHONE HYDROCHLORIDE 2 MILLIGRAM(S): 2 INJECTION INTRAMUSCULAR; INTRAVENOUS; SUBCUTANEOUS at 06:14

## 2021-01-01 RX ADMIN — MORPHINE SULFATE 15 MILLIGRAM(S): 50 CAPSULE, EXTENDED RELEASE ORAL at 12:44

## 2021-01-01 RX ADMIN — PANTOPRAZOLE SODIUM 40 MILLIGRAM(S): 20 TABLET, DELAYED RELEASE ORAL at 05:22

## 2021-01-01 RX ADMIN — HYDROMORPHONE HYDROCHLORIDE 2 MILLIGRAM(S): 2 INJECTION INTRAMUSCULAR; INTRAVENOUS; SUBCUTANEOUS at 13:42

## 2021-01-01 RX ADMIN — PREGABALIN 1000 MICROGRAM(S): 225 CAPSULE ORAL at 11:19

## 2021-01-01 RX ADMIN — Medication 2.5 MILLIGRAM(S): at 17:26

## 2021-01-01 RX ADMIN — OXYCODONE HYDROCHLORIDE 5 MILLIGRAM(S): 5 TABLET ORAL at 12:10

## 2021-01-01 RX ADMIN — HYDROMORPHONE HYDROCHLORIDE 2 MILLIGRAM(S): 2 INJECTION INTRAMUSCULAR; INTRAVENOUS; SUBCUTANEOUS at 05:19

## 2021-01-05 PROBLEM — K12.30 MUCOSITIS ORAL: Status: ACTIVE | Noted: 2020-04-15

## 2021-01-06 NOTE — HISTORY OF PRESENT ILLNESS
[Disease: _____________________] : Disease: [unfilled] [T: ___] : T[unfilled] [N: ___] : N[unfilled] [M: ___] : M[unfilled] [AJCC Stage: ____] : AJCC Stage: [unfilled] [de-identified] : Age 67: Stage I NSCLC adenocarcinoma s/p left VATs robotic assisted FERMÍN lobectomy and MLND 8/16/17 with Dr Ken Sol\par Age 67: Stage IV poorly differentiated ovarian cancer s/p AVEL/ BSO/ total omentectomy, bilateral ureterolysis, resection of abdominal anterior wall masses, repair of cystostomy\par She initially presented with hemoptysis and had evaluation consisting of bronchoscopy and FNA. The bronchoscopy was negative and the FNA was positive for malignant cells: TTF1+ and PAX 8 negative. She had Pet/ CT on 7/6/17 which showed 2.3 cm left upper lobe nodule with SUV of 23 and 4.2 x 3.1 cm left ovarian lesion SUV of 7.9 and left common iliac lymph nodes measuring up to 8mm with SUV of 2.2, 6 mm perihepatic implant SUV of 3.1. She initially had laparoscopic evaluation with left ovary biopsy which showed poorly differentiated carcinoma that was ER, WT1, PAX 8 positive and TTF1 negative. She initially had the left VATs. Then she subsequently had exploratory laparotomy with  AVEL, BSO, radical dissection of tumor with total omentectomy, resection of anterior abdominal wall masses, lysis of adhesions, and repair of cystostomy. She had CT imaging done after 5th cycle of chemotherapy to evaluate abdominal pain and CT showed no evidence of disease. She had abdominal pain in 3/2019 and had follow up CT which showed new small pelvic implant and enlarged sita-caval LN. She had headache and went to [facility] 4/10/19. She had imaging that showed predominantly cystic peripherally enhancing mass within the left cerebellar hemisphere. She had left suboccipital craniotomy with Dr Tatyana Ortiz. Had carboplatin retreat with paclitaxel/ Avastin started and had carboplatin reaction on 5/29/19 during bag 2 Cycle 2: redness over entire face and uncomfortable sensation over face/ arms. She had slowly increasing  on maintenance bevacizumab and had CT done on 2/5/2020 which showed 1.1 x 1 cm enhancing nodule in the right hemipelvis. She had subsequent Pet/ CT which showed hypermetabolic right cardiophrenic and periportal lymphadenopathy, hypermetabolic implants over the liver capsule, serosal surface of the urinary bladder and posterior right hemipelvis. She was not a surgical candidate and proceeded to Doxil/ bevacizumab. She developed mucositis that was persistent after C2 and reduced dose of Doxil for C3. Interval CT of the chest/ abd/ pelvis done on 6/9/2020 showed new RLL 5 mm lung nodule nonspecific but regression of abdominal adenopathy and right pelvic tumor implant. \par  [de-identified] : poorly differentiated carcinoma: ER positive  [de-identified] : carbo/ taxol: 10/24/17 to 2/12/18 with cumulative fatigue and neuropathy\par bevacizumab added on 12/8/17 to 4/2018 (pt stopped coming for treatment and lost to follow up until 12/2018)\par retreat carbo/ taxol 5/7/19\par bevacizumab added to Cycle 2 of retreat carboplatin/ paclitaxel 5/29/19 and allergic reaction with 2nd bag of carboplatin\par bevacizumab and paclitaxel 6/19/19 to 8/27/19\par bevacizumab maintenance 9/2019 to 2/2020\par Doxil/ bevacizumab 3/4/2020 to 6/2020\par Doxil 6/2020 to present  [de-identified] : Since last evaluation, she had 2 D Echo done which showed EF WNL. She has occasional palpitations but notices this when she gets up from sitting position. No dizziness. She has noticed more tingling sensation over the B groin areas; more at night. Does not affect walking or activities. No abdominal pain. She uses the lidocaine rinse occasionally and although her tongue has sores; she does not feel it affects eating or bothers her. She denies any new cough or headaches or vision changes.

## 2021-01-06 NOTE — REVIEW OF SYSTEMS
[Skin Rash] : no skin rash [Skin Wound] : no skin wound [Confused] : no confusion [Dizziness] : no dizziness [Fainting] : no fainting [Difficulty Walking] : no difficulty walking [Negative] : Allergic/Immunologic [de-identified] : dry skin  [de-identified] : tingling sensation over the B groin

## 2021-01-06 NOTE — ASSESSMENT
[FreeTextEntry1] : She is a 69 y/o F with Stage IV ovarian cancer and Stage I NSCLC diagnosed simultaneously. She has completed 6 cycles of carboplatin/ paclitaxel with bevacizumab and did not continue with bevacizumab maintenance: off from 4/2018 to 4/2019. 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 She completed SRS to the craniotomy site. CT of the chest/ abd/ pelvis showed adenopathy and started with palliative chemotherapy: retreat carboplatin/ paclitaxel 5/7/19. She had allergic reaction with C2 bag 2 of carboplatin and has been on bevacizumab/ paclitaxel s/p total of 6 cycles. While on maintenance bevacizumab, she has progression of disease. She has been on Doxil chemotherapy currently C11. We reviewed with her and her dtr in law (on phone) increasing  and most likely need to change therapy. Her tingling sensation most likely related to inguinal adenopathy. Will obtain interval CT imaging to confirm progression and will plan on changing to gemcitabine next cycle. Next follow up in 3 to 4 weeks after imaging. Questions answered to their satisfaction. \par \par Mucositis: we reviewed continued oral care and lidocaine as needed swish and spit\par \par Brain mets: she has no new symptoms and last evaluation 8/2020 MRI of the brain.

## 2021-01-06 NOTE — PHYSICAL EXAM
[Restricted in physically strenuous activity but ambulatory and able to carry out work of a light or sedentary nature] : Status 1- Restricted in physically strenuous activity but ambulatory and able to carry out work of a light or sedentary nature, e.g., light house work, office work [Thin] : thin [Ulcers] : no ulcers [Mucositis] : no mucositis [Thrush] : no thrush [Vesicles] : no vesicles [Normal] : affect appropriate [de-identified] : L lateral tongue 1 cm ulceration  [de-identified] : no palpable mass or LN  [de-identified] : palpable R inguinal LN 2 cm  [de-identified] : dry skin and hyperpigmentation over the hands and trunk

## 2021-02-02 NOTE — ASSESSMENT
[FreeTextEntry1] : She is a 71 y/o F with Stage IV ovarian cancer and Stage I NSCLC diagnosed simultaneously. She has completed 6 cycles of carboplatin/ paclitaxel with bevacizumab and did not continue with bevacizumab maintenance: off from 4/2018 to 4/2019. 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 She completed SRS to the craniotomy site. CT of the chest/ abd/ pelvis showed adenopathy and started with palliative chemotherapy: retreat carboplatin/ paclitaxel 5/7/19. She had allergic reaction with C2 bag 2 of carboplatin and has been on bevacizumab/ paclitaxel s/p total of 6 cycles. While on maintenance bevacizumab, she has progression of disease. She was maintained on Doxil and had stable disease for 10 months. Currently we reviewed her CT scan showing new liver lesion and adenopathy; she has implant over area where she feels discomfort: we reviewed topical analgesic and trial of anti inflammatory as needed for pain. We reviewed goals of further palliative chemotherapy to control disease. We reviewed single agent gemcitabine D1 and D8 every 3 weeks. We reviewed with her potential side effects including but not limited to: fevers, low blood counts, fatigue, nausea, GI upset, constipation/ diarrhea, allergic reaction and increased risk of infection. We reviewed supportive measures. We reviewed continued moisturization of hands and feet to allow for Doxil hand/ foot to resolve. We reviewed if treatment not tolerated, we would make further adjustments. Questions answered to her satisfaction. Side effects in Chinese translated and given to her along with appts for treatment. She consented to therapy. Next follow up in 3 weeks.

## 2021-02-02 NOTE — HISTORY OF PRESENT ILLNESS
[Disease: _____________________] : Disease: [unfilled] [T: ___] : T[unfilled] [N: ___] : N[unfilled] [M: ___] : M[unfilled] [AJCC Stage: ____] : AJCC Stage: [unfilled] [de-identified] : Age 67: Stage I NSCLC adenocarcinoma s/p left VATs robotic assisted FERMÍN lobectomy and MLND 8/16/17 with Dr Ken Sol\par Age 67: Stage IV poorly differentiated ovarian cancer s/p AVEL/ BSO/ total omentectomy, bilateral ureterolysis, resection of abdominal anterior wall masses, repair of cystostomy\par She initially presented with hemoptysis and had evaluation consisting of bronchoscopy and FNA. The bronchoscopy was negative and the FNA was positive for malignant cells: TTF1+ and PAX 8 negative. She had Pet/ CT on 7/6/17 which showed 2.3 cm left upper lobe nodule with SUV of 23 and 4.2 x 3.1 cm left ovarian lesion SUV of 7.9 and left common iliac lymph nodes measuring up to 8mm with SUV of 2.2, 6 mm perihepatic implant SUV of 3.1. She initially had laparoscopic evaluation with left ovary biopsy which showed poorly differentiated carcinoma that was ER, WT1, PAX 8 positive and TTF1 negative. She initially had the left VATs. Then she subsequently had exploratory laparotomy with  AVEL, BSO, radical dissection of tumor with total omentectomy, resection of anterior abdominal wall masses, lysis of adhesions, and repair of cystostomy. She had CT imaging done after 5th cycle of chemotherapy to evaluate abdominal pain and CT showed no evidence of disease. She had abdominal pain in 3/2019 and had follow up CT which showed new small pelvic implant and enlarged sita-caval LN. She had headache and went to [facility] 4/10/19. She had imaging that showed predominantly cystic peripherally enhancing mass within the left cerebellar hemisphere. She had left suboccipital craniotomy with Dr Tatyana Ortiz. Had carboplatin retreat with paclitaxel/ Avastin started and had carboplatin reaction on 5/29/19 during bag 2 Cycle 2: redness over entire face and uncomfortable sensation over face/ arms. She had slowly increasing  on maintenance bevacizumab and had CT done on 2/5/2020 which showed 1.1 x 1 cm enhancing nodule in the right hemipelvis. She had subsequent Pet/ CT which showed hypermetabolic right cardiophrenic and periportal lymphadenopathy, hypermetabolic implants over the liver capsule, serosal surface of the urinary bladder and posterior right hemipelvis. She was not a surgical candidate and proceeded to Doxil/ bevacizumab. She developed mucositis that was persistent after C2 and reduced dose of Doxil for C3. Interval CT of the chest/ abd/ pelvis done on 6/9/2020 showed new RLL 5 mm lung nodule nonspecific but regression of abdominal adenopathy and right pelvic tumor implant. She had repeat imaging done on 1/19/2021 which showed new 1.4 cm liver lesion along with interval increase in periportal lymph nodes and pelvic implant. She was switched to gemcitabine on 2/2/2020. \par  [de-identified] : poorly differentiated carcinoma: ER positive  [de-identified] : She feels there is pain underneath her right breast: worse with deep breathing and at night. Feels there is something inside that is irritating her and giving her sensation of tingling: also notices this sensation of tingling over the abdomen when she walks. Denies any vomiting and having normal BM. She is eating. She noticed over the past 2 weeks abdominal discomfort. Thought this was her cancer that has spread everywhere: she has not tried any topical therapy or anti inflammatory for this discomfort. Not affecting her ADLs. She is present to review her CT and next steps for palliative treatment of her ovarian cancer. She feels groin fullness and also noticed vaginal nodule which has since resolved. She denies any vaginal bleeding or burning with urination or fevers.  [de-identified] : carbo/ taxol: 10/24/17 to 2/12/18 with cumulative fatigue and neuropathy\par bevacizumab added on 12/8/17 to 4/2018 (pt stopped coming for treatment and lost to follow up until 12/2018)\par retreat carbo/ taxol 5/7/19\par bevacizumab added to Cycle 2 of retreat carboplatin/ paclitaxel 5/29/19 and allergic reaction with 2nd bag of carboplatin\par bevacizumab and paclitaxel 6/19/19 to 8/27/19\par bevacizumab maintenance 9/2019 to 2/2020\par Doxil/ bevacizumab 3/4/2020 to 6/2020\par Doxil 6/2020 to 1/19/2021\par gemcitabine 2/2/2021 to present

## 2021-02-02 NOTE — REVIEW OF SYSTEMS
[Fever] : no fever [Chills] : no chills [Night Sweats] : no night sweats [Fatigue] : fatigue [Recent Change In Weight] : ~T no recent weight change [Dysphagia] : no dysphagia [Loss of Hearing] : no loss of hearing [Nosebleeds] : no nosebleeds [Hoarseness] : no hoarseness [Odynophagia] : no odynophagia [Mucosal Pain] : no mucosal pain [Shortness Of Breath] : no shortness of breath [Wheezing] : no wheezing [Cough] : no cough [SOB on Exertion] : no shortness of breath during exertion [Abdominal Pain] : abdominal pain [Vomiting] : no vomiting [Constipation] : no constipation [Diarrhea] : no diarrhea [Negative] : Allergic/Immunologic [FreeTextEntry4] : occasional mouth sore [FreeTextEntry6] : tenderness underneath the right breast  [FreeTextEntry7] : tingling sensation that moves along the intestines worse when walking and at night

## 2021-02-02 NOTE — REASON FOR VISIT
[Follow-Up Visit] : a follow-up [Patient Declined  Services] : - None: Patient declined  services [FreeTextEntry2] : follow up for ovarian cancer s/p CT imaging showing progression of disease  [FreeTextEntry3] : Preferred dtr in law translation: Agnes  [TWNoteComboBox1] : Chinese

## 2021-02-02 NOTE — PHYSICAL EXAM
[Restricted in physically strenuous activity but ambulatory and able to carry out work of a light or sedentary nature] : Status 1- Restricted in physically strenuous activity but ambulatory and able to carry out work of a light or sedentary nature, e.g., light house work, office work [Thin] : thin [Ulcers] : no ulcers [Mucositis] : no mucositis [Thrush] : no thrush [Vesicles] : no vesicles [Normal] : affect appropriate [de-identified] : L lateral tongue 1 cm ulceration  [de-identified] : no palpable mass or LN  [de-identified] : no masses palpable underneath R breast but reproducible pain with palpation of chest wall  [de-identified] : B inguinal LN: R 2 cm; L 3 cm [de-identified] : dry skin and hyperpigmentation over the hands and trunk

## 2021-02-24 PROBLEM — K13.79 MOUTH SORE: Status: RESOLVED | Noted: 2020-05-27 | Resolved: 2021-01-01

## 2021-02-24 NOTE — HISTORY OF PRESENT ILLNESS
[Disease: _____________________] : Disease: [unfilled] [T: ___] : T[unfilled] [N: ___] : N[unfilled] [M: ___] : M[unfilled] [AJCC Stage: ____] : AJCC Stage: [unfilled] [de-identified] : Age 67: Stage I NSCLC adenocarcinoma s/p left VATs robotic assisted FERMÍN lobectomy and MLND 8/16/17 with Dr Ken Sol\par Age 67: Stage IV poorly differentiated ovarian cancer s/p AVEL/ BSO/ total omentectomy, bilateral ureterolysis, resection of abdominal anterior wall masses, repair of cystostomy\par She initially presented with hemoptysis and had evaluation consisting of bronchoscopy and FNA. The bronchoscopy was negative and the FNA was positive for malignant cells: TTF1+ and PAX 8 negative. She had Pet/ CT on 7/6/17 which showed 2.3 cm left upper lobe nodule with SUV of 23 and 4.2 x 3.1 cm left ovarian lesion SUV of 7.9 and left common iliac lymph nodes measuring up to 8mm with SUV of 2.2, 6 mm perihepatic implant SUV of 3.1. She initially had laparoscopic evaluation with left ovary biopsy which showed poorly differentiated carcinoma that was ER, WT1, PAX 8 positive and TTF1 negative. She initially had the left VATs. Then she subsequently had exploratory laparotomy with  AVEL, BSO, radical dissection of tumor with total omentectomy, resection of anterior abdominal wall masses, lysis of adhesions, and repair of cystostomy. She had CT imaging done after 5th cycle of chemotherapy to evaluate abdominal pain and CT showed no evidence of disease. She had abdominal pain in 3/2019 and had follow up CT which showed new small pelvic implant and enlarged sita-caval LN. She had headache and went to [facility] 4/10/19. She had imaging that showed predominantly cystic peripherally enhancing mass within the left cerebellar hemisphere. She had left suboccipital craniotomy with Dr Tatyana Ortiz. Had carboplatin retreat with paclitaxel/ Avastin started and had carboplatin reaction on 5/29/19 during bag 2 Cycle 2: redness over entire face and uncomfortable sensation over face/ arms. She had slowly increasing  on maintenance bevacizumab and had CT done on 2/5/2020 which showed 1.1 x 1 cm enhancing nodule in the right hemipelvis. She had subsequent Pet/ CT which showed hypermetabolic right cardiophrenic and periportal lymphadenopathy, hypermetabolic implants over the liver capsule, serosal surface of the urinary bladder and posterior right hemipelvis. She was not a surgical candidate and proceeded to Doxil/ bevacizumab. She developed mucositis that was persistent after C2 and reduced dose of Doxil for C3. Interval CT of the chest/ abd/ pelvis done on 6/9/2020 showed new RLL 5 mm lung nodule nonspecific but regression of abdominal adenopathy and right pelvic tumor implant. She had repeat imaging done on 1/19/2021 which showed new 1.4 cm liver lesion along with interval increase in periportal lymph nodes and pelvic implant. She was switched to gemcitabine on 2/2/2020. \par  [de-identified] : carbo/ taxol: 10/24/17 to 2/12/18 with cumulative fatigue and neuropathy\par bevacizumab added on 12/8/17 to 4/2018 (pt stopped coming for treatment and lost to follow up until 12/2018)\par retreat carbo/ taxol 5/7/19\par bevacizumab added to Cycle 2 of retreat carboplatin/ paclitaxel 5/29/19 and allergic reaction with 2nd bag of carboplatin\par bevacizumab and paclitaxel 6/19/19 to 8/27/19\par bevacizumab maintenance 9/2019 to 2/2020\par Doxil/ bevacizumab 3/4/2020 to 6/2020\par Doxil 6/2020 to 1/19/2021\par gemcitabine 2/2/2021 to present  [de-identified] : poorly differentiated carcinoma: ER positive  [de-identified] : She denies any fevers after gemcitabine. She has mild fatigue but feels she is tolerating it well. She has no additional mouth sores and has not needed to use oral rinse. She still notices intermittent abdominal pain; when the pain occurs, she has to rest and has been taking ibuprofen 400 mg as needed. Then there are times she has no pain and she feels completely fine. She is able to do housework and still having good appetite. Having regular BM and no HA.

## 2021-02-24 NOTE — ASSESSMENT
[FreeTextEntry1] : She is a 71 y/o F with Stage IV ovarian cancer and Stage I NSCLC diagnosed simultaneously. She has completed 6 cycles of carboplatin/ paclitaxel with bevacizumab and did not continue with bevacizumab maintenance: off from 4/2018 to 4/2019. 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 She completed SRS to the craniotomy site. CT of the chest/ abd/ pelvis showed adenopathy and started with palliative chemotherapy: retreat carboplatin/ paclitaxel 5/7/19. She had allergic reaction with C2 bag 2 of carboplatin and has been on bevacizumab/ paclitaxel s/p total of 6 cycles. While on maintenance bevacizumab, she has progression of disease. She progressed on Doxil and currently on gemcitabine. She currently is on C2 D1 of treatment and has not had fevers on therapy. We reviewed her blood counts which are stable. We encouraged foods rich in iron and B12 to improve anemia. We will continue monitoring her counts. We reviewed her abdominal pain and may be due to adenopathy and will try course of gabapentin low dose at bedtime to see if will improve. We explained she can continue to take ibuprofen as needed. We reviewed continued monitoring of LFTs and  but will expect it will take time for  to decline. Questions answered to her and her dtr in law's satisfaction. Next follow up in 3 weeks.

## 2021-02-24 NOTE — PHYSICAL EXAM
[Restricted in physically strenuous activity but ambulatory and able to carry out work of a light or sedentary nature] : Status 1- Restricted in physically strenuous activity but ambulatory and able to carry out work of a light or sedentary nature, e.g., light house work, office work [Thin] : thin [Ulcers] : no ulcers [Mucositis] : no mucositis [Thrush] : no thrush [Vesicles] : no vesicles [Normal] : affect appropriate [de-identified] : L lateral tongue 1 cm ulceration  [de-identified] : no palpable mass or LN  [de-identified] : no masses palpable underneath R breast but reproducible pain with palpation of chest wall  [de-identified] : Tenderness on palpation over the LLQ; no rebound or guarding  [de-identified] : B inguinal LN: R 2 cm; L 3 cm [de-identified] : dry skin and hyperpigmentation over the hands and trunk

## 2021-02-24 NOTE — REASON FOR VISIT
[Follow-Up Visit] : a follow-up [Patient Declined  Services] : - None: Patient declined  services [FreeTextEntry2] : follow up for ovarian cancer on gemcitabine C2 D1 [FreeTextEntry3] : Agnes: pt preferred translation from dtr in law [TWNoteComboBox1] : Chinese

## 2021-02-24 NOTE — REVIEW OF SYSTEMS
[Abdominal Pain] : abdominal pain [Constipation] : no constipation [Vomiting] : no vomiting [Diarrhea] : no diarrhea [Negative] : Allergic/Immunologic [FreeTextEntry4] : resolved oral mouth sores  [FreeTextEntry7] : feels tingling sensation over the abdomen and worse over the groin area

## 2021-03-23 PROBLEM — K06.8 BLEEDING GUMS: Status: ACTIVE | Noted: 2021-01-01

## 2021-03-23 PROBLEM — H04.123 DRY EYES: Status: ACTIVE | Noted: 2021-01-01

## 2021-03-24 NOTE — PHYSICAL EXAM
[Restricted in physically strenuous activity but ambulatory and able to carry out work of a light or sedentary nature] : Status 1- Restricted in physically strenuous activity but ambulatory and able to carry out work of a light or sedentary nature, e.g., light house work, office work [Thin] : thin [Ulcers] : no ulcers [Mucositis] : no mucositis [Thrush] : no thrush [Vesicles] : no vesicles [Normal] : affect appropriate [de-identified] : able to see grossly and count fingers  [de-identified] : erythema over the gums  [de-identified] : no palpable mass or LN  [de-identified] : no masses palpable underneath R breast but reproducible pain with palpation of chest wall  [de-identified] : Tenderness on palpation over the LLQ; no rebound or guarding  [de-identified] : B inguinal LN: R 2 cm; L 3 cm [de-identified] : dry skin with decreased hyperpigmentation

## 2021-03-24 NOTE — REVIEW OF SYSTEMS
[Fever] : no fever [Chills] : no chills [Night Sweats] : no night sweats [Fatigue] : fatigue [Recent Change In Weight] : ~T no recent weight change [Eye Pain] : no eye pain [Red Eyes] : eyes not red [Dry Eyes] : no dryness of the eyes [Vision Problems] : no vision problems [Dysphagia] : no dysphagia [Loss of Hearing] : no loss of hearing [Nosebleeds] : no nosebleeds [Hoarseness] : no hoarseness [Odynophagia] : no odynophagia [Mucosal Pain] : no mucosal pain [Negative] : Allergic/Immunologic [FreeTextEntry3] : blurry vision  [FreeTextEntry4] : gum tenderness

## 2021-03-24 NOTE — HISTORY OF PRESENT ILLNESS
[Disease: _____________________] : Disease: [unfilled] [T: ___] : T[unfilled] [N: ___] : N[unfilled] [M: ___] : M[unfilled] [AJCC Stage: ____] : AJCC Stage: [unfilled] [de-identified] : Age 67: Stage I NSCLC adenocarcinoma s/p left VATs robotic assisted FERMÍN lobectomy and MLND 8/16/17 with Dr Ken Sol\par Age 67: Stage IV poorly differentiated ovarian cancer s/p AVEL/ BSO/ total omentectomy, bilateral ureterolysis, resection of abdominal anterior wall masses, repair of cystostomy\par She initially presented with hemoptysis and had evaluation consisting of bronchoscopy and FNA. The bronchoscopy was negative and the FNA was positive for malignant cells: TTF1+ and PAX 8 negative. She had Pet/ CT on 7/6/17 which showed 2.3 cm left upper lobe nodule with SUV of 23 and 4.2 x 3.1 cm left ovarian lesion SUV of 7.9 and left common iliac lymph nodes measuring up to 8mm with SUV of 2.2, 6 mm perihepatic implant SUV of 3.1. She initially had laparoscopic evaluation with left ovary biopsy which showed poorly differentiated carcinoma that was ER, WT1, PAX 8 positive and TTF1 negative. She initially had the left VATs. Then she subsequently had exploratory laparotomy with  AVEL, BSO, radical dissection of tumor with total omentectomy, resection of anterior abdominal wall masses, lysis of adhesions, and repair of cystostomy. She had CT imaging done after 5th cycle of chemotherapy to evaluate abdominal pain and CT showed no evidence of disease. She had abdominal pain in 3/2019 and had follow up CT which showed new small pelvic implant and enlarged sita-caval LN. She had headache and went to [facility] 4/10/19. She had imaging that showed predominantly cystic peripherally enhancing mass within the left cerebellar hemisphere. She had left suboccipital craniotomy with Dr Tatyana Ortiz. Had carboplatin retreat with paclitaxel/ Avastin started and had carboplatin reaction on 5/29/19 during bag 2 Cycle 2: redness over entire face and uncomfortable sensation over face/ arms. She had slowly increasing  on maintenance bevacizumab and had CT done on 2/5/2020 which showed 1.1 x 1 cm enhancing nodule in the right hemipelvis. She had subsequent Pet/ CT which showed hypermetabolic right cardiophrenic and periportal lymphadenopathy, hypermetabolic implants over the liver capsule, serosal surface of the urinary bladder and posterior right hemipelvis. She was not a surgical candidate and proceeded to Doxil/ bevacizumab. She developed mucositis that was persistent after C2 and reduced dose of Doxil for C3. Interval CT of the chest/ abd/ pelvis done on 6/9/2020 showed new RLL 5 mm lung nodule nonspecific but regression of abdominal adenopathy and right pelvic tumor implant. She had repeat imaging done on 1/19/2021 which showed new 1.4 cm liver lesion along with interval increase in periportal lymph nodes and pelvic implant. She was switched to gemcitabine on 2/2/2020. \par  [de-identified] : poorly differentiated carcinoma: ER positive  [de-identified] : carbo/ taxol: 10/24/17 to 2/12/18 with cumulative fatigue and neuropathy\par bevacizumab added on 12/8/17 to 4/2018 (pt stopped coming for treatment and lost to follow up until 12/2018)\par retreat carbo/ taxol 5/7/19\par bevacizumab added to Cycle 2 of retreat carboplatin/ paclitaxel 5/29/19 and allergic reaction with 2nd bag of carboplatin\par bevacizumab and paclitaxel 6/19/19 to 8/27/19\par bevacizumab maintenance 9/2019 to 2/2020\par Doxil/ bevacizumab 3/4/2020 to 6/2020\par Doxil 6/2020 to 1/19/2021\par gemcitabine 2/2/2021 to present  [de-identified] : She has not noticed further groin tenderness but has LLQ tingling/ pain sensation: comes and goes. She denies any worsening pain with BM or after eating. No vomiting or nausea. She has fatigue that lasts for 2 to 3 days. She has been home since covid and not going outdoors. She has gum soreness/ pain and has not gone to dentist. Also having blurry vision and has not seen eye doctor. Denies any headaches or double vision. She has not tried any supportive measures for these symptoms. She is wondering when she should get covid vaccine.

## 2021-03-24 NOTE — ASSESSMENT
[FreeTextEntry1] : She is a 71 y/o F with Stage IV ovarian cancer and Stage I NSCLC diagnosed simultaneously. She has completed 6 cycles of carboplatin/ paclitaxel with bevacizumab and did not continue with bevacizumab maintenance: off from 4/2018 to 4/2019. 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 She completed SRS to the craniotomy site. CT of the chest/ abd/ pelvis showed adenopathy and started with palliative chemotherapy: retreat carboplatin/ paclitaxel 5/7/19. She had allergic reaction with C2 bag 2 of carboplatin and has been on bevacizumab/ paclitaxel s/p total of 6 cycles. She is currently on 4th line gemcitabine with persistent LLQ pain: we reviewed CT imaging to evaluate disease to evaluate for progression. She has tolerated gemcitabine with mild fatigue but otherwise counts have been stable and groin adenopathy have improved. Will evaluate goals and review imaging for future further chemotherapy. \par \par Blurry vision: we reviewed follow up with eye doctor for vision. We will try artificial tears to see if due to dry eyes.\par \par Gum pain: we reviewed oral rinse to improve infection over gums and to decrease further pain. She has not been to dentist and will see if gum symptoms improve; if not, will need to have her see dentist\par \par We reviewed covid vaccination and can space out chemotherapy to allow her to get vaccination. Questions answered to her and her dtr in law's satisfaction.

## 2021-03-24 NOTE — REASON FOR VISIT
[Follow-Up Visit] : a follow-up [Patient Declined  Services] : - None: Patient declined  services [FreeTextEntry2] : follow up for ovarian cancer on gemcitabine  [FreeTextEntry3] : Pt preferred dtr in law for [place] [TWNoteComboBox1] : Chinese

## 2021-04-20 NOTE — REVIEW OF SYSTEMS
[Recent Change In Weight] : ~T recent weight change [Abdominal Pain] : abdominal pain [Constipation] : constipation [Negative] : Allergic/Immunologic [Fever] : no fever [Chills] : no chills [Night Sweats] : no night sweats [Fatigue] : no fatigue [Shortness Of Breath] : no shortness of breath [Wheezing] : no wheezing [Cough] : cough [SOB on Exertion] : no shortness of breath during exertion [Vomiting] : no vomiting [Diarrhea] : no diarrhea [FreeTextEntry6] : occasional cough: had one episode of blood in sputum but no further episodes  [FreeTextEntry2] : lost 4 lbs since last visit 1 month ago  [FreeTextEntry7] : LLQ pain and hard stool

## 2021-04-20 NOTE — ASSESSMENT
[FreeTextEntry1] : She is a 71 y/o F with Stage IV ovarian cancer and Stage I NSCLC diagnosed simultaneously. She has completed 6 cycles of carboplatin/ paclitaxel with bevacizumab and did not continue with bevacizumab maintenance: off from 4/2018 to 4/2019. 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 She completed SRS to the craniotomy site. CT of the chest/ abd/ pelvis showed adenopathy and started with palliative chemotherapy: retreat carboplatin/ paclitaxel 5/7/19. She had allergic reaction with C2 bag 2 of carboplatin and has been on bevacizumab/ paclitaxel s/p total of 6 cycles. She was on gemcitabine with persistent LLQ pain: we reviewed CT imaging showing progression of disease in the abdomen/ pelvis with liver lesions increased and adenopathy increased. We reviewed her LLQ pain and to left external iliac node that is 2.6 x 1.8 cm and will see if any palliative RT can be done for this area. We reviewed gabapentin daily at bedtime and can titrate medication to help with abdominal discomfort. We reviewed expectations with ovarian cancer progression and decreased weight. We encouraged trial of oral supplements like Ensure to help maintain weight: we reviewed sweetness can be decreased with mixing with almond milk or milk. We reviewed increasing calorie dense drinks to help with protein and calorie intake. Her family would like to avoid letting pt know of the progression in LN: we explained that we are obliged tell pt whatever she wanted to know. Pt currently does not want to know full results of her CT scan and wants to proceed with 5th line topotecan chemotherapy: will schedule for D1, 8, 15 every 4 weeks but will modify interval for any grade 3 neutropenia or thrombocytopenia. We reviewed potential side effects including but not limited to: fatigue, low blood counts, increased risk of infection, hair loss, and GI upset. Written information was given to her family. We reviewed her, her dtr in law's, and son in law's questions. She consented to chemotherapy. \par \par Blood sputum x 1 episode: will follow counts and symptoms. She had lung lesion resected and CT without any new lung metastases. Will follow.  [Palliative] : Goals of care discussed with patient: Palliative

## 2021-04-20 NOTE — REASON FOR VISIT
[Follow-Up Visit] : a follow-up [FreeTextEntry2] : follow up for ovarian cancer with progression on gemcitabine

## 2021-04-20 NOTE — HISTORY OF PRESENT ILLNESS
[Disease: _____________________] : Disease: [unfilled] [T: ___] : T[unfilled] [N: ___] : N[unfilled] [M: ___] : M[unfilled] [AJCC Stage: ____] : AJCC Stage: [unfilled] [de-identified] : Age 67: Stage I NSCLC adenocarcinoma s/p left VATs robotic assisted FERMÍN lobectomy and MLND 8/16/17 with Dr Ken Sol\par Age 67: Stage IV poorly differentiated ovarian cancer s/p AVEL/ BSO/ total omentectomy, bilateral ureterolysis, resection of abdominal anterior wall masses, repair of cystostomy\par She initially presented with hemoptysis and had evaluation consisting of bronchoscopy and FNA. The bronchoscopy was negative and the FNA was positive for malignant cells: TTF1+ and PAX 8 negative. She had Pet/ CT on 7/6/17 which showed 2.3 cm left upper lobe nodule with SUV of 23 and 4.2 x 3.1 cm left ovarian lesion SUV of 7.9 and left common iliac lymph nodes measuring up to 8mm with SUV of 2.2, 6 mm perihepatic implant SUV of 3.1. She initially had laparoscopic evaluation with left ovary biopsy which showed poorly differentiated carcinoma that was ER, WT1, PAX 8 positive and TTF1 negative. She initially had the left VATs. Then she subsequently had exploratory laparotomy with  AVEL, BSO, radical dissection of tumor with total omentectomy, resection of anterior abdominal wall masses, lysis of adhesions, and repair of cystostomy. She had CT imaging done after 5th cycle of chemotherapy to evaluate abdominal pain and CT showed no evidence of disease. She had abdominal pain in 3/2019 and had follow up CT which showed new small pelvic implant and enlarged sita-caval LN. She had headache and went to [facility] 4/10/19. She had imaging that showed predominantly cystic peripherally enhancing mass within the left cerebellar hemisphere. She had left suboccipital craniotomy with Dr Tatyana Ortiz. Had carboplatin retreat with paclitaxel/ Avastin started and had carboplatin reaction on 5/29/19 during bag 2 Cycle 2: redness over entire face and uncomfortable sensation over face/ arms. She had slowly increasing  on maintenance bevacizumab and had CT done on 2/5/2020 which showed 1.1 x 1 cm enhancing nodule in the right hemipelvis. She had subsequent Pet/ CT which showed hypermetabolic right cardiophrenic and periportal lymphadenopathy, hypermetabolic implants over the liver capsule, serosal surface of the urinary bladder and posterior right hemipelvis. She was not a surgical candidate and proceeded to Doxil/ bevacizumab. She developed mucositis that was persistent after C2 and reduced dose of Doxil for C3. Interval CT of the chest/ abd/ pelvis done on 6/9/2020 showed new RLL 5 mm lung nodule nonspecific but regression of abdominal adenopathy and right pelvic tumor implant. She had repeat imaging done on 1/19/2021 which showed new 1.4 cm liver lesion along with interval increase in periportal lymph nodes and pelvic implant. She was switched to gemcitabine on 2/2/2020. She had continuing abdominal pain sensation that would come and go and she had interval CT imaging showing progression of hepatic metastatic disease, periportal adenopathy, new RP and pelvic adenopathy, and new hepatic capsular implant. \par  [de-identified] : carbo/ taxol: 10/24/17 to 2/12/18 with cumulative fatigue and neuropathy\par bevacizumab added on 12/8/17 to 4/2018 (pt stopped coming for treatment and lost to follow up until 12/2018)\par retreat carbo/ taxol 5/7/19\par bevacizumab added to Cycle 2 of retreat carboplatin/ paclitaxel 5/29/19 and allergic reaction with 2nd bag of carboplatin\par Oncomine from [facility]: (evaluated hotspot and full gene and fusion protein panel) GENESIS missense, tp53, Myc amplication, Birc3\par bevacizumab and paclitaxel 6/19/19 to 8/27/19\par bevacizumab maintenance 9/2019 to 2/2020\par Doxil/ bevacizumab 3/4/2020 to 6/2020\par Doxil 6/2020 to 1/19/2021\par gemcitabine 2/2/2021 to 4/2021\par topotecan 4/20/2021 to present  [de-identified] : poorly differentiated carcinoma: ER positive  [de-identified] : She has been having constipation and wanting to have stool softener to help her BM. She has left sided lower pelvic pain that is located near pubic area. She took gabapentin which improved sensation but wondering how long she could take the medication: has been taking the medication as needed. She denies any drowsiness from medication. She also has decreased appetite: denies any vomiting or nausea or abdominal pain after eating. She has not tried any supplements.

## 2021-04-20 NOTE — PHYSICAL EXAM
[Restricted in physically strenuous activity but ambulatory and able to carry out work of a light or sedentary nature] : Status 1- Restricted in physically strenuous activity but ambulatory and able to carry out work of a light or sedentary nature, e.g., light house work, office work [Thin] : thin [Normal] : affect appropriate [Ulcers] : no ulcers [Mucositis] : no mucositis [Thrush] : no thrush [Vesicles] : no vesicles [de-identified] : erythema over the gums  [de-identified] : able to see grossly and count fingers  [de-identified] : no palpable mass or LN  [de-identified] : Tenderness on palpation over the LLQ; no rebound or guarding  [de-identified] : B inguinal LN: R 2 cm; L 3 cm [de-identified] : dry skin with decreased hyperpigmentation

## 2021-05-05 NOTE — ASSESSMENT
HR=85 bpm, XNXN=737/79 mmhg, SpO2=99.0 %, Resp=11 B/min, EtCO2=41 mmHg, Apnea=4 Seconds [FreeTextEntry1] : She is a 67 y/o F with Stage IV ovarian cancer and Stage I NSCLC diagnosed simultaneously. She has completed 6 cycles of carboplatin/ paclitaxel with bevacizumab and did not continue with bevacizumab maintenance: off since 4/2018. We obtained report of pathology: metastatic ovarian cancer. She will complete SRS to the craniotomy site and will start on palliative chemotherapy: retreat carboplatin/ paclitaxel. We reviewed goals of care: explained that will add Avastin to treatment after 1st cycle to ensure complete wound healing from surgery. We reviewed potential side effects including but not limited to: fatigue, allergic reaction, myalgias, neuropathy, constipation, and nausea: to start 5/7/19. We reviewed repeat imaging in 3 months. She consented to retreat chemotherapy and will have next follow up in 2 to 3 weeks. We reviewed supportive care for side effects and modification if any intolerance. Questions answered to her and her families' satisfaction.  [Palliative] : Goals of care discussed with patient: Palliative

## 2021-05-21 PROBLEM — K59.00 CONSTIPATION: Status: ACTIVE | Noted: 2019-07-31

## 2021-05-22 NOTE — HISTORY OF PRESENT ILLNESS
[Disease: _____________________] : Disease: [unfilled] [T: ___] : T[unfilled] [N: ___] : N[unfilled] [M: ___] : M[unfilled] [AJCC Stage: ____] : AJCC Stage: [unfilled] [de-identified] : Age 67: Stage I NSCLC adenocarcinoma s/p left VATs robotic assisted FERMÍN lobectomy and MLND 8/16/17 with Dr Kne Sol\par Age 67: Stage IV poorly differentiated ovarian cancer s/p AVEL/ BSO/ total omentectomy, bilateral ureterolysis, resection of abdominal anterior wall masses, repair of cystostomy\par She initially presented with hemoptysis and had evaluation consisting of bronchoscopy and FNA. The bronchoscopy was negative and the FNA was positive for malignant cells: TTF1+ and PAX 8 negative. She had Pet/ CT on 7/6/17 which showed 2.3 cm left upper lobe nodule with SUV of 23 and 4.2 x 3.1 cm left ovarian lesion SUV of 7.9 and left common iliac lymph nodes measuring up to 8mm with SUV of 2.2, 6 mm perihepatic implant SUV of 3.1. She initially had laparoscopic evaluation with left ovary biopsy which showed poorly differentiated carcinoma that was ER, WT1, PAX 8 positive and TTF1 negative. She initially had the left VATs. Then she subsequently had exploratory laparotomy with  AVEL, BSO, radical dissection of tumor with total omentectomy, resection of anterior abdominal wall masses, lysis of adhesions, and repair of cystostomy. She had CT imaging done after 5th cycle of chemotherapy to evaluate abdominal pain and CT showed no evidence of disease. She had abdominal pain in 3/2019 and had follow up CT which showed new small pelvic implant and enlarged sita-caval LN. She had headache and went to [facility] 4/10/19. She had imaging that showed predominantly cystic peripherally enhancing mass within the left cerebellar hemisphere. She had left suboccipital craniotomy with Dr Tatyana Ortiz. Had carboplatin retreat with paclitaxel/ Avastin started and had carboplatin reaction on 5/29/19 during bag 2 Cycle 2: redness over entire face and uncomfortable sensation over face/ arms. She had slowly increasing  on maintenance bevacizumab and had CT done on 2/5/2020 which showed 1.1 x 1 cm enhancing nodule in the right hemipelvis. She had subsequent Pet/ CT which showed hypermetabolic right cardiophrenic and periportal lymphadenopathy, hypermetabolic implants over the liver capsule, serosal surface of the urinary bladder and posterior right hemipelvis. She was not a surgical candidate and proceeded to Doxil/ bevacizumab. She developed mucositis that was persistent after C2 and reduced dose of Doxil for C3. Interval CT of the chest/ abd/ pelvis done on 6/9/2020 showed new RLL 5 mm lung nodule nonspecific but regression of abdominal adenopathy and right pelvic tumor implant. She had repeat imaging done on 1/19/2021 which showed new 1.4 cm liver lesion along with interval increase in periportal lymph nodes and pelvic implant. She was switched to gemcitabine on 2/2/2020. She had continuing abdominal pain sensation that would come and go and she had interval CT imaging showing progression of hepatic metastatic disease, periportal adenopathy, new RP and pelvic adenopathy, and new hepatic capsular implant. \par  [de-identified] : poorly differentiated carcinoma: ER positive  [de-identified] : Pt daughter present during visit via phone. Pt reports stable fatigue. She c/o intermittent right buttock pain. States pain is deep, "in her bone". Constipation managed well with Senna which she reports taking at night. States she has a BM ~every 4 days. Last BM this morning. She continues to have decreased appetite & reports eating mainly [place]. She continues taking Gabapentin with relief of left sided lower pelvic pain that is located near pubic area. [de-identified] : carbo/ taxol: 10/24/17 to 2/12/18 with cumulative fatigue and neuropathy\par bevacizumab added on 12/8/17 to 4/2018 (pt stopped coming for treatment and lost to follow up until 12/2018)\par retreat carbo/ taxol 5/7/19\par bevacizumab added to Cycle 2 of retreat carboplatin/ paclitaxel 5/29/19 and allergic reaction with 2nd bag of carboplatin\par Oncomine from [employer]: (evaluated hotspot and full gene and fusion protein panel) GENESIS missense, tp53, Myc amplication, Birc3\par bevacizumab and paclitaxel 6/19/19 to 8/27/19\par bevacizumab maintenance 9/2019 to 2/2020\par Doxil/ bevacizumab 3/4/2020 to 6/2020\par Doxil 6/2020 to 1/19/2021\par gemcitabine 2/2/2021 to 4/2021\par topotecan 4/20/2021 to present

## 2021-05-22 NOTE — REASON FOR VISIT
[Follow-Up Visit] : a follow-up [[employer] ] : provided by [employer]   [Time Spent: ____ minutes] : I have spent [unfilled] minutes of time on the encounter. The patient's primary language is not English thus required  services. [FreeTextEntry1] : [id number] [FreeTextEntry2] : Janice [FreeTextEntry3] : Mandarin [TWNoteComboBox1] : Chinese

## 2021-05-22 NOTE — ASSESSMENT
[Palliative] : Goals of care discussed with patient: Palliative [FreeTextEntry1] : She is a 69 y/o F with Stage IV ovarian cancer and Stage I NSCLC diagnosed simultaneously. She has completed 6 cycles of carboplatin/ paclitaxel with bevacizumab and did not continue with bevacizumab maintenance: off from 4/2018 to 4/2019. 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 She completed SRS to the craniotomy site. CT of the chest/ abd/ pelvis showed adenopathy and started with palliative chemotherapy: presently on Topotecan. Hgb 8.0. We will proceed with tx today as pt motivated to be treated. We discussed that she will start folic acid & B12 to help address anemia. Also discussed increased food intake with calorie rich foods & supplementing with Ensure. Advised that I can make nutrition referral but daughter prefers to defer for now & will let us know if she changes her mind. We also discussed potential need for PRBC transfusion if her anemia continues to worsen &/or she is symptomatic as a result. Gabapentin refill sent to pharmacy per request. She will continue Senna for diarrhea. Follow up in 2 weeks or sooner if needed.

## 2021-05-22 NOTE — REVIEW OF SYSTEMS
[Recent Change In Weight] : ~T recent weight change [Abdominal Pain] : abdominal pain [Constipation] : constipation [Negative] : Respiratory [Fever] : no fever [Chills] : no chills [Night Sweats] : no night sweats [Fatigue] : no fatigue [Shortness Of Breath] : no shortness of breath [Wheezing] : no wheezing [Cough] : no cough [SOB on Exertion] : no shortness of breath during exertion [Vomiting] : no vomiting [Diarrhea] : no diarrhea [FreeTextEntry2] : lost ~0.5kg since last office visit

## 2021-05-22 NOTE — PHYSICAL EXAM
[Restricted in physically strenuous activity but ambulatory and able to carry out work of a light or sedentary nature] : Status 1- Restricted in physically strenuous activity but ambulatory and able to carry out work of a light or sedentary nature, e.g., light house work, office work [Thin] : thin [Normal] : pharynx is unremarkable, moist mucus membrane, no oral lesions [Ulcers] : no ulcers [Mucositis] : no mucositis [Thrush] : no thrush [Vesicles] : no vesicles [de-identified] : Tenderness on palpation over the LLQ; no rebound or guarding [de-identified] : no tender to palpation R buttocks

## 2021-06-19 PROBLEM — R53.83 FATIGUE, UNSPECIFIED TYPE: Status: ACTIVE | Noted: 2017-11-07

## 2021-06-19 NOTE — REVIEW OF SYSTEMS
Calm [Fever] : no fever [Chills] : no chills [Night Sweats] : no night sweats [Fatigue] : fatigue [Recent Change In Weight] : ~T no recent weight change [Abdominal Pain] : abdominal pain [Vomiting] : no vomiting [Constipation] : no constipation [Diarrhea] : no diarrhea [Negative] : Allergic/Immunologic

## 2021-06-19 NOTE — ASSESSMENT
[FreeTextEntry1] : She is a 71 y/o F with Stage IV ovarian cancer and Stage I NSCLC diagnosed simultaneously. She has completed 6 cycles of carboplatin/ paclitaxel with bevacizumab and did not continue with bevacizumab maintenance: off from 4/2018 to 4/2019. 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 She completed SRS to the craniotomy site. CT of the chest/ abd/ pelvis showed adenopathy and started with palliative chemotherapy: retreat carboplatin/ paclitaxel 5/7/19. She had allergic reaction with C2 bag 2 of carboplatin and has been on bevacizumab/ paclitaxel s/p total of 6 cycles. She went through gemcitabine and currently on 5th line topotecan. We reviewed goals of care. We reviewed with her and her family that we would hold treatment today given her weakness and try small amount of dexamethasone in am to see if energy and appetite will improve. We reviewed with her dtr in law that if her energy does not improve, palliative chemotherapy would give more side effects than benefit. We reviewed problems many of the ovarian cancer patients with cancer related cachexia and fatigue and how chemotherapy can have additive side effects. Will re-evaluate next week to see if improvement. Will plan on fluid hydration next week as well. Questions answered to their satisfaction. Follow up with Cadence in 1 week.

## 2021-06-19 NOTE — PHYSICAL EXAM
[Ambulatory and capable of all self care but unable to carry out any work activities] : Status 2- Ambulatory and capable of all self care but unable to carry out any work activities. Up and about more than 50% of waking hours [Thin] : thin [Ulcers] : no ulcers [Mucositis] : no mucositis [Thrush] : no thrush [Vesicles] : no vesicles [Normal] : affect appropriate [de-identified] : able to see grossly and count fingers  [de-identified] : erythema over the gums  [de-identified] : no palpable mass or LN  [de-identified] : Tenderness on palpation over the LLQ; no rebound or guarding  [de-identified] : B inguinal LN: R 2 cm; L 3 cm [de-identified] : dry skin with decreased hyperpigmentation

## 2021-06-19 NOTE — HISTORY OF PRESENT ILLNESS
[Disease: _____________________] : Disease: [unfilled] [T: ___] : T[unfilled] [N: ___] : N[unfilled] [M: ___] : M[unfilled] [AJCC Stage: ____] : AJCC Stage: [unfilled] [de-identified] : Age 67: Stage I NSCLC adenocarcinoma s/p left VATs robotic assisted FERMÍN lobectomy and MLND 8/16/17 with Dr Ken Sol\par Age 67: Stage IV poorly differentiated ovarian cancer s/p AVEL/ BSO/ total omentectomy, bilateral ureterolysis, resection of abdominal anterior wall masses, repair of cystostomy\par She initially presented with hemoptysis and had evaluation consisting of bronchoscopy and FNA. The bronchoscopy was negative and the FNA was positive for malignant cells: TTF1+ and PAX 8 negative. She had Pet/ CT on 7/6/17 which showed 2.3 cm left upper lobe nodule with SUV of 23 and 4.2 x 3.1 cm left ovarian lesion SUV of 7.9 and left common iliac lymph nodes measuring up to 8mm with SUV of 2.2, 6 mm perihepatic implant SUV of 3.1. She initially had laparoscopic evaluation with left ovary biopsy which showed poorly differentiated carcinoma that was ER, WT1, PAX 8 positive and TTF1 negative. She initially had the left VATs. Then she subsequently had exploratory laparotomy with  AVEL, BSO, radical dissection of tumor with total omentectomy, resection of anterior abdominal wall masses, lysis of adhesions, and repair of cystostomy. She had CT imaging done after 5th cycle of chemotherapy to evaluate abdominal pain and CT showed no evidence of disease. She had abdominal pain in 3/2019 and had follow up CT which showed new small pelvic implant and enlarged sita-caval LN. She had headache and went to [facility] 4/10/19. She had imaging that showed predominantly cystic peripherally enhancing mass within the left cerebellar hemisphere. She had left suboccipital craniotomy with Dr Tatyana Ortiz. Had carboplatin retreat with paclitaxel/ Avastin started and had carboplatin reaction on 5/29/19 during bag 2 Cycle 2: redness over entire face and uncomfortable sensation over face/ arms. She had slowly increasing  on maintenance bevacizumab and had CT done on 2/5/2020 which showed 1.1 x 1 cm enhancing nodule in the right hemipelvis. She had subsequent Pet/ CT which showed hypermetabolic right cardiophrenic and periportal lymphadenopathy, hypermetabolic implants over the liver capsule, serosal surface of the urinary bladder and posterior right hemipelvis. She was not a surgical candidate and proceeded to Doxil/ bevacizumab. She developed mucositis that was persistent after C2 and reduced dose of Doxil for C3. Interval CT of the chest/ abd/ pelvis done on 6/9/2020 showed new RLL 5 mm lung nodule nonspecific but regression of abdominal adenopathy and right pelvic tumor implant. She had repeat imaging done on 1/19/2021 which showed new 1.4 cm liver lesion along with interval increase in periportal lymph nodes and pelvic implant. She was switched to gemcitabine on 2/2/2020. She had continuing abdominal pain sensation that would come and go and she had interval CT imaging showing progression of hepatic metastatic disease, periportal adenopathy, new RP and pelvic adenopathy, and new hepatic capsular implant. \par  [de-identified] : poorly differentiated carcinoma: ER positive  [de-identified] : carbo/ taxol: 10/24/17 to 2/12/18 with cumulative fatigue and neuropathy\par bevacizumab added on 12/8/17 to 4/2018 (pt stopped coming for treatment and lost to follow up until 12/2018)\par retreat carbo/ taxol 5/7/19\par bevacizumab added to Cycle 2 of retreat carboplatin/ paclitaxel 5/29/19 and allergic reaction with 2nd bag of carboplatin\par Oncomine from [facility]: (evaluated hotspot and full gene and fusion protein panel) GENESIS missense, tp53, Myc amplication, Birc3\par bevacizumab and paclitaxel 6/19/19 to 8/27/19\par bevacizumab maintenance 9/2019 to 2/2020\par Doxil/ bevacizumab 3/4/2020 to 6/2020\par Doxil 6/2020 to 1/19/2021\par gemcitabine 2/2/2021 to 4/2021\par topotecan 4/20/2021 to present  [de-identified] : She did not feel any better after blood transfusion 2 weeks ago: feels tired and mainly in bed. She has poor appetite and wondering what will help her eat. She has mainly been drinking fluids. She denies any nausea or vomiting after eating or distension. She has the abdominal tingling sensations and continues to take gabapentin. Feels it makes her sleepy even in the am when she takes it prior to bedtime. She denies any lightheadedness or palpitations.

## 2021-06-19 NOTE — REASON FOR VISIT
[Follow-Up Visit] : a follow-up [Family Member] : family member [Patient Declined  Services] : - None: Patient declined  services [FreeTextEntry2] : follow up for ovarian cancer with worsening fatigue on week off chemotherapy  [FreeTextEntry3] : Pt preferred translation from son and dtr in law  [TWNoteComboBox1] : Chinese

## 2021-06-22 PROBLEM — Z79.899 ON ANTINEOPLASTIC CHEMOTHERAPY: Status: ACTIVE | Noted: 2018-02-28

## 2021-06-22 NOTE — HISTORY OF PRESENT ILLNESS
[Disease: _____________________] : Disease: [unfilled] [T: ___] : T[unfilled] [N: ___] : N[unfilled] [M: ___] : M[unfilled] [AJCC Stage: ____] : AJCC Stage: [unfilled] [de-identified] : Age 67: Stage I NSCLC adenocarcinoma s/p left VATs robotic assisted FERMÍN lobectomy and MLND 8/16/17 with Dr Ken Sol\par Age 67: Stage IV poorly differentiated ovarian cancer s/p AVEL/ BSO/ total omentectomy, bilateral ureterolysis, resection of abdominal anterior wall masses, repair of cystostomy\par She initially presented with hemoptysis and had evaluation consisting of bronchoscopy and FNA. The bronchoscopy was negative and the FNA was positive for malignant cells: TTF1+ and PAX 8 negative. She had Pet/ CT on 7/6/17 which showed 2.3 cm left upper lobe nodule with SUV of 23 and 4.2 x 3.1 cm left ovarian lesion SUV of 7.9 and left common iliac lymph nodes measuring up to 8mm with SUV of 2.2, 6 mm perihepatic implant SUV of 3.1. She initially had laparoscopic evaluation with left ovary biopsy which showed poorly differentiated carcinoma that was ER, WT1, PAX 8 positive and TTF1 negative. She initially had the left VATs. Then she subsequently had exploratory laparotomy with  AVEL, BSO, radical dissection of tumor with total omentectomy, resection of anterior abdominal wall masses, lysis of adhesions, and repair of cystostomy. She had CT imaging done after 5th cycle of chemotherapy to evaluate abdominal pain and CT showed no evidence of disease. She had abdominal pain in 3/2019 and had follow up CT which showed new small pelvic implant and enlarged sita-caval LN. She had headache and went to [facility] 4/10/19. She had imaging that showed predominantly cystic peripherally enhancing mass within the left cerebellar hemisphere. She had left suboccipital craniotomy with Dr Tatyana Ortiz. Had carboplatin retreat with paclitaxel/ Avastin started and had carboplatin reaction on 5/29/19 during bag 2 Cycle 2: redness over entire face and uncomfortable sensation over face/ arms. She had slowly increasing  on maintenance bevacizumab and had CT done on 2/5/2020 which showed 1.1 x 1 cm enhancing nodule in the right hemipelvis. She had subsequent Pet/ CT which showed hypermetabolic right cardiophrenic and periportal lymphadenopathy, hypermetabolic implants over the liver capsule, serosal surface of the urinary bladder and posterior right hemipelvis. She was not a surgical candidate and proceeded to Doxil/ bevacizumab. She developed mucositis that was persistent after C2 and reduced dose of Doxil for C3. Interval CT of the chest/ abd/ pelvis done on 6/9/2020 showed new RLL 5 mm lung nodule nonspecific but regression of abdominal adenopathy and right pelvic tumor implant. She had repeat imaging done on 1/19/2021 which showed new 1.4 cm liver lesion along with interval increase in periportal lymph nodes and pelvic implant. She was switched to gemcitabine on 2/2/2020. She had continuing abdominal pain sensation that would come and go and she had interval CT imaging showing progression of hepatic metastatic disease, periportal adenopathy, new RP and pelvic adenopathy, and new hepatic capsular implant. \par  [de-identified] : poorly differentiated carcinoma: ER positive  [de-identified] : carbo/ taxol: 10/24/17 to 2/12/18 with cumulative fatigue and neuropathy\par bevacizumab added on 12/8/17 to 4/2018 (pt stopped coming for treatment and lost to follow up until 12/2018)\par retreat carbo/ taxol 5/7/19\par bevacizumab added to Cycle 2 of retreat carboplatin/ paclitaxel 5/29/19 and allergic reaction with 2nd bag of carboplatin\par Oncomine from [facility] [employer]: (evaluated hotspot and full gene and fusion protein panel) GENESIS missense, tp53, Myc amplication, Birc3\par bevacizumab and paclitaxel 6/19/19 to 8/27/19\par bevacizumab maintenance 9/2019 to 2/2020\par Doxil/ bevacizumab 3/4/2020 to 6/2020\par Doxil 6/2020 to 1/19/2021\par gemcitabine 2/2/2021 to 4/2021\par topotecan 4/20/2021 to present  [de-identified] : Pt seen today accompanied by son. She reports feeling exhausted with no energy to do things around the house like she usually has the energy to do. She also c/o intermittent sharp, stabbing pain across lower abdomen. She has occasional constipation for which laxative provides relief. She reports taking Decadron 2 mg as prescribed but felt dizzy after taking. Inquiring whether Decadron or B12 supplement may be cause of dizziness. She c/o dry mouth at night but reports drinking well. Still with very little appetite but she tries to drink Ensure. Hgb today 6.8.\par \par \par \par

## 2021-06-22 NOTE — ASSESSMENT
[Palliative] : Goals of care discussed with patient: Palliative [FreeTextEntry1] : She is a 69 y/o F with Stage IV ovarian cancer and Stage I NSCLC diagnosed simultaneously. She has completed 6 cycles of carboplatin/ paclitaxel with bevacizumab and did not continue with bevacizumab maintenance: off from 4/2018 to 4/2019. 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 She completed SRS to the craniotomy site. CT of the chest/ abd/ pelvis showed adenopathy and started with palliative chemotherapy: presently on Topotecan. Hgb 6.8 today w/ pt reporting extreme fatigue. Will hold today's tx. She will receive IVF hydration. Type & cross drawn for 2u PRBC transfusion later this week. Will try to schedule in tx room, but son was advised that should pts sx worsen or she develops SOB, chest pain, palpitations, she should be taken to ED. Advised she continue Gabapentin for pain, but that she could also try Tylenol when pain is more intense to see if that provides better relief. Follow up in 1 week.\par \par

## 2021-06-22 NOTE — PHYSICAL EXAM
[Restricted in physically strenuous activity but ambulatory and able to carry out work of a light or sedentary nature] : Status 1- Restricted in physically strenuous activity but ambulatory and able to carry out work of a light or sedentary nature, e.g., light house work, office work [Thin] : thin [Normal] : affect appropriate [Ulcers] : no ulcers [Mucositis] : no mucositis [Thrush] : no thrush [Vesicles] : no vesicles [de-identified] : no tenderness to palpation, soft

## 2021-06-22 NOTE — REASON FOR VISIT
[Follow-Up Visit] : a follow-up [Family Member] : family member [FreeTextEntry2] : follow up for ovarian cancer, on Topotecan  [FreeTextEntry3] : Mandarin [TWNoteComboBox1] : Chinese

## 2021-06-22 NOTE — REVIEW OF SYSTEMS
[Fatigue] : fatigue [Recent Change In Weight] : ~T recent weight change [Abdominal Pain] : abdominal pain [Constipation] : constipation [Negative] : Allergic/Immunologic [Fever] : no fever [Chills] : no chills [Night Sweats] : no night sweats [Shortness Of Breath] : no shortness of breath [Wheezing] : no wheezing [Cough] : no cough [SOB on Exertion] : no shortness of breath during exertion [Vomiting] : no vomiting [Diarrhea] : no diarrhea [FreeTextEntry4] : dry mouth

## 2021-07-14 PROBLEM — C56.2: Status: ACTIVE | Noted: 2017-08-03

## 2021-07-14 PROBLEM — R63.4 ABNORMAL WEIGHT LOSS: Status: ACTIVE | Noted: 2021-01-01

## 2021-07-14 PROBLEM — G62.9 NEUROPATHY: Status: ACTIVE | Noted: 2019-05-29

## 2021-07-14 NOTE — HISTORY OF PRESENT ILLNESS
[Disease: _____________________] : Disease: [unfilled] [T: ___] : T[unfilled] [N: ___] : N[unfilled] [M: ___] : M[unfilled] [AJCC Stage: ____] : AJCC Stage: [unfilled] [de-identified] : Age 67: Stage I NSCLC adenocarcinoma s/p left VATs robotic assisted FERMÍN lobectomy and MLND 8/16/17 with Dr Ken Sol\par Age 67: Stage IV poorly differentiated ovarian cancer s/p AVEL/ BSO/ total omentectomy, bilateral ureterolysis, resection of abdominal anterior wall masses, repair of cystostomy\par She initially presented with hemoptysis and had evaluation consisting of bronchoscopy and FNA. The bronchoscopy was negative and the FNA was positive for malignant cells: TTF1+ and PAX 8 negative. She had Pet/ CT on 7/6/17 which showed 2.3 cm left upper lobe nodule with SUV of 23 and 4.2 x 3.1 cm left ovarian lesion SUV of 7.9 and left common iliac lymph nodes measuring up to 8mm with SUV of 2.2, 6 mm perihepatic implant SUV of 3.1. She initially had laparoscopic evaluation with left ovary biopsy which showed poorly differentiated carcinoma that was ER, WT1, PAX 8 positive and TTF1 negative. She initially had the left VATs. Then she subsequently had exploratory laparotomy with  AVEL, BSO, radical dissection of tumor with total omentectomy, resection of anterior abdominal wall masses, lysis of adhesions, and repair of cystostomy. She had CT imaging done after 5th cycle of chemotherapy to evaluate abdominal pain and CT showed no evidence of disease. She had abdominal pain in 3/2019 and had follow up CT which showed new small pelvic implant and enlarged sita-caval LN. She had headache and went to [facility] 4/10/19. She had imaging that showed predominantly cystic peripherally enhancing mass within the left cerebellar hemisphere. She had left suboccipital craniotomy with Dr Tatyana Ortiz. Had carboplatin retreat with paclitaxel/ Avastin started and had carboplatin reaction on 5/29/19 during bag 2 Cycle 2: redness over entire face and uncomfortable sensation over face/ arms. She had slowly increasing  on maintenance bevacizumab and had CT done on 2/5/2020 which showed 1.1 x 1 cm enhancing nodule in the right hemipelvis. She had subsequent Pet/ CT which showed hypermetabolic right cardiophrenic and periportal lymphadenopathy, hypermetabolic implants over the liver capsule, serosal surface of the urinary bladder and posterior right hemipelvis. She was not a surgical candidate and proceeded to Doxil/ bevacizumab. She developed mucositis that was persistent after C2 and reduced dose of Doxil for C3. Interval CT of the chest/ abd/ pelvis done on 6/9/2020 showed new RLL 5 mm lung nodule nonspecific but regression of abdominal adenopathy and right pelvic tumor implant. She had repeat imaging done on 1/19/2021 which showed new 1.4 cm liver lesion along with interval increase in periportal lymph nodes and pelvic implant. She was switched to gemcitabine on 2/2/2020. She had continuing abdominal pain sensation that would come and go and she had interval CT imaging showing progression of hepatic metastatic disease, periportal adenopathy, new RP and pelvic adenopathy, and new hepatic capsular implant. \par  [de-identified] : poorly differentiated carcinoma: ER positive  [de-identified] : carbo/ taxol: 10/24/17 to 2/12/18 with cumulative fatigue and neuropathy\par bevacizumab added on 12/8/17 to 4/2018 (pt stopped coming for treatment and lost to follow up until 12/2018)\par retreat carbo/ taxol 5/7/19\par bevacizumab added to Cycle 2 of retreat carboplatin/ paclitaxel 5/29/19 and allergic reaction with 2nd bag of carboplatin\par Oncomine from [facility] [employer]: (evaluated hotspot and full gene and fusion protein panel) GENESIS missense, tp53, Myc amplication, Birc3\par bevacizumab and paclitaxel 6/19/19 to 8/27/19\par bevacizumab maintenance 9/2019 to 2/2020\par Doxil/ bevacizumab 3/4/2020 to 6/2020\par Doxil 6/2020 to 1/19/2021\par gemcitabine 2/2/2021 to 4/2021\par topotecan 4/20/2021 to present  [de-identified] : We called her dtr in law who patient prefers for translation. She felt better with last blood transfusion and able to do housework. Has continued to have abdominal pain: worse in the am; she feels the gabapentin helps with the pain at night and helps her fall asleep; in the day time she feels the pain with sensation of whooshing of the bowels and abdominal aching. She is eating 1/2 ensure and drinking muscle milk but feels appetite is poor. No nausea or vomiting or pain after eating; just no appetite. She tried the dexamethasone but felt it did not help. She has lost another 4.4 lbs over the last 2 weeks. She also has been having more pain over the BLE : feels sharp pain over the legs that comes and goes. She has not been having much with BM since she is not eating much. Feels Ensure is too sweet.

## 2021-07-14 NOTE — REASON FOR VISIT
[Follow-Up Visit] : a follow-up [Spouse] : spouse [FreeTextEntry2] : follow up for ovarian cancer: treatment delays due to anemia and decrease in performance status

## 2021-07-14 NOTE — REVIEW OF SYSTEMS
[Abdominal Pain] : abdominal pain [Negative] : Allergic/Immunologic [Vomiting] : no vomiting [Constipation] : no constipation [Diarrhea] : no diarrhea [Joint Pain] : no joint pain [Joint Stiffness] : no joint stiffness [Muscle Pain] : no muscle pain [Confused] : no confusion [Dizziness] : no dizziness [Fainting] : no fainting [Difficulty Walking] : no difficulty walking [de-identified] : sharp pain over the BLE

## 2021-07-14 NOTE — ASSESSMENT
[FreeTextEntry1] : She is a 69 y/o F with Stage IV ovarian cancer and Stage I NSCLC diagnosed simultaneously. She has completed 6 cycles of carboplatin/ paclitaxel with bevacizumab and did not continue with bevacizumab maintenance: off from 4/2018 to 4/2019. 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 She completed SRS to the craniotomy site. CT of the chest/ abd/ pelvis showed adenopathy and started with palliative chemotherapy: retreat carboplatin/ paclitaxel 5/7/19. She had allergic reaction with C2 bag 2 of carboplatin and has been on bevacizumab/ paclitaxel s/p total of 6 cycles. 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 We reviewed supportive measures but they want to continue trying treatment to try to decrease her symptoms. Will obtain interval CMP,  and CT imaging to evaluate disease and abdominal pain. We reviewed goals of care. We will try bevacizumab which will affect counts the least and if tolerated, may introduce oral cytoxan low dose. \par \par Abdominal pain: we reviewed tramadol as needed \par \par Neuropathy: she will continue with gabapentin and will increase dose from 300mg bedtime to 100mg in am and 300mg at bedtime. 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 Reviewed powder supplements to help with wt loss. \par \par Anemia: will transfuse 2 units this Saturday. Questions answered to her and her families' satisfaction. Next follow up in 2 to 3 weeks.

## 2021-07-14 NOTE — PHYSICAL EXAM
[Ambulatory and capable of all self care but unable to carry out any work activities] : Status 2- Ambulatory and capable of all self care but unable to carry out any work activities. Up and about more than 50% of waking hours [Thin] : thin [Normal] : affect appropriate [Ulcers] : no ulcers [Mucositis] : no mucositis [Thrush] : no thrush [Vesicles] : no vesicles [de-identified] : no palpable mass or LN  [de-identified] : palpable B inguinal LN: 2 cm and 3 cm in the left and 3 cm in the R; no rebound or guarding; pain over the RLQ on palpation  [de-identified] : B inguinal LN: R 3 cm; L 3 cm and 2cm  [de-identified] : dry skin with decreased hyperpigmentation

## 2021-09-09 PROBLEM — R10.9 ABDOMINAL PAIN, ACUTE: Status: ACTIVE | Noted: 2019-03-21

## 2021-09-09 NOTE — HISTORY OF PRESENT ILLNESS
[Disease: _____________________] : Disease: [unfilled] [T: ___] : T[unfilled] [N: ___] : N[unfilled] [M: ___] : M[unfilled] [AJCC Stage: ____] : AJCC Stage: [unfilled] [de-identified] : Age 67: Stage I NSCLC adenocarcinoma s/p left VATs robotic assisted FERMÍN lobectomy and MLND 8/16/17 with Dr Ken Sol\par Age 67: Stage IV poorly differentiated ovarian cancer s/p AVEL/ BSO/ total omentectomy, bilateral ureterolysis, resection of abdominal anterior wall masses, repair of cystostomy\par She initially presented with hemoptysis and had evaluation consisting of bronchoscopy and FNA. The bronchoscopy was negative and the FNA was positive for malignant cells: TTF1+ and PAX 8 negative. She had Pet/ CT on 7/6/17 which showed 2.3 cm left upper lobe nodule with SUV of 23 and 4.2 x 3.1 cm left ovarian lesion SUV of 7.9 and left common iliac lymph nodes measuring up to 8mm with SUV of 2.2, 6 mm perihepatic implant SUV of 3.1. She initially had laparoscopic evaluation with left ovary biopsy which showed poorly differentiated carcinoma that was ER, WT1, PAX 8 positive and TTF1 negative. She initially had the left VATs. Then she subsequently had exploratory laparotomy with  AVEL, BSO, radical dissection of tumor with total omentectomy, resection of anterior abdominal wall masses, lysis of adhesions, and repair of cystostomy. She had CT imaging done after 5th cycle of chemotherapy to evaluate abdominal pain and CT showed no evidence of disease. She had abdominal pain in 3/2019 and had follow up CT which showed new small pelvic implant and enlarged sita-caval LN. She had headache and went to [facility] 4/10/19. She had imaging that showed predominantly cystic peripherally enhancing mass within the left cerebellar hemisphere. She had left suboccipital craniotomy with Dr Tatyana Ortiz. Had carboplatin retreat with paclitaxel/ Avastin started and had carboplatin reaction on 5/29/19 during bag 2 Cycle 2: redness over entire face and uncomfortable sensation over face/ arms. She had slowly increasing  on maintenance bevacizumab and had CT done on 2/5/2020 which showed 1.1 x 1 cm enhancing nodule in the right hemipelvis. She had subsequent Pet/ CT which showed hypermetabolic right cardiophrenic and periportal lymphadenopathy, hypermetabolic implants over the liver capsule, serosal surface of the urinary bladder and posterior right hemipelvis. She was not a surgical candidate and proceeded to Doxil/ bevacizumab. She developed mucositis that was persistent after C2 and reduced dose of Doxil for C3. Interval CT of the chest/ abd/ pelvis done on 6/9/2020 showed new RLL 5 mm lung nodule nonspecific but regression of abdominal adenopathy and right pelvic tumor implant. She had repeat imaging done on 1/19/2021 which showed new 1.4 cm liver lesion along with interval increase in periportal lymph nodes and pelvic implant. She was switched to gemcitabine on 2/2/2020. She had continuing abdominal pain sensation that would come and go and she had interval CT imaging showing progression of hepatic metastatic disease, periportal adenopathy, new RP and pelvic adenopathy, and new hepatic capsular implant. \par  [de-identified] : poorly differentiated carcinoma: ER positive  [de-identified] : carbo/ taxol: 10/24/17 to 2/12/18 with cumulative fatigue and neuropathy\par bevacizumab added on 12/8/17 to 4/2018 (pt stopped coming for treatment and lost to follow up until 12/2018)\par retreat carbo/ taxol 5/7/19\par bevacizumab added to Cycle 2 of retreat carboplatin/ paclitaxel 5/29/19 and allergic reaction with 2nd bag of carboplatin\par Oncomine from [facility]: (evaluated hotspot and full gene and fusion protein panel) GENESIS missense, tp53, Myc amplication, Birc3\par bevacizumab and paclitaxel 6/19/19 to 8/27/19\par bevacizumab maintenance 9/2019 to 2/2020\par Doxil/ bevacizumab 3/4/2020 to 6/2020\par Doxil 6/2020 to 1/19/2021\par gemcitabine 2/2/2021 to 4/2021\par topotecan 4/20/2021 to present  [de-identified] : We called her dtr in law who patient prefers for translation. She felt better with last blood transfusion and able to do housework. Has continued to have abdominal pain: worse in the am; she feels the gabapentin helps with the pain at night and helps her fall asleep; in the day time she feels the pain with sensation of whooshing of the bowels and abdominal aching. She is eating 1/2 ensure and drinking muscle milk but feels appetite is poor. No nausea or vomiting or pain after eating; just no appetite. She tried the dexamethasone but felt it did not help. She has lost another 4.4 lbs over the last 2 weeks. She also has been having more pain over the BLE : feels sharp pain over the legs that comes and goes. She has not been having much with BM since she is not eating much. Feels Ensure is too sweet.

## 2021-09-09 NOTE — REVIEW OF SYSTEMS
[Abdominal Pain] : abdominal pain [Vomiting] : no vomiting [Constipation] : no constipation [Diarrhea] : no diarrhea [Joint Pain] : no joint pain [Joint Stiffness] : no joint stiffness [Muscle Pain] : no muscle pain [Confused] : no confusion [Dizziness] : no dizziness [Fainting] : no fainting [Difficulty Walking] : no difficulty walking [Negative] : Allergic/Immunologic [de-identified] : sharp pain over the BLE

## 2021-09-09 NOTE — PHYSICAL EXAM
[Ambulatory and capable of all self care but unable to carry out any work activities] : Status 2- Ambulatory and capable of all self care but unable to carry out any work activities. Up and about more than 50% of waking hours [Thin] : thin [Ulcers] : no ulcers [Mucositis] : no mucositis [Thrush] : no thrush [Vesicles] : no vesicles [Normal] : affect appropriate [de-identified] : no palpable mass or LN  [de-identified] : palpable B inguinal LN: 2 cm and 3 cm in the left and 3 cm in the R; no rebound or guarding; pain over the RLQ on palpation  [de-identified] : dry skin with decreased hyperpigmentation  [de-identified] : B inguinal LN: R 3 cm; L 3 cm and 2cm

## 2021-09-09 NOTE — ASSESSMENT
[FreeTextEntry1] : She is a 71 y/o F with Stage IV ovarian cancer and Stage I NSCLC diagnosed simultaneously. She has completed 6 cycles of carboplatin/ paclitaxel with bevacizumab and did not continue with bevacizumab maintenance: off from 4/2018 to 4/2019. 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 She completed SRS to the craniotomy site. CT of the chest/ abd/ pelvis showed adenopathy and started with palliative chemotherapy: retreat carboplatin/ paclitaxel 5/7/19. She had allergic reaction with C2 bag 2 of carboplatin and has been on bevacizumab/ paclitaxel s/p total of 6 cycles. 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 We reviewed supportive measures but they want to continue trying treatment to try to decrease her symptoms. Will obtain interval CMP,  and CT imaging to evaluate disease and abdominal pain. We reviewed goals of care. We will try bevacizumab which will affect counts the least and if tolerated, may introduce oral cytoxan low dose. \par \par Abdominal pain: we reviewed tramadol as needed \par \par Neuropathy: she will continue with gabapentin and will increase dose from 300mg bedtime to 100mg in am and 300mg at bedtime. 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 Reviewed powder supplements to help with wt loss. \par \par Anemia: will transfuse 2 units this Saturday. Questions answered to her and her families' satisfaction. Next follow up in 2 to 3 weeks.

## 2021-09-22 PROBLEM — C34.12 MALIGNANT NEOPLASM OF UPPER LOBE OF LEFT LUNG: Status: ACTIVE | Noted: 2017-10-13

## 2021-09-22 PROBLEM — R10.30 LOWER ABDOMINAL PAIN: Status: ACTIVE | Noted: 2021-01-01

## 2021-09-22 NOTE — REASON FOR VISIT
[Patient Declined  Services] : - None: Patient declined  services [FreeTextEntry2] : follow up for ovarian cancer on bevacizumab  [TWNoteComboBox1] : Chinese [Interpreters_Relationshiptopatient] : Pt speaks Agnes and her son translated

## 2021-09-22 NOTE — ASSESSMENT
[FreeTextEntry1] : She is a 71 y/o F with Stage IV ovarian cancer and Stage I NSCLC diagnosed simultaneously. She has completed 6 cycles of carboplatin/ paclitaxel with bevacizumab and did not continue with bevacizumab maintenance: off from 4/2018 to 4/2019. 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 She completed SRS to the craniotomy site. CT of the chest/ abd/ pelvis showed adenopathy and started with palliative chemotherapy: retreat carboplatin/ paclitaxel 5/7/19. She had allergic reaction with C2 bag 2 of carboplatin and has been on bevacizumab/ paclitaxel s/p total of 6 cycles. She has been heavily pretreated with 5th line treatment with counts not able to tolerate topotecan. She has been recycled on bevacizumab. While her  decreased initially; her last blood work increased. We reviewed her abdominal symptoms and will plan on repeat CT. We explained to her family we do not have any additional chemotherapy that she will tolerate and we may need to have supportive measures alone. Will check LFTs for liver mets. \par \par Pain control: suspect worsening disease. Will see if any RT can be delivered to area. We reviewed gabapentin and tramadol for pain 5 to 7 / 10 and oxycodone for pain 8 to 10/ 10. We reviewed Tylenol is mild pain medication and would not control cancer pain well. We reviewed bowel regimen: pericolace 2 at bedtime. We s/w her family and instructions written for her to try for better pain control. \par \par Anemia: we suspect worsening due to MDS/ treatment; will transfuse 2 units PRBC. \par \par We s/w her via her son and her dtr in law over the phone: they are agreeable with plan.

## 2021-09-22 NOTE — HISTORY OF PRESENT ILLNESS
[de-identified] : Age 67: Stage I NSCLC adenocarcinoma s/p left VATs robotic assisted FERMÍN lobectomy and MLND 8/16/17 with Dr Ken Sol\par Age 67: Stage IV poorly differentiated ovarian cancer s/p AVEL/ BSO/ total omentectomy, bilateral ureterolysis, resection of abdominal anterior wall masses, repair of cystostomy\par She initially presented with hemoptysis and had evaluation consisting of bronchoscopy and FNA. The bronchoscopy was negative and the FNA was positive for malignant cells: TTF1+ and PAX 8 negative. She had Pet/ CT on 7/6/17 which showed 2.3 cm left upper lobe nodule with SUV of 23 and 4.2 x 3.1 cm left ovarian lesion SUV of 7.9 and left common iliac lymph nodes measuring up to 8mm with SUV of 2.2, 6 mm perihepatic implant SUV of 3.1. She initially had laparoscopic evaluation with left ovary biopsy which showed poorly differentiated carcinoma that was ER, WT1, PAX 8 positive and TTF1 negative. She initially had the left VATs. Then she subsequently had exploratory laparotomy with  AVEL, BSO, radical dissection of tumor with total omentectomy, resection of anterior abdominal wall masses, lysis of adhesions, and repair of cystostomy. She had CT imaging done after 5th cycle of chemotherapy to evaluate abdominal pain and CT showed no evidence of disease. She had abdominal pain in 3/2019 and had follow up CT which showed new small pelvic implant and enlarged sita-caval LN. She had headache and went to [facility] 4/10/19. She had imaging that showed predominantly cystic peripherally enhancing mass within the left cerebellar hemisphere. She had left suboccipital craniotomy with Dr Tatyana Ortiz. Had carboplatin retreat with paclitaxel/ Avastin started and had carboplatin reaction on 5/29/19 during bag 2 Cycle 2: redness over entire face and uncomfortable sensation over face/ arms. She had slowly increasing  on maintenance bevacizumab and had CT done on 2/5/2020 which showed 1.1 x 1 cm enhancing nodule in the right hemipelvis. She had subsequent Pet/ CT which showed hypermetabolic right cardiophrenic and periportal lymphadenopathy, hypermetabolic implants over the liver capsule, serosal surface of the urinary bladder and posterior right hemipelvis. She was not a surgical candidate and proceeded to Doxil/ bevacizumab. She developed mucositis that was persistent after C2 and reduced dose of Doxil for C3. Interval CT of the chest/ abd/ pelvis done on 6/9/2020 showed new RLL 5 mm lung nodule nonspecific but regression of abdominal adenopathy and right pelvic tumor implant. She had repeat imaging done on 1/19/2021 which showed new 1.4 cm liver lesion along with interval increase in periportal lymph nodes and pelvic implant. She was switched to gemcitabine on 2/2/2020. She had continuing abdominal pain sensation that would come and go and she had interval CT imaging showing progression of hepatic metastatic disease, periportal adenopathy, new RP and pelvic adenopathy, and new hepatic capsular implant. Due to persistent anemia and inability to deliver chemotherapy, we switched therapy to bevacizumab. \par  [de-identified] : poorly differentiated carcinoma: ER positive  [de-identified] : carbo/ taxol: 10/24/17 to 2/12/18 with cumulative fatigue and neuropathy\par bevacizumab added on 12/8/17 to 4/2018 (pt stopped coming for treatment and lost to follow up until 12/2018)\par retreat carbo/ taxol 5/7/19\par bevacizumab added to Cycle 2 of retreat carboplatin/ paclitaxel 5/29/19 and allergic reaction with 2nd bag of carboplatin\par Oncomine from [facility] [employer]: (evaluated hotspot and full gene and fusion protein panel) GENESIS missense, tp53, Myc amplication, Birc3\par bevacizumab and paclitaxel 6/19/19 to 8/27/19\par bevacizumab maintenance 9/2019 to 2/2020\par Doxil/ bevacizumab 3/4/2020 to 6/2020\par Doxil 6/2020 to 1/19/2021\par gemcitabine 2/2/2021 to 4/2021\par topotecan 4/20/2021 to 8/2021\par bevacizumab 8/2021 to present  [de-identified] : She has been tolerating therapy and has gained few lbs: 2 since July. She is eating better: vanilla flavored supplement. She feels tired but at baseline. She has persistent abdominal pain: LLQ that is rated 8/10. She has been taking Tylenol and gabapentin twice a day. She has not tried the tramadol that she was prescribed and did not feel the pain was strong enough for her to take oxycodone. Feels the LN is affecting urine and bowels. She is having BM daily but has to strain. She has not been taking bowel regimen consistently. She denies any nausea or vomiting. Has been able to get up and do chores at home but there are days where she feels like staying in bed. She denies any new medications.

## 2021-09-22 NOTE — PHYSICAL EXAM
[Ambulatory and capable of all self care but unable to carry out any work activities] : Status 2- Ambulatory and capable of all self care but unable to carry out any work activities. Up and about more than 50% of waking hours [Thin] : thin [Normal] : affect appropriate [Ulcers] : no ulcers [Mucositis] : no mucositis [Thrush] : no thrush [Vesicles] : no vesicles [de-identified] : no palpable mass or LN  [de-identified] : palpable B inguinal LN: 4 cm and 3 cm in the left and 3 cm in the R; no rebound or guarding; pain over the RLQ on palpation  [de-identified] : B inguinal LN: R 3 cm; L 3 cm and 4cm  [de-identified] : dry skin with decreased hyperpigmentation

## 2021-09-22 NOTE — REVIEW OF SYSTEMS
[Fatigue] : fatigue [Abdominal Pain] : abdominal pain [Negative] : Allergic/Immunologic [Fever] : no fever [Night Sweats] : no night sweats [Chills] : no chills [Recent Change In Weight] : ~T no recent weight change [Vomiting] : no vomiting [Constipation] : no constipation [Diarrhea] : no diarrhea

## 2021-09-23 PROBLEM — D64.9 ANEMIA: Status: ACTIVE | Noted: 2021-01-01

## 2021-10-12 PROBLEM — R30.0 DYSURIA: Status: ACTIVE | Noted: 2021-01-01

## 2021-10-12 PROBLEM — C78.7 LIVER METASTASES: Status: ACTIVE | Noted: 2021-01-01

## 2021-10-12 PROBLEM — C34.92 ADENOCARCINOMA OF LEFT LUNG: Status: ACTIVE | Noted: 2017-07-12

## 2021-10-12 PROBLEM — C76.2 ABDOMINAL CARCINOMATOSIS: Status: ACTIVE | Noted: 2017-09-27

## 2021-10-12 PROBLEM — C79.31 BRAIN METASTASES: Status: ACTIVE | Noted: 2019-04-17

## 2021-10-13 NOTE — ASSESSMENT
[FreeTextEntry1] : She is a 72 y/o F with Stage IV ovarian cancer and Stage I NSCLC diagnosed simultaneously. She has completed 6 cycles of carboplatin/ paclitaxel with bevacizumab and did not continue with bevacizumab maintenance: off from 4/2018 to 4/2019. 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 She completed SRS to the craniotomy site. CT of the chest/ abd/ pelvis showed adenopathy and started with palliative chemotherapy: retreat carboplatin/ paclitaxel 5/7/19. She had allergic reaction with C2 bag 2 of carboplatin and has been on bevacizumab/ paclitaxel s/p total of 6 cycles. She has been heavily pretreated with 5th line treatment with counts not able to tolerate topotecan. She has been recycled on bevacizumab. We reviewed findings from recent CT showing stable disease but with disease starting to erode in to the small bowel and holding further bevacizumab to decrease risk of fistula or complications if she did need any surgical intervention: last given 9/25/2021. We reviewed goals of care and response rate of further treatments will be low with set side effects and her anemia/ thrombocytopenia which has limited ability to give chemotherapy on time. Reviewed hospice/ supportive care. Will re-evaluate in 1 to 1 1/2 weeks to see if performance status improved to consider compassionate basis immune therapy. Her family still wants to try treatment even though chance of response is low. \par \par Anemia: we suspect worsening due to MDS/ treatment: last transfused on 9/22/2021 and will repeat blood counts to see if needs repeat transfusion: will check TSH, A1C and B12 to further evaluate fatigue. This is multifactorial and affected by cancer: anemia of chronic disease. \par \par Dysuria: will obtain u/a and urine culture today. Will start cipro twice a day and if proven infection: will complete at least 7 day course of antibiotics and re-evaluate performance status at that time. \par \par Pain control: she will continue tramadol with stool softener. \par \par Dry mouth: reviewed ACT mouth rinse to see if improved.  [Palliative] : Goals of care discussed with patient: Palliative [Palliative Care Plan] : patient was apprised of terminal prognosis of 6 month or less. Hospice and Palliative care options discussed

## 2021-10-13 NOTE — REASON FOR VISIT
[Follow-Up Visit] : a follow-up [FreeTextEntry2] : follow up for ovarian cancer with abdominal pain

## 2021-10-13 NOTE — REVIEW OF SYSTEMS
[Dysphagia] : no dysphagia [Loss of Hearing] : no loss of hearing [Nosebleeds] : no nosebleeds [Hoarseness] : no hoarseness [Odynophagia] : no odynophagia [Mucosal Pain] : no mucosal pain [Abdominal Pain] : abdominal pain [Vomiting] : no vomiting [Constipation] : no constipation [Diarrhea] : no diarrhea [Dysuria] : dysuria [Incontinence] : no incontinence [Vaginal Discharge] : no vaginal discharge [Dysmenorrhea/Abn Vaginal Bleeding] : no dysmenorrhea/abnormal vaginal bleeding [Negative] : Allergic/Immunologic [FreeTextEntry4] : dry mouth  [FreeTextEntry8] : frequent urination and pain over the pubic area

## 2021-10-13 NOTE — PHYSICAL EXAM
[Ambulatory and capable of all self care but unable to carry out any work activities] : Status 2- Ambulatory and capable of all self care but unable to carry out any work activities. Up and about more than 50% of waking hours [Thin] : thin [Ulcers] : no ulcers [Mucositis] : no mucositis [Thrush] : no thrush [Vesicles] : no vesicles [Normal] : affect appropriate [de-identified] : no palpable mass or LN  [de-identified] : palpable B inguinal LN: 4 cm and 3 cm in the left and 3 cm in the R; no rebound or guarding; pain over the RLQ on palpation  [de-identified] : B inguinal LN: R 3 cm; L 3 cm and 4cm  [de-identified] : no CVA tenderness

## 2021-10-21 NOTE — H&P ADULT - HISTORY OF PRESENT ILLNESS
The patient is a 71-year-old woman who is an immigrant from [place] and who is followed for stage IV ovarian cancer and stage I non-small cell carcinoma of the lung diagnosed in 2017. She is followed as an outpatient by Dr. Geo Cuevas of the [facility]. Per outpatient records, the patient is status post multiple surgical resections and six rounds of carboplatin, paclitaxel, and bevacizumab still with recurrence of disease to the cerebellum status post resection in 2019. Most recently, in September of this year, the patient was treated with a single dose of bevacizumab. Given the patient's tenuous clinical status and out of concern for the development of fistulization, further targeted treatment was held earlier this month, and the patient's primary oncology team discussed with her the possibility of transition of care to hospice. At that time, the patient complained of dysuria, so she had a urinalysis performed, which demonstrated leukocyte esterase and nitrites, so the patient was administered a seven-day course of ciprofloxacin, but she had no symptom relief. Instead she had progressive discomfort in the lower abdomen in the context of a visible mass that has been present for several months. Per discussion with Dr. Cuevas, the patient was encouraged to present to the emergency department for further evaluation. At this time, the patient notes persistent dysuria accompanied by hematuria, pneumaturia, and black-colored urine. She denies any nausea, vomiting, diarrhea, or constipation. She has not had any chest pain, shortness of breath, back pain, or upper abdominal pain.     In the emergency department, the patient had a CT scan of the abdomen and pelvis with intravenous contrast performed, and it demonstrated large pelvic masses with infiltration into the bladder, the adjacent small bowel, and the anterior abdominal wall. The patient was administered one dose of intravenous meropenem and was admitted to medicine for further evaluation and management. 
none

## 2021-10-21 NOTE — ED PROVIDER NOTE - PROGRESS NOTE DETAILS
Josué, PGY3: worsening leukocytosis, low grade temp 100F, contaminated U culture on 10/12, concerning for possible fistula, covering with meropenem (pt with pen allergy but unknown reaction) Fuentes Munroe, PGY3: CT showing possible colovesicular fistula. Surgery consulted. Urology paged. Will require admission for IV abx and further management.

## 2021-10-21 NOTE — ED PROVIDER NOTE - OBJECTIVE STATEMENT
71F PMH HTN, Mets Ovarian Ca (on chemo, last dose end of september 2021) presents to the ED from [facility] d/t concerns for possible bowel/bladder fistula vs persistent UTI despite po abx tx. Pt was having lower abd/suprapubic abd pain 1.5 weeks ago, was put on Cipro (Urine showed contamination) and finished course but having progressing suprapubic abd pain and urinary frequency. Pt denies fevers/chills, uri sxs, cough, n/v/d, dark/bloody stools, flank pain, rash. Pt has tramadol at home for pain that only gives her mild relief. Pain is 8/10 currently.  Spoke with Dr. Holly at [phone], she gave most of collateral information, recs for CT, cultures.     Mandarin  ID: [id number]

## 2021-10-21 NOTE — ED PROVIDER NOTE - PHYSICAL EXAMINATION
GENERAL: no acute distress, non-toxic appearing  HEENT: normal conjunctiva, oral mucosa moist  CARDIAC: regular rate and regular rhythm  PULM: clear to ascultation bilaterally, no incr wob, sats well on RA  GI: abdomen nondistended, soft, nontender  : no CVA tenderness, + suprapubic tenderness  NEURO: alert and oriented x 3, normal speech, moving all extremities without lateralization  MSK: no visible deformities, no peripheral edema, calf tenderness/redness/swelling  SKIN: some overlying redness to suprapubic region that's not warm to touch/no areas fluctuance or crepitus  PSYCH: appropriate mood and affect

## 2021-10-21 NOTE — H&P ADULT - NSHPREVIEWOFSYSTEMS_GEN_ALL_CORE
CONSTITUTIONAL: No fever, weight loss, or fatigue  EYES: No eye pain, visual disturbances, or discharge  ENMT:  No difficulty hearing, tinnitus, vertigo; No sinus or throat pain  NECK: No pain or stiffness  BREASTS: No pain, masses, or nipple discharge  RESPIRATORY: No cough, wheezing, chills or hemoptysis; No shortness of breath  CARDIOVASCULAR: No chest pain, palpitations, dizziness, or leg swelling  GASTROINTESTINAL: No abdominal or epigastric pain. No nausea, vomiting, or hematemesis; No diarrhea or constipation. No melena or hematochezia.  GENITOURINARY: No dysuria, frequency, hematuria, or incontinence  NEUROLOGICAL: No headaches, memory loss, loss of strength, numbness, or tremors  SKIN: No itching, burning, rashes, or lesions   LYMPH NODES: No enlarged glands  ENDOCRINE: No heat or cold intolerance; No hair loss  MUSCULOSKELETAL: No joint pain or swelling; No muscle, back, or extremity pain  PSYCHIATRIC: No depression, anxiety, mood swings, or difficulty sleeping  HEME/LYMPH: No easy bruising, or bleeding gums  ALLERY AND IMMUNOLOGIC: No hives or eczema CONSTITUTIONAL: No fever, no weight loss; fatigue noted   EYES: No eye pain, visual disturbances, or discharge  ENMT:  No difficulty hearing, tinnitus, vertigo; No sinus or throat pain  NECK: No pain, no stiffness  BREASTS: No pain, masses, or nipple discharge  RESPIRATORY: No cough, wheezing, chills or hemoptysis; No shortness of breath  CARDIOVASCULAR: No chest pain, palpitations, dizziness, or leg swelling  GASTROINTESTINAL: Lower abdominal discomfort noted. No nausea, vomiting, or hematemesis; No diarrhea or constipation. No melena or hematochezia.  GENITOURINARY: No dysuria, frequency, hematuria, or incontinence  NEUROLOGICAL: No headaches, memory loss, loss of strength, numbness, or tremors  SKIN: No itching, burning, rashes, or lesions   LYMPH NODES: No enlarged glands  ENDOCRINE: No heat or cold intolerance; No hair loss  MUSCULOSKELETAL: No joint pain or swelling; No muscle, back, or extremity pain  PSYCHIATRIC: No depression, anxiety, mood swings, or difficulty sleeping  HEME/LYMPH: No easy bruising, or bleeding gums  ALLERY AND IMMUNOLOGIC: No hives or eczema CONSTITUTIONAL: No fever, no weight loss; fatigue noted   EYES: No eye pain, visual disturbances, or discharge  ENMT:  No difficulty hearing, tinnitus, vertigo; No sinus or throat pain  NECK: No pain, no stiffness  BREASTS: No pain, masses, or nipple discharge  RESPIRATORY: No cough, wheezing, chills or hemoptysis; No shortness of breath  CARDIOVASCULAR: No chest pain, palpitations, dizziness, or leg swelling  GASTROINTESTINAL: Lower abdominal discomfort noted. No nausea, vomiting, or hematemesis; No diarrhea or constipation. No melena or hematochezia.  GENITOURINARY: see HPI  NEUROLOGICAL: No headaches, memory loss, loss of strength, numbness, dizziness or tremors  SKIN: No itching, burning, rashes, or lesions   LYMPH NODES: No enlarged glands  ENDOCRINE: No heat or cold intolerance; No hair loss  MUSCULOSKELETAL: No joint pain or swelling; No muscle, back, or extremity pain  PSYCHIATRIC: No depression, anxiety, mood swings, or difficulty sleeping  HEME/LYMPH: No easy bruising, or bleeding gums  ALLERY AND IMMUNOLOGIC: No hives or eczema

## 2021-10-21 NOTE — ED ADULT NURSE NOTE - OBJECTIVE STATEMENT
pt received in the ED rm 4. pt A+Ox4 Mandarin Speaking  Don (#[id number]) used and VSS. pt c/o lower abd pain 8/10 X 4days. pt states she has blood in her urine, stool, and vagina unsure of the amount. pt denies chest pain, sob, vomiting, and diarrhea. pt placed on CM and changed into gown. IV placed L #20G AC labs drawn and sent. will continue to monitor.

## 2021-10-21 NOTE — H&P ADULT - PROBLEM SELECTOR PLAN 7
-Lovenox 40 for dvt prophylaxis   -Regular diet -Lovenox 40 for dvt prophylaxis   -NPO pending surg recs in AM    -confirm dose of gabapentin with family in AM

## 2021-10-21 NOTE — H&P ADULT - NSHPLABSRESULTS_GEN_ALL_CORE
LABS personally reviewed:	 	                        8.1    18.54 )-----------( 318      ( 21 Oct 2021 12:53 )             26.2     10-21    137  |  97<L>  |  15  ----------------------------<  85  3.7   |  29  |  0.46<L>    Ca    9.3      21 Oct 2021 12:53    TPro  6.7  /  Alb  3.1<L>  /  TBili  0.3  /  DBili  x   /  AST  13  /  ALT  16  /  AlkPhos  87  10-21    FS: CAPILLARY BLOOD GLUCOSE    UCX: ordered     Prothrombin Time, Plasma: 14.1 sec (10-21-21 @ 12:53)  INR: 1.25 ratio (10-21-21 @ 12:53)  Activated Partial Thromboplastin Time: 26.9 sec (10-21-21 @ 12:53)    12:53 - VBG - pH: 7.39  | pCO2: 47    | pO2: 33    | Lactate: 1.3      ECG personally reviewed: normal sinus rhythm     RADIOLOGY: CT abdomen/pelvis with iv contrast: Gas containing extensive pelvic mass apparently involving adjacent small bowel as well as the urinary bladder which now also demonstrates intraluminal gas.  Interval extension of the mass into the anterior abdominal wall which demonstrates new air-fluid level. LABS personally reviewed:	 	                        8.1    18.54 )-----------( 318      ( 21 Oct 2021 12:53 )             26.2     10-21    137  |  97<L>  |  15  ----------------------------<  85  3.7   |  29  |  0.46<L>    Ca    9.3      21 Oct 2021 12:53    TPro  6.7  /  Alb  3.1<L>  /  TBili  0.3  /  DBili  x   /  AST  13  /  ALT  16  /  AlkPhos  87  10-21    Lipase, Serum: 18 U/L (10.21.21 @ 12:53)    UCX: ordered     Prothrombin Time, Plasma: 14.1 sec (10-21-21 @ 12:53)  INR: 1.25 ratio (10-21-21 @ 12:53)  Activated Partial Thromboplastin Time: 26.9 sec (10-21-21 @ 12:53)    12:53 - VBG - pH: 7.39  | pCO2: 47    | pO2: 33    | Lactate: 1.3      Rapid RVP Result: NotDetec (10.21.21 @ 13:02)  SARS-CoV-2: NotDetec (10.21.21 @ 13:02)    < from: CT Abdomen and Pelvis w/ Oral Cont and w/ IV Cont (10.21.21 @ 15:51) >  LOWER CHEST: 2 mm right middle lobe nodule (2:4)  LIVER: Multiple hypodense liver lesions essentially unchanged from 09/28/2021  BILE DUCTS: Normal caliber.  GALLBLADDER: Within normal limits.  SPLEEN: Several hypodensities unchanged  PANCREAS: Within normal limits.  ADRENALS: Within normal limits.  KIDNEYS/URETERS: Within normal limits.  BLADDER/PERITONEUM/BOWEL/ ABDOMINAL WALL: No bowel obstruction. Pelvic mass with gas as previously described infiltrating the urinary bladder which is also gas containing. Again the mass apparently involves adjacent small bowel and has now eroded into the abdominal wall which also demonstrates an air-fluid level (2:97). Rectal and transverse colonic anastomoses. Additional deep right pelvic implant unchanged 3.3 x 2.7 cm (2:97).  REPRODUCTIVE ORGANS:  VESSELS: Within normal limits.  RETROPERITONEUM/LYMPH NODES: Small upper abdominal lymph nodes unchanged. Left external iliac lymph node contiguous with the pelvic mass unchanged measuring 2.9 x 2 cm  BONES: Within normal limits.  IMPRESSION: Gas containing extensive pelvic mass apparently involving adjacent small bowel as well as the urinary bladder which now also demonstrates intraluminal gas. Interval extension of the mass into the anterior abdominal wall which demonstrates new air-fluid level.  < end of copied text >    < from: Xray Chest 1 View- PORTABLE-Urgent (Xray Chest 1 View- PORTABLE-Urgent .) (10.21.21 @ 12:40) >  The heart is normal in size. The lungs are clear. There is no pneumothorax or pleural effusion. No acute bony abnormality. Diffuse osteopenia.  IMPRESSION: Clear lungs.  < end of copied text >    ECG personally reviewed: normal sinus rhythm LABS personally reviewed:	 	                        8.1    18.54 )-----------( 318      ( 21 Oct 2021 12:53 )             26.2     10-21    137  |  97<L>  |  15  ----------------------------<  85  3.7   |  29  |  0.46<L>    Ca    9.3      21 Oct 2021 12:53    TPro  6.7  /  Alb  3.1<L>  /  TBili  0.3  /  DBili  x   /  AST  13  /  ALT  16  /  AlkPhos  87  10-21    Lipase, Serum: 18 U/L (10.21.21 @ 12:53)    UCX: ordered     Prothrombin Time, Plasma: 14.1 sec (10-21-21 @ 12:53)  INR: 1.25 ratio (10-21-21 @ 12:53)  Activated Partial Thromboplastin Time: 26.9 sec (10-21-21 @ 12:53)    12:53 - VBG - pH: 7.39  | pCO2: 47    | pO2: 33    | Lactate: 1.3      Rapid RVP Result: NotDetec (10.21.21 @ 13:02)  SARS-CoV-2: NotDetec (10.21.21 @ 13:02)    < from: CT Abdomen and Pelvis w/ Oral Cont and w/ IV Cont (10.21.21 @ 15:51) >  LOWER CHEST: 2 mm right middle lobe nodule (2:4)  LIVER: Multiple hypodense liver lesions essentially unchanged from 09/28/2021  BILE DUCTS: Normal caliber.  GALLBLADDER: Within normal limits.  SPLEEN: Several hypodensities unchanged  PANCREAS: Within normal limits.  ADRENALS: Within normal limits.  KIDNEYS/URETERS: Within normal limits.  BLADDER/PERITONEUM/BOWEL/ ABDOMINAL WALL: No bowel obstruction. Pelvic mass with gas as previously described infiltrating the urinary bladder which is also gas containing. Again the mass apparently involves adjacent small bowel and has now eroded into the abdominal wall which also demonstrates an air-fluid level (2:97). Rectal and transverse colonic anastomoses. Additional deep right pelvic implant unchanged 3.3 x 2.7 cm (2:97).  REPRODUCTIVE ORGANS:  VESSELS: Within normal limits.  RETROPERITONEUM/LYMPH NODES: Small upper abdominal lymph nodes unchanged. Left external iliac lymph node contiguous with the pelvic mass unchanged measuring 2.9 x 2 cm  BONES: Within normal limits.  IMPRESSION: Gas containing extensive pelvic mass apparently involving adjacent small bowel as well as the urinary bladder which now also demonstrates intraluminal gas. Interval extension of the mass into the anterior abdominal wall which demonstrates new air-fluid level.  < end of copied text >    < from: Xray Chest 1 View- PORTABLE-Urgent (Xray Chest 1 View- PORTABLE-Urgent .) (10.21.21 @ 12:40) >  The heart is normal in size. The lungs are clear. There is no pneumothorax or pleural effusion. No acute bony abnormality. Diffuse osteopenia.  IMPRESSION: Clear lungs.  < end of copied text >    ECG personally reviewed: normal sinus rhythm 87bpm QTc 428ms

## 2021-10-21 NOTE — CONSULT NOTE ADULT - ASSESSMENT
- Care and pain control per primary team  - GYN Oncology team to round on patient in the AM, and to follow while inpatient  - final recs pending evaluation tomorrow morning    d/w Dr. Dean Borrego PGY2 70yo w/ stage IV poorly differentiated ovarian cancer, stage I NSCLC, HTN and DM admitted w/ abdominal pain and leakage of stool from the urethra. 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 Patient hemodynamically and clinically stable.     - Care and pain control per primary team  - GYN Oncology team to round on patient in the AM, and to follow while inpatient  - final recs pending evaluation tomorrow morning    d/w Dr. Dean Borrego PGY2 72yo w/ stage IV poorly differentiated ovarian cancer, stage I NSCLC, HTN and DM admitted w/ abdominal pain and leakage of stool from the urethra. 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 Patient hemodynamically and clinically stable.     - Care and pain control per primary team  - GYN Oncology team to round on patient in the AM, and to follow while inpatient  - final recs pending evaluation tomorrow morning    d/w Dr. Dean Borrego PGY2     GYN ONC Fellow Addendum:  Chart reviewed. Agree with above. Will assess in AM.  72yo w/ stage IV poorly differentiated ovarian cancer, stage I NSCLC, HTN and DM admitted w/ abdominal pain and leakage of stool from the urethra. 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 Patient hemodynamically and clinically stable.     - Care and pain control per primary team  - GYN Oncology team to round on patient in the AM, and to follow while inpatient  - final recs pending evaluation tomorrow morning    d/w Dr. Dean Borrego PGY2     GYN ONC Fellow Addendum:  Chart reviewed. Agree with above. Not a candidate for surgical intervention. Heavily pretreated with chemotherapy with progressive disease and toxicities. Recommended palliative care/hospice.     WENDY Moran, PGY6

## 2021-10-21 NOTE — H&P ADULT - PROBLEM SELECTOR PLAN 5
-Per my discussion with patient and with her daughter-in-law who functioned as her , patient is aware that we will not be able to cure her disease, and is most interested in focusing on her own comfort   -She is not interested in a major surgery to fix a possible entero-vesicular fistula but is curious about radiotherapy to shrink large mass in pelvis in an attempt to limit pain; will discuss with oncology team in am   -Will discuss code status with patient pending discussion of further cancer treatment -Patient appears comfortable to my exam; however, she has exquisite tenderness, even to light touch, over palpable mass in lower abdomen; I suspect this is her cancerous mass, which is very tender to my palpation   -ISTOP noted in chart; will administer acetaminophen for mild and moderate pain and oxycodone for severe pain every six hours; will assess patient's daily requirement of pain medications and order standing regimen according to daily requirements

## 2021-10-21 NOTE — H&P ADULT - NSICDXPASTMEDICALHX_GEN_ALL_CORE_FT
PAST MEDICAL HISTORY:  Diabetes     GERD (gastroesophageal reflux disease)     HTN (hypertension)     Malignant neoplasm left lung    Noninflammatory disorder of ovary, fallopian tube and broad ligament, unspecified     Ovarian ca

## 2021-10-21 NOTE — ED ADULT NURSE NOTE - NSFALLRSKASSESSTYPE_ED_ALL_ED
Patient discharge instructions reviewed with patient by KIMBERLY Haas. Patient verbalized understanding. patient ambulatory off unit. Initial (On Arrival)

## 2021-10-21 NOTE — ED ADULT NURSE NOTE - CHIEF COMPLAINT QUOTE
Pt presents to ED from [facility] with c/o worsening lower abdominal pain. Pt has hx of  metastatic ovarian CA.

## 2021-10-21 NOTE — ED PROVIDER NOTE - CLINICAL SUMMARY MEDICAL DECISION MAKING FREE TEXT BOX
Concern for progression of mets vs uti vs possible fistula. Will do labs, cultures, fluids, analgesia, CT, urine. Pt not septic at this time, can  hold abx until further diagnostics. TBA

## 2021-10-21 NOTE — ED CLERICAL - NS ED CLERK NOTE PRE-ARRIVAL INFORMATION; ADDITIONAL PRE-ARRIVAL INFORMATION
Sent by Dr Holly at [facility] ([phone]) for worsening abdominal distention and gross hematuria. known metastatic ovarian cancer

## 2021-10-21 NOTE — H&P ADULT - NSHPSOCIALHISTORY_GEN_ALL_CORE
The patient lives at home with her son. She is an immigrant from [place]. She is a retired . She has no history of alcohol use, smoking, or illicit drug use.

## 2021-10-21 NOTE — CONSULT NOTE ADULT - ASSESSMENT
70 yo female with metastatic ovarian cancer now with infiltrative pelvic mass and enterovesicular fistula. Given patients late stage of disease she is not a good surgical candidate.     Recommend:   - goals of care discussion   - urine will be chronically colonized with bacteria 2/2 fistula, only treat urinary infections if patient is having symptoms   - can consider methenamine as ppx agent    Plan d/w attending on call Dr. Farrar

## 2021-10-21 NOTE — H&P ADULT - ATTENDING COMMENTS
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 Patient reports to me 7/10, non-radiating lower abdominal pain, unchanged with PO intake, relieved with home Tramadol. Uro, surg onc, gyn onc following, possible plans for surgery, made NPO for now as during my exam patient expressed possible desire for surgical intervention. Would f/u onc recs in AM as well and discuss with daughter (as Mandarin [place]  #[phone] had trouble translating at times). Would benefit from continued GOC and possibly palliative consult pending onc recs.

## 2021-10-21 NOTE — H&P ADULT - PROBLEM SELECTOR PLAN 2
-Stage IV ovarian cancer noted status post multiple surgical resections and failure of four-to-five lines of chemotherapy   -Per documentation of outpatient discussion with Dr. Flores, patient is not a good candidate for additional chemotherapy at this time; chemotherapy seems more likely to cause undue harm than to decrease patient's disease burden   -Per patient's family, there was a discussion of intervention by radiation oncology to decrease patient's burden of disease  -Will discuss case with medical oncology team (Dr. Flores's colleagues) in am to assess if this is feasible; if not, per discussion with patient's family, patient would likely benefit most from referral to hospice care and focus of care on patient's comfort and dignity -Meets sepsis criteria given tachycardia and leukocytosis   -Possibly secondary to urinary tract infection; alternatively, may be secondary to discomfort and pain   -Managing urinary tract infection as above -Pain and burning with urination, with nitrites and leukocyte esterase noted on outpatient laboratory concerning for cystitis; of note, patient does not have any fevers, flank pain, costovertebral angle tenderness to raise concern for complicated urinary tract infection   -Patient failed empiric antibiotic therapy, ciprofloxacin, as outpatient   -Possible that air in bladder and in bladder wall represents production of air by gas-producing organism and that for that reason, patient failed empiric antibiotic therapy, which did not cover for ESBL  -However, in the setting of cancerous mass noted abutting bladder and small intestine, 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, which has precipitated the patient's urinary symptoms  -Will trial meropenem for anticipated five-day course for treatment of emphysematous cystitis; urinalysis and urine culture ordered but not yet performed prior to administration of one dose of meropenem; question diagnostic yield of this specimen   -If antibiotic course is unsuccessful, I question whether or not the patient's symptoms really represent infection   -Per documented conversations on outpatient side and per my discussion with the patient and her daughter-in-law today, a major surgery to relieve this fistula is unlikely to be consistent with patient's goals of care -Pain and burning with urination, with nitrites and leukocyte esterase noted on outpatient laboratory concerning for cystitis; of note, patient does not have any fevers, flank pain, costovertebral angle tenderness  -Patient failed empiric antibiotic therapy, ciprofloxacin, as outpatient   -Possible that air in bladder and in bladder wall represents production of air by gas-producing organism and that for that reason, patient failed empiric antibiotic therapy, which did not cover for ESBL  -However, in the setting of cancerous mass noted abutting bladder and small intestine, 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, which has precipitated the patient's urinary symptoms  -Will trial meropenem for anticipated seven-day course for treatment of emphysematous cystitis; urinalysis and urine culture ordered but not yet performed prior to administration of one dose of meropenem; question diagnostic yield of this specimen   -If antibiotic course is unsuccessful, I question whether or not the patient's symptoms really represent infection   -Per documented conversations on outpatient side and per my discussion with the patient and her daughter-in-law today, a major surgery to relieve this fistula is unlikely to be consistent with patient's goals of care

## 2021-10-21 NOTE — H&P ADULT - REASON FOR ADMISSION
abdominal pain, concern for abdominal pain, urinary tract infection     patient is Mandarin-speaking; patient preferred to use her daughter-in-law as  as opposed to  services

## 2021-10-21 NOTE — ED ADULT TRIAGE NOTE - CHIEF COMPLAINT QUOTE
Pt presents to ED from [facility] with c/o worsening lower abdominal pain. Pt has hx of  metastatic ovarian CA.

## 2021-10-21 NOTE — H&P ADULT - PROBLEM SELECTOR PLAN 3
-History of non-small cell cancer of the lung noted, stage I, status post surgical resection   -No further evaluation or management necessary at this time -Stage IV ovarian cancer noted status post multiple surgical resections and failure of four-to-five lines of chemotherapy   -Per documentation of outpatient discussion with Dr. Flores, patient is not a good candidate for additional chemotherapy at this time; chemotherapy seems more likely to cause undue harm than to decrease patient's disease burden   -Per patient's family, there was a discussion of intervention by radiation oncology to decrease patient's burden of disease  -Will discuss case with medical oncology team (Dr. Flores's colleagues) in am to assess if this is feasible; if not, per discussion with patient's family, patient would likely benefit most from referral to hospice care and focus of care on patient's comfort and dignity -Stage IV ovarian cancer noted status post multiple surgical resections and failure of four-to-five lines of chemotherapy   -Per documentation of outpatient discussion with Dr. Flores, patient is not a good candidate for additional chemotherapy at this time  -Per patient's family, there was a discussion of intervention by radiation oncology to decrease patient's burden of disease  -Will discuss case with medical oncology team (Dr. Cuevas's colleagues) in am to assess if this is feasible; if not, per discussion with patient's family, patient would likely benefit most from referral to hospice care and focus of care on patient's comfort and dignity  -on attending discussion with patient via  phone, would possibly consider surgery, please f/u gyn/onc and surg onc recs in AM

## 2021-10-21 NOTE — CONSULT NOTE ADULT - ATTENDING COMMENTS
71yF w/ metastatic ovarian CA now w/ enterovesicular fistula    - F/U Urology and Gyn-Onc recs  - Possible palliative resection  - IV abx for enterovesicular fistula  - Will need [facility] discussion  - Will d/w Gyn Onc

## 2021-10-21 NOTE — CONSULT NOTE ADULT - ASSESSMENT
71yF w/ metastatic ovarian CA now w/ enterovesicular fistula    - F/U Urology and Gyn-Onc recs  - Possible palliative resection  - IV abx for enterovesicular fistula  - Pt discussed w/ Dr. Rossi  - Please page [phone] w/ any questions    PETERSON Murphy PGY-3

## 2021-10-21 NOTE — ED PROVIDER NOTE - NS ED MD DISPO ADMITTING SERVICE
Wand: Fine Prep Text: The patient's skin was cleaned and prepped. Number Of Passes: 2 Treatment Number: 1 Post-Care Instructions: I reviewed with the patient in detail post-care instructions. Patient should stay away from the sun and wear sun protection until treated areas are fully healed. Indication: acne Consent: Written consent obtained, risks reviewed including but not limited to crusting, scabbing, blistering, scarring, darker or lighter pigmentary change, bruising, and/or incomplete response. MED Vacuum Pressure Units: inches Hg Detail Level: Simple Endpoint: mild erythema Vacuum Pressure: 10

## 2021-10-21 NOTE — ED PROVIDER NOTE - ATTENDING CONTRIBUTION TO CARE
Dr. Rhodes: 72 yo female with HTN and metastatic ovarian cancer (last chemo approx 3 weeks ago), sent to ED from [facility] due to concern for possible colovesicular fistula or UTI that is not improving on outpatient abx.  Has been having low abdominal pain for 1.5 weeks.  No fever, N/V/D, stool changes or other complaints.  On exam pt chronically-ill appearing but in NAD, heart RRR, lungs CTAB, abd TTP in lower abdomen midline with discreet area of erythema over suprapubic region, with associated TTP, extremities without swelling, strength equal in all extremities and skin without rash.

## 2021-10-21 NOTE — H&P ADULT - PROBLEM SELECTOR PLAN 6
-Lovenox 40 for dvt prophylaxis   -Regular diet -Per my discussion with patient and with her daughter-in-law who functioned as her , patient is aware that we will not be able to cure her disease, and is most interested in focusing on her own comfort   -She is not interested in a major surgery to fix a possible entero-vesicular fistula but is curious about radiotherapy to shrink large mass in pelvis in an attempt to limit pain; will discuss with oncology team in am   -Will discuss code status with patient pending discussion of further cancer treatment

## 2021-10-21 NOTE — H&P ADULT - PROBLEM SELECTOR PLAN 4
-Patient appears comfortable to my exam; however, she has exquisite tenderness, even to light touch, over palpable mass in lower abdomen; I suspect this is her cancerous mass, which is very tender to my palpation   -ISTOP noted in chart; will administer acetaminophen for mild and moderate pain and oxycodone for severe pain every six hours; will assess patient's daily requirement of pain medications and order standing regimen according to daily requirements -History of non-small cell cancer of the lung noted, stage I, status post surgical resection   -No further evaluation or management necessary at this time

## 2021-10-21 NOTE — CONSULT NOTE ADULT - SUBJECTIVE AND OBJECTIVE BOX
Surgical Oncology Consult  Consulting surgical team: D Team (Pager [phone])  Consulting attending: Dr. Michael Rossi    HPI: [id number] w/ PMH sig for stage IV poorly differentiated ovarian CA s/p AVEL/BSO (Dr. Brooks) w/ intraoperative c/s for LAR (Dr. Rossi) as mass was invading the colon in Sept 2017. Pt had recurrence of ovarian CA w/ mets to liver and brain s/p L crani at [facility]. Pt on multiple different chemotherapeutic regimens, currently receiving bevacizumab at [facility] (Dr. Geo Holly). Pt presented to [facility] ED on 10/21/21 c/o a week of dysuria, pneumaturia, and fecaluria. Pt completed course of PO cipro w/o resolution in symptoms. CT scan sig for mass eroding into small bowel and bladder.      PAST MEDICAL HISTORY:  HTN (hypertension)    Abnormal lung field    GERD (gastroesophageal reflux disease)    Noninflammatory disorder of ovary, fallopian tube and broad ligament, unspecified    Malignant neoplasm    Ovarian ca    Diabetes        PAST SURGICAL HISTORY:  History of cholecystectomy    H/O abdominal hysterectomy    H/O bilateral salpingo-oophorectomy    History of lung surgery    S/P AVEL-BSO        MEDICATIONS:      ALLERGIES:  carboplatin (Flushing; Rash)  penicillin (Unknown)      VITALS & I/Os:  Vital Signs Last 24 Hrs  T(C): 37.6 (21 Oct 2021 19:30), Max: 37.8 (21 Oct 2021 13:27)  T(F): 99.6 (21 Oct 2021 19:30), Max: 100 (21 Oct 2021 13:27)  HR: 98 (21 Oct 2021 19:30) (87 - 105)  BP: 121/68 (21 Oct 2021 19:30) (103/68 - 121/68)  BP(mean): --  RR: 16 (21 Oct 2021 19:30) (16 - 17)  SpO2: 98% (21 Oct 2021 19:30) (98% - 100%)    I&O's Summary      PHYSICAL EXAM:  General: Ill appearing, uncomfortable in bed  Respiratory: Nonlabored  Cardiovascular: RRR  Abdominal: Soft, nondistended, TTP lower abdomen, w/ mass in lower abdominal wall w/ surrounding erythema and TTP.   Extremities: Warm    LABS:                        8.1    18.54 )-----------( 318      ( 21 Oct 2021 12:53 )             26.2     10-21    137  |  97<L>  |  15  ----------------------------<  85  3.7   |  29  |  0.46<L>    Ca    9.3      21 Oct 2021 12:53    TPro  6.7  /  Alb  3.1<L>  /  TBili  0.3  /  DBili  x   /  AST  13  /  ALT  16  /  AlkPhos  87  10-21    Lactate:  10-21 @ 12:53  1.3    PT/INR - ( 21 Oct 2021 12:53 )   PT: 14.1 sec;   INR: 1.25 ratio         PTT - ( 21 Oct 2021 12:53 )  PTT:26.9 sec      IMAGING:  < from: CT Abdomen and Pelvis w/ Oral Cont and w/ IV Cont (10.21.21 @ 15:51) >  IMPRESSION:  Gas containing extensive pelvic mass apparently involving adjacent small bowel as well as the urinary bladder which now also demonstrates intraluminal gas.  Interval extension of the mass into the anterior abdominal wall which demonstrates new air-fluid level.    < end of copied text >

## 2021-10-21 NOTE — CONSULT NOTE ADULT - ATTENDING COMMENTS
Agree with assessment and plan.  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

## 2021-10-21 NOTE — CONSULT NOTE ADULT - REASON FOR ADMISSION
abdominal pain, urinary tract infection     patient is Mandarin-speaking; patient preferred to use her daughter-in-law as  as opposed to  services enterovesicular fistula

## 2021-10-21 NOTE — H&P ADULT - NSICDXPASTSURGICALHX_GEN_ALL_CORE_FT
PAST SURGICAL HISTORY:  H/O bilateral salpingo-oophorectomy 7/21/17 - ovarian ca    History of cholecystectomy     History of lung surgery 8/16/17 - Left = lung ca    S/P AVEL-BSO

## 2021-10-21 NOTE — CONSULT NOTE ADULT - SUBJECTIVE AND OBJECTIVE BOX
GYNECOLOGIC ONCOLOGY CONSULT NOTE    70yo w/ stage IV poorly differentiated ovarian cancer, stage I NSCLC, HTN and DM presents for evaluation of abnormal urination. Patient reports that she has been passing thick, soft brown/purple liquid contents through her urethra every time she attempts to void. She has also occasionally noticed hematuria. She has also been experiencing worsening abdominal pain. Patient reports pain is rated 8/10 in severity, and primarily localized to the suprapubic region. She took home Rx of Tramadol this morning, but has had no relief in pain symptoms. She also reports 10lb weight loss in the last 6 months, and recently discovered a palpable suprapubic structure approximately 1 week ago. She denies any fevers/chills, lightheadedness, dizziness, vision changes, chest pain/sob, n/v/d, melena, hematochezia.     GYN Oncology History  s/p C/T 10/24/17-2/12/18  s/p SRS x3 to craniotomy site 2019  s/p C/T 5/7/19-6/19/19 c/b carboplatin rxn during C2  s/p Taxol/Avastin 6/19/19-9/2019  s/p Maintenance Avastin 9/8/19-10/2021    Surgical History:    s/p cholecystectomy  s/p AVEL, BSO  s/p AZEEM lobectomy    Past Medical History:   stage IV poorly differentiated ovarian cancer  stage 1 NSCLC   HTN  Diabetes    Meds  Tramadol     Allergies:  carboplatin (Flushing; Rash)  penicillin (Unknown)    Social History: denies t/e/d    MEDICATIONS  (STANDING):  cyanocobalamin 1000 MICROGram(s) Oral daily  dexAMETHasone     Tablet 2 milliGRAM(s) Oral daily  dronabinol 2.5 milliGRAM(s) Oral two times a day  enoxaparin Injectable 40 milliGRAM(s) SubCutaneous daily  folic acid 1 milliGRAM(s) Oral daily  pantoprazole    Tablet 40 milliGRAM(s) Oral before breakfast  senna 2 Tablet(s) Oral at bedtime    MEDICATIONS  (PRN):  acetaminophen     Tablet .. 650 milliGRAM(s) Oral every 6 hours PRN Mild Pain (1 - 3), Moderate Pain (4 - 6)  ondansetron   Disintegrating Tablet 4 milliGRAM(s) Oral every 6 hours PRN Nausea and/or Vomiting  oxyCODONE    IR 5 milliGRAM(s) Oral every 6 hours PRN Severe Pain (7 - 10)      OBJECTIVE FINDINGS:    Vital Signs Last 24 Hrs  T(C): 36.9 (21 Oct 2021 21:45), Max: 37.8 (21 Oct 2021 13:27)  T(F): 98.5 (21 Oct 2021 21:45), Max: 100 (21 Oct 2021 13:27)  HR: 103 (21 Oct 2021 21:45) (87 - 105)  BP: 130/81 (21 Oct 2021 21:45) (103/68 - 130/81)  RR: 16 (21 Oct 2021 21:45) (16 - 17)  SpO2: 97% (21 Oct 2021 21:45) (97% - 100%)    PHYSICAL EXAM:  GENERAL: NAD, well-developed  HEAD:  Atraumatic, Normocephalic  EYES: EOMI, PERRLA, conjunctiva and sclera clear  ENMT: Moist mucous membranes, Good dentition, No lesions  NECK: Supple, No JVD  NERVOUS SYSTEM:  Alert & Oriented X3, Motor Strength 5/5 B/L upper and lower extremities  CHEST/LUNG: Clear to percussion bilaterally; No rales, rhonchi, wheezing, or rubs  HEART: Regular rate and rhythm  ABDOMEN: Soft, Nondistended; Palpable 4x5cm suprapubic mass, tender to palpation. Bowel sounds present, No rebound, No guarding  EXTREMITIES:  2+ Peripheral Pulses, No clubbing, cyanosis, or edema, Elisa's sign negative  SKIN: No rashes or lesions  PELVIC: deferred  RECTAL: deferred    LABS:             8.1    18.54 )-----------( 318      ( 21 Oct 2021 12:53 )             26.2     10-21    137  |  97<L>  |  15  ----------------------------<  85  3.7   |  29  |  0.46<L>    Ca    9.3      21 Oct 2021 12:53  TPro  6.7  /  Alb  3.1<L>  /  TBili  0.3  /  DBili  x   /  AST  13  /  ALT  16  /  AlkPhos  87  10-21  PT/INR - ( 21 Oct 2021 12:53 )   PT: 14.1 sec;   INR: 1.25 ratio     PTT - ( 21 Oct 2021 12:53 )  PTT:26.9 sec      RADIOLOGY & ADDITIONAL STUDIES:  CT A/P FINDINGS:  LOWER CHEST: 2 mm right middle lobe nodule (2:4)    LIVER: Multiple hypodense liver lesions essentially unchanged from 09/28/2021  BILE DUCTS: Normal caliber.  GALLBLADDER: Within normal limits.  SPLEEN: Several hypodensities unchanged  PANCREAS: Within normal limits.  ADRENALS: Within normal limits.  KIDNEYS/URETERS: Within normal limits.    BLADDER/PERITONEUM/BOWEL/ ABDOMINAL WALL: No bowel obstruction. Pelvic mass with gas as previously described infiltrating the urinary bladder which is also gas containing. Again the mass apparently involves adjacent small bowel and has now eroded into the abdominal wall which also demonstrates an air-fluid level (2:97).  Rectal and transverse colonic anastomoses.  Additional deep right pelvic implant unchanged 3.3 x 2.7 cm (2:97).  REPRODUCTIVE ORGANS:  VESSELS: Within normal limits.  RETROPERITONEUM/LYMPH NODES:Small upper abdominal lymph nodes unchanged. Left external iliac lymph node contiguous with the pelvic mass unchanged measuring 2.9 x 2 cm    BONES: Within normal limits.    IMPRESSION:  Gas containing extensive pelvic mass apparently involving adjacent small bowel as well as the urinary bladder which now also demonstrates intraluminal gas.  Interval extension of the mass into the anterior abdominal wall which demonstrates new air-fluid level. GYNECOLOGIC ONCOLOGY CONSULT NOTE    70yo w/ stage IV poorly differentiated ovarian cancer, stage I NSCLC, HTN and DM presents for evaluation of abnormal urination. Patient reports that she has been passing thick, soft brown/purple liquid contents through her urethra every time she attempts to void. She has also occasionally noticed hematuria. She has also been experiencing worsening abdominal pain. Patient reports pain is rated 8/10 in severity, and primarily localized to the suprapubic region. She took home Rx of Tramadol this morning, but has had no relief in pain symptoms. She also reports 10lb weight loss in the last 6 months, and recently discovered a palpable suprapubic structure approximately 1 week ago. She denies any fevers/chills, lightheadedness, dizziness, vision changes, chest pain/sob, n/v/d, melena, hematochezia.     GYN Oncology History  stage IV poorly differentiated ovarian cancer w/ metastases to brain  s/p RA LSO, STEPHANIE 7/21/2017  s/p ex-lap, AVEL, BSO, omentectomy, debulking, STEPHANIE, cystotomy repair, LAR w/ Surgical Oncology 9/15/2017    s/p C/T 10/24/17-2/12/18; Avastin added 12/8/17-4/2018 - pt lost to f/u until 12/2018  s/p C/T 5/7/19-6/19/19 c/b carboplatin rxn during C2; Avastin added to C2 5/29/2019  s/p Taxol/Avastin 6/19/2019-8/27/2019  s/p Avastin maintenance 9/2019-2/2020  s/p Doxil/Avastin 3/4/2020-6/2020 c/b mucositis after C2 -> Doxil dose reduced  s/p Doxil 6/2020-1/19/2021  s/p Gemcitabine 2/2/2021-4/2021 c/b pancytopenia  s/p Topotecan 4/20/2021-8/2021  switched to Avastin 8/2021 - held in the setting of concern for fistula formation and possible need for surgical management, last dose 9/25/21    Surgical History:    s/p RA LSO, STEPHANIE 7/21/2017  s/p AZEEM lobectomy and MLND 8/16/2017  s/p ex-lap, AVEL, BSO, omentectomy, debulking, STEPHANIE, cystotomy repair, LAR w/ Surgical Oncology 9/15/2017  s/p suboccipital craniotomy @ [facility] w/ Dr. Tatyana Ortiz 2019  s/p SRS x3 to craniotomy site 2019  s/p cholecystectomy    Past Medical History:   stage IV poorly differentiated ovarian cancer  stage 1 NSCLC   HTN  Diabetes    Meds:  Tramadol     Allergies:  carboplatin (Flushing; Rash)  penicillin (Unknown)    Social History: denies t/e/d    MEDICATIONS  (STANDING):  cyanocobalamin 1000 MICROGram(s) Oral daily  dexAMETHasone     Tablet 2 milliGRAM(s) Oral daily  dronabinol 2.5 milliGRAM(s) Oral two times a day  enoxaparin Injectable 40 milliGRAM(s) SubCutaneous daily  folic acid 1 milliGRAM(s) Oral daily  pantoprazole    Tablet 40 milliGRAM(s) Oral before breakfast  senna 2 Tablet(s) Oral at bedtime    MEDICATIONS  (PRN):  acetaminophen     Tablet .. 650 milliGRAM(s) Oral every 6 hours PRN Mild Pain (1 - 3), Moderate Pain (4 - 6)  ondansetron   Disintegrating Tablet 4 milliGRAM(s) Oral every 6 hours PRN Nausea and/or Vomiting  oxyCODONE    IR 5 milliGRAM(s) Oral every 6 hours PRN Severe Pain (7 - 10)      OBJECTIVE FINDINGS:    Vital Signs Last 24 Hrs  T(C): 36.9 (21 Oct 2021 21:45), Max: 37.8 (21 Oct 2021 13:27)  T(F): 98.5 (21 Oct 2021 21:45), Max: 100 (21 Oct 2021 13:27)  HR: 103 (21 Oct 2021 21:45) (87 - 105)  BP: 130/81 (21 Oct 2021 21:45) (103/68 - 130/81)  RR: 16 (21 Oct 2021 21:45) (16 - 17)  SpO2: 97% (21 Oct 2021 21:45) (97% - 100%)    PHYSICAL EXAM:  GENERAL: appears uncomfortable, fatigued, cachectic   HEAD:  Atraumatic  ENMT: Moist mucous membranes, Good dentition, No lesions  NECK: Supple, No JVD  NERVOUS SYSTEM:  Alert & Oriented X3  CHEST/LUNG: Clear to auscultation bilaterally; No rales, rhonchi, wheezing, or rubs  HEART: Regular rate and rhythm  ABDOMEN: Soft, Nondistended; Palpable 4x5cm suprapubic mass, tender to palpation. Bowel sounds present, No rebound, No guarding  EXTREMITIES:  2+ Peripheral Pulses, No clubbing, cyanosis, or edema, Elisa's sign negative  SKIN: No rashes or lesions  PELVIC: deferred  RECTAL: deferred    LABS:             8.1    18.54 )-----------( 318      ( 21 Oct 2021 12:53 )             26.2     10-21    137  |  97<L>  |  15  ----------------------------<  85  3.7   |  29  |  0.46<L>    Ca    9.3      21 Oct 2021 12:53  TPro  6.7  /  Alb  3.1<L>  /  TBili  0.3  /  DBili  x   /  AST  13  /  ALT  16  /  AlkPhos  87  10-21  PT/INR - ( 21 Oct 2021 12:53 )   PT: 14.1 sec;   INR: 1.25 ratio     PTT - ( 21 Oct 2021 12:53 )  PTT:26.9 sec      RADIOLOGY & ADDITIONAL STUDIES:  CT A/P FINDINGS:  LOWER CHEST: 2 mm right middle lobe nodule (2:4)    LIVER: Multiple hypodense liver lesions essentially unchanged from 09/28/2021  BILE DUCTS: Normal caliber.  GALLBLADDER: Within normal limits.  SPLEEN: Several hypodensities unchanged  PANCREAS: Within normal limits.  ADRENALS: Within normal limits.  KIDNEYS/URETERS: Within normal limits.    BLADDER/PERITONEUM/BOWEL/ ABDOMINAL WALL: No bowel obstruction. Pelvic mass with gas as previously described infiltrating the urinary bladder which is also gas containing. Again the mass apparently involves adjacent small bowel and has now eroded into the abdominal wall which also demonstrates an air-fluid level (2:97).  Rectal and transverse colonic anastomoses.  Additional deep right pelvic implant unchanged 3.3 x 2.7 cm (2:97).  REPRODUCTIVE ORGANS:  VESSELS: Within normal limits.  RETROPERITONEUM/LYMPH NODES: Small upper abdominal lymph nodes unchanged. Left external iliac lymph node contiguous with the pelvic mass unchanged measuring 2.9 x 2 cm    BONES: Within normal limits.    IMPRESSION:  Gas containing extensive pelvic mass apparently involving adjacent small bowel as well as the urinary bladder which now also demonstrates intraluminal gas.  Interval extension of the mass into the anterior abdominal wall which demonstrates new air-fluid level. GYNECOLOGIC ONCOLOGY CONSULT NOTE    70yo w/ stage IV poorly differentiated ovarian cancer, stage I NSCLC, HTN and DM presents for evaluation of abnormal urination. Patient reports that she has been passing thick, soft brown/purple liquid contents through her urethra every time she attempts to void. She has also occasionally noticed hematuria. She has also been experiencing worsening abdominal pain. Patient reports pain is rated 8/10 in severity, and primarily localized to the suprapubic region. She took home Rx of Tramadol this morning, but has had no relief in pain symptoms. She also reports 10lb weight loss in the last 6 months, and recently discovered a palpable suprapubic structure approximately 1 week ago. She denies any fevers/chills, lightheadedness, dizziness, vision changes, chest pain/sob, n/v/d, melena, hematochezia.     GYN Oncology History  stage IV poorly differentiated ovarian cancer w/ metastases to brain  s/p RA LSO, STEPHANIE 7/21/2017  s/p ex-lap, AVEL, RSO, omentectomy, debulking, STEPHANIE, cystotomy repair, LAR w/ Surgical Oncology 9/15/2017    s/p C/T 10/24/17-2/12/18; Avastin added 12/8/17-4/2018 - pt lost to f/u until 12/2018  s/p C/T 5/7/19-6/19/19 c/b carboplatin rxn during C2; Avastin added to C2 5/29/2019  s/p Taxol/Avastin 6/19/2019-8/27/2019  s/p Avastin maintenance 9/2019-2/2020  s/p Doxil/Avastin 3/4/2020-6/2020 c/b mucositis after C2 -> Doxil dose reduced  s/p Doxil 6/2020-1/19/2021  s/p Gemcitabine 2/2/2021-4/2021 c/b pancytopenia  s/p Topotecan 4/20/2021-8/2021  switched to Avastin 8/2021 - held in the setting of concern for fistula formation and possible need for surgical management, last dose 9/25/21    Surgical History:    s/p RA LSO, STEPHANIE 7/21/2017  s/p AZEEM lobectomy and MLND 8/16/2017  s/p ex-lap, AVEL, RSO, omentectomy, debulking, STEPHANIE, cystotomy repair, LAR w/ Surgical Oncology 9/15/2017  s/p suboccipital craniotomy @ [facility] w/ Dr. Tatyana Ortiz 2019  s/p SRS x3 to craniotomy site 2019  s/p cholecystectomy    Past Medical History:   stage IV poorly differentiated ovarian cancer  stage 1 NSCLC   HTN  Diabetes    Meds:  Tramadol     Allergies:  carboplatin (Flushing; Rash)  penicillin (Unknown)    Social History: denies t/e/d    MEDICATIONS  (STANDING):  cyanocobalamin 1000 MICROGram(s) Oral daily  dexAMETHasone     Tablet 2 milliGRAM(s) Oral daily  dronabinol 2.5 milliGRAM(s) Oral two times a day  enoxaparin Injectable 40 milliGRAM(s) SubCutaneous daily  folic acid 1 milliGRAM(s) Oral daily  pantoprazole    Tablet 40 milliGRAM(s) Oral before breakfast  senna 2 Tablet(s) Oral at bedtime    MEDICATIONS  (PRN):  acetaminophen     Tablet .. 650 milliGRAM(s) Oral every 6 hours PRN Mild Pain (1 - 3), Moderate Pain (4 - 6)  ondansetron   Disintegrating Tablet 4 milliGRAM(s) Oral every 6 hours PRN Nausea and/or Vomiting  oxyCODONE    IR 5 milliGRAM(s) Oral every 6 hours PRN Severe Pain (7 - 10)      OBJECTIVE FINDINGS:    Vital Signs Last 24 Hrs  T(C): 36.9 (21 Oct 2021 21:45), Max: 37.8 (21 Oct 2021 13:27)  T(F): 98.5 (21 Oct 2021 21:45), Max: 100 (21 Oct 2021 13:27)  HR: 103 (21 Oct 2021 21:45) (87 - 105)  BP: 130/81 (21 Oct 2021 21:45) (103/68 - 130/81)  RR: 16 (21 Oct 2021 21:45) (16 - 17)  SpO2: 97% (21 Oct 2021 21:45) (97% - 100%)    PHYSICAL EXAM:  GENERAL: appears uncomfortable, fatigued, cachectic   HEAD:  Atraumatic  ENMT: Moist mucous membranes, Good dentition, No lesions  NECK: Supple, No JVD  NERVOUS SYSTEM:  Alert & Oriented X3  CHEST/LUNG: Clear to auscultation bilaterally; No rales, rhonchi, wheezing, or rubs  HEART: Regular rate and rhythm  ABDOMEN: Soft, Nondistended; Palpable 4x5cm suprapubic mass, tender to palpation. Bowel sounds present, No rebound, No guarding  EXTREMITIES:  2+ Peripheral Pulses, No clubbing, cyanosis, or edema, Elisa's sign negative  SKIN: No rashes or lesions  PELVIC: deferred  RECTAL: deferred    LABS:             8.1    18.54 )-----------( 318      ( 21 Oct 2021 12:53 )             26.2     10-21    137  |  97<L>  |  15  ----------------------------<  85  3.7   |  29  |  0.46<L>    Ca    9.3      21 Oct 2021 12:53  TPro  6.7  /  Alb  3.1<L>  /  TBili  0.3  /  DBili  x   /  AST  13  /  ALT  16  /  AlkPhos  87  10-21  PT/INR - ( 21 Oct 2021 12:53 )   PT: 14.1 sec;   INR: 1.25 ratio     PTT - ( 21 Oct 2021 12:53 )  PTT:26.9 sec      RADIOLOGY & ADDITIONAL STUDIES:  CT A/P FINDINGS:  LOWER CHEST: 2 mm right middle lobe nodule (2:4)    LIVER: Multiple hypodense liver lesions essentially unchanged from 09/28/2021  BILE DUCTS: Normal caliber.  GALLBLADDER: Within normal limits.  SPLEEN: Several hypodensities unchanged  PANCREAS: Within normal limits.  ADRENALS: Within normal limits.  KIDNEYS/URETERS: Within normal limits.    BLADDER/PERITONEUM/BOWEL/ ABDOMINAL WALL: No bowel obstruction. Pelvic mass with gas as previously described infiltrating the urinary bladder which is also gas containing. Again the mass apparently involves adjacent small bowel and has now eroded into the abdominal wall which also demonstrates an air-fluid level (2:97).  Rectal and transverse colonic anastomoses.  Additional deep right pelvic implant unchanged 3.3 x 2.7 cm (2:97).  REPRODUCTIVE ORGANS:  VESSELS: Within normal limits.  RETROPERITONEUM/LYMPH NODES: Small upper abdominal lymph nodes unchanged. Left external iliac lymph node contiguous with the pelvic mass unchanged measuring 2.9 x 2 cm    BONES: Within normal limits.    IMPRESSION:  Gas containing extensive pelvic mass apparently involving adjacent small bowel as well as the urinary bladder which now also demonstrates intraluminal gas.  Interval extension of the mass into the anterior abdominal wall which demonstrates new air-fluid level.

## 2021-10-21 NOTE — CONSULT NOTE ADULT - SUBJECTIVE AND OBJECTIVE BOX
HPI    71 year old female with pmhx significant for extensive oncologic hx. Stage IV ovarian cancer s/p AVEL/BSO and rectosigmoid resection 2017, brain recurrence s/p left craniotomy. Has been on several different chemotherapies and immunotherapy. Patient was seen at [facility] earlier this month complaints of urinary symptoms including left groin pain, frequency of urination and "muddy" colored urine. Urine culture was obtain grew > 3 organisms. She was prescribed cipro which she completed course of.     In the ED patient had CT scan which shows a pelvic mass infiltrating into the urinary bladder with some air in the lumen of the bladder. In the ED she is afebrile, tachycardic to 105, normotensive. Leukocytosis to 18, Cr 0.46.     PAST MEDICAL & SURGICAL HISTORY:  HTN (hypertension)    GERD (gastroesophageal reflux disease)    Noninflammatory disorder of ovary, fallopian tube and broad ligament, unspecified    Malignant neoplasm  left lung    Ovarian ca    Diabetes    History of cholecystectomy    H/O bilateral salpingo-oophorectomy  7/21/17 - ovarian ca    History of lung surgery  8/16/17 - Left = lung ca    S/P AVEL-BSO        MEDICATIONS  (STANDING):    MEDICATIONS  (PRN):      FAMILY HISTORY:  No pertinent family history in first degree relatives        Allergies    carboplatin (Flushing; Rash)  penicillin (Unknown)    Intolerances        SOCIAL HISTORY:    REVIEW OF SYSTEMS:   Otherwise negative as stated in HPI    Physical Exam  Vital signs  T(C): 37.3 (10-21-21 @ 17:31), Max: 37.8 (10-21-21 @ 13:27)  HR: 101 (10-21-21 @ 17:31)  BP: 103/68 (10-21-21 @ 17:31)  SpO2: 98% (10-21-21 @ 17:31)  Wt(kg): --    Output      Gen:  NAD    Pulm:  No respiratory distress  	  CV:  RRR    GI:  S/ND/NT    :      MSK:      LABS:      10-21 @ 12:53    WBC 18.54 / Hct 26.2  / SCr 0.46     10-21    137  |  97<L>  |  15  ----------------------------<  85  3.7   |  29  |  0.46<L>    Ca    9.3      21 Oct 2021 12:53    TPro  6.7  /  Alb  3.1<L>  /  TBili  0.3  /  DBili  x   /  AST  13  /  ALT  16  /  AlkPhos  87  10-21    PT/INR - ( 21 Oct 2021 12:53 )   PT: 14.1 sec;   INR: 1.25 ratio         PTT - ( 21 Oct 2021 12:53 )  PTT:26.9 sec      Urine Cx:  Blood Cx:    RADIOLOGY:     HPI    71 year old female with pmhx significant for extensive oncologic hx. Stage IV ovarian cancer s/p AVEL/BSO and rectosigmoid resection 2017, brain recurrence s/p left craniotomy. Has been on several different chemotherapies and immunotherapy. Patient was seen at [facility] earlier this month complaints of urinary symptoms including left groin pain, frequency of urination and "muddy" colored urine. Urine culture was obtain grew > 3 organisms. She was prescribed cipro which she completed course of.     In the ED patient had CT scan which shows a pelvic mass infiltrating into the urinary bladder with some air in the lumen of the bladder. In the ED she is afebrile, tachycardic to 105, normotensive. Leukocytosis to 18, Cr 0.46.     PAST MEDICAL & SURGICAL HISTORY:  HTN (hypertension)    GERD (gastroesophageal reflux disease)    Noninflammatory disorder of ovary, fallopian tube and broad ligament, unspecified    Malignant neoplasm  left lung    Ovarian ca    Diabetes    History of cholecystectomy    H/O bilateral salpingo-oophorectomy  7/21/17 - ovarian ca    History of lung surgery  8/16/17 - Left = lung ca    S/P AVEL-BSO        MEDICATIONS  (STANDING):    MEDICATIONS  (PRN):      FAMILY HISTORY:  No pertinent family history in first degree relatives        Allergies    carboplatin (Flushing; Rash)  penicillin (Unknown)    Intolerances        SOCIAL HISTORY:    REVIEW OF SYSTEMS:   Otherwise negative as stated in HPI    Physical Exam  Vital signs  T(C): 37.3 (10-21-21 @ 17:31), Max: 37.8 (10-21-21 @ 13:27)  HR: 101 (10-21-21 @ 17:31)  BP: 103/68 (10-21-21 @ 17:31)  SpO2: 98% (10-21-21 @ 17:31)  Wt(kg): --    Output      Gen:  NAD    Pulm:  No respiratory distress  	  CV:  RRR    GI:  soft, tender and erythematous near suprapubic region      :  No CVA tenderness         LABS:      10-21 @ 12:53    WBC 18.54 / Hct 26.2  / SCr 0.46     10-21    137  |  97<L>  |  15  ----------------------------<  85  3.7   |  29  |  0.46<L>    Ca    9.3      21 Oct 2021 12:53    TPro  6.7  /  Alb  3.1<L>  /  TBili  0.3  /  DBili  x   /  AST  13  /  ALT  16  /  AlkPhos  87  10-21    PT/INR - ( 21 Oct 2021 12:53 )   PT: 14.1 sec;   INR: 1.25 ratio         PTT - ( 21 Oct 2021 12:53 )  PTT:26.9 sec      Urine Cx: > 3 org, p  Blood Cx: p

## 2021-10-21 NOTE — H&P ADULT - NSHPPHYSICALEXAM_GEN_ALL_CORE
Vital Signs Last 24 Hrs  T(C): 37.6 (21 Oct 2021 19:30), Max: 37.8 (21 Oct 2021 13:27)  T(F): 99.6 (21 Oct 2021 19:30), Max: 100 (21 Oct 2021 13:27)  HR: 98 (21 Oct 2021 19:30) (87 - 105)  BP: 121/68 (21 Oct 2021 19:30) (103/68 - 121/68)  BP(mean): --  RR: 16 (21 Oct 2021 19:30) (16 - 17)  SpO2: 98% (21 Oct 2021 19:30) (98% - 100%)    GENERAL: Resting in bed comfortably; not in acute distress   EYES: EOMI, PERRLA, conjunctiva and sclera clear  ENMT: No tonsillar erythema, exudates, or enlargement; Moist mucous membranes, Good dentition, No lesions  NECK: Supple, No JVD, Normal thyroid  NERVOUS SYSTEM:  Alert & Oriented X3, Good concentration; Motor Strength 5/5 B/L upper and lower extremities; DTRs 2+ intact and symmetric  CHEST/LUNG: Clear to auscultation and tympanic to percussion bilaterally; No rales, rhonchi, wheezing, or rubs  HEART: Regular rate and rhythm; No murmurs, rubs, or gallops  ABDOMEN: Mass with overlying erythema noted in lower abdomen; tenderness to light touch noted; no tenderness elsewhere in abdomen; no rebound   EXTREMITIES:  2+ Peripheral Pulses, No clubbing, cyanosis, or edema  LYMPH: No lymphadenopathy noted  SKIN: No rashes, no lesions Vital Signs Last 24 Hrs  T(C): 37.6 (21 Oct 2021 19:30), Max: 37.8 (21 Oct 2021 13:27)  T(F): 99.6 (21 Oct 2021 19:30), Max: 100 (21 Oct 2021 13:27)  HR: 98 (21 Oct 2021 19:30) (87 - 105)  BP: 121/68 (21 Oct 2021 19:30) (103/68 - 121/68)  BP(mean): --  RR: 16 (21 Oct 2021 19:30) (16 - 17)  SpO2: 98% (21 Oct 2021 19:30) (98% - 100%)    GENERAL: Resting in bed comfortably; not in acute distress   EYES: EOMI, PERRLA, conjunctiva and sclera clear  ENMT: No tonsillar erythema, exudates, or enlargement; Moist mucous membranes, Good dentition, No lesions  NECK: Supple, No JVD, Normal thyroid  NERVOUS SYSTEM:  Alert & Oriented X3, Good concentration; Motor Strength 5/5 B/L upper and lower extremities; DTRs 2+ intact and symmetric  CHEST/LUNG: Clear to auscultation and tympanic to percussion bilaterally; No rales, rhonchi, wheezing, or rubs  HEART: Regular rate and rhythm; No murmurs, rubs, or gallops  ABDOMEN: Mass with overlying erythema noted in lower abdomen; tenderness to light touch noted; no tenderness elsewhere in abdomen; no rebound or guarding. erythema not appreciated on attending exam  EXTREMITIES:  2+ Peripheral Pulses, No clubbing, cyanosis, or edema  LYMPH: No lymphadenopathy noted  SKIN: No rashes, no lesions

## 2021-10-21 NOTE — H&P ADULT - ASSESSMENT
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

## 2021-10-22 NOTE — CONSULT NOTE ADULT - SUBJECTIVE AND OBJECTIVE BOX
Patient is a 71y old  Female who presents with a chief complaint of abdominal pain, urinary tract infection     patient is Mandarin-speaking; patient preferred to use her daughter-in-law as  as opposed to  services (22 Oct 2021 13:55)    HPI:  The patient is a 71-year-old woman who is an immigrant from [place] and who is followed for stage IV ovarian cancer and stage I non-small cell carcinoma of the lung diagnosed in 2017.   She is followed as an outpatient by Dr. Geo Cuevas of the [facility]. Per outpatient records, the patient is status post multiple surgical resections and six rounds of carboplatin, paclitaxel, and bevacizumab still with recurrence of disease to the cerebellum status post resection in 2019. Most recently, in September of this year, the patient was treated with a single dose of bevacizumab. Given the patient's tenuous clinical status and out of concern for the development of fistulization, further targeted treatment was held earlier this month, and the patient's primary oncology team discussed with her the possibility of transition of care to hospice.   At that time, the patient complained of dysuria, so she had a urinalysis performed, which demonstrated leukocyte esterase and nitrites, (10/12/21 Urine cx  > 3 organisms) so the patient was administered a seven-day course of ciprofloxacin, but she had no symptom relief. Instead she had progressive discomfort in the lower abdomen in the context of a visible mass that has been present for several months. Per discussion with Dr. Cuevas, the patient was encouraged to present to the emergency department for further evaluation. At this time, the patient notes persistent dysuria accompanied by hematuria, pneumaturia, and black-colored urine. She denies any nausea, vomiting, diarrhea, or constipation. She has not had any chest pain, shortness of breath, back pain, or upper abdominal pain.     In the emergency department, the patient had a CT scan of the abdomen and pelvis with intravenous contrast performed, and it demonstrated large pelvic masses with infiltration into the bladder, the adjacent small bowel, and the anterior abdominal wall. The patient was administered one dose of intravenous meropenem and was admitted to medicine for further evaluation and management.  (21 Oct 2021 20:23)      PAST MEDICAL & SURGICAL HISTORY:  HTN (hypertension)    GERD (gastroesophageal reflux disease)    Noninflammatory disorder of ovary, fallopian tube and broad ligament, unspecified    Malignant neoplasm  left lung    Ovarian ca    Diabetes    History of cholecystectomy    H/O bilateral salpingo-oophorectomy  7/21/17 - ovarian ca    History of lung surgery  8/16/17 - Left = lung ca    S/P AVEL-BSO    Social history:  , worked in [employer], understands mandarin, speaks [place] dialect    FAMILY HISTORY:  No pertinent family history in first degree relatives    REVIEW OF SYSTEMS:  CONSTITUTIONAL: No weakness, fevers or chills  EYES/ENT: No visual changes;  No vertigo or throat pain   NECK: No pain or stiffness  RESPIRATORY: No cough, wheezing, hemoptysis; No shortness of breath  CARDIOVASCULAR: No chest pain or palpitations  GASTROINTESTINAL: lower abdominal or epigastric pain. No nausea, vomiting, or hematemesis; No diarrhea or constipation. No melena or hematochezia.  GENITOURINARY: continued dysuria, frequency or hematuria  NEUROLOGICAL: No numbness or weakness  SKIN: No itching, burning, rashes, or lesions   All other review of systems is negative unless indicated above    Allergies  carboplatin (Flushing; Rash)  penicillin (Unknown)    Antimicrobials:  meropenem  IVPB 1000 milliGRAM(s) IV Intermittent every 12 hours      Vital Signs Last 24 Hrs  T(C): 36.7 (22 Oct 2021 12:50), Max: 37.6 (21 Oct 2021 19:30)  T(F): 98 (22 Oct 2021 12:50), Max: 99.6 (21 Oct 2021 19:30)  HR: 89 (22 Oct 2021 12:50) (89 - 103)  BP: 102/65 (22 Oct 2021 12:50) (102/65 - 130/81)  BP(mean): --  RR: 17 (22 Oct 2021 12:50) (16 - 17)  SpO2: 97% (22 Oct 2021 12:50) (97% - 98%)    PHYSICAL EXAM:  General: thin, chronically ill appearing,  NAD, Non-toxic  Neurology: A&Ox3, nonfocal  Respiratory: Clear to auscultation bilaterally  CV: RRR, S1S2, no murmurs, rubs or gallops  Abdominal: Soft,tender induration lower mid abd, distended, normal bowel sounds  Extremities: No edema, + peripheral pulses  Line Sites: Clear  Skin: No rash                        8.3    25.89 )-----------( 297      ( 22 Oct 2021 07:46 )             25.6   WBC Count: 25.89 (10-22 @ 07:46)  WBC Count: 18.54 (10-21 @ 12:53)        10-22    130<L>  |  93<L>  |  12  ----------------------------<  76  3.5   |  23  |  0.40<L>    Ca    8.7      22 Oct 2021 07:46  Phos  3.9     10-22  Mg     2.00     10-22    TPro  6.5  /  Alb  2.8<L>  /  TBili  0.5  /  DBili  x   /  AST  19  /  ALT  17  /  AlkPhos  87  10-22      Radiology: flims independently viewed    She  < from: CT Abdomen and Pelvis w/ Oral Cont and w/ IV Cont (10.21.21 @ 15:51) >  FINDINGS:  LOWER CHEST: 2 mm right middle lobe nodule (2:4)    LIVER: Multiple hypodense liver lesions essentially unchanged from 09/28/2021  BILE DUCTS: Normal caliber.  GALLBLADDER: Within normal limits.  SPLEEN: Several hypodensities unchanged  PANCREAS: Within normal limits.  ADRENALS: Within normal limits.  KIDNEYS/URETERS: Within normal limits.    BLADDER/PERITONEUM/BOWEL/ ABDOMINAL WALL: No bowel obstruction. Pelvic mass with gas as previously described infiltrating the urinary bladder which is also gas containing. Again the mass apparently involves adjacent small bowel and has now eroded into the abdominal wall which also demonstrates an air-fluid level (2:97).  Rectal and transverse colonic anastomoses.  Additional deep right pelvic implant unchanged 3.3 x 2.7 cm (2:97).  REPRODUCTIVE ORGANS:  VESSELS: Within normal limits.  RETROPERITONEUM/LYMPH NODES:Small upper abdominal lymph nodes unchanged. Left external iliac lymph node contiguous with the pelvic mass unchanged measuring 2.9 x 2 cm    BONES: Within normal limits.    IMPRESSION:  Gas containing extensive pelvic mass apparently involving adjacent small bowel as well as the urinary bladder which now also demonstrates intraluminal gas.  Interval extension of the mass into the anterior abdominal wall which demonstrates new air-fluid level.    < end of copied text >      Kwadwo Cerrato MD; Division of Infectious Disease; Pager: [phone]; nights and weekends: [phone]

## 2021-10-22 NOTE — PROGRESS NOTE ADULT - PROBLEM SELECTOR PLAN 1
-Meets sepsis criteria given tachycardia and leukocytosis   -Currently HD stable  -2/2 UTI, management as below -Meets sepsis criteria given tachycardia and leukocytosis   -Currently HD stable  -2/2 UTI vs inflamed abdominal nodule, management as below

## 2021-10-22 NOTE — PROGRESS NOTE ADULT - PROBLEM SELECTOR PLAN 3
-Stage IV ovarian cancer noted status post multiple surgical resections and failure of four-to-five lines of chemotherapy   -Per documentation of outpatient discussion with Dr. Flores, patient is not a good candidate for additional chemotherapy at this time  -Per patient's family, there was a discussion of intervention by radiation oncology to decrease patient's burden of disease  -Will discuss case with medical oncology team (Dr. Cuevas's colleagues) in am to assess if this is feasible; if not, per discussion with patient's family, patient would likely benefit most from referral to hospice care and focus of care on patient's comfort and dignity -Stage IV ovarian cancer noted status post multiple surgical resections and failure of four-to-five lines of chemotherapy   -Per documentation of outpatient discussion with Dr. Flores, patient is not a good candidate for additional chemotherapy at this time  -Per patient's family, there was a discussion of intervention by radiation oncology to decrease patient's burden of disease    Plan:  - identify patient's GOC  - f/u rad onc recs  - f/u palliatve care recs -Stage IV ovarian cancer w hx brain mets (surgically resected) status post multiple surgical resections and failure of chemotherapy x5  -Per documentation of outpatient discussion with Dr. Flores, patient is not a good candidate for additional chemotherapy at this time    Plan:  - family would like to pursue surgery to reduce pain from tumor burden  - f/u heme onc recs  - f/u gyn onc recs

## 2021-10-22 NOTE — PROGRESS NOTE ADULT - PROBLEM SELECTOR PLAN 4
-History of non-small cell cancer of the lung noted, stage I, status post surgical resection   -No further evaluation or management necessary at this time

## 2021-10-22 NOTE — PROGRESS NOTE ADULT - REASON FOR ADMISSION
abdominal pain, urinary tract infection     patient is Mandarin-speaking; patient preferred to use her daughter-in-law as  as opposed to  services

## 2021-10-22 NOTE — PROGRESS NOTE ADULT - PROBLEM SELECTOR PLAN 2
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  - Patient failed empiric antibiotic therapy, ciprofloxacin, as outpatient   - meropenem given in ED before collected UA/UCx  - Per conversations by admitting team w patient and her daughter-in-law, surgery to relieve fistula is unlikely to be consistent with patient's goals of care    Plan:  - cont meropenem 7d course for acute cystitis (10/21-)  - surg recs: possible palliative resection if in line w GOC  - urology recs: only tx UTI if pt having sx, can try methenamine w vit C for UTI ppx  - gyn onc recs:  - consult palliative care  - consult rad onc to decrease tumor burden  - GOC conversation w family to discuss surgical options i/s/o vesiculo-enteric fistula  - Patient failed empiric antibiotic therapy, ciprofloxacin, as outpatient   - meropenem given in ED before UA/UCx collected    Plan:  - c/w meropenem 1g BID (10/21-)  - f/u ID recs for duration of abx  - patient/family would like to pursue surgery and/or rad onc to relieve pain/freq UTIs    - surg recs: possible palliative resection if in line w GOC pending infection clearance  - urology recs: only tx UTI if pt having sx, can try methenamine w vit C for UTI ppx  - gyn onc recs: pending  - rad onc recs: can pursue rad onc outpt to decrease pain from tumor burden once infx clears

## 2021-10-22 NOTE — CONSULT NOTE ADULT - SUBJECTIVE AND OBJECTIVE BOX
Patient is a 71y old  Female who presents with a chief complaint of abdominal pain, urinary tract infection     patient is Mandarin-speaking; patient preferred to use her daughter-in-law as  as opposed to  services (22 Oct 2021 07:43)      HPI:  The patient is a 71-year-old woman who is an immigrant from [place] and who is followed for stage IV ovarian cancer and stage I non-small cell carcinoma of the lung diagnosed in 2017. She is followed as an outpatient by Dr. Geo Cuevas of the [facility]. Per outpatient records, the patient is status post multiple surgical resections and six rounds of carboplatin, paclitaxel, and bevacizumab still with recurrence of disease to the cerebellum status post resection in 2019. Most recently, in September of this year, the patient was treated with a single dose of bevacizumab. Given the patient's tenuous clinical status and out of concern for the development of fistulization, further targeted treatment was held earlier this month, and the patient's primary oncology team discussed with her the possibility of transition of care to hospice. At that time, the patient complained of dysuria, so she had a urinalysis performed, which demonstrated leukocyte esterase and nitrites, so the patient was administered a seven-day course of ciprofloxacin, but she had no symptom relief. Instead she had progressive discomfort in the lower abdomen in the context of a visible mass that has been present for several months. Per discussion with Dr. Cuevas, the patient was encouraged to present to the emergency department for further evaluation. At this time, the patient notes persistent dysuria accompanied by hematuria, pneumaturia, and black-colored urine. She denies any nausea, vomiting, diarrhea, or constipation. She has not had any chest pain, shortness of breath, back pain, or upper abdominal pain.     In the emergency department, the patient had a CT scan of the abdomen and pelvis with intravenous contrast performed, and it demonstrated large pelvic masses with infiltration into the bladder, the adjacent small bowel, and the anterior abdominal wall. The patient was administered one dose of intravenous meropenem and was admitted to medicine for further evaluation and management.  (21 Oct 2021 20:23)       ROS: as above     PAST MEDICAL & SURGICAL HISTORY:  HTN (hypertension)    GERD (gastroesophageal reflux disease)    Noninflammatory disorder of ovary, fallopian tube and broad ligament, unspecified    Malignant neoplasm  left lung    Ovarian ca    Diabetes    History of cholecystectomy    H/O bilateral salpingo-oophorectomy  7/21/17 - ovarian ca    History of lung surgery  8/16/17 - Left = lung ca    S/P AVEL-BSO        SOCIAL HISTORY:    FAMILY HISTORY:  No pertinent family history in first degree relatives        MEDICATIONS  (STANDING):  cyanocobalamin 1000 MICROGram(s) Oral daily  dexAMETHasone     Tablet 2 milliGRAM(s) Oral daily  dronabinol 2.5 milliGRAM(s) Oral two times a day  enoxaparin Injectable 40 milliGRAM(s) SubCutaneous daily  folic acid 1 milliGRAM(s) Oral daily  meropenem  IVPB 1000 milliGRAM(s) IV Intermittent every 12 hours  pantoprazole    Tablet 40 milliGRAM(s) Oral before breakfast  senna 2 Tablet(s) Oral at bedtime  sodium chloride 0.9%. 1000 milliLiter(s) (50 mL/Hr) IV Continuous <Continuous>    MEDICATIONS  (PRN):  acetaminophen     Tablet .. 650 milliGRAM(s) Oral every 6 hours PRN Mild Pain (1 - 3), Moderate Pain (4 - 6)  ondansetron   Disintegrating Tablet 4 milliGRAM(s) Oral every 6 hours PRN Nausea and/or Vomiting  oxyCODONE    IR 5 milliGRAM(s) Oral every 6 hours PRN Severe Pain (7 - 10)      Allergies    carboplatin (Flushing; Rash)  penicillin (Unknown)    Intolerances        Vital Signs Last 24 Hrs  T(C): 36.9 (22 Oct 2021 05:24), Max: 37.8 (21 Oct 2021 13:27)  T(F): 98.4 (22 Oct 2021 05:24), Max: 100 (21 Oct 2021 13:27)  HR: 96 (22 Oct 2021 05:24) (87 - 103)  BP: 114/72 (22 Oct 2021 05:24) (103/68 - 130/81)  BP(mean): --  RR: 16 (22 Oct 2021 05:24) (16 - 17)  SpO2: 97% (22 Oct 2021 05:24) (97% - 100%)    PHYSICAL EXAM  General: adult in NAD  HEENT: clear oropharynx, anicteric sclera, pink conjunctiva  Neck: supple  CV: normal S1/S2 with no murmur rubs or gallops  Lungs: positive air movement b/l ant lungs, clear to auscultation, no wheezes, no rales  Abdomen: soft non-tender non-distended, no hepatosplenomegaly  Ext: no clubbing cyanosis or edema  Skin: no rashes and no petechiae  Neuro: alert and oriented X 3, none focal    LABS:                          8.3    25.89 )-----------( 297      ( 22 Oct 2021 07:46 )             25.6         Mean Cell Volume : 85.9 fL  Mean Cell Hemoglobin : 27.9 pg  Mean Cell Hemoglobin Concentration : 32.4 gm/dL  Auto Neutrophil # : 22.96 K/uL  Auto Lymphocyte # : 1.58 K/uL  Auto Monocyte # : 0.67 K/uL  Auto Eosinophil # : 0.00 K/uL  Auto Basophil # : 0.00 K/uL  Auto Neutrophil % : 84.4 %  Auto Lymphocyte % : 6.1 %  Auto Monocyte % : 2.6 %  Auto Eosinophil % : 0.0 %  Auto Basophil % : 0.0 %      Serial CBC's  10-22 @ 07:46  Hct-25.6 / Hgb-8.3 / Plat-297 / RBC-2.98 / WBC-25.89  Serial CBC's  10-21 @ 12:53  Hct-26.2 / Hgb-8.1 / Plat-318 / RBC-2.95 / WBC-18.54      10-22    130<L>  |  93<L>  |  12  ----------------------------<  76  3.5   |  23  |  0.40<L>    Ca    8.7      22 Oct 2021 07:46  Phos  3.9     10-22  Mg     2.00     10-22    TPro  6.5  /  Alb  2.8<L>  /  TBili  0.5  /  DBili  x   /  AST  19  /  ALT  17  /  AlkPhos  87  10-22      PT/INR - ( 21 Oct 2021 12:53 )   PT: 14.1 sec;   INR: 1.25 ratio         PTT - ( 21 Oct 2021 12:53 )  PTT:26.9 sec                RADIOLOGY & ADDITIONAL STUDIES:    IMPRESSION:  Gas containing extensive pelvic mass apparently involving adjacent small bowel as well as the urinary bladder which now also demonstrates intraluminal gas.  Interval extension of the mass into the anterior abdominal wall which demonstrates new air-fluid level.

## 2021-10-22 NOTE — CONSULT NOTE ADULT - PROBLEM SELECTOR RECOMMENDATION 4
- stage IV ovarian cancer and stage I non-small cell carcinoma of the lung diagnosed in 2017, s/p multiple lines of therapy  - Oncology recommendations appreciated : not a candidate for further chemotherapy 2/2 performance status and hematologic toxicity from prior treatments.

## 2021-10-22 NOTE — PROGRESS NOTE ADULT - SUBJECTIVE AND OBJECTIVE BOX
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

## 2021-10-22 NOTE — PROGRESS NOTE ADULT - ATTENDING COMMENTS
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

## 2021-10-22 NOTE — CONSULT NOTE ADULT - PROBLEM SELECTOR RECOMMENDATION 9
- PO Acetaminophen 650mg q6 PRN mild-moderate pain  - PO Oxycodone IR 5mg q4-6 PRN moderate-severe pain  - Bowel regimen while on opiates

## 2021-10-22 NOTE — PROGRESS NOTE ADULT - PROBLEM SELECTOR PLAN 5
-Patient appears comfortable to my exam; however, she has exquisite tenderness, even to light touch, over palpable mass in lower abdomen; I suspect this is her cancerous mass, which is very tender to my palpation   -ISTOP noted in chart; will administer acetaminophen for mild and moderate pain and oxycodone for severe pain every six hours; will assess patient's daily requirement of pain medications and order standing regimen according to daily requirements -ISTOP noted in chart; will administer acetaminophen for mild and moderate pain and oxycodone for severe pain every six hours; will assess patient's daily requirement of pain medications and order standing regimen according to daily requirements

## 2021-10-22 NOTE — CONSULT NOTE ADULT - PROBLEM SELECTOR RECOMMENDATION 6
A meeting to discuss advance care planning was held today.   Discussed goals of care and advance directives including, but not limited to health care proxy, code status, hospice.  Decision regarding code status: FULL CODE  Documentation completed today: HCP. Please see GOC note.  >46 minutes spent on advanced care planning with family.

## 2021-10-22 NOTE — GOALS OF CARE CONVERSATION - ADVANCED CARE PLANNING - CONVERSATION DETAILS
Referral to palliative care for complex decision making and symptom management in the setting of advanced malignancy. Meeting held with patient and family at bedside using  ID# [id number]  Reviewed pt's clinical status with pt and family. Ms. Flores is followed for stage IV ovarian cancer and stage I non-small cell carcinoma of the lung diagnosed in 2017. She is followed as an outpatient by Dr. Geo Cuevas of the [facility]. Per outpatient records, the patient is status post multiple surgical resections and six rounds of carboplatin, paclitaxel, and bevacizumab still with recurrence of disease to the cerebellum status post resection in 2019.     Ms. Flores appointed her daughter in law, Nicolle Martinez ([phone]) as her HCP and requested we discuss pt's treatment plan with Ms. Martinez. HCP paperwork completed and placed upon pt's medical record.    Pt's dtr in law understands that despite aggressive DMT pt's cancer has progressed. She shared with us the families desire to continue interventions aimed at fighting the cancer. Currently the family is awaiting input from surgery team, radiation oncology and medical oncology.   Dtr in law shared that Dr. Geo Cuevas discussed a transition to a supportive care approach. Dtr in law understands that pt may benefit from symptom directed care and may not be a candidate for surgical intervention or other forms of DMT.  Discussed the role, philosophy and services of hospice care in detail.   Dtr in law appreciated the information regarding hospice care however at this time the family still interested in exploring options aimed at treating pt's cancer.    Advanced directives explored.  Reviewed the risk and benefits of resuscitative measures at the end of life in patients with advanced malignancy.  Pt's dtr in law stated the family is not ready to discuss their preferences and wish for pt to remain a full code.    Palliative care will follow pt's hospital course.

## 2021-10-22 NOTE — PROGRESS NOTE ADULT - ASSESSMENT
71F w stage IV ovarian cancer s/p AVEL, b/l SPO, with known residual masses in the pelvis, presenting w lower abdominal pain superficial to a palpable mass with associated pain, and burning with urination refractory to antibiotic therapy, found to have CT w pelvic masses abutting the bladder and the small intestine, concerning for the formation of an entero-vesicular fistula. Pt currently on IV abx for UTI, pending GOC conversation re: hospice vs surgical intervention.  71F w stage IV ovarian cancer s/p AVEL, b/l SPO, with known residual masses in the pelvis, presenting w lower abdominal pain, dysuria, pain in superficial palpable mass on lower abdomen, found to have CT w pelvic masses extending to abdominal wall, and new entero-vesicular fistula, admitted for sepsis 2/2 UTI from entero-vesicular fistula vs infected abdominal wall mass. Pt currently on IV abx for UTI, pending potential surgical intervention to reduce pain from tumor burden after infection clears.

## 2021-10-22 NOTE — PROGRESS NOTE ADULT - PROBLEM SELECTOR PLAN 7
-Lovenox 40 for dvt prophylaxis   -NPO pending surg recs in AM    -confirm dose of gabapentin with family in AM DVT PPx: Lovenox 40 for dvt prophylaxis   Diet: Soft diet ; no plans for surgery at this time, not NPO

## 2021-10-22 NOTE — PROGRESS NOTE ADULT - SUBJECTIVE AND OBJECTIVE BOX
Samuel Zapata, PGY1  Internal Medicine      PATIENT: LINA CHAIDEZ, MRN: [id number]    CHIEF COMPLAINT: Patient is a 71y old  Female who presents with a chief complaint of abdominal pain, urinary tract infection     patient is Mandarin-speaking; patient preferred to use her daughter-in-law as  as opposed to  services (22 Oct 2021 11:40)      INTERVAL HISTORY/OVERNIGHT EVENTS:   - cont to endorse pain in abdominal nodule, worse with movement  - + hematuria, no blood blots, stool or brown urine  - passing flatus, no BMs    REVIEW OF SYSTEMS:  Negative unless noted in HPI      MEDICATIONS:  MEDICATIONS  (STANDING):  cyanocobalamin 1000 MICROGram(s) Oral daily  dexAMETHasone     Tablet 2 milliGRAM(s) Oral daily  dronabinol 2.5 milliGRAM(s) Oral two times a day  enoxaparin Injectable 40 milliGRAM(s) SubCutaneous daily  folic acid 1 milliGRAM(s) Oral daily  meropenem  IVPB 1000 milliGRAM(s) IV Intermittent every 12 hours  pantoprazole    Tablet 40 milliGRAM(s) Oral before breakfast  senna 2 Tablet(s) Oral at bedtime  sodium chloride 0.9%. 1000 milliLiter(s) (50 mL/Hr) IV Continuous <Continuous>    MEDICATIONS  (PRN):  acetaminophen     Tablet .. 650 milliGRAM(s) Oral every 6 hours PRN Mild Pain (1 - 3), Moderate Pain (4 - 6)  ondansetron   Disintegrating Tablet 4 milliGRAM(s) Oral every 6 hours PRN Nausea and/or Vomiting  oxyCODONE    IR 5 milliGRAM(s) Oral every 6 hours PRN Severe Pain (7 - 10)      ALLERGIES: Allergies    carboplatin (Flushing; Rash)  penicillin (Unknown)    Intolerances        OBJECTIVE:    VITALS:   Vital Signs Last 24 Hrs  T(C): 36.9 (22 Oct 2021 05:24), Max: 37.8 (21 Oct 2021 13:27)  T(F): 98.4 (22 Oct 2021 05:24), Max: 100 (21 Oct 2021 13:27)  HR: 96 (22 Oct 2021 05:24) (87 - 103)  BP: 114/72 (22 Oct 2021 05:24) (103/68 - 130/81)  BP(mean): --  RR: 16 (22 Oct 2021 05:24) (16 - 17)  SpO2: 97% (22 Oct 2021 05:24) (97% - 100%)    CAPILLARY BLOOD GLUCOSE          I&O's Summary    Daily Height in cm: 154.94 (21 Oct 2021 21:45)    Daily     PHYSICAL EXAMINATION:  General: Frail. Comfortable, no acute distress, cooperative with exam.  HEENT: PERRLA, EOMI, moist mucous membranes.  Respiratory: CTAB, normal respiratory effort, no coughing, wheezes, crackles, or rales.  CV: RRR, S1S2, no murmurs, rubs or gallops. No JVD. Distal pulses intact.  Abdominal: Erythematous nodule in lower abdomen, exquisitely TTP, tender to light palpation. Soft, nondistended. + rebound, - guarding. normal bowel sounds.  Neurology: AOx3, no focal neuro defects, HOUSE x 4.  Extremities: No pitting edema, + Peripheral pulses.      LABS:                        8.3    25.89 )-----------( 297      ( 22 Oct 2021 07:46 )             25.6     10-22    130<L>  |  93<L>  |  12  ----------------------------<  76  3.5   |  23  |  0.40<L>    Ca    8.7      22 Oct 2021 07:46  Phos  3.9     10-22  Mg     2.00     10-22    TPro  6.5  /  Alb  2.8<L>  /  TBili  0.5  /  DBili  x   /  AST  19  /  ALT  17  /  AlkPhos  87  10-22    PT/INR - ( 21 Oct 2021 12:53 )   PT: 14.1 sec;   INR: 1.25 ratio         PTT - ( 21 Oct 2021 12:53 )  PTT:26.9 sec            MICRO:  Microbiology     EKG:    RADIOLOGY & ADDITIONAL TESTS:    IMAGING:

## 2021-10-22 NOTE — CONSULT NOTE ADULT - PROBLEM SELECTOR RECOMMENDATION 3
- i/s/o vesiculo-enteric fistula  - on meropenem   - patient/family would like to pursue surgery and/or rad onc to relieve pain/freq UTIs  - surg recs: possible palliative resection if in line w GOC pending infection clearance  - urology recs: "Given patients late stage of disease she is not a good surgical candidate.   - Gyn/Onc recs: "Not a candidate for surgical intervention. Heavily pretreated with chemotherapy with progressive disease and toxicities. Recommended palliative care/hospice. "  - rad onc recs: can pursue rad onc outpt to decrease pain from tumor burden once infx clears.  - pain control as above

## 2021-10-22 NOTE — CONSULT NOTE ADULT - ASSESSMENT
71F with  stage IV ovarian cancer and stage I non-small cell carcinoma of the lung diagnosed in 2017, with extensive chemotherapy and resection of cerebellar met 2018    Disease: ovarian cancer left    Pathology: poorly differentiated carcinoma: ER positive   TNM stage: T3, N1, M1   AJCC Stage: IV     carbo/ taxol: 10/24/17 to 2/12/18 with cumulative fatigue and neuropathy  bevacizumab added on 12/8/17 to 4/2018 (pt stopped coming for treatment and lost to follow up until 12/2018)  retreat carbo/ taxol 5/7/19  bevacizumab added to Cycle 2 of retreat carboplatin/ paclitaxel 5/29/19 and allergic reaction with 2nd bag of carboplatin  Oncomine from [facility]: (evaluated hotspot and full gene and fusion protein panel) GENESIS missense, tp53, Myc amplication, Birc3  bevacizumab and paclitaxel 6/19/19 to 8/27/19  bevacizumab maintenance 9/2019 to 2/2020  Doxil/ bevacizumab 3/4/2020 to 6/2020  Doxil 6/2020 to 1/19/2021  gemcitabine 2/2/2021 to 4/2021  topotecan 4/20/2021 to 8/2021  bevacizumab 8/2021 to present      Given the patient's tenuous clinical status and out of concern for the development of fistulization, further targeted treatment was held earlier this month, and the patient's primary oncology team discussed with her the possibility of transition of care to hospice.   At that time, the patient complained of dysuria, so she had a urinalysis performed, which demonstrated leukocyte esterase and nitrites, (10/12/21 Urine cx  > 3 organisms) so the patient was administered a seven-day course of ciprofloxacin, but she had no symptom relief. Instead she had progressive discomfort in the lower abdomen in the context of a visible mass that has been present for several months. Per discussion with Dr. Cuevas, the patient was encouraged to present to the emergency department for further evaluation. At this time, the patient notes persistent dysuria accompanied by hematuria, pneumaturia, and black-colored urine. She denies any nausea, vomiting, diarrhea, or constipation. She has not had any chest pain, shortness of breath, back pain, or upper abdominal pain.     lower abd pain  dysuria  leukocytosis  h/o PCN allergy  abdominal carcinomatosis    Patient appears to have progression of ovarian cancer with malignant mass invading urinary bladder and small intestine with pending entero cutaneous fistula    Suggest  Continue Meropenem 10/21-->  consider drainage of anterior abd wall mass  palliative care evaluation    discussed with resident  ID will see over weekend

## 2021-10-22 NOTE — CONSULT NOTE ADULT - PROBLEM SELECTOR RECOMMENDATION 7
Emotional support provided, questions answered.    Thank you for allowing us to participate in your patient's care. Please page [phone] for any questions/concerns.

## 2021-10-22 NOTE — CONSULT NOTE ADULT - ASSESSMENT
71F w stage IV ovarian cancer s/p AVEL, b/l SPO, with known residual masses in the pelvis, presenting w lower abdominal pain, dysuria, pain in superficial palpable mass on lower abdomen, found to have CT w pelvic masses extending to abdominal wall, and new entero-vesicular fistula, admitted for sepsis 2/2 UTI from entero-vesicular fistula vs infected abdominal wall mass. Pt currently on IV abx for UTI, pending potential surgical intervention to reduce pain from tumor burden after infection clears.   Palliative Care consulted for complex decision making  related to goals of care discussions in the setting of advanced illness

## 2021-10-22 NOTE — CONSULT NOTE ADULT - ASSESSMENT
71f with stage IV ovarian cancer and stage I non-small cell carcinoma of the lung diagnosed in 2017, s/p multiple lines of therapy, presenting with UTI and sepsis.    Infectious work up, abx  pal care consult, patient is an appropriate candidate for hospice services, she is heavily pretreated and is not a candidate for further chemotherapy 2/2 performance status and hematologic toxicity from prior treatments.   Supportive care, pain control, Nutrition, PT, DVT ppx  Outpatient oncology f/u    Will follow. Please do not hesitate to call back with questions.     Peg Desir MD  Medical Oncology Attending  C: [phone]     71f with stage IV ovarian cancer and stage I non-small cell carcinoma of the lung diagnosed in 2017, s/p multiple lines of therapy, presenting with UTI and sepsis.    Patient and family understand and are aware that she is not a candidate for chemotherapy, they ask about other treatment modalities including surgery and radiation.    Infectious work up, abx  pal care consult, patient is an appropriate candidate for hospice services, she is heavily pretreated and is not a candidate for further chemotherapy 2/2 performance status and hematologic toxicity from prior treatments.   Supportive care, pain control, Nutrition, PT, DVT ppx  Outpatient oncology f/u    Will follow. Please do not hesitate to call back with questions.     Peg Desir MD  Medical Oncology Attending  C: [phone]

## 2021-10-23 NOTE — DISCHARGE NOTE PROVIDER - HOSPITAL COURSE
The patient is a 71-year-old woman who is an immigrant from [place] and who is followed for stage IV ovarian cancer and stage I non-small cell carcinoma of the lung diagnosed in 2017. She is followed as an outpatient by Dr. Geo Cuevas of the [facility]. Per outpatient records, the patient is status post multiple surgical resections and six rounds of carboplatin, paclitaxel, and bevacizumab still with recurrence of disease to the cerebellum status post resection in 2019. Most recently, in September of this year, the patient was treated with a single dose of bevacizumab. Given the patient's tenuous clinical status and out of concern for the development of fistulization, further targeted treatment was held earlier this month, and the patient's primary oncology team discussed with her the possibility of transition of care to hospice. At that time, the patient complained of dysuria, so she had a urinalysis performed, which demonstrated leukocyte esterase and nitrites, so the patient was administered a seven-day course of ciprofloxacin, but she had no symptom relief. Instead she had progressive discomfort in the lower abdomen in the context of a visible mass that has been present for several months. Per discussion with Dr. Cuevas, the patient was encouraged to present to the emergency department for further evaluation. At this time, the patient notes persistent dysuria accompanied by hematuria, pneumaturia, and black-colored urine. She denies any nausea, vomiting, diarrhea, or constipation. She has not had any chest pain, shortness of breath, back pain, or upper abdominal pain.     In the emergency department, the patient had a CT scan of the abdomen and pelvis with intravenous contrast performed, and it demonstrated large pelvic masses with infiltration into the bladder, the adjacent small bowel, and the anterior abdominal wall. The patient was administered one dose of intravenous meropenem and was admitted to medicine for further evaluation and management.         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 She is followed as an outpatient by Dr. Geo Cuevas of the [facility]. Per outpatient records, the patient is status post multiple surgical resections and six rounds of carboplatin, paclitaxel, and bevacizumab still with recurrence of disease to the cerebellum status post resection in 2019. Most recently, in September of this year, the patient was treated with a single dose of bevacizumab. Given the patient's tenuous clinical status and out of concern for the development of fistulization, further targeted treatment was held earlier this month, and the patient's primary oncology team discussed with her the possibility of transition of care to hospice. At that time, the patient complained of dysuria, so she had a urinalysis performed, which demonstrated leukocyte esterase and nitrites, so the patient was administered a seven-day course of ciprofloxacin, but she had no symptom relief. Instead she had progressive discomfort in the lower abdomen in the context of a visible mass that has been present for several months. Per discussion with Dr. Cuevas, the patient was encouraged to present to the emergency department for further evaluation. At this time, the patient notes persistent dysuria accompanied by hematuria, pneumaturia, and black-colored urine. She denies any nausea, vomiting, diarrhea, or constipation. She has not had any chest pain, shortness of breath, back pain, or upper abdominal pain.     In the emergency department, the patient had a CT scan of the abdomen and pelvis with intravenous contrast performed, and it demonstrated large pelvic masses with infiltration into the bladder, the adjacent small bowel, and the anterior abdominal wall. 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    Pt was evaluated by surg onc, gyn onc, rad onc, heme onc, ID and palliative. Pt's main complaint was pain from abd wall nodule, ______ drained? Plan was made to clear pt's current infection w _____ weeks of abx, and consider rad onc to reduce tumor burden/pain as an outpt. No inpatient chemotherapy was indicated.    The patient is a 71-year-old woman who is an immigrant from [place] and who is followed for stage IV ovarian cancer and stage I non-small cell carcinoma of the lung diagnosed in 2017. She is followed as an outpatient by Dr. Geo Cuevas of the [facility]. Per outpatient records, the patient is status post multiple surgical resections and six rounds of carboplatin, paclitaxel, and bevacizumab still with recurrence of disease to the cerebellum status post resection in 2019. Most recently, in September of this year, the patient was treated with a single dose of bevacizumab. Given the patient's tenuous clinical status and out of concern for the development of fistulization, further targeted treatment was held earlier this month, and the patient's primary oncology team discussed with her the possibility of transition of care to hospice. At that time, the patient complained of dysuria, so she had a urinalysis performed, which demonstrated leukocyte esterase and nitrites, so the patient was administered a seven-day course of ciprofloxacin, but she had no symptom relief. Instead she had progressive discomfort in the lower abdomen in the context of a visible mass that has been present for several months. Per discussion with Dr. Cuevas, the patient was encouraged to present to the emergency department for further evaluation. At this time, the patient notes persistent dysuria accompanied by hematuria, pneumaturia, and black-colored urine. She denies any nausea, vomiting, diarrhea, or constipation. She has not had any chest pain, shortness of breath, back pain, or upper abdominal pain.     In the emergency department, the patient had a CT scan of the abdomen and pelvis with intravenous contrast performed, and it demonstrated large pelvic masses with infiltration into the bladder, the adjacent small bowel, and the anterior abdominal wall. 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    Pt was evaluated by surg onc, gyn onc, rad onc, heme onc, ID and palliative. Per surgical consultants, patient not surgical candidate as risk of creating cutaneous fistula if pelvic mass drained. Pelvic mass self-dislodged leaving clean based ulceration that was attended to by wound care. Patient completed 7 days of meropenem. To be d/jose angel home w/ home hospice per family's wishes.    The patient is a 71-year-old woman who is an immigrant from [place] and who is followed for stage IV ovarian cancer and stage I non-small cell carcinoma of the lung diagnosed in 2017. She is followed as an outpatient by Dr. Geo Cuevas of the [facility]. Per outpatient records, the patient is status post multiple surgical resections and six rounds of carboplatin, paclitaxel, and bevacizumab still with recurrence of disease to the cerebellum status post resection in 2019. Most recently, in September of this year, the patient was treated with a single dose of bevacizumab. Given the patient's tenuous clinical status and out of concern for the development of fistulization, further targeted treatment was held earlier this month, and the patient's primary oncology team discussed with her the possibility of transition of care to hospice. At that time, the patient complained of dysuria, so she had a urinalysis performed, which demonstrated leukocyte esterase and nitrites, so the patient was administered a seven-day course of ciprofloxacin, but she had no symptom relief. Instead she had progressive discomfort in the lower abdomen in the context of a visible mass that has been present for several months. Per discussion with Dr. Cuevas, the patient was encouraged to present to the emergency department for further evaluation. At this time, the patient notes persistent dysuria accompanied by hematuria, pneumaturia, and black-colored urine. She denies any nausea, vomiting, diarrhea, or constipation. She has not had any chest pain, shortness of breath, back pain, or upper abdominal pain.     In the emergency department, the patient had a CT scan of the abdomen and pelvis with intravenous contrast performed, and it demonstrated large pelvic masses with infiltration into the bladder, the adjacent small bowel, and the anterior abdominal wall. 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    Pt was evaluated by surg onc, gyn onc, rad onc, heme onc, ID and palliative. Per surgical consultants, patient not surgical candidate as risk of creating cutaneous fistula if pelvic mass drained. Pelvic mass self-dislodged leaving clean based ulceration that was attended to by wound care. Patient completed 7 days of meropenem. To be d/jose angel home w/ home hospice per family's wishes.

## 2021-10-23 NOTE — PROGRESS NOTE ADULT - ASSESSMENT
71F w stage IV ovarian cancer s/p AVEL, b/l SPO, with known residual masses in the pelvis, presenting w lower abdominal pain, dysuria, pain in superficial palpable mass on lower abdomen, found to have CT w pelvic masses extending to abdominal wall, and new entero-vesicular fistula, admitted for sepsis 2/2 UTI from entero-vesicular fistula vs infected abdominal wall mass. Pt currently on IV abx for UTI, pending potential surgical intervention to reduce pain from tumor burden after infection clears.

## 2021-10-23 NOTE — DISCHARGE NOTE PROVIDER - PROVIDER TOKENS
FREE:[LAST:[[facility]],PHONE:[(   )    -],FAX:[(   )    -],FOLLOWUP:[1 week]]

## 2021-10-23 NOTE — DISCHARGE NOTE PROVIDER - NSDCCPCAREPLAN_GEN_ALL_CORE_FT
PRINCIPAL DISCHARGE DIAGNOSIS  Diagnosis: Acute UTI  Assessment and Plan of Treatment: You presented with a UTI from an enterovesicular fistula, a connection between your bladder and your bowel due to your ovarian cancer. You were treated with 7 days of meropenem, an antibiotic with improvement in your UTI symptoms. Please follow up with your hospice care doctor if you have any further concerns for recurrent UTI or if you have fevers, chills, back pain.      SECONDARY DISCHARGE DIAGNOSES  Diagnosis: Ovarian cancer  Assessment and Plan of Treatment: You were prescribed oxycontin and dilaudid for pain management. Oxycontin should be taken twice daily. Dilaudid should be used ONLY for breakthrough pain. Please follow up with your hospice care doctor for continued pain control options. Please make sure you are having normal bowel movements while on pain killers.

## 2021-10-23 NOTE — DISCHARGE NOTE PROVIDER - CARE PROVIDER_API CALL
[facility],   Phone: (   )    -  Fax: (   )    -  Follow Up Time: 1 week

## 2021-10-23 NOTE — PROGRESS NOTE ADULT - SUBJECTIVE AND OBJECTIVE BOX
71yPatient is a 71y old  Female who presents with a chief complaint of abdominal pain, urinary tract infection     patient is Mandarin-speaking; patient preferred to use her daughter-in-law as  as opposed to  services (23 Oct 2021 16:13)      Interval history:  Seen in bed  Weak  No fevers  Family at bedside  Urine still with stool per daughter in law      Antimicrobials:    meropenem  IVPB 1000 milliGRAM(s) IV Intermittent every 12 hours    MEDICATIONS  (STANDING):  acetaminophen     Tablet .. 650 every 6 hours PRN  dexAMETHasone     Tablet 2 daily  dronabinol 2.5 two times a day  enoxaparin Injectable 40 daily  HYDROmorphone  Injectable 0.5 every 4 hours PRN  ondansetron   Disintegrating Tablet 4 every 6 hours PRN  oxyCODONE    IR 5 every 6 hours PRN  pantoprazole    Tablet 40 before breakfast  polyethylene glycol 3350 17 two times a day  senna 2 at bedtime        Vital Signs Last 24 Hrs  T(C): 36.8 (10-23-21 @ 12:49), Max: 36.8 (10-23-21 @ 06:24)  T(F): 98.3 (10-23-21 @ 12:49), Max: 98.3 (10-23-21 @ 12:49)  HR: 69 (10-23-21 @ 15:55) (69 - 86)  BP: 105/65 (10-23-21 @ 15:55) (98/67 - 108/70)  BP(mean): --  RR: 17 (10-23-21 @ 15:55) (16 - 17)  SpO2: 100% (10-23-21 @ 15:55) (100% - 100%)      PHYSICAL EXAM:  General: thin, chronically ill appearing,  NAD, Non-toxic  Neurology: A&Ox3, nonfocal  Respiratory: Clear to auscultation bilaterally  CV: RRR, S1S2, no murmurs, rubs or gallops  Abdominal: Soft,tender induration lower mid abd, distended, normal bowel sounds, palpable mass at lower abdomen  Extremities: No edema, + peripheral pulses  Line Sites: Clear  Skin: No rash                        7.7    21.14 )-----------( 309      ( 23 Oct 2021 07:25 )             23.9   10-23    138  |  101  |  16  ----------------------------<  109<H>  4.2   |  25  |  0.41<L>    Ca    9.3      23 Oct 2021 07:25  Phos  3.2     10-23  Mg     2.30     10-23    TPro  6.5  /  Alb  2.8<L>  /  TBili  0.5  /  DBili  x   /  AST  19  /  ALT  17  /  AlkPhos  87  10-22      LIVER FUNCTIONS - ( 22 Oct 2021 07:46 )  Alb: 2.8 g/dL / Pro: 6.5 g/dL / ALK PHOS: 87 U/L / ALT: 17 U/L / AST: 19 U/L / GGT: x             RECENT CULTURES:    Culture - Urine (collected 22 Oct 2021 15:40)  Source: Clean Catch Clean Catch (Midstream)  Final Report (23 Oct 2021 15:58):    <10,000 CFU/mL Normal Urogenital Estella    Culture - Blood (collected 21 Oct 2021 21:15)  Source: .Blood Blood-Venous  Preliminary Report (22 Oct 2021 22:02):    No growth to date.    Culture - Blood (collected 21 Oct 2021 21:12)  Source: .Blood Blood-Peripheral  Preliminary Report (22 Oct 2021 22:02):    No growth to date.          Radiology:  < from: CT Abdomen and Pelvis w/ Oral Cont and w/ IV Cont (10.21.21 @ 15:51) >  IMPRESSION:  Gas containing extensive pelvic mass apparently involving adjacent small bowel as well as the urinary bladder which now also demonstrates intraluminal gas.  Interval extension of the mass into the anterior abdominal wall which demonstrates new air-fluid level.    < end of copied text >

## 2021-10-23 NOTE — DISCHARGE NOTE PROVIDER - NSDCMRMEDTOKEN_GEN_ALL_CORE_FT
dexamethasone 2 mg oral tablet: 1 tab(s) orally once a day  docusate potassium 100 mg oral capsule: 1 tab(s) orally once a day  dronabinol 2.5 mg oral capsule: 1 cap(s) orally 2 times a day  folic acid 1 mg oral tablet: 1 tab(s) orally once a day  gabapentin 300 mg oral capsule: 1 cap(s) orally 3 times a day  pantoprazole 20 mg oral delayed release tablet: 1 tab(s) orally once a day  prochlorperazine 10 mg oral tablet: 1 tab(s) orally 4 times a day  Senna 8.6 mg oral tablet: 1 tab(s) orally once a day (at bedtime)  traMADol 50 mg oral tablet: 1 tab(s) orally every 6 hours  Vitamin B12 1000 mcg oral tablet: 1 tab(s) orally once a day   dexamethasone 2 mg oral tablet: 1 tab(s) orally once a day  docusate potassium 100 mg oral capsule: 1 tab(s) orally once a day  dronabinol 2.5 mg oral capsule: 1 cap(s) orally 2 times a day  folic acid 1 mg oral tablet: 1 tab(s) orally once a day  gabapentin 300 mg oral capsule: 1 cap(s) orally 3 times a day  HYDROmorphone 2 mg oral tablet: 1 tab(s) orally every 4 hours, As needed, Moderate Pain (4 - 6) MDD:6 tablets or 12 mg. istop #[id number]  morphine 15 mg/8 to 12 hr oral tablet, extended release: 1 tab(s) orally once a day MDD:MDD 2 tablets or 30 mg.  istop #[id number]  pantoprazole 20 mg oral delayed release tablet: 1 tab(s) orally once a day  prochlorperazine 10 mg oral tablet: 1 tab(s) orally 4 times a day  Senna 8.6 mg oral tablet: 1 tab(s) orally once a day (at bedtime)  Vitamin B12 1000 mcg oral tablet: 1 tab(s) orally once a day

## 2021-10-23 NOTE — PROGRESS NOTE ADULT - ASSESSMENT
71 F with ovarian cancer with progression, malignant mass invading urinary bladder and small intesting with enterovesicular fistula.    lower abd pain  dysuria  leukocytosis  h/o PCN allergy  abdominal carcinomatosis  Urine culture negative  Blood culture NGTD  IR unable to drain mass/fluid - appreciate consult    Suggest  Continue Meropenem 10/21-->  Trend WBC  F/up cultures  Consider palliative care evaluation  Care as per oncology    Tess Mcdaniel MD  [phone] (pager)  [phone] (office)

## 2021-10-23 NOTE — PROGRESS NOTE ADULT - ASSESSMENT
72 yo female with metastatic ovarian cancer now with infiltrative pelvic mass and enterovesicular fistula. Given patients late stage of disease she is not a good surgical candidate.     Recommend:   - goals of care discussion   - urine will be chronically colonized with bacteria 2/2 fistula, only treat urinary infections if patient is having symptoms   - can consider methenamine as ppx agent  - please call with further questions

## 2021-10-23 NOTE — CONSULT NOTE ADULT - CONSULT REASON
ovarian cancer w/ evidence of progression of disease
Pelvic mass infiltrating bladder
Entervesicular fistula
Percutaneous drainage of abdominal wall collection
intraabdominal collection
ovarian ca
goals of care

## 2021-10-23 NOTE — PROGRESS NOTE ADULT - SUBJECTIVE AND OBJECTIVE BOX
Subjective    Seen and examined.   Feeling well, no complaints.    Objective    Vital signs  T(F): , Max: 98.2 (10-23-21 @ 06:24)  HR: 78 (10-23-21 @ 06:24)  BP: 108/70 (10-23-21 @ 06:24)  SpO2: 100% (10-23-21 @ 06:24)  Wt(kg): --    Output     OUT:    Voided (mL): 1150 mL  Total OUT: 1150 mL    Total NET: -1150 mL          Physical Exam  Gen: NAD  Abd: soft NT ND  : no CVAT  Labs      10-23 @ 07:25    WBC 21.14 / Hct 23.9  / SCr 0.41     10-22 @ 07:46    WBC 25.89 / Hct 25.6  / SCr 0.40         Imaging  < from: CT Abdomen and Pelvis w/ Oral Cont and w/ IV Cont (10.21.21 @ 15:51) >    EXAM:  CT ABDOMEN AND PELVIS OC IC        PROCEDURE DATE:  Oct 21 2021         INTERPRETATION:  CLINICAL INFORMATION: 71-year-old female presenting with abdominal pain. History metastatic ovarian cancer. Referred from oncologist for possible bowel bladder fistula versus persistent urinary tract infection    COMPARISON: None.    CONTRAST/COMPLICATIONS:  IV Contrast: Omnipaque 350  70 cc administered   30 cc discarded  Oral Contrast: Omnipaque 300 + Fruit 2o  Complications: None reported at time of study completion    PROCEDURE:  CT of the Abdomen and Pelvis was performed.  Sagittal and coronal reformats were performed.    FINDINGS:  LOWER CHEST: 2 mm right middle lobe nodule (2:4)    LIVER: Multiple hypodense liver lesions essentially unchanged from 09/28/2021  BILE DUCTS: Normal caliber.  GALLBLADDER: Within normal limits.  SPLEEN: Several hypodensities unchanged  PANCREAS: Within normal limits.  ADRENALS: Within normal limits.  KIDNEYS/URETERS: Within normal limits.    BLADDER/PERITONEUM/BOWEL/ ABDOMINAL WALL: No bowel obstruction. Pelvic mass with gas as previously described infiltrating the urinary bladder which is also gas containing. Again the mass apparently involves adjacent small bowel and has now eroded into the abdominal wall which also demonstrates an air-fluid level (2:97).  Rectal and transverse colonic anastomoses.  Additional deep right pelvic implant unchanged 3.3 x 2.7 cm (2:97).  REPRODUCTIVE ORGANS:  VESSELS: Within normal limits.  RETROPERITONEUM/LYMPH NODES:Small upper abdominal lymph nodes unchanged. Left external iliac lymph node contiguous with the pelvic mass unchanged measuring 2.9 x 2 cm    BONES: Within normal limits.    IMPRESSION:  Gas containing extensive pelvic mass apparently involving adjacent small bowel as well as the urinary bladder which now also demonstrates intraluminal gas.  Interval extension of the mass into the anterior abdominal wall which demonstrates new air-fluid level.

## 2021-10-23 NOTE — PROGRESS NOTE ADULT - PROBLEM SELECTOR PLAN 2
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  - Patient failed empiric antibiotic therapy, ciprofloxacin, as outpatient   - meropenem given in ED before UA/UCx collected  - surg recs: possible palliative resection if in line w GOC pending infection clearance  - urology recs: only tx UTI if pt having sx, can try methenamine w vit C for UTI ppx  - gyn onc recs: not a surg candidate  - heme onc: no role for further chemo  - rad onc recs: can pursue rad onc outpt to decrease pain from tumor burden once infx clears    Plan:  - c/w meropenem 1g BID (10/21-)  - f/u ID recs for duration of abx  - patient/family would like to pursue surgery and/or rad onc to relieve pain/freq UTIs  - consult IR for possible drainage of abdominal wall nodule   - once infection clears, consider rad onc or surgical intervention as indicated/appropriate and in lines w GOC of pt/family

## 2021-10-23 NOTE — PROGRESS NOTE ADULT - PROBLEM SELECTOR PLAN 1
-Meets sepsis criteria given tachycardia and leukocytosis   -Currently HD stable  -2/2 emphysematous cystitis vs inflamed abdominal nodule    Plan:  - c/w meropenem (10/21-) as below

## 2021-10-23 NOTE — PROGRESS NOTE ADULT - ATTENDING COMMENTS
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

## 2021-10-23 NOTE — PROGRESS NOTE ADULT - ATTENDING COMMENTS
72 yo Mandarin speaking female with metastatic ovarian cancer now with infiltrative pelvic mass and enterovesical fistula.     Patient is poor surgical candidate for urinary diversion.    - goals of care discussion   - urine will be chronically colonized with bacteria 2/2 fistula, only treat urinary infections if patient is symptomic   - can consider methenamine as urinary prophylaxis     Indra Farrar MD  [address] [facility]  [address]  [place], [place] [id number] [phone]

## 2021-10-23 NOTE — PROGRESS NOTE ADULT - SUBJECTIVE AND OBJECTIVE BOX
Samuel Zapata, PGY1  Internal Medicine      PATIENT: LINA CHAIDEZ, MRN: [id number]    CHIEF COMPLAINT: Patient is a 71y old  Female who presents with a chief complaint of abdominal pain, urinary tract infection     patient is Mandarin-speaking; patient preferred to use her daughter-in-law as  as opposed to  services (23 Oct 2021 09:46)      INTERVAL HISTORY/OVERNIGHT EVENTS:   - no event overnight  - +dysuria, no hematuria, no BM x2d, not passing gas  - pain in abdominal nodule unchanged, still 7-8/10    REVIEW OF SYSTEMS:  Negative unless noted in HPI      MEDICATIONS:  MEDICATIONS  (STANDING):  cyanocobalamin 1000 MICROGram(s) Oral daily  dexAMETHasone     Tablet 2 milliGRAM(s) Oral daily  dronabinol 2.5 milliGRAM(s) Oral two times a day  enoxaparin Injectable 40 milliGRAM(s) SubCutaneous daily  folic acid 1 milliGRAM(s) Oral daily  meropenem  IVPB 1000 milliGRAM(s) IV Intermittent every 12 hours  pantoprazole    Tablet 40 milliGRAM(s) Oral before breakfast  senna 2 Tablet(s) Oral at bedtime  sodium chloride 0.9%. 1000 milliLiter(s) (50 mL/Hr) IV Continuous <Continuous>    MEDICATIONS  (PRN):  acetaminophen     Tablet .. 650 milliGRAM(s) Oral every 6 hours PRN Mild Pain (1 - 3), Moderate Pain (4 - 6)  ondansetron   Disintegrating Tablet 4 milliGRAM(s) Oral every 6 hours PRN Nausea and/or Vomiting  oxyCODONE    IR 5 milliGRAM(s) Oral every 6 hours PRN Severe Pain (7 - 10)      ALLERGIES: Allergies    carboplatin (Flushing; Rash)  penicillin (Unknown)    Intolerances        OBJECTIVE:    VITALS:   Vital Signs Last 24 Hrs  T(C): 36.8 (23 Oct 2021 06:24), Max: 36.8 (23 Oct 2021 06:24)  T(F): 98.2 (23 Oct 2021 06:24), Max: 98.2 (23 Oct 2021 06:24)  HR: 78 (23 Oct 2021 06:24) (78 - 89)  BP: 108/70 (23 Oct 2021 06:24) (102/65 - 108/70)  BP(mean): --  RR: 16 (23 Oct 2021 06:24) (16 - 17)  SpO2: 100% (23 Oct 2021 06:24) (97% - 100%)    CAPILLARY BLOOD GLUCOSE          I&O's Summary    22 Oct 2021 07:01  -  23 Oct 2021 07:00  --------------------------------------------------------  IN: 818 mL / OUT: 1150 mL / NET: -332 mL      Daily     Daily     PHYSICAL EXAMINATION:  General: Comfortable, no acute distress, cooperative with exam.  HEENT: PERRLA, EOMI, moist mucous membranes.  Respiratory: CTAB, normal respiratory effort, no coughing, wheezes, crackles, or rales.  CV: RRR, S1S2, no murmurs, rubs or gallops. No JVD. Distal pulses intact.  Abdominal: Erythematous nodule in lower abdomen, exquisitely TTP, tender to light palpation. Soft, nontender, nondistended, no rebound or guarding, normal bowel sounds.  Neurology: AOx3, no focal neuro defects, HOUSE x 4.   Extremities: No pitting edema, + Peripheral pulses.      LABS:                        7.7    21.14 )-----------( 309      ( 23 Oct 2021 07:25 )             23.9     10-23    138  |  101  |  16  ----------------------------<  109<H>  4.2   |  25  |  0.41<L>    Ca    9.3      23 Oct 2021 07:25  Phos  3.2     10-23  Mg     2.30     10-23    TPro  6.5  /  Alb  2.8<L>  /  TBili  0.5  /  DBili  x   /  AST  19  /  ALT  17  /  AlkPhos  87  10-22    PT/INR - ( 21 Oct 2021 12:53 )   PT: 14.1 sec;   INR: 1.25 ratio         PTT - ( 21 Oct 2021 12:53 )  PTT:26.9 sec            MICRO:  Microbiology     EKG:    RADIOLOGY & ADDITIONAL TESTS:    IMAGING:

## 2021-10-23 NOTE — PROGRESS NOTE ADULT - PROBLEM SELECTOR PLAN 3
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 Flores, patient is not a good candidate for additional chemotherapy at this time    Plan:  - family would like to pursue surgery or outpt rad onc to reduce pain from tumor burden

## 2021-10-23 NOTE — PROGRESS NOTE ADULT - PROBLEM SELECTOR PLAN 5
-ISTOP noted in chart; will administer acetaminophen for mild and moderate pain and oxycodone for severe pain every six hours; will assess patient's daily requirement of pain medications and order standing regimen according to daily requirements

## 2021-10-23 NOTE — CONSULT NOTE ADULT - CONSULT REQUESTED DATE/TIME
21-Oct-2021 18:00
21-Oct-2021 18:45
23-Oct-2021 16:13
22-Oct-2021
22-Oct-2021 11:41
21-Oct-2021 21:48
22-Oct-2021 13:55

## 2021-10-24 NOTE — PROGRESS NOTE ADULT - ATTENDING COMMENTS
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

## 2021-10-24 NOTE — PROGRESS NOTE ADULT - ASSESSMENT
71F w stage IV ovarian cancer s/p AVEL, b/l SPO, with known residual masses in the pelvis, presenting w lower abdominal pain, dysuria, pain in superficial palpable mass on lower abdomen, found to have CT w pelvic masses extending to abdominal wall, and new entero-vesicular fistula, admitted for sepsis 2/2 UTI from entero-vesicular fistula vs infected abdominal wall mass. Pt currently on IV abx for UTI, pending potential surgical intervention to reduce pain from tumor burden after infection clears.  71F w stage IV ovarian cancer s/p AVEL, b/l SPO, with known residual masses in the pelvis, presenting w lower abdominal pain, dysuria, pain in superficial palpable mass on lower abdomen, found to have CT w pelvic masses extending to abdominal wall, and new entero-vesicular fistula, admitted for sepsis 2/2 UTI from entero-vesicular fistula vs infected abdominal wall mass. Pt currently on IV abx for coverage of enterovesicular fistula, no surgical intervention offered at this time.

## 2021-10-24 NOTE — PROGRESS NOTE ADULT - PROBLEM SELECTOR PLAN 2
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  - Patient failed empiric antibiotic therapy, ciprofloxacin, as outpatient   - meropenem given in ED before UA/UCx collected  - surg recs: possible palliative resection if in line w GOC pending infection clearance  - urology recs: only tx UTI if pt having sx, can try methenamine w vit C for UTI ppx  - gyn onc recs: not a surg candidate  - heme onc: no role for further chemo  - rad onc recs: can pursue rad onc outpt to decrease pain from tumor burden once infx clears    Plan:  - c/w meropenem 1g BID (10/21-)  - f/u ID recs for duration of abx  - patient/family would like to pursue surgery and/or rad onc to relieve pain/freq UTIs  - consult IR for possible drainage of abdominal wall nodule   - once infection clears, consider rad onc or surgical intervention as indicated/appropriate and in lines w GOC of pt/family 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  - Patient failed empiric antibiotic therapy, ciprofloxacin, as outpatient   - meropenem given in ED before UA/UCx collected  - surg recs: possible palliative resection if in line w GOC pending infection clearance  - urology recs: only tx UTI if pt having sx, can try methenamine w vit C for UTI ppx  - gyn onc recs: not a surg candidate  - heme onc: no role for further chemo  - rad onc recs: can pursue rad onc outpt to decrease pain from tumor burden once infx clears    Plan:  - c/w meropenem 1g BID (10/21-)  - f/u ID recs for duration of abx  - patient/family would like to pursue surgery and/or rad onc to relieve pain/freq UTIs  - IR unable to offer drainage of abdominal wall nodule  - once infection clears, consider rad onc or surgical intervention as indicated/appropriate and in lines w GOC of pt/family

## 2021-10-24 NOTE — PROGRESS NOTE ADULT - PROBLEM SELECTOR PLAN 1
-Meets sepsis criteria given tachycardia and leukocytosis   -Currently HD stable  -2/2 emphysematous cystitis vs inflamed abdominal nodule    Plan:  - c/w meropenem (10/21-) as below -Meets sepsis criteria given tachycardia and leukocytosis   -Currently HD stable  -2/2 emphysematous cystitis vs inflamed abdominal nodule    Plan:  - c/w meropenem (10/21-)

## 2021-10-24 NOTE — PROGRESS NOTE ADULT - SUBJECTIVE AND OBJECTIVE BOX
Gretel Porras MD  PGY 2 Department of Internal Medicine  Pager: [phone] [place], [phone] [facility]      Patient is a 71y old  Female who presents with a chief complaint of abdominal pain, urinary tract infection     patient is Mandarin-speaking; patient preferred to use her daughter-in-law as  as opposed to  services (23 Oct 2021 18:06)      SUBJECTIVE / OVERNIGHT EVENTS: Pt seen and examined. No acute overnight events.   Denies fevers, chills, CP/palpitations, SOB/cough, abdominal pain, N/V, constipation, or diarrhea.        MEDICATIONS  (STANDING):  cyanocobalamin 1000 MICROGram(s) Oral daily  dexAMETHasone     Tablet 2 milliGRAM(s) Oral daily  dronabinol 2.5 milliGRAM(s) Oral two times a day  enoxaparin Injectable 40 milliGRAM(s) SubCutaneous daily  folic acid 1 milliGRAM(s) Oral daily  meropenem  IVPB 1000 milliGRAM(s) IV Intermittent every 12 hours  pantoprazole    Tablet 40 milliGRAM(s) Oral before breakfast  polyethylene glycol 3350 17 Gram(s) Oral two times a day  senna 2 Tablet(s) Oral at bedtime  sodium chloride 0.9%. 1000 milliLiter(s) (50 mL/Hr) IV Continuous <Continuous>  sodium chloride 0.9%. 1000 milliLiter(s) (50 mL/Hr) IV Continuous <Continuous>    MEDICATIONS  (PRN):  acetaminophen     Tablet .. 650 milliGRAM(s) Oral every 6 hours PRN Mild Pain (1 - 3), Moderate Pain (4 - 6)  HYDROmorphone  Injectable 0.5 milliGRAM(s) IV Push every 4 hours PRN Severe Pain (7 - 10)  ondansetron   Disintegrating Tablet 4 milliGRAM(s) Oral every 6 hours PRN Nausea and/or Vomiting  oxyCODONE    IR 5 milliGRAM(s) Oral every 6 hours PRN Moderate Pain (4 - 6)      I&O's Summary    23 Oct 2021 07:01  -  24 Oct 2021 07:00  --------------------------------------------------------  IN: 250 mL / OUT: 700 mL / NET: -450 mL        Vital Signs Last 24 Hrs  T(C): 36.3 (24 Oct 2021 05:11), Max: 36.8 (23 Oct 2021 12:49)  T(F): 97.4 (24 Oct 2021 05:11), Max: 98.3 (23 Oct 2021 12:49)  HR: 78 (24 Oct 2021 05:11) (69 - 86)  BP: 115/72 (24 Oct 2021 05:11) (98/67 - 115/72)  BP(mean): --  RR: 17 (24 Oct 2021 05:11) (17 - 17)  SpO2: 99% (24 Oct 2021 05:11) (99% - 100%)    CAPILLARY BLOOD GLUCOSE          PHYSICAL EXAM:  GENERAL: NAD,   HEAD:  Atraumatic, Normocephalic  EYES: EOMI, PERRL, conjunctiva and sclera clear  NECK: No JVD  CHEST/LUNG: Clear to auscultation bilaterally; No wheeze  HEART: Regular rate and rhythm; No murmurs, rubs, or gallops  ABDOMEN: Soft, Nontender, Nondistended; Bowel sounds present  EXTREMITIES:  2+ Peripheral Pulses, No clubbing, cyanosis, or edema  PSYCH: AAOx3  NEUROLOGY: non-focal  SKIN: No rashes or lesions       LABS:                        7.7    21.14 )-----------( 309      ( 23 Oct 2021 07:25 )             23.9     Auto Eosinophil # x     / Auto Eosinophil % x     / Auto Neutrophil # x     / Auto Neutrophil % x     / BANDS % x                            8.3    25.89 )-----------( 297      ( 22 Oct 2021 07:46 )             25.6     Auto Eosinophil # 0.00  / Auto Eosinophil % 0.0   / Auto Neutrophil # 22.96 / Auto Neutrophil % 84.4  / BANDS % 4.3      10-23    138  |  101  |  16  ----------------------------<  109<H>  4.2   |  25  |  0.41<L>  10-22    130<L>  |  93<L>  |  12  ----------------------------<  76  3.5   |  23  |  0.40<L>    Ca    9.3      23 Oct 2021 07:25  Mg     2.30     10-23  Phos  3.2     10-23  TPro  6.5  /  Alb  2.8<L>  /  TBili  0.5  /  DBili  x   /  AST  19  /  ALT  17  /  AlkPhos  87  10-22               Gretel Porras MD  PGY 2 Department of Internal Medicine  Pager: [phone] [place], [phone] [facility]      Patient is a 71y old  Female who presents with a chief complaint of abdominal pain, urinary tract infection     patient is Mandarin-speaking; patient preferred to use her daughter-in-law as  as opposed to  services (23 Oct 2021 18:06)      SUBJECTIVE / OVERNIGHT EVENTS: Pt seen and examined. No acute overnight events. This morning the pt continues to have discomfort from pelvic mass.     MEDICATIONS  (STANDING):  cyanocobalamin 1000 MICROGram(s) Oral daily  dexAMETHasone     Tablet 2 milliGRAM(s) Oral daily  dronabinol 2.5 milliGRAM(s) Oral two times a day  enoxaparin Injectable 40 milliGRAM(s) SubCutaneous daily  folic acid 1 milliGRAM(s) Oral daily  meropenem  IVPB 1000 milliGRAM(s) IV Intermittent every 12 hours  pantoprazole    Tablet 40 milliGRAM(s) Oral before breakfast  polyethylene glycol 3350 17 Gram(s) Oral two times a day  senna 2 Tablet(s) Oral at bedtime  sodium chloride 0.9%. 1000 milliLiter(s) (50 mL/Hr) IV Continuous <Continuous>  sodium chloride 0.9%. 1000 milliLiter(s) (50 mL/Hr) IV Continuous <Continuous>    MEDICATIONS  (PRN):  acetaminophen     Tablet .. 650 milliGRAM(s) Oral every 6 hours PRN Mild Pain (1 - 3), Moderate Pain (4 - 6)  HYDROmorphone  Injectable 0.5 milliGRAM(s) IV Push every 4 hours PRN Severe Pain (7 - 10)  ondansetron   Disintegrating Tablet 4 milliGRAM(s) Oral every 6 hours PRN Nausea and/or Vomiting  oxyCODONE    IR 5 milliGRAM(s) Oral every 6 hours PRN Moderate Pain (4 - 6)      I&O's Summary    23 Oct 2021 07:01  -  24 Oct 2021 07:00  --------------------------------------------------------  IN: 250 mL / OUT: 700 mL / NET: -450 mL        Vital Signs Last 24 Hrs  T(C): 36.3 (24 Oct 2021 05:11), Max: 36.8 (23 Oct 2021 12:49)  T(F): 97.4 (24 Oct 2021 05:11), Max: 98.3 (23 Oct 2021 12:49)  HR: 78 (24 Oct 2021 05:11) (69 - 86)  BP: 115/72 (24 Oct 2021 05:11) (98/67 - 115/72)  BP(mean): --  RR: 17 (24 Oct 2021 05:11) (17 - 17)  SpO2: 99% (24 Oct 2021 05:11) (99% - 100%)    CAPILLARY BLOOD GLUCOSE          PHYSICAL EXAM:  GENERAL: NAD  HEAD:  Atraumatic, Normocephalic  EYES: EOMI, PERRL, conjunctiva and sclera clear  NECK: No JVD  CHEST/LUNG: Clear to auscultation bilaterally; No wheezes or crackles  HEART: Regular rate and rhythm; No murmurs, rubs, or gallops  ABDOMEN: Soft, nontender, Nondistended; Bowel sounds prese  PELVIS: Tenderness at site of pelvic mass  EXTREMITIES:  2+ Peripheral Pulses, No clubbing, cyanosis, or edema  PSYCH: AAOx3  NEUROLOGY: non-focal  SKIN: No rashes or lesions       LABS:                        7.7    21.14 )-----------( 309      ( 23 Oct 2021 07:25 )             23.9     Auto Eosinophil # x     / Auto Eosinophil % x     / Auto Neutrophil # x     / Auto Neutrophil % x     / BANDS % x                            8.3    25.89 )-----------( 297      ( 22 Oct 2021 07:46 )             25.6     Auto Eosinophil # 0.00  / Auto Eosinophil % 0.0   / Auto Neutrophil # 22.96 / Auto Neutrophil % 84.4  / BANDS % 4.3      10-23    138  |  101  |  16  ----------------------------<  109<H>  4.2   |  25  |  0.41<L>  10-22    130<L>  |  93<L>  |  12  ----------------------------<  76  3.5   |  23  |  0.40<L>    Ca    9.3      23 Oct 2021 07:25  Mg     2.30     10-23  Phos  3.2     10-23  TPro  6.5  /  Alb  2.8<L>  /  TBili  0.5  /  DBili  x   /  AST  19  /  ALT  17  /  AlkPhos  87  10-22

## 2021-10-25 NOTE — PROGRESS NOTE ADULT - PROBLEM SELECTOR PLAN 4
- i/s/o vesiculo-enteric fistula  - on meropenem   - patient/family would like to pursue surgery and/or rad onc to relieve pain/freq UTIs  - surg recs: possible palliative resection if in line w GOC pending infection clearance  - urology recs: "Given patients late stage of disease she is not a good surgical candidate.   - Gyn/Onc recs: "Not a candidate for surgical intervention. Heavily pretreated with chemotherapy with progressive disease and toxicities. Recommended palliative care/hospice. "  - rad onc recs: can pursue rad onc outpt to decrease pain from tumor burden once infx clears.  - pain control as above. - i/s/o vesiculo-enteric fistula  - on meropenem   - Surgery, Gyn/Onc, Urology, and IR recommendations appreciated   - pain control as above.

## 2021-10-25 NOTE — PROGRESS NOTE ADULT - PROBLEM SELECTOR PLAN 2
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  - Patient failed empiric antibiotic therapy, ciprofloxacin, as outpatient   - meropenem given in ED before UA/UCx collected  - surg recs: possible palliative resection if in line w GOC pending infection clearance  - urology recs: only tx UTI if pt having sx, can try methenamine w vit C for UTI ppx  - IR: does not recommend drainage   - gyn onc recs: not a surg candidate  - heme onc: no role for further chemo  - rad onc recs: can pursue rad onc outpt to decrease pain from tumor burden once infx clears    Plan:  - c/w meropenem 1g BID (10/21-)  - f/u ID recs for duration of abx / whether pt should be on abx ppx

## 2021-10-25 NOTE — PROGRESS NOTE ADULT - ASSESSMENT
71yF w/ recurrent metastatic ovarian CA, now c/b enterovesicular fistula, likely extending into anterior abdominal wall. Patient with advanced metastatic disease, with progression of disease despite prior surgical debulking and chemotherapy, not candidate for curative resection. Patient currently without evidence of obstruction and improvement of pain and WBC on antibiotics, no indication for palliative resection at this time.     Plan:   - No acute plan for surgical intervention   - F/U Urology and Gyn-Onc recs: no plan for operative intervention   - Appreciate ID recs re: abx  - Appreciate Palliative recs: pt to remain full code and pursuing curative treatment per family  - Please re-consult surgical oncology as needed if clinical condition changes    D Team Surgery  [id number]

## 2021-10-25 NOTE — PROGRESS NOTE ADULT - ASSESSMENT
71f with stage IV ovarian cancer and stage I non-small cell carcinoma of the lung diagnosed in 2017, s/p multiple lines of therapy, presenting with UTI and sepsis.    Patient and family understand and are aware that she is not a candidate for chemotherapy, they ask about other treatment modalities including surgery and radiation.    Infectious work up, abx  Seen by urology, gyn onc, sx and IR, no acute interventions indicated  will followup Rad Onc consult recs  pal care consult input appreciated, patient is an appropriate candidate for hospice services, she is heavily pretreated and is not a candidate for further chemotherapy 2/2 performance status and hematologic toxicity from prior treatments.   -Patient to followup with Dr. Geo Cuevas ([facility]) upon discharge  -C/w Supportive care, pain control, Nutrition, PT, DVT ppx  -Oncology will continue to follow with you      Case d/w Dr. Thang COOPER  Oncology Physician Assistant  [facility]  Pager [phone]    If after 5pm or weekends please page On-call Oncology Fellow

## 2021-10-25 NOTE — HOSPICE CARE NOTE - HOSPICE APPROVAL ADDITIONAL DETAILS
Duration of course of IV meropenem to be determined.   HCN MD will need determination of the duration of therapy in order to approve admission to hospice.

## 2021-10-25 NOTE — PROGRESS NOTE ADULT - PROBLEM SELECTOR PLAN 1
Meets sepsis criteria given tachycardia and leukocytosis. Currently HD stable  - likely 2/2 emphysematous cystitis vs inflamed/infected abdominal wall mass   - urine cx neg, blood cx 10/21 ngtd     Plan:  - c/w meropenem (10/21-)

## 2021-10-25 NOTE — PROGRESS NOTE ADULT - REASON FOR ADMISSION
abdominal pain, urinary tract infection     patient is Mandarin-speaking; patient preferred to use her daughter-in-law as  as opposed to  services abdominal pain, urinary tract infection

## 2021-10-25 NOTE — PROGRESS NOTE ADULT - SUBJECTIVE AND OBJECTIVE BOX
CHRIS Lane PGY1  Pager# [phone]    Patient is a 71y old  Female who presents with a chief complaint of abdominal pain, urinary tract infection     SUBJECTIVE / OVERNIGHT EVENTS: Mandarin phone  used, #[phone]  Pt states she does not know how she feels but no specific complaints. Says pain is well controlled on current regimen but requiring 0.5 IV dilaudid PRN. No abdominal pain, nausea, or vomiting.       ADDITIONAL REVIEW OF SYSTEMS:  CONSTITUTIONAL: No fevers or chills  EYES/ENT: No visual changes;  No vertigo or throat pain   NECK: No pain or stiffness  RESPIRATORY: No cough, wheezing, hemoptysis; No shortness of breath  CARDIOVASCULAR: No chest pain or palpitations  GASTROINTESTINAL: No abdominal or epigastric pain. No nausea, vomiting, or hematemesis; No diarrhea or constipation. No melena or hematochezia.  GENITOURINARY: No dysuria, frequency or hematuria  NEUROLOGICAL: No syncope or dizziness  SKIN: No itching, rashes    MEDICATIONS  (STANDING):  bisacodyl 5 milliGRAM(s) Oral at bedtime  cyanocobalamin 1000 MICROGram(s) Oral daily  dexAMETHasone     Tablet 2 milliGRAM(s) Oral daily  dronabinol 2.5 milliGRAM(s) Oral two times a day  enoxaparin Injectable 40 milliGRAM(s) SubCutaneous daily  folic acid 1 milliGRAM(s) Oral daily  meropenem  IVPB 1000 milliGRAM(s) IV Intermittent every 12 hours  morphine ER Tablet 15 milliGRAM(s) Oral daily  pantoprazole    Tablet 40 milliGRAM(s) Oral before breakfast  polyethylene glycol 3350 17 Gram(s) Oral two times a day  senna 2 Tablet(s) Oral at bedtime  sodium chloride 0.9%. 1000 milliLiter(s) (50 mL/Hr) IV Continuous <Continuous>  sodium chloride 0.9%. 1000 milliLiter(s) (50 mL/Hr) IV Continuous <Continuous>    MEDICATIONS  (PRN):  acetaminophen     Tablet .. 650 milliGRAM(s) Oral every 6 hours PRN Mild Pain (1 - 3)  HYDROmorphone   Tablet 2 milliGRAM(s) Oral every 4 hours PRN Moderate Pain (4 - 6)  HYDROmorphone  Injectable 0.5 milliGRAM(s) IV Push every 4 hours PRN Severe Pain (7 - 10)  ondansetron   Disintegrating Tablet 4 milliGRAM(s) Oral every 6 hours PRN Nausea and/or Vomiting      CAPILLARY BLOOD GLUCOSE        I&O's Summary      PHYSICAL EXAM:  Vital Signs Last 24 Hrs  T(C): 37.2 (25 Oct 2021 12:30), Max: 37.7 (25 Oct 2021 05:09)  T(F): 99 (25 Oct 2021 12:30), Max: 99.8 (25 Oct 2021 05:09)  HR: 98 (25 Oct 2021 12:30) (80 - 98)  BP: 103/60 (25 Oct 2021 12:30) (100/65 - 127/72)  BP(mean): --  RR: 17 (25 Oct 2021 12:30) (17 - 18)  SpO2: 98% (25 Oct 2021 12:30) (96% - 99%)  General: NAD, lying comfortably in bed   HEENT: EOMi, no scleral icterus  Neck: supple, no jvd  CV: RRR, normal S1 and S2, no m/r/g  Lungs: normal respiratory effort. CTAB, no wheezes, rales, or rhonchi  Abd: soft, nondistended, normal BS, nontender to palpation. No guarding or rebound   Ext: no edema, 2+ peripheral pulses   Pysch: AAOx3  Neuro: non-focal  Skin: no rashes or lesions     LABS:                        8.2    18.05 )-----------( 338      ( 25 Oct 2021 07:55 )             25.7     10-25    137  |  99  |  16  ----------------------------<  79  3.8   |  27  |  0.39<L>    Ca    9.5      25 Oct 2021 07:55  Phos  2.4     10-25  Mg     2.10     10-25                      RADIOLOGY & ADDITIONAL TESTS:  Results Reviewed:  Y  Imaging Personally Reviewed: Y  Electrocardiogram Personally Reviewed:    COORDINATION OF CARE:  Care Discussed with Consultants/Other Providers [Y/N]: Y  Prior or Outpatient Records Reviewed [Y/N]:

## 2021-10-25 NOTE — PROGRESS NOTE ADULT - ASSESSMENT
71F w stage IV ovarian cancer s/p AVEL, b/l SPO, with known residual masses in the pelvis, presenting w/ lower abdominal pain, dysuria, pain in superficial palpable mass on lower abdomen, found to have CT w pelvic masses extending to abdominal wall, and new entero-vesicular fistula, admitted for sepsis 2/2 UTI from entero-vesicular fistula vs infected abdominal wall mass. Pt currently on IV abx for coverage of enterovesicular fistula, no surgical intervention offered at this time.

## 2021-10-25 NOTE — PROGRESS NOTE ADULT - PROBLEM SELECTOR PLAN 1
- PO Acetaminophen 650mg q6 PRN mild-moderate pain  - PO Oxycodone IR 5mg q4-6 PRN moderate-severe pain  - Bowel regimen while on opiates. - D/C Oxycodone IR 5mg  - Start MS Contin (Morphine ER) 15mg once a day  - Continue PO Tylenol PRN mild pain  - Start PO Dilaudid 2mg q4 PRN moderate pain  - Continue IV Dilaudid 0.5mg q4 PRN severe pain  - Bowel regimen while on opiates.

## 2021-10-25 NOTE — PROGRESS NOTE ADULT - PROBLEM SELECTOR PLAN 4
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 Flores, patient is not a good candidate for additional chemotherapy at this time    Plan:  - palliative following: after discussion w/ family, pt put in for hospice referral  - will try to optimize pain management and re-consult IR only if pain not controlled after adjustments

## 2021-10-25 NOTE — PROGRESS NOTE ADULT - SUBJECTIVE AND OBJECTIVE BOX
INTERVAL HPI/OVERNIGHT EVENTS:  Patient seen at bedside.  Patient with no acute complaints  Accompanied by her dtr in law and family    VITAL SIGNS:  T(F): 99 (10-25-21 @ 12:30)  HR: 98 (10-25-21 @ 12:30)  BP: 103/60 (10-25-21 @ 12:30)  RR: 17 (10-25-21 @ 12:30)  SpO2: 98% (10-25-21 @ 12:30)  Wt(kg): --    PHYSICAL EXAM:    GENERAL: NAD  HEAD:  Atraumatic, Normocephalic  EYES: EOMI, PERRL, conjunctiva and sclera clear  NECK: No JVD  CHEST/LUNG: Clear to auscultation bilaterally; No wheezes or crackles  HEART: Regular rate and rhythm; No murmurs, rubs, or gallops  ABDOMEN: Soft, nontender, Nondistended; Bowel sounds prese  PELVIS: Tenderness at site of pelvic mass  EXTREMITIES:  2+ Peripheral Pulses, No clubbing, cyanosis, or edema  PSYCH: AAOx3  NEUROLOGY: non-focal  SKIN: No rashes or lesions        MEDICATIONS  (STANDING):  bisacodyl 5 milliGRAM(s) Oral at bedtime  cyanocobalamin 1000 MICROGram(s) Oral daily  dexAMETHasone     Tablet 2 milliGRAM(s) Oral daily  dronabinol 2.5 milliGRAM(s) Oral two times a day  enoxaparin Injectable 40 milliGRAM(s) SubCutaneous daily  folic acid 1 milliGRAM(s) Oral daily  meropenem  IVPB 1000 milliGRAM(s) IV Intermittent every 12 hours  morphine ER Tablet 15 milliGRAM(s) Oral daily  pantoprazole    Tablet 40 milliGRAM(s) Oral before breakfast  polyethylene glycol 3350 17 Gram(s) Oral two times a day  senna 2 Tablet(s) Oral at bedtime  sodium chloride 0.9%. 1000 milliLiter(s) (50 mL/Hr) IV Continuous <Continuous>  sodium chloride 0.9%. 1000 milliLiter(s) (50 mL/Hr) IV Continuous <Continuous>    MEDICATIONS  (PRN):  acetaminophen     Tablet .. 650 milliGRAM(s) Oral every 6 hours PRN Mild Pain (1 - 3)  HYDROmorphone   Tablet 2 milliGRAM(s) Oral every 4 hours PRN Moderate Pain (4 - 6)  HYDROmorphone  Injectable 0.5 milliGRAM(s) IV Push every 4 hours PRN Severe Pain (7 - 10)  ondansetron   Disintegrating Tablet 4 milliGRAM(s) Oral every 6 hours PRN Nausea and/or Vomiting      Allergies    carboplatin (Flushing; Rash)  penicillin (Unknown)    Intolerances        LABS:                        8.2    18.05 )-----------( 338      ( 25 Oct 2021 07:55 )             25.7     10-25    137  |  99  |  16  ----------------------------<  79  3.8   |  27  |  0.39<L>    Ca    9.5      25 Oct 2021 07:55  Phos  2.4     10-25  Mg     2.10     10-25            RADIOLOGY & ADDITIONAL TESTS:  Studies reviewed.

## 2021-10-25 NOTE — PROGRESS NOTE ADULT - SUBJECTIVE AND OBJECTIVE BOX
SUBJECTIVE AND OBJECTIVE:  Patient preferred for daughter-in-law to act as .   Patient seen with , son and daughter-in-law at bedside. Patient stated she had 7-8/10 pain with inadequate relief with PO oxy IR. With initiation of IV Dilaudid, patient's pain improved to 3-4/10.   Patient with constipation: last bowel movement 2 days ago.     INTERVAL HPI/OVERNIGHT EVENTS:  IV Dilaudid 0.5mg started by primary team over the weekend due to inadequate pain control  In past 24 hours (8AM-8AM), patient received 5 prn does of IV Dilaudid 0.5mg and 1 prn dose of PO Oxy IR 5mg     DNR on chart:   Allergies    carboplatin (Flushing; Rash)  penicillin (Unknown)    Intolerances    MEDICATIONS  (STANDING):  bisacodyl 5 milliGRAM(s) Oral at bedtime  cyanocobalamin 1000 MICROGram(s) Oral daily  dexAMETHasone     Tablet 2 milliGRAM(s) Oral daily  dronabinol 2.5 milliGRAM(s) Oral two times a day  enoxaparin Injectable 40 milliGRAM(s) SubCutaneous daily  folic acid 1 milliGRAM(s) Oral daily  meropenem  IVPB 1000 milliGRAM(s) IV Intermittent every 12 hours  morphine ER Tablet 15 milliGRAM(s) Oral daily  pantoprazole    Tablet 40 milliGRAM(s) Oral before breakfast  polyethylene glycol 3350 17 Gram(s) Oral two times a day  senna 2 Tablet(s) Oral at bedtime  sodium chloride 0.9%. 1000 milliLiter(s) (50 mL/Hr) IV Continuous <Continuous>  sodium chloride 0.9%. 1000 milliLiter(s) (50 mL/Hr) IV Continuous <Continuous>    MEDICATIONS  (PRN):  acetaminophen     Tablet .. 650 milliGRAM(s) Oral every 6 hours PRN Mild Pain (1 - 3)  HYDROmorphone   Tablet 2 milliGRAM(s) Oral every 4 hours PRN Moderate Pain (4 - 6)  HYDROmorphone  Injectable 0.5 milliGRAM(s) IV Push every 4 hours PRN Severe Pain (7 - 10)  ondansetron   Disintegrating Tablet 4 milliGRAM(s) Oral every 6 hours PRN Nausea and/or Vomiting      ITEMS UNCHECKED ARE NOT PRESENT    PRESENT SYMPTOMS: [ ]Unable to obtain due to poor mentation   Source if other than patient:  [ ]Family   [ ]Team     Pain: [x ]yes [ ]no  QOL impact - moderate  Location -   lower abdominal pain at abd nodule site                Aggravating factors - movement  Quality - unable to describe   Radiation - none  Timing- constant  Severity (0-10 scale): 7-8  Minimal acceptable level (0-10 scale): 3-4      Dyspnea:                           [ ]Mild [ ]Moderate [ ]Severe  Anxiety:                             [ ]Mild [ ]Moderate [ ]Severe  Agitation:                          [ ]Mild [ ]Moderate [ ]Severe  Fatigue:                             [ ]Mild [ ]Moderate [ ]Severe  Nausea:                             [ ]Mild [ ]Moderate [ ]Severe  Loss of appetite:              [ ]Mild [ ]Moderate [ ]Severe  Constipation:                   [ x]Mild [ ]Moderate [ ]Severe  Diarrhea:                          [ ]Mild [ ]Moderate [ ]Severe      CPOT:    https://www.[facility].org/getattachment/[email or web address]/Critical-Care-Pain-Observation-Tool-(CPOT)    PAIN AD Score:	  http://geriatrictoolkit.[facility]/cog/painad.pdf (Ctrl + left click to view)    Other Symptoms:  [ x]All other review of systems negative     Palliative Performance Status Version 2:   60-70      %      http://npcrc.org/files/news/palliative_performance_scale_ppsv2.pdf    PHYSICAL EXAM:  Vital Signs Last 24 Hrs  T(C): 37.2 (25 Oct 2021 12:30), Max: 37.7 (25 Oct 2021 05:09)  T(F): 99 (25 Oct 2021 12:30), Max: 99.8 (25 Oct 2021 05:09)  HR: 98 (25 Oct 2021 12:30) (80 - 98)  BP: 103/60 (25 Oct 2021 12:30) (102/63 - 127/72)  BP(mean): --  RR: 17 (25 Oct 2021 12:30) (17 - 18)  SpO2: 98% (25 Oct 2021 12:30) (96% - 99%)     I&O's Summary     GENERAL: Frail  [ x]Alert  [x ]Oriented x 3  [ ]Lethargic  [ ]Cachexia  [ ]Unarousable  [x ]Verbal  [ ]Non-Verbal  [ x] No Distress  Behavioral:   [ ] Anxiety  [ ] Delirium [ ] Agitation [ x] Calm  [ ] Other  HEENT:  [x ]Normal  [ ] Temporal Wasting  [ ]Dry mouth   [ ]ET Tube/Trach  [ ]Oral lesions  [ ] Mucositis  PULMONARY:   [ x]Clear [ ]Tachypnea  [ ]Audible excessive secretions   [ ]Rhonchi        [ ]Right [ ]Left [ ]Bilateral  [ ]Crackles        [ ]Right [ ]Left [ ]Bilateral  [ ]Wheezing     [ ]Right [ ]Left [ ]Bilateral  [ ]Diminished breath sounds [ ]right [ ]left [ ]bilateral  CARDIOVASCULAR:    [x ]Regular [ ]Irregular [ ]Tachy  [ ]Jose Antonio [ ]Murmur [ ]Other  GASTROINTESTINAL: Erythematous nodule at lower abd  [ x]Soft  [ ]Distended   [ ]+BS  [ ]Non tender [ x]Tender  [ ]PEG [ ]OGT/ NGT  Last BM: 10/23 per patient  GENITOURINARY:  [ ]Normal [ x] Incontinent   [ ]Oliguria/Anuria   [ ][place]calf  MUSCULOSKELETAL:   [ ]Normal   [x ]Weakness  [ ]Bed/Wheelchair bound [ ]Edema  [  ] amputation  [  ] contraction  NEUROLOGIC:   [x ]No focal deficits  [ ]Cognitive impairment  [ ]Dysphagia [ ]Dysarthria [ ]Paresis [ ]Other   SKIN: See Nursing Skin Assessment for further details  [ x]Normal, except mild erythema at abdominal nodule site    [ ]Rash  [ ]Pressure ulcer(s)       Present on admission [ ]y [ ]n   [  ]  Wound    [  ] hyperpigmentation      CRITICAL CARE:  [ ]Shock Present  [ ]Septic [ ]Cardiogenic [ ]Neurologic [ ]Hypovolemic  [ ]Vasopressors [ ]Inotropes  [ ]Respiratory failure present [ ]Mechanical Ventilation [ ]Non-invasive ventilatory support [ ]High-Flow   [ ]Acute  [ ]Chronic [ ]Hypoxic  [ ]Hypercarbic [ ]Other  [ ]Other organ failure     LABS:                        8.2    18.05 )-----------( 338      ( 25 Oct 2021 07:55 )             25.7   10-25    137  |  99  |  16  ----------------------------<  79  3.8   |  27  |  0.39<L>    Ca    9.5      25 Oct 2021 07:55  Phos  2.4     10-25  Mg     2.10     10-25      CAPILLARY BLOOD GLUCOSE    RADIOLOGY & ADDITIONAL STUDIES: no new imaging    Protein Calorie Malnutrition Present: [ ]mild [ ]moderate [ ]severe [ ]underweight [ ]morbid obesity  https://www.andeal.org/vault/0290/web/files/ONC/Table_Clinical%20Characteristics%20to%20Document%20Malnutrition-White%20JV%20et%20al%202012.pdf    Height (cm): 154.9 (10-21-21 @ 21:45), 154.9 (09-13-21 @ 14:12), 151.5 (07-21-21 @ 16:20)  Weight (kg): 43 (10-21-21 @ 21:45), 43.5 (09-17-21 @ 08:38), 43.5 (09-13-21 @ 14:12)  BMI (kg/m2): 17.9 (10-21-21 @ 21:45), 18.1 (09-17-21 @ 08:38), 18.1 (09-13-21 @ 14:12)    [ ]PPSV2 < or = 30%  [ ]significant weight loss [ ]poor nutritional intake [ ]anasarca    [ ]Artificial Nutrition    REFERRALS:   [ ]Chaplaincy  [x ]Hospice  [ ]Child Life  [ ]Social Work  [x ]Case management [ ]Holistic Therapy

## 2021-10-25 NOTE — PROGRESS NOTE ADULT - PROBLEM SELECTOR PLAN 2
- Continue Miralax BID and Senna 2 tablets at bedtime  - Dulcolax started by primary team today; can increase to BID if still constipated  - Goal BM 1-2x daily  - If no BM >2d, offer lactulose or enema.

## 2021-10-25 NOTE — PROGRESS NOTE ADULT - ATTENDING COMMENTS
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 given pt has fistula, will f/u ID regarding total course abx  - on exam pt w/ skin perforation below umbilicus, no active bleeding/purulence, wound care eval in AM  -c/w pain management, Dilaudid 0.5 mg IV PRN added yesterday with improvement in pain control   -home w/ hospice referral pending

## 2021-10-25 NOTE — PROGRESS NOTE ADULT - SUBJECTIVE AND OBJECTIVE BOX
Gyn ONC Progress Note   HD#5   [place] [employer]  #[id number]      Pt seen and examined at bedside. No events overnight. Patient reports "stomach pain". Tolerating regular diet. Denies nausea, vomiting,  fever, chills, dysuria, chest pain, SOB, lightheadedness, dizziness.      Objective:  T(F): 99.8 (10-25-21 @ 05:09), Max: 99.8 (10-25-21 @ 05:09)  HR: 84 (10-25-21 @ 05:09) (80 - 94)  BP: 127/72 (10-25-21 @ 05:09) (99/67 - 127/72)  RR: 18 (10-25-21 @ 05:09) (18 - 18)  SpO2: 98% (10-25-21 @ 05:09) (95% - 99%)  Wt(kg): --  I&O's Summary      MEDICATIONS  (STANDING):  cyanocobalamin 1000 MICROGram(s) Oral daily  dexAMETHasone     Tablet 2 milliGRAM(s) Oral daily  dronabinol 2.5 milliGRAM(s) Oral two times a day  enoxaparin Injectable 40 milliGRAM(s) SubCutaneous daily  folic acid 1 milliGRAM(s) Oral daily  meropenem  IVPB 1000 milliGRAM(s) IV Intermittent every 12 hours  pantoprazole    Tablet 40 milliGRAM(s) Oral before breakfast  polyethylene glycol 3350 17 Gram(s) Oral two times a day  senna 2 Tablet(s) Oral at bedtime  sodium chloride 0.9%. 1000 milliLiter(s) (50 mL/Hr) IV Continuous <Continuous>  sodium chloride 0.9%. 1000 milliLiter(s) (50 mL/Hr) IV Continuous <Continuous>    MEDICATIONS  (PRN):  acetaminophen     Tablet .. 650 milliGRAM(s) Oral every 6 hours PRN Mild Pain (1 - 3), Moderate Pain (4 - 6)  HYDROmorphone  Injectable 0.5 milliGRAM(s) IV Push every 4 hours PRN Severe Pain (7 - 10)  ondansetron   Disintegrating Tablet 4 milliGRAM(s) Oral every 6 hours PRN Nausea and/or Vomiting  oxyCODONE    IR 5 milliGRAM(s) Oral every 6 hours PRN Moderate Pain (4 - 6)      PHYSICAL EXAM:  GENERAL: NAD, AOx3, cachectic   RESP: Nonlabored breathing   ABDOMEN: Soft, nondistended; palpable 4x5cm suprapubic mass, tender to palpation. No rebound or guarding  PELVIC: deferred  RECTAL: deferred    LABS:  10-24    139    |  104    |  15     ----------------------------<  87     4.2     |  24     |  0.35<L>    Ca    8.6        24 Oct 2021 07:13  Phos  2.5       10-24  Mg     2.10      10-24

## 2021-10-25 NOTE — PROGRESS NOTE ADULT - PROBLEM SELECTOR PLAN 6
-ISTOP noted in chart; will administer acetaminophen for mild and moderate pain and oxycodone for severe pain every six hours; will assess patient's daily requirement of pain medications and order standing regimen according to daily requirements     - per palliative, pain not currently optimized: MS contin 15mg qd (titrate up as needed), dilaudid 2mg q4h PO for moderate pain, dilaudid 0.5mg IV q4h prn severe pain

## 2021-10-25 NOTE — PROGRESS NOTE ADULT - SUBJECTIVE AND OBJECTIVE BOX
SURGERY PROGRESS NOTE    SUBJECTIVE / 24H EVENTS:  Patient seen and examined on morning rounds. No acute events overnight.      OBJECTIVE:  VITAL SIGNS:  T(C): 37.7 (10-25-21 @ 05:09), Max: 37.7 (10-25-21 @ 05:09)  HR: 84 (10-25-21 @ 05:09) (80 - 94)  BP: 127/72 (10-25-21 @ 05:09) (99/67 - 127/72)  RR: 18 (10-25-21 @ 05:09) (18 - 18)  SpO2: 98% (10-25-21 @ 05:09) (95% - 99%)  Daily     Daily         PHYSICAL EXAM:  Gen: NAD  LS: Respirations unlabored on RA  GI: Soft. Nondistended. Tender in bilateral lower quadrants. Palpable 4x5cm suprapubic mass, with overlying skin changes and erythema.   Ext: Grossly moving all extremities        LAB VALUES:  10-24    139  |  104  |  15  ----------------------------<  87  4.2   |  24  |  0.35<L>    Ca    8.6      24 Oct 2021 07:13  Phos  2.5     10-24  Mg     2.10     10-24                                 8.2    18.05 )-----------( 338      ( 25 Oct 2021 07:55 )             25.7                   MICROBIOLOGY:    Culture - Urine (collected 22 Oct 2021 15:40)  Source: Clean Catch Clean Catch (Midstream)  Final Report (23 Oct 2021 15:58):    <10,000 CFU/mL Normal Urogenital Estella        RADIOLOGY:        MEDICATIONS  (STANDING):  cyanocobalamin 1000 MICROGram(s) Oral daily  dexAMETHasone     Tablet 2 milliGRAM(s) Oral daily  dronabinol 2.5 milliGRAM(s) Oral two times a day  enoxaparin Injectable 40 milliGRAM(s) SubCutaneous daily  folic acid 1 milliGRAM(s) Oral daily  meropenem  IVPB 1000 milliGRAM(s) IV Intermittent every 12 hours  pantoprazole    Tablet 40 milliGRAM(s) Oral before breakfast  polyethylene glycol 3350 17 Gram(s) Oral two times a day  senna 2 Tablet(s) Oral at bedtime  sodium chloride 0.9%. 1000 milliLiter(s) (50 mL/Hr) IV Continuous <Continuous>  sodium chloride 0.9%. 1000 milliLiter(s) (50 mL/Hr) IV Continuous <Continuous>    MEDICATIONS  (PRN):  acetaminophen     Tablet .. 650 milliGRAM(s) Oral every 6 hours PRN Mild Pain (1 - 3), Moderate Pain (4 - 6)  HYDROmorphone  Injectable 0.5 milliGRAM(s) IV Push every 4 hours PRN Severe Pain (7 - 10)  ondansetron   Disintegrating Tablet 4 milliGRAM(s) Oral every 6 hours PRN Nausea and/or Vomiting  oxyCODONE    IR 5 milliGRAM(s) Oral every 6 hours PRN Moderate Pain (4 - 6)        SURGICAL ONCOLOGY PROGRESS NOTE    SUBJECTIVE / 24H EVENTS:  Patient seen and examined on morning rounds. No acute events overnight. Patient reports intermittent abdominal, improved from prior, some pain with urination.     Patient interview conducted using [employer]  ID #[id number].       OBJECTIVE:  VITAL SIGNS:  T(C): 37.7 (10-25-21 @ 05:09), Max: 37.7 (10-25-21 @ 05:09)  HR: 84 (10-25-21 @ 05:09) (80 - 94)  BP: 127/72 (10-25-21 @ 05:09) (99/67 - 127/72)  RR: 18 (10-25-21 @ 05:09) (18 - 18)  SpO2: 98% (10-25-21 @ 05:09) (95% - 99%)  Daily     Daily         PHYSICAL EXAM:  Gen: NAD  LS: Respirations unlabored on RA  GI: Soft. Nondistended. Tender in bilateral lower quadrants. Palpable 4x5cm suprapubic mass, with overlying skin changes and erythema.   Ext: Grossly moving all extremities        LAB VALUES:  10-24    139  |  104  |  15  ----------------------------<  87  4.2   |  24  |  0.35<L>    Ca    8.6      24 Oct 2021 07:13  Phos  2.5     10-24  Mg     2.10     10-24                                 8.2    18.05 )-----------( 338      ( 25 Oct 2021 07:55 )             25.7                   MICROBIOLOGY:    Culture - Urine (collected 22 Oct 2021 15:40)  Source: Clean Catch Clean Catch (Midstream)  Final Report (23 Oct 2021 15:58):    <10,000 CFU/mL Normal Urogenital Estella        RADIOLOGY:        MEDICATIONS  (STANDING):  cyanocobalamin 1000 MICROGram(s) Oral daily  dexAMETHasone     Tablet 2 milliGRAM(s) Oral daily  dronabinol 2.5 milliGRAM(s) Oral two times a day  enoxaparin Injectable 40 milliGRAM(s) SubCutaneous daily  folic acid 1 milliGRAM(s) Oral daily  meropenem  IVPB 1000 milliGRAM(s) IV Intermittent every 12 hours  pantoprazole    Tablet 40 milliGRAM(s) Oral before breakfast  polyethylene glycol 3350 17 Gram(s) Oral two times a day  senna 2 Tablet(s) Oral at bedtime  sodium chloride 0.9%. 1000 milliLiter(s) (50 mL/Hr) IV Continuous <Continuous>  sodium chloride 0.9%. 1000 milliLiter(s) (50 mL/Hr) IV Continuous <Continuous>    MEDICATIONS  (PRN):  acetaminophen     Tablet .. 650 milliGRAM(s) Oral every 6 hours PRN Mild Pain (1 - 3), Moderate Pain (4 - 6)  HYDROmorphone  Injectable 0.5 milliGRAM(s) IV Push every 4 hours PRN Severe Pain (7 - 10)  ondansetron   Disintegrating Tablet 4 milliGRAM(s) Oral every 6 hours PRN Nausea and/or Vomiting  oxyCODONE    IR 5 milliGRAM(s) Oral every 6 hours PRN Moderate Pain (4 - 6)

## 2021-10-25 NOTE — PROVIDER CONTACT NOTE (OTHER) - BACKGROUND
Patient has history of stage IV ovarian cancer. Admitted for intestniovesical fistula.
Patient admitted with intestinovesical fistula

## 2021-10-25 NOTE — PROVIDER CONTACT NOTE (OTHER) - ASSESSMENT
Patient coughed resulting in opening in her lower abdomen. 1 cm x 1 cm. MD at bedside with patient.
Patient is asymptomatic. Patient does not report feeling dizzy or light headed. Patient is resting.

## 2021-10-25 NOTE — PROGRESS NOTE ADULT - ATTENDING COMMENTS
Patient seen at bedside. Case discussed with KENNETH Rider. Plan as above.   Hospice eval.   No further oncology treatments are recommended given poor performance status.  Surgery and rad onc eval appreciated.

## 2021-10-25 NOTE — PROGRESS NOTE ADULT - PROBLEM SELECTOR PLAN 5
- stage IV ovarian cancer and stage I non-small cell carcinoma of the lung diagnosed in 2017, s/p multiple lines of therapy  - Oncology recommendations appreciated : not a candidate for further chemotherapy 2/2 performance status and hematologic toxicity from prior treatments. - stage IV ovarian cancer and stage I non-small cell carcinoma of the lung diagnosed in 2017, s/p multiple lines of therapy  - Oncology recommendations appreciated : not a candidate for further chemotherapy 2/2 performance status and hematologic toxicity from prior treatments.  - Hospice referral placed

## 2021-10-25 NOTE — PROGRESS NOTE ADULT - PROBLEM SELECTOR PLAN 7
A meeting to discuss advance care planning was held today.   Discussed goals of care and advance directives including, but not limited to health care proxy, code status, hospice.  Decision regarding code status: FULL CODE  Documentation completed today: HCP. Please see GOC note.  >46 minutes spent on advanced care planning with family. A meeting to discuss advance care planning was held today.   Discussed goals of care and advance directives including, but not limited to hospice.  Please see GOC note.  >16 minutes spent on advanced care planning with family.

## 2021-10-25 NOTE — PROGRESS NOTE ADULT - ASSESSMENT
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 Patient currently on Meropenem (10/22-) for coverage of enterovesicular fistula with no plans for surgical intervention at this time.     - Meropenem (10/22-) for treatment of cystitis 2/2 enterovesicular fistula vs superinfected suprapubic mass   - Ucx (10/22) - neg  - Bcx (10/21) - NGTD (p)   - GOC 10/22 per chart review: family still interested in exploring options aimed at treating pt's cancer. Advanced directives explored. Pt's proxy states the family is not ready to discuss their preferences and wish for pt to remain a full code  - Med Onc recs: not a candidate for further chemotherapy 2/2 performance status and hematologic toxicity from prior treatments.  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 Superficial collection likely communicates with internal fistula and percutaneous drainage will cause fistulization to skin and external drainage which will worsen QOL.  - Patient discussed with Surg Onc team, no plans for surgical interventions at this time. Patient heavily pretreated with prior surgical debulking and chemotherapy with evidence of progressive disease and chemotherapy toxicities. Agree with palliative care/hospice.

## 2021-10-26 NOTE — PHYSICAL THERAPY INITIAL EVALUATION ADULT - GENERAL OBSERVATIONS, REHAB EVAL
pt received in semi-supine, NAD. pt agreeable to PT consultation. pt cleared by RN Gilda for PT session.

## 2021-10-26 NOTE — PROGRESS NOTE ADULT - SUBJECTIVE AND OBJECTIVE BOX
Follow Up:  collection    Interval History/ROS:  drainage from abd wall collection,   feels improved,  no pain    Allergies  carboplatin (Flushing; Rash)  penicillin (Unknown)    ANTIMICROBIALS:  meropenem  IVPB 1000 every 12 hours      OTHER MEDS:  MEDICATIONS  (STANDING):  acetaminophen     Tablet .. 650 every 6 hours PRN  bisacodyl 5 at bedtime  dexAMETHasone     Tablet 2 daily  dronabinol 2.5 two times a day  enoxaparin Injectable 40 daily  HYDROmorphone   Tablet 2 every 4 hours PRN  HYDROmorphone  Injectable 0.5 every 4 hours PRN  morphine ER Tablet 15 daily  ondansetron   Disintegrating Tablet 4 every 6 hours PRN  pantoprazole    Tablet 40 before breakfast  polyethylene glycol 3350 17 two times a day  senna 2 at bedtime      Vital Signs Last 24 Hrs  T(C): 37.1 (26 Oct 2021 13:02), Max: 37.1 (25 Oct 2021 21:24)  T(F): 98.8 (26 Oct 2021 13:02), Max: 98.8 (26 Oct 2021 13:02)  HR: 96 (26 Oct 2021 13:02) (76 - 96)  BP: 102/73 (26 Oct 2021 13:02) (102/73 - 115/84)  BP(mean): --  RR: 17 (26 Oct 2021 13:02) (16 - 18)  SpO2: 98% (26 Oct 2021 13:02) (96% - 98%)    PHYSICAL EXAM:  General:  NAD, Non-toxic  Neurology: A&Ox3, nonfocal  Respiratory: Clear to auscultation bilaterally  CV: RRR, S1S2, no murmurs, rubs or gallops  Abdominal: Soft, Non-tender, decreased swelling  Extremities: No edema,   Line Sites: Clear  Skin: No rash                        8.0    17.12 )-----------( 355      ( 26 Oct 2021 07:04 )             25.1   WBC Count: 17.12 (10-26 @ 07:04)  WBC Count: 18.05 (10-25 @ 07:55)  WBC Count: 17.51 (10-24 @ 07:15)  WBC Count: 21.14 (10-23 @ 07:25)  WBC Count: 25.89 (10-22 @ 07:46)      10-26    133<L>  |  99  |  18  ----------------------------<  99  3.7   |  27  |  0.37<L>    Ca    9.5      26 Oct 2021 07:04  Phos  2.9     10-26  Mg     2.50     10-26      MICROBIOLOGY:  Clean Catch Clean Catch (Midstream)  10-22-21   <10,000 CFU/mL Normal Urogenital Estella  --  --      .Blood Blood-Venous  10-21-21   No growth to date.  --  --      .Blood Blood-Peripheral  10-21-21   No growth to date.  --  --    Rapid RVP Result: NotDetec (10-21 @ 13:02)        RADIOLOGY:  < from: CT Abdomen and Pelvis w/ Oral Cont and w/ IV Cont (10.21.21 @ 15:51) >  IMPRESSION:  Gas containing extensive pelvic mass apparently involving adjacent small bowel as well as the urinary bladder which now also demonstrates intraluminal gas.  Interval extension of the mass into the anterior abdominal wall which demonstrates new air-fluid level.    < end of copied text >      Kwadwo Cerrato MD; Division of Infectious Disease; Pager: [phone]; nights and weekends: [phone]

## 2021-10-26 NOTE — PROGRESS NOTE ADULT - PROBLEM SELECTOR PLAN 4
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 Flores, patient is not a good candidate for additional chemotherapy at this time    Plan:  - palliative following: after discussion w/ family, pt put in for hospice referral  - will try to optimize pain management and re-consult IR only if pain not controlled after adjustments for palliative debulking

## 2021-10-26 NOTE — PROGRESS NOTE ADULT - SUBJECTIVE AND OBJECTIVE BOX
SUBJECTIVE AND OBJECTIVE:  Patient preferred for daughter-in-law to act as .   Patient seen with , and daughter-in-law at bedside. Patient with improvement of pain today; daughter-in-law reports pain well controlled with current pain regimen.     INTERVAL HPI/OVERNIGHT EVENTS:  In past 24 hours (8AM-8AM), patient received 1 prn does of IV Dilaudid 0.5mg and 2 prn dose of PO Dilaudid    DNR on chart:   Allergies    carboplatin (Flushing; Rash)  penicillin (Unknown)    Intolerances    MEDICATIONS  (STANDING):  bisacodyl 5 milliGRAM(s) Oral at bedtime  cyanocobalamin 1000 MICROGram(s) Oral daily  dexAMETHasone     Tablet 2 milliGRAM(s) Oral daily  dronabinol 2.5 milliGRAM(s) Oral two times a day  enoxaparin Injectable 40 milliGRAM(s) SubCutaneous daily  folic acid 1 milliGRAM(s) Oral daily  meropenem  IVPB 1000 milliGRAM(s) IV Intermittent every 12 hours  morphine ER Tablet 15 milliGRAM(s) Oral daily  pantoprazole    Tablet 40 milliGRAM(s) Oral before breakfast  polyethylene glycol 3350 17 Gram(s) Oral two times a day  senna 2 Tablet(s) Oral at bedtime  sodium chloride 0.9%. 1000 milliLiter(s) (50 mL/Hr) IV Continuous <Continuous>  sodium chloride 0.9%. 1000 milliLiter(s) (50 mL/Hr) IV Continuous <Continuous>    MEDICATIONS  (PRN):  acetaminophen     Tablet .. 650 milliGRAM(s) Oral every 6 hours PRN Mild Pain (1 - 3)  HYDROmorphone   Tablet 2 milliGRAM(s) Oral every 4 hours PRN Moderate Pain (4 - 6)  HYDROmorphone  Injectable 0.5 milliGRAM(s) IV Push every 4 hours PRN Severe Pain (7 - 10)  ondansetron   Disintegrating Tablet 4 milliGRAM(s) Oral every 6 hours PRN Nausea and/or Vomiting      ITEMS UNCHECKED ARE NOT PRESENT    PRESENT SYMPTOMS: [ ]Unable to obtain due to poor mentation   Source if other than patient:  [ ]Family   [ ]Team     Pain: [x ]yes [ ]no  QOL impact - moderate  Location -   lower abdominal pain at abd nodule site                Aggravating factors - movement  Quality - unable to describe   Radiation - none  Timing- constant  Severity (0-10 scale): 7-8  Minimal acceptable level (0-10 scale): 3-4      Dyspnea:                           [ ]Mild [ ]Moderate [ ]Severe  Anxiety:                             [ ]Mild [ ]Moderate [ ]Severe  Agitation:                          [ ]Mild [ ]Moderate [ ]Severe  Fatigue:                             [ ]Mild [ ]Moderate [ ]Severe  Nausea:                             [ ]Mild [ ]Moderate [ ]Severe  Loss of appetite:              [ ]Mild [ ]Moderate [ ]Severe  Constipation:                   [ x]Mild [ ]Moderate [ ]Severe  Diarrhea:                          [ ]Mild [ ]Moderate [ ]Severe      CPOT:    https://www.[facility].org/getattachment/[email or web address]/Critical-Care-Pain-Observation-Tool-(CPOT)    PAIN AD Score:	  http://geriatrictoolkit.[facility]/cog/painad.pdf (Ctrl + left click to view)    Other Symptoms:  [ x]All other review of systems negative     Palliative Performance Status Version 2:   60-70      %      http://[facility].org/files/news/palliative_performance_scale_ppsv2.pdf    PHYSICAL EXAM:  Vital Signs Last 24 Hrs  T(C): 37.2 (25 Oct 2021 12:30), Max: 37.7 (25 Oct 2021 05:09)  T(F): 99 (25 Oct 2021 12:30), Max: 99.8 (25 Oct 2021 05:09)  HR: 98 (25 Oct 2021 12:30) (80 - 98)  BP: 103/60 (25 Oct 2021 12:30) (102/63 - 127/72)  BP(mean): --  RR: 17 (25 Oct 2021 12:30) (17 - 18)  SpO2: 98% (25 Oct 2021 12:30) (96% - 99%)     I&O's Summary     GENERAL: Frail  [ x]Alert  [x ]Oriented x 3  [ ]Lethargic  [ ]Cachexia  [ ]Unarousable  [x ]Verbal  [ ]Non-Verbal  [ x] No Distress  Behavioral:   [ ] Anxiety  [ ] Delirium [ ] Agitation [ x] Calm  [ ] Other  HEENT:  [x ]Normal  [ ] Temporal Wasting  [ ]Dry mouth   [ ]ET Tube/Trach  [ ]Oral lesions  [ ] Mucositis  PULMONARY:   [ x]Clear [ ]Tachypnea  [ ]Audible excessive secretions   [ ]Rhonchi        [ ]Right [ ]Left [ ]Bilateral  [ ]Crackles        [ ]Right [ ]Left [ ]Bilateral  [ ]Wheezing     [ ]Right [ ]Left [ ]Bilateral  [ ]Diminished breath sounds [ ]right [ ]left [ ]bilateral  CARDIOVASCULAR:    [x ]Regular [ ]Irregular [ ]Tachy  [ ]Jose Antonio [ ]Murmur [ ]Other  GASTROINTESTINAL:   [ x]Soft  [ ]Distended   [ ]+BS  [ ]Non tender [ x]Tender  [ ]PEG [ ]OGT/ NGT  Last BM: 10/23 per patient  GENITOURINARY:  [ ]Normal [ x] Incontinent   [ ]Oliguria/Anuria   [ ][place]calf  MUSCULOSKELETAL:   [ ]Normal   [x ]Weakness  [ ]Bed/Wheelchair bound [ ]Edema  [  ] amputation  [  ] contraction  NEUROLOGIC:   [x ]No focal deficits  [ ]Cognitive impairment  [ ]Dysphagia [ ]Dysarthria [ ]Paresis [ ]Other   SKIN: See Nursing Skin Assessment for further details  [ x]Normal, except skin perforation below umbilicus with no active bleeding/ purulence    [ ]Rash  [ ]Pressure ulcer(s)       Present on admission [ ]y [ ]n   [  ]  Wound    [  ] hyperpigmentation      CRITICAL CARE:  [ ]Shock Present  [ ]Septic [ ]Cardiogenic [ ]Neurologic [ ]Hypovolemic  [ ]Vasopressors [ ]Inotropes  [ ]Respiratory failure present [ ]Mechanical Ventilation [ ]Non-invasive ventilatory support [ ]High-Flow   [ ]Acute  [ ]Chronic [ ]Hypoxic  [ ]Hypercarbic [ ]Other  [ ]Other organ failure     LABS:                                       8.0    17.12 )-----------( 355      ( 26 Oct 2021 07:04 )             25.1       10-26    133<L>  |  99  |  18  ----------------------------<  99  3.7   |  27  |  0.37<L>    Ca    9.5      26 Oct 2021 07:04  Phos  2.9     10-26  Mg     2.50     10-26      RADIOLOGY & ADDITIONAL STUDIES: no new imaging    Protein Calorie Malnutrition Present: [ ]mild [ ]moderate [ ]severe [ ]underweight [ ]morbid obesity  https://www.andeal.org/vault/7694/web/files/ONC/Table_Clinical%20Characteristics%20to%20Document%20Malnutrition-White%20JV%20et%20al%036486.pdf    Height (cm): 154.9 (10-21-21 @ 21:45), 154.9 (09-13-21 @ 14:12), 151.5 (07-21-21 @ 16:20)  Weight (kg): 43 (10-21-21 @ 21:45), 43.5 (09-17-21 @ 08:38), 43.5 (09-13-21 @ 14:12)  BMI (kg/m2): 17.9 (10-21-21 @ 21:45), 18.1 (09-17-21 @ 08:38), 18.1 (09-13-21 @ 14:12)    [ ]PPSV2 < or = 30%  [ ]significant weight loss [ ]poor nutritional intake [ ]anasarca    [ ]Artificial Nutrition    REFERRALS:   [ ]Chaplaincy  [x ]Hospice  [ ]Child Life  [ ]Social Work  [x ]Case management [ ]Holistic Therapy

## 2021-10-26 NOTE — PROGRESS NOTE ADULT - PROBLEM SELECTOR PLAN 6
-ISTOP noted in chart; will administer acetaminophen for mild and moderate pain and oxycodone for severe pain every six hours; will assess patient's daily requirement of pain medications and order standing regimen according to daily requirements     - per palliative, pain not currently optimized: MS contin 15mg qd (titrate up as needed), tylenol for mild pain, dilaudid 2mg q4h PO for moderate pain, dilaudid 0.5mg IV q4h prn severe pain    - bowel regimen: senna 2mg qhs, miralax bid, dulcolax qhs  - monitor for BMs -ISTOP noted in chart; will administer acetaminophen for mild and moderate pain and oxycodone for severe pain every six hours; will assess patient's daily requirement of pain medications and order standing regimen according to daily requirements    - pain better controlled today on current regimen  - MS contin 15mg qd (titrate up as needed), tylenol for mild pain, dilaudid 2mg q4h PO for moderate pain, dilaudid 0.5mg IV q4h prn severe pain    - bowel regimen: senna 2mg qhs, miralax bid, dulcolax qhs  - monitor for BMs

## 2021-10-26 NOTE — PROGRESS NOTE ADULT - ASSESSMENT
71 F with ovarian cancer with progression, malignant mass invading urinary bladder and small intestine with enterovesicular fistula. She has developed enterocutaneous fistulization with decreased swelling and pain,   appears improved    lower abd pain  dysuria  leukocytosis  h/o PCN allergy  abdominal carcinomatosis  Urine culture negative  Blood culture NGTD  IR unable to drain mass/fluid - appreciate consult    Suggest  Continue Meropenem 10/21-->   complete antibiotics 10/28  monitor wound - may need ostomy bag over wound    discussed with resident - I do not recommend long term prophylactic antibiotics - promoting hydration and assuring drainage is suggested

## 2021-10-26 NOTE — PROGRESS NOTE ADULT - PROBLEM SELECTOR PLAN 3
- no surgical intervention at this time  - see above - no surgical intervention at this time  - see above  - now extending cutaneously and opened. no overt signs of infection. will monitor for 1-2 more days on the meropenem   - wound care c/s

## 2021-10-26 NOTE — PHYSICAL THERAPY INITIAL EVALUATION ADULT - DISCHARGE DISPOSITION, PT EVAL
Based on functional status, recommend Inpatient rehab facility to address functional deficits and independence during ADLs. However, as per EMR, plan is for home hospice.

## 2021-10-26 NOTE — PROGRESS NOTE ADULT - PROBLEM SELECTOR PLAN 4
- i/s/o vesiculo-enteric fistula  - on meropenem   - ID recommendations appreciated: complete abx 10/28; "do not recommend long term prophylactic abx- promoting hydration and assuring drainage is suggsted"  - pain control as above.

## 2021-10-26 NOTE — PHYSICAL THERAPY INITIAL EVALUATION ADULT - GAIT DISTANCE, PT EVAL
1 lateral step along side edge of bed. further mobility deferred secondary to complaint of dizziness. pt with positive orthostatic hypertension, see vitals flow sheet for details.

## 2021-10-26 NOTE — PROGRESS NOTE ADULT - SUBJECTIVE AND OBJECTIVE BOX
CHRIS Lane PGY1  Pager# [phone]    Patient is a 71y old  Female who presents with a chief complaint of abdominal pain, urinary tract infection     patient is Mandarin-speaking; patient preferred to use her daughter-in-law as  as opposed to  services (26 Oct 2021 07:52)    SUBJECTIVE / OVERNIGHT EVENTS:  No acute overnight events. Pt feeling better today, states that pain is more controlled today on new regimen. Tolerating PO diet.       ADDITIONAL REVIEW OF SYSTEMS:  CONSTITUTIONAL: No fevers or chills  EYES/ENT: No visual changes;  No vertigo or throat pain   NECK: No pain or stiffness  RESPIRATORY: No cough, wheezing, hemoptysis; No shortness of breath  CARDIOVASCULAR: No chest pain or palpitations  GASTROINTESTINAL: No abdominal or epigastric pain. No nausea, vomiting, or hematemesis; No diarrhea or constipation. No melena or hematochezia.  GENITOURINARY: No dysuria, frequency or hematuria  NEUROLOGICAL: No syncope or dizziness  SKIN: No itching, rashes    MEDICATIONS  (STANDING):  bisacodyl 5 milliGRAM(s) Oral at bedtime  cyanocobalamin 1000 MICROGram(s) Oral daily  dexAMETHasone     Tablet 2 milliGRAM(s) Oral daily  dronabinol 2.5 milliGRAM(s) Oral two times a day  enoxaparin Injectable 40 milliGRAM(s) SubCutaneous daily  folic acid 1 milliGRAM(s) Oral daily  meropenem  IVPB 1000 milliGRAM(s) IV Intermittent every 12 hours  morphine ER Tablet 15 milliGRAM(s) Oral daily  pantoprazole    Tablet 40 milliGRAM(s) Oral before breakfast  polyethylene glycol 3350 17 Gram(s) Oral two times a day  senna 2 Tablet(s) Oral at bedtime  sodium chloride 0.9%. 1000 milliLiter(s) (50 mL/Hr) IV Continuous <Continuous>  sodium chloride 0.9%. 1000 milliLiter(s) (50 mL/Hr) IV Continuous <Continuous>    MEDICATIONS  (PRN):  acetaminophen     Tablet .. 650 milliGRAM(s) Oral every 6 hours PRN Mild Pain (1 - 3)  HYDROmorphone   Tablet 2 milliGRAM(s) Oral every 4 hours PRN Moderate Pain (4 - 6)  HYDROmorphone  Injectable 0.5 milliGRAM(s) IV Push every 4 hours PRN Severe Pain (7 - 10)  ondansetron   Disintegrating Tablet 4 milliGRAM(s) Oral every 6 hours PRN Nausea and/or Vomiting      CAPILLARY BLOOD GLUCOSE        I&O's Summary    25 Oct 2021 07:01  -  26 Oct 2021 07:00  --------------------------------------------------------  IN: 350 mL / OUT: 210 mL / NET: 140 mL    26 Oct 2021 07:01  -  26 Oct 2021 13:13  --------------------------------------------------------  IN: 120 mL / OUT: 0 mL / NET: 120 mL        PHYSICAL EXAM:  Vital Signs Last 24 Hrs  T(C): 37.1 (26 Oct 2021 13:02), Max: 37.1 (25 Oct 2021 21:24)  T(F): 98.8 (26 Oct 2021 13:02), Max: 98.8 (26 Oct 2021 13:02)  HR: 96 (26 Oct 2021 13:02) (76 - 96)  BP: 102/73 (26 Oct 2021 13:02) (102/73 - 115/84)  BP(mean): --  RR: 17 (26 Oct 2021 13:02) (16 - 18)  SpO2: 98% (26 Oct 2021 13:02) (96% - 98%)  General: NAD, sitting up comfortably in bed  HEENT: EOMi, no scleral icterus  Neck: supple, no jvd  CV: RRR, normal S1 and S2, no m/r/g  Lungs: normal respiratory effort. CTAB, no wheezes, rales, or rhonchi  Abd: soft, nontender, nondistended  Ext: no edema, 2+ peripheral pulses   Pysch: AAOx3  Neuro: non-focal      LABS:                        8.0    17.12 )-----------( 355      ( 26 Oct 2021 07:04 )             25.1     10-26    133<L>  |  99  |  18  ----------------------------<  99  3.7   |  27  |  0.37<L>    Ca    9.5      26 Oct 2021 07:04  Phos  2.9     10-26  Mg     2.50     10-26            RADIOLOGY & ADDITIONAL TESTS:  Results Reviewed:   Imaging Personally Reviewed:  Electrocardiogram Personally Reviewed:    COORDINATION OF CARE:  Care Discussed with Consultants/Other Providers [Y/N]:  Prior or Outpatient Records Reviewed [Y/N]:

## 2021-10-26 NOTE — PROGRESS NOTE ADULT - PROBLEM SELECTOR PLAN 1
- Continue MS Contin (Morphine ER) 15mg once a day  - Continue PO Tylenol PRN mild pain  - Continue PO Dilaudid 2mg q4 PRN moderate pain  - Continue IV Dilaudid 0.5mg q4 PRN severe pain  - Bowel regimen while on opiates.

## 2021-10-26 NOTE — PHYSICAL THERAPY INITIAL EVALUATION ADULT - MANUAL MUSCLE TESTING RESULTS, REHAB EVAL
bilateral upper and lower extremity MMT grossly 3/5. resistive MMT avoided secondary to abdominal wound.

## 2021-10-26 NOTE — PROGRESS NOTE ADULT - PROBLEM SELECTOR PLAN 5
- stage IV ovarian cancer and stage I non-small cell carcinoma of the lung diagnosed in 2017, s/p multiple lines of therapy  - Oncology recommendations appreciated : not a candidate for further chemotherapy 2/2 performance status and hematologic toxicity from prior treatments.  - Hospice referral placed

## 2021-10-26 NOTE — PROGRESS NOTE ADULT - ATTENDING COMMENTS
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 given pt has fistula, will f/u ID regarding total course abx and possible suppressive therapy  - on exam pt w/ skin perforation below umbilicus, no active bleeding/purulence, wound care eval and surgery regarding wound closure   -home w/ hospice referral planning for Thursday.

## 2021-10-26 NOTE — PHYSICAL THERAPY INITIAL EVALUATION ADULT - ADDITIONAL COMMENTS
History obtained from daughter-in-law at bedside. pt lives with . no prior home health aide services or assistive devices utilized. pt's son lives close, able to assist as needed. Prior to admission, pt was functionally independent.  as per EMR, pt has 3 steps to enter; however plan is for pt to return home via ambulance.     pt was left semi-supine with family at bedside, NAD. + call bell, + bed alarm.

## 2021-10-26 NOTE — PHYSICAL THERAPY INITIAL EVALUATION ADULT - PERTINENT HX OF CURRENT PROBLEM, REHAB EVAL
71 year old female with stage IV ovarian cancer with known residual masses in the pelvis, presenting with lower abdominal pain, dysuria, pain in superficial palpable mass on lower abdomen, found to have CT with pelvic masses extending to abdominal wall, and new entero-vesicular fistula, admitted for sepsis secondary to UTI from entero-vesicular fistula versus infected abdominal wall mass. no surgical intervention offered at this time

## 2021-10-26 NOTE — CHART NOTE - NSCHARTNOTEFT_GEN_A_CORE
Called by nurse. Patient's family requesting MD to eval ?blood clot that patient passed in urine.    At time of arrival, patient appearing well. In patient's urine hat, noted a ~3m long tubular structure. Scant blood noted as well. No sign of trauma or bleeding noted at urethral site, vaginal introitus or rectum. Patient w/o pain. ddx ?food particle given enterovesicular fistula, GI/ worm vs remnant of membrane that was associated with pelvic mass that has since dislodged on 10/25 that may have been retained in clothing. Sent specimen for pathological evaluation.     NB PGY3
Others' Prescriptions  Patient Name: Garret Li Date: 1950  Address: [address] [place] [id number]: Female  Rx Written	Rx Dispensed	Drug	Quantity	Days Supply	Prescriber Name	Prescriber Raisa #	Payment Method	Dispenser  10/06/2021	10/08/2021	tramadol hcl 50 mg tablet	80	20	Geo Cuevas)	[id number]	[facility]  10/06/2021	10/08/2021	dronabinol 2.5 mg capsule	60	30	Geo Cuevas)	[id number]	Insurance	[facility]  09/02/2021	09/04/2021	tramadol hcl 50 mg tablet	40	10	Cadence Harvey	[id number]	Insurance	[facility]  07/14/2021	07/24/2021	dronabinol 2.5 mg capsule	60	30	Geo Cuevas)	[id number]	[facility]  07/21/2021	07/24/2021	oxycodone hcl 5 mg tablet	20	5	Cadence Harvey	[id number]	Insurance	[facility]  07/14/2021	07/15/2021	tramadol hcl 50 mg tablet	40	10	Geo Cuevas)	[id number]	[facility]
Assessment: 71yF w/ metastatic ovarian CA now w/ enterovesicular fistula    Plan:  - Appreciate Urolog, Gyn-Onc, Hem-Onc recs  - Recommend GOC discussion  - IV abx for enterovesicular fistula  - Care and pain control per primary team    Surgical Oncology  [id number] w/ any questions
Chart reviewed and patient discussed with resident on call. Patient having active treatment for sepsis, source UTI vs inflammed abdominal nodule. Also had fistula. No active bleeding or other indications for emergent RT. Should she have continued pain after treatment for sepsis would rec re-consult before dc to set up outpatient appointment for palliative RT.

## 2021-10-26 NOTE — PROGRESS NOTE ADULT - PROBLEM SELECTOR PLAN 7
A meeting to discuss advance care planning was held today.   Discussed goals of care and advance directives including, but not limited to code status.  Patient remains FULL CODE . Please see GOC note.  >16 minutes spent on advanced care planning with family.

## 2021-10-26 NOTE — PROGRESS NOTE ADULT - PROBLEM SELECTOR PLAN 2
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  - Patient failed empiric antibiotic therapy, ciprofloxacin, as outpatient   - meropenem given in ED before UA/UCx collected  - surg recs: possible palliative resection if in line w GOC pending infection clearance  - urology recs: only tx UTI if pt having sx, can try methenamine w vit C for UTI ppx  - IR: does not recommend drainage   - gyn onc recs: not a surg candidate  - heme onc: no role for further chemo      Plan:  - c/w meropenem 1g BID (10/21-)  - f/u ID recs for duration of abx / whether pt should be on abx ppx

## 2021-10-26 NOTE — PROGRESS NOTE ADULT - PROBLEM SELECTOR PLAN 1
Meets sepsis criteria given tachycardia and leukocytosis. Currently HD stable  - likely 2/2 emphysematous cystitis vs inflamed/infected abdominal wall mass   - urine cx neg, blood cx 10/21 ngtd     Plan:  - c/w meropenem (10/21-) Meets sepsis criteria given tachycardia and leukocytosis. Currently HD stable  - likely 2/2 emphysematous cystitis vs inflamed/infected abdominal wall mass   - urine cx neg, blood cx 10/21 ngtd     Plan:  - c/w meropenem (10/21-)  - per ID: will c/w meropenem for 1-2 more days after observing abdominal wound

## 2021-10-27 NOTE — PROGRESS NOTE ADULT - SUBJECTIVE AND OBJECTIVE BOX
Follow Up:   entero-vesicular cutaneous fistula    Interval History/ROS:   decreased pain and lower abd swelling    Allergies  carboplatin (Flushing; Rash)  penicillin (Unknown)    ANTIMICROBIALS:  meropenem  IVPB 1000 every 12 hours      OTHER MEDS:  MEDICATIONS  (STANDING):  acetaminophen     Tablet .. 650 every 6 hours PRN  bisacodyl 5 at bedtime  dexAMETHasone     Tablet 2 daily  dronabinol 2.5 two times a day  enoxaparin Injectable 40 daily  HYDROmorphone   Tablet 2 every 4 hours PRN  HYDROmorphone  Injectable 0.5 every 4 hours PRN  morphine ER Tablet 15 daily  ondansetron   Disintegrating Tablet 4 every 6 hours PRN  pantoprazole    Tablet 40 before breakfast  polyethylene glycol 3350 17 two times a day  senna 2 at bedtime      Vital Signs Last 24 Hrs  T(C): 36.8 (27 Oct 2021 05:17), Max: 36.8 (27 Oct 2021 05:17)  T(F): 98.2 (27 Oct 2021 05:17), Max: 98.2 (27 Oct 2021 05:17)  HR: 71 (27 Oct 2021 05:17) (71 - 76)  BP: 106/67 (27 Oct 2021 05:17) (101/66 - 106/67)  BP(mean): --  RR: 16 (27 Oct 2021 05:17) (16 - 17)  SpO2: 100% (27 Oct 2021 05:17) (97% - 100%)    PHYSICAL EXAM:  General: thin,  NAD, Non-toxic  Neurology: A&Ox3, nonfocal  Respiratory: Clear to auscultation bilaterally  CV: RRR, S1S2, no murmurs, rubs or gallops  Abdominal: Soft, Non-tender, non-distended,   Extremities: No edema  Line Sites: Clear  Skin: No rash                        7.1    14.00 )-----------( 342      ( 27 Oct 2021 08:21 )             22.5     10-27    142  |  102  |  22  ----------------------------<  87  3.8   |  27  |  0.37<L>    Ca    9.1      27 Oct 2021 08:21  Phos  2.1     10-27  Mg     2.30     10-27    TPro  6.0  /  Alb  2.8<L>  /  TBili  0.2  /  DBili  x   /  AST  12  /  ALT  15  /  AlkPhos  79  10-27    MICROBIOLOGY:  Clean Catch Clean Catch (Midstream)  10-22-21   <10,000 CFU/mL Normal Urogenital Estella  --  --      .Blood Blood-Peripheral  10-21-21   No Growth Final  --  --      .Blood Blood-Venous  10-21-21   No Growth Final  --  --    Rapid RVP Result: NotDetec (10-21 @ 13:02)    RADIOLOGY:  < from: CT Abdomen and Pelvis w/ Oral Cont and w/ IV Cont (10.21.21 @ 15:51) >  IMPRESSION:  Gas containing extensive pelvic mass apparently involving adjacent small bowel as well as the urinary bladder which now also demonstrates intraluminal gas.  Interval extension of the mass into the anterior abdominal wall which demonstrates new air-fluid level.    < end of copied text >      Kwadwo Cerrato MD; Division of Infectious Disease; Pager: [phone]; nights and weekends: [phone]

## 2021-10-27 NOTE — PROGRESS NOTE ADULT - ASSESSMENT
71 F with ovarian cancer with progression, malignant mass invading urinary bladder and small intestine with enterovesicular fistula. She has developed enterocutaneous fistulization with decreased swelling and pain,   appears improved    lower abd pain  dysuria  leukocytosis  h/o PCN allergy  abdominal carcinomatosis  Urine culture negative  Blood culture NGTD      Suggest  Continue Meropenem 10/21--> until tomorrow   complete antibiotics 10/28  monitor wound - dry sterile dressing adequate    discussed with resident - I do not recommend long term prophylactic antibiotics - promoting hydration and assuring drainage is suggested  discussed with primary team

## 2021-10-27 NOTE — ADVANCED PRACTICE NURSE CONSULT - RECOMMEDATIONS
Recommend follow up care at [facility] ([phone], [address] [place] [address]).     Topical recommendations:   Midline suprapubic area- Cleanse with wound cleanser, pat dry. Apply Liquid barrier film to periwound skin (allow to dry). Loosely pack cavity with hydrofiber (Aquacel), cover with gauze, secure with tape. Change daily or PRN if compromised.     Plan discussed with patient, son and  at bedside. Patient educated on topical wound therapy to optimize wound healing. Questions answered.       Please contact Wound/Ostomy Care Service Line if we can be of further assistance (ext [phone]).

## 2021-10-27 NOTE — ADVANCED PRACTICE NURSE CONSULT - ASSESSMENT
General: A&Ox4, ambulates with assistance, continent of urine and stool. Skin warm, dry, adequate skin turgor. Blanchable erythema on bilateral heels.     Midline suprapubic area with wound of unknown etiology (malignancy progression? vs abscess?) measuring [id number]. Undermining noted circumferentially ranging from 1.5cm to 2cm with 2cm at 9oclock. Small amount of serosanguinous drainage, no odor. No pus, drainage unable to be expressed upon palpation. Tissue type unable to be visualized due to narrow opening. Periwound skin intact with induration at 4oclock extending 6cm. No erythema, no increased warmth. Goals of care: Manage drainage, lightly pack dead space.

## 2021-10-27 NOTE — PROGRESS NOTE ADULT - SUBJECTIVE AND OBJECTIVE BOX
SUBJECTIVE AND OBJECTIVE:  Patient preferred for son Rossana to act as .   Patient seen with , and son Rossana at bedside this AM. Patient with pain well controlled.  and son with questions about wound care when home; discussed family will need to perform day-to-day wound care and Hospice RN can eval during weekly visits to determine if further adjustments to wound care are needed.     INTERVAL HPI/OVERNIGHT EVENTS:  In past 24 hours (8AM-8AM), patient received 2 prn dose of PO Dilaudid    DNR on chart:   Allergies    carboplatin (Flushing; Rash)  penicillin (Unknown)    Intolerances    MEDICATIONS  (STANDING):  bisacodyl 5 milliGRAM(s) Oral at bedtime  cyanocobalamin 1000 MICROGram(s) Oral daily  dexAMETHasone     Tablet 2 milliGRAM(s) Oral daily  dronabinol 2.5 milliGRAM(s) Oral two times a day  enoxaparin Injectable 40 milliGRAM(s) SubCutaneous daily  folic acid 1 milliGRAM(s) Oral daily  meropenem  IVPB 1000 milliGRAM(s) IV Intermittent every 12 hours  morphine ER Tablet 15 milliGRAM(s) Oral daily  pantoprazole    Tablet 40 milliGRAM(s) Oral before breakfast  polyethylene glycol 3350 17 Gram(s) Oral two times a day  potassium phosphate / sodium phosphate Tablet (K-PHOS No. 2) 1 Tablet(s) Oral four times a day with meals  senna 2 Tablet(s) Oral at bedtime  sodium chloride 0.9%. 1000 milliLiter(s) (50 mL/Hr) IV Continuous <Continuous>  sodium chloride 0.9%. 1000 milliLiter(s) (50 mL/Hr) IV Continuous <Continuous>    MEDICATIONS  (PRN):  acetaminophen     Tablet .. 650 milliGRAM(s) Oral every 6 hours PRN Mild Pain (1 - 3)  HYDROmorphone   Tablet 2 milliGRAM(s) Oral every 4 hours PRN Moderate Pain (4 - 6)  HYDROmorphone  Injectable 0.5 milliGRAM(s) IV Push every 4 hours PRN Severe Pain (7 - 10)  ondansetron   Disintegrating Tablet 4 milliGRAM(s) Oral every 6 hours PRN Nausea and/or Vomiting      ITEMS UNCHECKED ARE NOT PRESENT    PRESENT SYMPTOMS: [ ]Unable to obtain due to poor mentation   Source if other than patient:  [ ]Family   [ ]Team     Pain: [x ]yes [ ]no  QOL impact - moderate  Location -   lower abdominal pain below umbilicus              Aggravating factors - movement  Quality - unable to describe   Radiation - none  Timing- constant  Severity (0-10 scale): 7-8  Minimal acceptable level (0-10 scale): 3-4    Dyspnea:                           [ ]Mild [ ]Moderate [ ]Severe  Anxiety:                             [ ]Mild [ ]Moderate [ ]Severe  Agitation:                          [ ]Mild [ ]Moderate [ ]Severe  Fatigue:                             [ ]Mild [ ]Moderate [ ]Severe  Nausea:                             [ ]Mild [ ]Moderate [ ]Severe  Loss of appetite:              [ ]Mild [ ]Moderate [ ]Severe  Constipation:                   [ ]Mild [ x]Moderate [ ]Severe  Diarrhea:                          [ ]Mild [ ]Moderate [ ]Severe      CPOT:    https://www.[facility].org/getattachment/hau83r45-1e4n-6p1k-[email or web address]-2538f6517q5k/Critical-Care-Pain-Observation-Tool-(CPOT)    PAIN AD Score:	  http://geriatrictoolkit.[facility]/cog/painad.pdf (Ctrl + left click to view)    Other Symptoms:  [ x]All other review of systems negative     Palliative Performance Status Version 2:   60-70      %      http://[facility].org/files/news/palliative_performance_scale_ppsv2.pdf    PHYSICAL EXAM:  Vital Signs Last 24 Hrs  T(C): 36.6 (27 Oct 2021 13:30), Max: 36.8 (27 Oct 2021 05:17)  T(F): 97.9 (27 Oct 2021 13:30), Max: 98.2 (27 Oct 2021 05:17)  HR: 76 (27 Oct 2021 13:30) (71 - 76)  BP: 108/71 (27 Oct 2021 13:30) (101/66 - 108/71)  BP(mean): --  RR: 17 (27 Oct 2021 13:30) (16 - 17)  SpO2: 100% (27 Oct 2021 13:30) (97% - 100%)     GENERAL: Frail  [ x]Alert  [x ]Oriented x 3  [ ]Lethargic  [ ]Cachexia  [ ]Unarousable  [x ]Verbal  [ ]Non-Verbal  [ x] No Distress  Behavioral:   [ ] Anxiety  [ ] Delirium [ ] Agitation [ x] Calm  [ ] Other  HEENT:  [x ]Normal  [ ] Temporal Wasting  [ ]Dry mouth   [ ]ET Tube/Trach  [ ]Oral lesions  [ ] Mucositis  PULMONARY:   [ x]Clear [ ]Tachypnea  [ ]Audible excessive secretions   [ ]Rhonchi        [ ]Right [ ]Left [ ]Bilateral  [ ]Crackles        [ ]Right [ ]Left [ ]Bilateral  [ ]Wheezing     [ ]Right [ ]Left [ ]Bilateral  [ ]Diminished breath sounds [ ]right [ ]left [ ]bilateral  CARDIOVASCULAR:    [x ]Regular [ ]Irregular [ ]Tachy  [ ]Jose Antonio [ ]Murmur [ ]Other  GASTROINTESTINAL:   [ x]Soft  [ ]Distended   [ ]+BS  [ ]Non tender [ x]Tender  [ ]PEG [ ]OGT/ NGT  Last BM: 10/23 per patient  GENITOURINARY:  [ ]Normal [ x] Incontinent   [ ]Oliguria/Anuria   [ ]Metcalf  MUSCULOSKELETAL:   [ ]Normal   [x ]Weakness  [ ]Bed/Wheelchair bound [ ]Edema  [  ] amputation  [  ] contraction  NEUROLOGIC:   [x ]No focal deficits  [ ]Cognitive impairment  [ ]Dysphagia [ ]Dysarthria [ ]Paresis [ ]Other   SKIN: See Nursing Skin Assessment for further details  [ x]Normal, except open wound below umbilicus with no active bleeding/ purulence    [ ]Rash  [ ]Pressure ulcer(s)       Present on admission [ ]y [ ]n   [  ]  Wound    [  ] hyperpigmentation      CRITICAL CARE:  [ ]Shock Present  [ ]Septic [ ]Cardiogenic [ ]Neurologic [ ]Hypovolemic  [ ]Vasopressors [ ]Inotropes  [ ]Respiratory failure present [ ]Mechanical Ventilation [ ]Non-invasive ventilatory support [ ]High-Flow   [ ]Acute  [ ]Chronic [ ]Hypoxic  [ ]Hypercarbic [ ]Other  [ ]Other organ failure     LABS:                                       7.1    14.00 )-----------( 342      ( 27 Oct 2021 08:21 )             22.5       10-27    142  |  102  |  22  ----------------------------<  87  3.8   |  27  |  0.37<L>    Ca    9.1      27 Oct 2021 08:21  Phos  2.1     10-27  Mg     2.30     10-27    TPro  6.0  /  Alb  2.8<L>  /  TBili  0.2  /  DBili  x   /  AST  12  /  ALT  15  /  AlkPhos  79  10-27      RADIOLOGY & ADDITIONAL STUDIES: no new imaging    Protein Calorie Malnutrition Present: [ ]mild [ ]moderate [ ]severe [ ]underweight [ ]morbid obesity  https://www.andeal.org/vault/2440/web/files/ONC/Table_Clinical%20Characteristics%20to%20Document%20Malnutrition-White%20JV%20et%20al%028838.pdf    Height (cm): 154.9 (10-21-21 @ 21:45), 154.9 (09-13-21 @ 14:12), 151.5 (07-21-21 @ 16:20)  Weight (kg): 43 (10-21-21 @ 21:45), 43.5 (09-17-21 @ 08:38), 43.5 (09-13-21 @ 14:12)  BMI (kg/m2): 17.9 (10-21-21 @ 21:45), 18.1 (09-17-21 @ 08:38), 18.1 (09-13-21 @ 14:12)    [ ]PPSV2 < or = 30%  [ ]significant weight loss [ ]poor nutritional intake [ ]anasarca    [ ]Artificial Nutrition    REFERRALS:   [ ]Chaplaincy  [x ]Hospice  [ ]Child Life  [ ]Social Work  [x ]Case management [ ]Holistic Therapy

## 2021-10-27 NOTE — PROGRESS NOTE ADULT - PROBLEM SELECTOR PLAN 6
-ISTOP noted in chart; will administer acetaminophen for mild and moderate pain and oxycodone for severe pain every six hours; will assess patient's daily requirement of pain medications and order standing regimen according to daily requirements    - pain better controlled today on current regimen  - MS contin 15mg qd (titrate up as needed), tylenol for mild pain, dilaudid 2mg q4h PO for moderate pain, dilaudid 0.5mg IV q4h prn severe pain    - bowel regimen: senna 2mg qhs, miralax bid, dulcolax qhs  - monitor for BMs

## 2021-10-27 NOTE — PROGRESS NOTE ADULT - PROBLEM SELECTOR PLAN 3
- no surgical intervention at this time  - see above  - now extending cutaneously and opened. no overt signs of infection. will monitor for 1-2 more days on the meropenem   - wound care c/s - no surgical intervention at this time  - see above  - now extending cutaneously and opened. no overt signs of infection. will monitor for 1-2 more days on the meropenem. pain improved now that wound opened up  - appreciate wound care recs     - 3cm tubular structure seen in urine yesterday- ?parasite, ?fecal matter- sent to lab

## 2021-10-27 NOTE — PROGRESS NOTE ADULT - PROBLEM SELECTOR PLAN 7
Spoke with daughter-in-law Nicolle in afternoon. She states family will be discussing code status tonight. Reassured her that there if family is unable to make a decision at this time, this can be further discussed with hospice when at home as well.

## 2021-10-27 NOTE — PROGRESS NOTE ADULT - SUBJECTIVE AND OBJECTIVE BOX
Progress Note    LINA CHAIDEZ 71y (1950) Female [id number]  10-21-21 (6d)    CHRIS Lane PGY1  Pager# [phone]    Chief Complaint: abdominal pain, urinary tract infection     patient is Mandarin-speaking; patient preferred to use her daughter-in-law as  as opposed to  services    Subjective:  Patient seen and examined at bedside. Pain is more well controlled now on current regimen.   No acute events overnight.      REVIEW OF SYSTEMS:  CONSTITUTIONAL: No fevers or chills  EYES/ENT: No visual changes;  No vertigo or throat pain   NECK: No pain or stiffness  RESPIRATORY: No cough, wheezing, hemoptysis; No shortness of breath  CARDIOVASCULAR: No chest pain or palpitations  GASTROINTESTINAL: No abdominal or epigastric pain. No nausea, vomiting, or hematemesis; No diarrhea or constipation. No melena or hematochezia.  GENITOURINARY: No dysuria, frequency or hematuria  NEUROLOGICAL: No syncope or dizziness  SKIN: No itching, rashes      Physical exam:  General: NAD, sitting up comfortably in bed   HEENT:  EOMi, no scleral icterus  Neck: supple, no jvd  CV: RRR, normal S1 and S2, no m/r/g  Lungs: normal respiratory effort. CTAB, no wheezes, rales, or rhonchi  Abd: soft, nontender, nondistended  Ext: no edema, 2+ peripheral pulses   Pysch: AAOx3  Neuro: non-focal        PAST MEDICAL & SURGICAL HISTORY:  HTN (hypertension) [I10]    Abnormal lung field [R91.8]  Lung Ca    GERD (gastroesophageal reflux disease) [K21.9]    Noninflammatory disorder of ovary, fallopian tube and broad ligament, unspecified [N83.9]    Malignant neoplasm [C80.1]  left lung    Ovarian ca [C56.9]    Diabetes [E11.9]    History of cholecystectomy [Z90.49]    H/O abdominal hysterectomy [Z90.710]    H/O bilateral salpingo-oophorectomy [Z90.722]  7/21/17 - ovarian ca    History of lung surgery [Z98.890]  8/16/17 - Left = lung ca    S/P AVEL-BSO [Z90.710]      acetaminophen     Tablet .. 650 milliGRAM(s) Oral every 6 hours PRN  bisacodyl 5 milliGRAM(s) Oral at bedtime  cyanocobalamin 1000 MICROGram(s) Oral daily  dexAMETHasone     Tablet 2 milliGRAM(s) Oral daily  dronabinol 2.5 milliGRAM(s) Oral two times a day  enoxaparin Injectable 40 milliGRAM(s) SubCutaneous daily  folic acid 1 milliGRAM(s) Oral daily  HYDROmorphone   Tablet 2 milliGRAM(s) Oral every 4 hours PRN  HYDROmorphone  Injectable 0.5 milliGRAM(s) IV Push every 4 hours PRN  meropenem  IVPB 1000 milliGRAM(s) IV Intermittent every 12 hours  morphine ER Tablet 15 milliGRAM(s) Oral daily  ondansetron   Disintegrating Tablet 4 milliGRAM(s) Oral every 6 hours PRN  pantoprazole    Tablet 40 milliGRAM(s) Oral before breakfast  polyethylene glycol 3350 17 Gram(s) Oral two times a day  potassium phosphate / sodium phosphate Tablet (K-PHOS No. 2) 1 Tablet(s) Oral four times a day with meals  senna 2 Tablet(s) Oral at bedtime  sodium chloride 0.9%. 1000 milliLiter(s) IV Continuous <Continuous>  sodium chloride 0.9%. 1000 milliLiter(s) IV Continuous <Continuous>    Objective:  T(C): 36.6 (10-27-21 @ 13:30), Max: 36.8 (10-27-21 @ 05:17)  HR: 76 (10-27-21 @ 13:30) (71 - 76)  BP: 108/71 (10-27-21 @ 13:30) (101/66 - 108/71)  RR: 17 (10-27-21 @ 13:30) (16 - 17)  SpO2: 100% (10-27-21 @ 13:30) (97% - 100%)      10-26-21 @ 07:01  -  10-27-21 @ 07:00  --------------------------------------------------------  IN: 120 mL / OUT: 0 mL / NET: 120 mL        CAPILLARY BLOOD GLUCOSE      (10-27 @ 08:21)                      7.1  14.00 )-----------( 342                 22.5    Neutrophils = 11.60 (82.8%)  Lymphocytes = 1.20 (8.6%)  Eosinophils = 0.01 (0.1%)  Basophils = 0.01 (0.1%)  Monocytes = 1.08 (7.7%)  Bands = --%    10-27    142  |  102  |  22  ----------------------------<  87  3.8   |  27  |  0.37<L>    Ca    9.1      27 Oct 2021 08:21  Phos  2.1     10-27  Mg     2.30     10-27    TPro  6.0  /  Alb  2.8<L>  /  TBili  0.2  /  DBili  x   /  AST  12  /  ALT  15  /  AlkPhos  79  10-27          RVP:(10-21 @ 13:02)  [facility]            Tox:             WBC Trend: 14.00<--, 17.12<--, 18.05<--    Hb Trend: 7.1<--, 8.0<--, 8.2<--, 7.1<--, 7.7<--        New imaging in last 24 hours:  Consult notes reviewed:

## 2021-10-27 NOTE — PROGRESS NOTE ADULT - ATTENDING COMMENTS
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 vs intestinal epithelium, sent to microbiology for analysis, ID informed.

## 2021-10-27 NOTE — PROGRESS NOTE ADULT - PROBLEM SELECTOR PLAN 1
Meets sepsis criteria given tachycardia and leukocytosis. Currently HD stable  - likely 2/2 emphysematous cystitis vs inflamed/infected abdominal wall mass   - urine cx neg, blood cx 10/21 ngtd     Plan:  - c/w meropenem (10/21-)  - per ID: will c/w meropenem for 1-2 more days after observing abdominal wound Meets sepsis criteria given tachycardia and leukocytosis. Currently HD stable  - likely 2/2 emphysematous cystitis vs inflamed/infected abdominal wall mass   - urine cx neg, blood cx 10/21 ngtd     Plan:  - c/w meropenem (10/21-), last day tomorrow

## 2021-10-27 NOTE — ADVANCED PRACTICE NURSE CONSULT - REASON FOR CONSULT
Patient seen on skin care rounds after wound care referral received for assessment of skin impairment and recommendations of topical management. Chart reviewed: BMI 17.9, WBC 14.00, H/H 7.1/22.5, platelets 342, Serum albumin 2.8, Eduardo 19, CT abd/pelvis (+) Gas containing extensive pelvic mass apparently involving adjacent small bowel as well as the urinary bladder which now also demonstrates intraluminal gas. Interval extension of the mass into the anterior abdominal wall which demonstrates new air-fluid level. Patient and son interviewed at bedside with son as the  as per patients preference stating patient noticed the wound to pelvic area about a week ago, was draining "red" drainage, not brown, hx of hysterectomy 3-4 years ago. Patient H/O of stage IV ovarian cancer s/p AVEL, b/l SPO, with known residual masses in the pelvis, presenting w/ lower abdominal pain, dysuria, pain in superficial palpable mass on lower abdomen, found to have CT w pelvic masses extending to abdominal wall, and new entero-vesicular fistula, admitted for sepsis 2/2 UTI from entero-vesicular fistula vs infected abdominal wall mass. Pt currently on IV abx for coverage of enterovesicular fistula seen by infectious disease on IV anbx, Surgery , GYN, Urology consulted on patient for enterovesicular fistula- no surgical intervention offered at this time. Patient seen by Palliative care and hospice- plan to be discharged to home hospice.

## 2021-10-27 NOTE — PROGRESS NOTE ADULT - PROBLEM SELECTOR PLAN 2
- Continue Miralax BID and Senna 2 tablets at bedtime  - PO Dulcolax 5mg daily; can increase to BID if still constipated  - Goal BM 1-2x daily  - If no BM >2d, offer lactulose or enema.

## 2021-10-28 NOTE — DIETITIAN INITIAL EVALUATION ADULT. - ADD RECOMMEND
3) RDN remains available to obtain food preferences, discuss alternative menu items PRN.                                                                                        4) Monitor BMs, adjust bowel regimen as appropriate.                                                                                             5) Monitor PO intake, weight trends, nutrition related lab values, GI distress, hydration status, skin integrity.

## 2021-10-28 NOTE — PROGRESS NOTE ADULT - PROBLEM SELECTOR PLAN 4
- i/s/o vesiculo-enteric fistula  - on meropenem   - ID recommendations appreciated: complete abx 10/28; "do not recommend long term prophylactic abx- promoting hydration and assuring drainage is suggested"  - pain control as above.

## 2021-10-28 NOTE — PROGRESS NOTE ADULT - PROBLEM SELECTOR PLAN 2
- Bowel movement this AM per patient   - Continue Miralax BID and Senna 2 tablets at bedtime  - Increased to PO Dulcolax 5mg BID  - Goal BM 1-2x daily  - If no BM >2d, offer lactulose or enema.

## 2021-10-28 NOTE — DIETITIAN INITIAL EVALUATION ADULT. - ORAL NUTRITION SUPPLEMENTS
2) Recommend addition of Ensure Enlive 1 PO 3x daily (provides 350 kcal, 20 gm protein per 8oz serving). Of note, pt will receive Ensure Plus (350 kcal, 16 gm protein per 8 oz serving) to reflect in house formulary.

## 2021-10-28 NOTE — PROGRESS NOTE ADULT - PROBLEM SELECTOR PLAN 4
Benign - i/s/o vesiculo-enteric fistula  - on meropenem   - ID recommendations appreciated: complete abx 10/28; "do not recommend long term prophylactic abx- promoting hydration and assuring drainage is suggested"  - pain control as above.

## 2021-10-28 NOTE — PROGRESS NOTE ADULT - SUBJECTIVE AND OBJECTIVE BOX
SUBJECTIVE AND OBJECTIVE:  Patient preferred for son Rossana to act as .   Patient seen with , and son Rossana at bedside this AM. Patient with pain well controlled.  concerned about how patient will continue to receive medications once home to manage pain; reassured them that hospice will help manage pain medications once home.   Patient reported finally having bowel movement this AM. Educated son to monitor for constipation while on opiates when home.     INTERVAL HPI/OVERNIGHT EVENTS:  In past 24 hours (8AM-8AM), patient received 2 prn dose of PO Dilaudid    DNR on chart:   Allergies    carboplatin (Flushing; Rash)  penicillin (Unknown)    Intolerances    MEDICATIONS  (STANDING):  bisacodyl 5 milliGRAM(s) Oral every 12 hours  cyanocobalamin 1000 MICROGram(s) Oral daily  dexAMETHasone     Tablet 2 milliGRAM(s) Oral daily  enoxaparin Injectable 40 milliGRAM(s) SubCutaneous daily  folic acid 1 milliGRAM(s) Oral daily  morphine ER Tablet 15 milliGRAM(s) Oral daily  pantoprazole    Tablet 40 milliGRAM(s) Oral before breakfast  polyethylene glycol 3350 17 Gram(s) Oral two times a day  potassium phosphate / sodium phosphate Tablet (K-PHOS No. 2) 1 Tablet(s) Oral four times a day with meals  senna 2 Tablet(s) Oral at bedtime    MEDICATIONS  (PRN):  acetaminophen     Tablet .. 650 milliGRAM(s) Oral every 6 hours PRN Mild Pain (1 - 3)  HYDROmorphone   Tablet 2 milliGRAM(s) Oral every 4 hours PRN Moderate Pain (4 - 6)  HYDROmorphone  Injectable 0.5 milliGRAM(s) IV Push every 4 hours PRN Severe Pain (7 - 10)  ondansetron   Disintegrating Tablet 4 milliGRAM(s) Oral every 6 hours PRN Nausea and/or Vomiting      ITEMS UNCHECKED ARE NOT PRESENT    PRESENT SYMPTOMS: [ ]Unable to obtain due to poor mentation   Source if other than patient:  [ ]Family   [ ]Team     Pain: [x ]yes [ ]no  QOL impact - moderate  Location -   lower abdominal pain below umbilicus              Aggravating factors - movement  Quality - unable to describe   Radiation - none  Timing- constant  Severity (0-10 scale): 7-8  Minimal acceptable level (0-10 scale): 3-4    Dyspnea:                           [ ]Mild [ ]Moderate [ ]Severe  Anxiety:                             [ ]Mild [ ]Moderate [ ]Severe  Agitation:                          [ ]Mild [ ]Moderate [ ]Severe  Fatigue:                             [ ]Mild [ ]Moderate [ ]Severe  Nausea:                             [ ]Mild [ ]Moderate [ ]Severe  Loss of appetite:              [ ]Mild [ ]Moderate [ ]Severe  Constipation:                   [ ]Mild [ x]Moderate [ ]Severe  Diarrhea:                          [ ]Mild [ ]Moderate [ ]Severe      CPOT:    https://www.[email or web address]/getattachment/[email or web address]/Critical-Care-Pain-Observation-Tool-(CPOT)    PAIN AD Score:	  http://geriatrictoolkit.[facility]/cog/painad.pdf (Ctrl + left click to view)    Other Symptoms:  [ x]All other review of systems negative     Palliative Performance Status Version 2:   60-70      %      http://[facility].org/files/news/palliative_performance_scale_ppsv2.pdf    PHYSICAL EXAM:  Vital Signs Last 24 Hrs  T(C): 36.5 (28 Oct 2021 12:29), Max: 37.2 (28 Oct 2021 05:12)  T(F): 97.7 (28 Oct 2021 12:29), Max: 98.9 (28 Oct 2021 05:12)  HR: 81 (28 Oct 2021 12:29) (81 - 91)  BP: 111/74 (28 Oct 2021 12:29) (104/60 - 120/78)  BP(mean): --  RR: 18 (28 Oct 2021 12:29) (17 - 18)  SpO2: 99% (28 Oct 2021 12:29) (99% - 100%)     GENERAL: Frail  [ x]Alert  [x ]Oriented x 3  [ ]Lethargic  [ ]Cachexia  [ ]Unarousable  [x ]Verbal  [ ]Non-Verbal  [ x] No Distress  Behavioral:   [ ] Anxiety  [ ] Delirium [ ] Agitation [ x] Calm  [ ] Other  HEENT:  [x ]Normal  [ ] Temporal Wasting  [ ]Dry mouth   [ ]ET Tube/Trach  [ ]Oral lesions  [ ] Mucositis  PULMONARY:   [ x]Clear [ ]Tachypnea  [ ]Audible excessive secretions   [ ]Rhonchi        [ ]Right [ ]Left [ ]Bilateral  [ ]Crackles        [ ]Right [ ]Left [ ]Bilateral  [ ]Wheezing     [ ]Right [ ]Left [ ]Bilateral  [ ]Diminished breath sounds [ ]right [ ]left [ ]bilateral  CARDIOVASCULAR:    [x ]Regular [ ]Irregular [ ]Tachy  [ ]Jose Antonio [ ]Murmur [ ]Other  GASTROINTESTINAL:   [ x]Soft  [ ]Distended   [ ]+BS  [ ]Non tender [ x]Tender  [ ]PEG [ ]OGT/ NGT  Last BM: 10/28 per patient  GENITOURINARY:  [ ]Normal [ x] Incontinent   [ ]Oliguria/Anuria   [ ][place]calf  MUSCULOSKELETAL:   [ ]Normal   [x ]Weakness  [ ]Bed/Wheelchair bound [ ]Edema  [  ] amputation  [  ] contraction  NEUROLOGIC:   [x ]No focal deficits  [ ]Cognitive impairment  [ ]Dysphagia [ ]Dysarthria [ ]Paresis [ ]Other   SKIN: See Nursing Skin Assessment for further details  [ x]Normal, except open wound below umbilicus with no active bleeding/ purulence    [ ]Rash  [ ]Pressure ulcer(s)       Present on admission [ ]y [ ]n   [  ]  Wound    [  ] hyperpigmentation      CRITICAL CARE:  [ ]Shock Present  [ ]Septic [ ]Cardiogenic [ ]Neurologic [ ]Hypovolemic  [ ]Vasopressors [ ]Inotropes  [ ]Respiratory failure present [ ]Mechanical Ventilation [ ]Non-invasive ventilatory support [ ]High-Flow   [ ]Acute  [ ]Chronic [ ]Hypoxic  [ ]Hypercarbic [ ]Other  [ ]Other organ failure     LABS:                                       7.1    14.00 )-----------( 342      ( 27 Oct 2021 08:21 )             22.5       10-27    142  |  102  |  22  ----------------------------<  87  3.8   |  27  |  0.37<L>    Ca    9.1      27 Oct 2021 08:21  Phos  2.1     10-27  Mg     2.30     10-27    TPro  6.0  /  Alb  2.8<L>  /  TBili  0.2  /  DBili  x   /  AST  12  /  ALT  15  /  AlkPhos  79  10-27      RADIOLOGY & ADDITIONAL STUDIES: no new imaging    Protein Calorie Malnutrition Present: [ ]mild [ ]moderate [ ]severe [ ]underweight [ ]morbid obesity  https://www.andeal.org/vault/8076/web/files/ONC/Table_Clinical%20Characteristics%20to%20Document%20Malnutrition-White%20JV%20et%20al%827656.pdf    Height (cm): 154.9 (10-21-21 @ 21:45), 154.9 (09-13-21 @ 14:12), 151.5 (07-21-21 @ 16:20)  Weight (kg): 43 (10-21-21 @ 21:45), 43.5 (09-17-21 @ 08:38), 43.5 (09-13-21 @ 14:12)  BMI (kg/m2): 17.9 (10-21-21 @ 21:45), 18.1 (09-17-21 @ 08:38), 18.1 (09-13-21 @ 14:12)    [ ]PPSV2 < or = 30%  [ ]significant weight loss [ ]poor nutritional intake [ ]anasarca    [ ]Artificial Nutrition    REFERRALS:   [ ]Chaplaincy  [x ]Hospice  [ ]Child Life  [ ]Social Work  [x ]Case management [ ]Holistic Therapy

## 2021-10-28 NOTE — PROGRESS NOTE ADULT - PROBLEM SELECTOR PLAN 7
Emotional support provided, questions answered.    Thank you for allowing us to participate in your patient's care. Please page [phone] for any questions/concerns.

## 2021-10-28 NOTE — PROGRESS NOTE ADULT - PROBLEM SELECTOR PLAN 2
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  - Patient failed empiric antibiotic therapy, ciprofloxacin, as outpatient   - meropenem given in ED before UA/UCx collected  - surg recs: possible palliative resection if in line w GOC pending infection clearance  - urology recs: only tx UTI if pt having sx, can try methenamine w vit C for UTI ppx  - IR: does not recommend drainage   - gyn onc recs: not a surg candidate  - heme onc: no role for further chemo      Plan:  - c/w meropenem 1g BID (10/21-)  - f/u ID recs for duration of abx / whether pt should be on abx ppx - no surgical intervention at this time  - see above  - now extending cutaneously and opened. no overt signs of infection. pain improved now that wound opened up  - 3cm tubular structure seen in urine 2 days ago- ?parasite, ?fecal matter- sent to lab  - c/w wound care recs   - per ID: last day of meropenem today, no need for antibiotic ppx on d/c

## 2021-10-28 NOTE — PROGRESS NOTE ADULT - PROBLEM SELECTOR PLAN 1
Meets sepsis criteria given tachycardia and leukocytosis. Currently HD stable  - likely 2/2 emphysematous cystitis vs inflamed/infected abdominal wall mass   - urine cx neg, blood cx 10/21 ngtd     Plan:  - c/w meropenem (10/21-), last day tomorrow Meets sepsis criteria given tachycardia and leukocytosis. Currently HD stable. Resolving, leukocytosis continues to downtrend. WbC 12 today   - likely 2/2 emphysematous cystitis vs inflamed/infected abdominal wall mass   - urine cx neg, blood cx 10/21 ngtd     - c/w meropenem 10/21- today

## 2021-10-28 NOTE — PROGRESS NOTE ADULT - PROBLEM SELECTOR PLAN 5
-History of non-small cell cancer of the lung noted, stage I, status post surgical resection   -No further evaluation or management necessary at this time -ISTOP noted in chart; will administer acetaminophen for mild and moderate pain and oxycodone for severe pain every six hours; will assess patient's daily requirement of pain medications and order standing regimen according to daily requirements    - pain better controlled today on current regimen  - MS contin 15mg qd (titrate up as needed), tylenol for mild pain, dilaudid 2mg q4h PO for moderate pain, dilaudid 0.5mg IV q4h prn severe pain    - bowel regimen: senna 2mg qhs, miralax bid, dulcolax bid  - have not had BM in 4-5d, will change dulcolax to bid

## 2021-10-28 NOTE — DIETITIAN INITIAL EVALUATION ADULT. - PERTINENT MEDS FT
bisacodyl, cyanocobalamin, dexAMETHasone Tablet, dronabinol, folic acid, pantoprazole Tablet, polyethylene glycol, potassium phosphate / sodium phosphate Tablet, senna, ondansetron Disintegrating Tablet PRN

## 2021-10-28 NOTE — DIETITIAN INITIAL EVALUATION ADULT. - ORAL INTAKE PTA/DIET HISTORY
Attempted to visit pt twice, resting on both occasions. Comprehensive chart review completed.  NKFA. Pt lives at home with son.

## 2021-10-28 NOTE — PROGRESS NOTE ADULT - ATTENDING COMMENTS
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 vs intestinal epithelium, sent to microbiology for analysis, ID informed.   - plan for d/c tomorrow home w/ hospice

## 2021-10-28 NOTE — DIETITIAN INITIAL EVALUATION ADULT. - PHYSCIAL ASSESSMENT
No pressure injuries noted at this time.  Appearance consistent with BMI.  Midline suprapubic wound, seen by wound care RN.

## 2021-10-28 NOTE — HOSPICE CARE NOTE - CONVESATION DETAILS
w/c and ceasar chair to be delivered from [facility] in the AM
Hospice RN spoke with patient's daughter in law, Nicolle,  who expressed agreement with a home hospice plan of care. Hospice RN will meet with patient and family on Wed 10/27/2021 to obtain consent and to order DME in preparation for pt's discharge from [facility]. Hospice RN continues to follow for a safe transition of care. 
Hospice RN assessed pt and met with pt's spouse, son and daughter in law. Explained hospice care and services. [facility] folder of information and consents given to pt's daughter in law.

## 2021-10-28 NOTE — DIETITIAN INITIAL EVALUATION ADULT. - REASON FOR ADMISSION
Per chart, pt is 71 year old Niuean speaking female PMHx GERD, HTN, stage IV ovarian cancer s/p AVEL, bilateral SPO, with known residual masses in the pelvis, presenting with lower abdominal pain, dysuria, pain in superficial palpable mass on lower abdomen. CT with pelvic masses extending to abdominal wall, new entero-vesicular fistula, admitted for sepsis secondary to UTI from entero-vesicular fistula vs infected abdominal wall mass. Pt currently on IV antibiotics for coverage of entero-vesicular fistula, no surgical intervention offered at this time.

## 2021-10-28 NOTE — PROGRESS NOTE ADULT - PROBLEM SELECTOR PLAN 7
DVT PPx: Lovenox 40 for dvt prophylaxis   Diet: Soft diet -History of non-small cell cancer of the lung noted, stage I, status post surgical resection   -No further evaluation or management necessary at this time

## 2021-10-28 NOTE — PROGRESS NOTE ADULT - SUBJECTIVE AND OBJECTIVE BOX
Progress Note    LINA CHAIDEZ 71y (1950) Female [id number]  10-21-21 (7d)    CHRIS Lane PGY1  Pager# [phone]    Chief Complaint: abdominal pain, urinary tract infection     Subjective:  Patient seen and examined at bedside. Pt feels alright today, no complaints. Pain is well controlled on current regimen. Have not had a BM in ~4-5 days, says it's because shes not eating much.   No acute events overnight.      REVIEW OF SYSTEMS:  CONSTITUTIONAL: No fevers or chills  EYES/ENT: No visual changes;  No vertigo or throat pain   NECK: No pain or stiffness  RESPIRATORY: No cough, wheezing, hemoptysis; No shortness of breath  CARDIOVASCULAR: No chest pain or palpitations  GASTROINTESTINAL: No abdominal or epigastric pain. No nausea, vomiting, or hematemesis; No diarrhea or constipation. No melena or hematochezia.  GENITOURINARY: No dysuria, frequency or hematuria  NEUROLOGICAL: No syncope or dizziness  SKIN: No itching, rashes      Physical exam:  General: NAD, lying comfortably in bed   HEENT: PERRLA, EOMi, no scleral icterus  Neck: supple, no jvd  CV: RRR, normal S1 and S2, no m/r/g  Lungs: normal respiratory effort. CTAB, no wheezes, rales, or rhonchi  Abd: soft, nontender, nondistended  Ext: no edema, 2+ peripheral pulses   Pysch: AAOx3  Neuro: non-focal  Skin:            PAST MEDICAL & SURGICAL HISTORY:  HTN (hypertension) [I10]    Abnormal lung field [R91.8]  Lung Ca    GERD (gastroesophageal reflux disease) [K21.9]    Noninflammatory disorder of ovary, fallopian tube and broad ligament, unspecified [N83.9]    Malignant neoplasm [C80.1]  left lung    Ovarian ca [C56.9]    Diabetes [E11.9]    History of cholecystectomy [Z90.49]    H/O abdominal hysterectomy [Z90.710]    H/O bilateral salpingo-oophorectomy [Z90.722]  7/21/17 - ovarian ca    History of lung surgery [Z98.890]  8/16/17 - Left = lung ca    S/P AVEL-BSO [Z90.710]      acetaminophen     Tablet .. 650 milliGRAM(s) Oral every 6 hours PRN  bisacodyl 5 milliGRAM(s) Oral every 12 hours  cyanocobalamin 1000 MICROGram(s) Oral daily  dexAMETHasone     Tablet 2 milliGRAM(s) Oral daily  dronabinol 2.5 milliGRAM(s) Oral two times a day  enoxaparin Injectable 40 milliGRAM(s) SubCutaneous daily  folic acid 1 milliGRAM(s) Oral daily  HYDROmorphone   Tablet 2 milliGRAM(s) Oral every 4 hours PRN  HYDROmorphone  Injectable 0.5 milliGRAM(s) IV Push every 4 hours PRN  morphine ER Tablet 15 milliGRAM(s) Oral daily  ondansetron   Disintegrating Tablet 4 milliGRAM(s) Oral every 6 hours PRN  pantoprazole    Tablet 40 milliGRAM(s) Oral before breakfast  polyethylene glycol 3350 17 Gram(s) Oral two times a day  potassium phosphate / sodium phosphate Tablet (K-PHOS No. 2) 1 Tablet(s) Oral four times a day with meals  senna 2 Tablet(s) Oral at bedtime    Objective:  T(C): 37.2 (10-28-21 @ 05:12), Max: 37.2 (10-28-21 @ 05:12)  HR: 83 (10-28-21 @ 05:12) (76 - 91)  BP: 104/60 (10-28-21 @ 05:12) (104/60 - 120/78)  RR: 17 (10-28-21 @ 05:12) (17 - 17)  SpO2: 100% (10-28-21 @ 05:12) (100% - 100%)        CAPILLARY BLOOD GLUCOSE      (10-28 @ 07:55)                      7.2  12.16 )-----------( 332                 23.0    Neutrophils = 9.91 (81.5%)  Lymphocytes = 1.18 (9.7%)  Eosinophils = 0.03 (0.2%)  Basophils = 0.02 (0.2%)  Monocytes = 0.94 (7.7%)  Bands = --%    10-28    141  |  104  |  19  ----------------------------<  83  3.7   |  24  |  0.37<L>    Ca    8.6      28 Oct 2021 07:55  Phos  2.0     10-28  Mg     2.00     10-28    TPro  6.0  /  Alb  2.8<L>  /  TBili  0.2  /  DBili  x   /  AST  12  /  ALT  15  /  AlkPhos  79  10-27          RVP:(10-21 @ 13:02)  [facility]            Tox:             WBC Trend: 12.16<--, 14.00<--, 17.12<--    Hb Trend: 7.2<--, 7.1<--, 8.0<--, 8.2<--, 7.1<--        New imaging in last 24 hours:  Consult notes reviewed: Progress Note    LINA CHAIDEZ 71y (1950) Female [id number]  10-21-21 (7d)    CHRIS Lane PGY1  Pager# [phone]    Chief Complaint: abdominal pain, urinary tract infection     Subjective:  Patient seen and examined at bedside. Pt feels alright today, no complaints. Pain is well controlled on current regimen. Have not had a BM in ~4-5 days, says it's because shes not eating much.   No acute events overnight.      REVIEW OF SYSTEMS:  CONSTITUTIONAL: No fevers or chills  EYES/ENT: No visual changes;  No vertigo or throat pain   NECK: No pain or stiffness  RESPIRATORY: No cough, wheezing, hemoptysis; No shortness of breath  CARDIOVASCULAR: No chest pain or palpitations  GASTROINTESTINAL: No abdominal or epigastric pain. No nausea, vomiting, or hematemesis; No diarrhea or constipation. No melena or hematochezia.  GENITOURINARY: No dysuria, frequency or hematuria  NEUROLOGICAL: No syncope or dizziness  SKIN: No itching, rashes      Physical exam:  General: NAD, lying comfortably in bed   HEENT: EOMi, no scleral icterus  Neck: supple, no jvd  CV: RRR, normal S1 and S2, no m/r/g  Lungs: normal respiratory effort. CTAB, no wheezes, rales, or rhonchi  Abd: soft, nontender, nondistended  Ext: no edema, 2+ peripheral pulses   Pysch: AAOx3  Neuro: non-focal  Skin: <1cm punctate open wound, no erythema, no purulent drainage. no obv signs of ifnection           PAST MEDICAL & SURGICAL HISTORY:  HTN (hypertension) [I10]    Abnormal lung field [R91.8]  Lung Ca    GERD (gastroesophageal reflux disease) [K21.9]    Noninflammatory disorder of ovary, fallopian tube and broad ligament, unspecified [N83.9]    Malignant neoplasm [C80.1]  left lung    Ovarian ca [C56.9]    Diabetes [E11.9]    History of cholecystectomy [Z90.49]    H/O abdominal hysterectomy [Z90.710]    H/O bilateral salpingo-oophorectomy [Z90.722]  7/21/17 - ovarian ca    History of lung surgery [Z98.890]  8/16/17 - Left = lung ca    S/P AVEL-BSO [Z90.710]      acetaminophen     Tablet .. 650 milliGRAM(s) Oral every 6 hours PRN  bisacodyl 5 milliGRAM(s) Oral every 12 hours  cyanocobalamin 1000 MICROGram(s) Oral daily  dexAMETHasone     Tablet 2 milliGRAM(s) Oral daily  dronabinol 2.5 milliGRAM(s) Oral two times a day  enoxaparin Injectable 40 milliGRAM(s) SubCutaneous daily  folic acid 1 milliGRAM(s) Oral daily  HYDROmorphone   Tablet 2 milliGRAM(s) Oral every 4 hours PRN  HYDROmorphone  Injectable 0.5 milliGRAM(s) IV Push every 4 hours PRN  morphine ER Tablet 15 milliGRAM(s) Oral daily  ondansetron   Disintegrating Tablet 4 milliGRAM(s) Oral every 6 hours PRN  pantoprazole    Tablet 40 milliGRAM(s) Oral before breakfast  polyethylene glycol 3350 17 Gram(s) Oral two times a day  potassium phosphate / sodium phosphate Tablet (K-PHOS No. 2) 1 Tablet(s) Oral four times a day with meals  senna 2 Tablet(s) Oral at bedtime    Objective:  T(C): 37.2 (10-28-21 @ 05:12), Max: 37.2 (10-28-21 @ 05:12)  HR: 83 (10-28-21 @ 05:12) (76 - 91)  BP: 104/60 (10-28-21 @ 05:12) (104/60 - 120/78)  RR: 17 (10-28-21 @ 05:12) (17 - 17)  SpO2: 100% (10-28-21 @ 05:12) (100% - 100%)        CAPILLARY BLOOD GLUCOSE      (10-28 @ 07:55)                      7.2  12.16 )-----------( 332                 23.0    Neutrophils = 9.91 (81.5%)  Lymphocytes = 1.18 (9.7%)  Eosinophils = 0.03 (0.2%)  Basophils = 0.02 (0.2%)  Monocytes = 0.94 (7.7%)  Bands = --%    10-28    141  |  104  |  19  ----------------------------<  83  3.7   |  24  |  0.37<L>    Ca    8.6      28 Oct 2021 07:55  Phos  2.0     10-28  Mg     2.00     10-28    TPro  6.0  /  Alb  2.8<L>  /  TBili  0.2  /  DBili  x   /  AST  12  /  ALT  15  /  AlkPhos  79  10-27          RVP:(10-21 @ 13:02)  NotDete            Tox:             WBC Trend: 12.16<--, 14.00<--, 17.12<--    Hb Trend: 7.2<--, 7.1<--, 8.0<--, 8.2<--, 7.1<--        New imaging in last 24 hours:  Consult notes reviewed:

## 2021-10-28 NOTE — PROGRESS NOTE ADULT - PROBLEM SELECTOR PLAN 3
- no surgical intervention at this time  - see above  - now extending cutaneously and opened. no overt signs of infection. will monitor for 1-2 more days on the meropenem. 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

## 2021-10-29 NOTE — PROGRESS NOTE ADULT - REASON FOR ADMISSION
abdominal pain, urinary tract infection     patient is Mandarin-speaking; patient preferred to use her daughter-in-law as  as opposed to  services WFL

## 2021-10-29 NOTE — PROGRESS NOTE ADULT - SUBJECTIVE AND OBJECTIVE BOX
DNR/DNI  MOLST COMPLETED SUBJECTIVE AND OBJECTIVE:  Patient seen with , both sons, and daughter-in-law at bedside this AM. Patient with pain well controlled.      INTERVAL HPI/OVERNIGHT EVENTS:  In past 24 hours (8AM-8AM), patient received 3 prn dose of PO Dilaudid    DNR on chart:   Allergies    carboplatin ([place]; Rash)  penicillin (Unknown)    Intolerances    MEDICATIONS  (STANDING):  bisacodyl 5 milliGRAM(s) Oral every 12 hours  cyanocobalamin 1000 MICROGram(s) Oral daily  dexAMETHasone     Tablet 2 milliGRAM(s) Oral daily  enoxaparin Injectable 40 milliGRAM(s) SubCutaneous daily  folic acid 1 milliGRAM(s) Oral daily  morphine ER Tablet 15 milliGRAM(s) Oral two times a day  pantoprazole    Tablet 40 milliGRAM(s) Oral before breakfast  polyethylene glycol 3350 17 Gram(s) Oral two times a day  senna 2 Tablet(s) Oral at bedtime    MEDICATIONS  (PRN):  acetaminophen     Tablet .. 650 milliGRAM(s) Oral every 6 hours PRN Mild Pain (1 - 3)  HYDROmorphone   Tablet 2 milliGRAM(s) Oral every 3 hours PRN Moderate Pain (4 - 6)  HYDROmorphone  Injectable 0.5 milliGRAM(s) IV Push every 4 hours PRN Severe Pain (7 - 10)  ondansetron   Disintegrating Tablet 4 milliGRAM(s) Oral every 6 hours PRN Nausea and/or Vomiting      ITEMS UNCHECKED ARE NOT PRESENT    PRESENT SYMPTOMS: [ ]Unable to obtain due to poor mentation   Source if other than patient:  [ ]Family   [ ]Team     Pain: [x ]yes [ ]no  QOL impact - moderate  Location -   lower abdominal pain below umbilicus              Aggravating factors - movement, urination  Quality - unable to describe   Radiation - none  Timing- constant  Severity (0-10 scale): 7-8  Minimal acceptable level (0-10 scale): 3-4    Dyspnea:                           [ ]Mild [ ]Moderate [ ]Severe  Anxiety:                             [ ]Mild [ ]Moderate [ ]Severe  Agitation:                          [ ]Mild [ ]Moderate [ ]Severe  Fatigue:                             [ ]Mild [ ]Moderate [ ]Severe  Nausea:                             [ ]Mild [ ]Moderate [ ]Severe  Loss of appetite:              [ ]Mild [ ]Moderate [ ]Severe  Constipation:                   [ ]Mild [ x]Moderate [ ]Severe  Diarrhea:                          [ ]Mild [ ]Moderate [ ]Severe      CPOT:    https://www.sccm.org/getattachment/ask35w43-5q1e-4k7m-1a0p-8562i3670t7q/Critical-Care-Pain-Observation-Tool-(CPOT)    PAIN AD Score:	  [email or web address].[email or web address].pdf (Ctrl + left click to view)    Other Symptoms:  [ x]All other review of systems negative     Palliative Performance Status Version 2:   60-70      %      http://npcrc.org/files/news/palliative_performance_scale_ppsv2.pdf    PHYSICAL EXAM:  Vital Signs Last 24 Hrs  T(C): 36.7 (29 Oct 2021 13:16), Max: 36.7 (28 Oct 2021 21:34)  T(F): 98.1 (29 Oct 2021 13:16), Max: 98.1 (28 Oct 2021 21:34)  HR: 90 (29 Oct 2021 13:16) (83 - 90)  BP: 111/63 (29 Oct 2021 13:16) (106/61 - 111/63)  BP(mean): --  RR: 18 (29 Oct 2021 13:16) (16 - 18)  SpO2: 98% (29 Oct 2021 13:16) (98% - 99%)    GENERAL: Frail  [ x]Alert  [x ]Oriented x 3  [ ]Lethargic  [ ]Cachexia  [ ]Unarousable  [x ]Verbal  [ ]Non-Verbal  [ x] No Distress  Behavioral:   [ ] Anxiety  [ ] Delirium [ ] Agitation [ x] Calm  [ ] Other  HEENT:  [x ]Normal  [ ] Temporal Wasting  [ ]Dry mouth   [ ]ET Tube/Trach  [ ]Oral lesions  [ ] Mucositis  PULMONARY:   [ x]Clear [ ]Tachypnea  [ ]Audible excessive secretions   [ ]Rhonchi        [ ]Right [ ]Left [ ]Bilateral  [ ]Crackles        [ ]Right [ ]Left [ ]Bilateral  [ ]Wheezing     [ ]Right [ ]Left [ ]Bilateral  [ ]Diminished breath sounds [ ]right [ ]left [ ]bilateral  CARDIOVASCULAR:    [x ]Regular [ ]Irregular [ ]Tachy  [ ]Jose Antonio [ ]Murmur [ ]Other  GASTROINTESTINAL:   [ x]Soft  [ ]Distended   [ ]+BS  [ ]Non tender [ x]Tender  [ ]PEG [ ]OGT/ NGT  Last BM: 10/28 per patient  GENITOURINARY:  [ ]Normal [ x] Incontinent   [ ]Oliguria/Anuria   [ ][place]  MUSCULOSKELETAL:   [ ]Normal   [x ]Weakness  [ ]Bed/Wheelchair bound [ ]Edema  [  ] amputation  [  ] contraction  NEUROLOGIC:   [x ]No focal deficits  [ ]Cognitive impairment  [ ]Dysphagia [ ]Dysarthria [ ]Paresis [ ]Other   SKIN: See Nursing Skin Assessment for further details  [ x]Normal, except open wound below umbilicus with no active bleeding/ purulence    [ ]Rash  [ ]Pressure ulcer(s)       Present on admission [ ]y [ ]n   [  ]  Wound    [  ] hyperpigmentation      CRITICAL CARE:  [ ]Shock Present  [ ]Septic [ ]Cardiogenic [ ]Neurologic [ ]Hypovolemic  [ ]Vasopressors [ ]Inotropes  [ ]Respiratory failure present [ ]Mechanical Ventilation [ ]Non-invasive ventilatory support [ ]High-Flow   [ ]Acute  [ ]Chronic [ ]Hypoxic  [ ]Hypercarbic [ ]Other  [ ]Other organ failure     LABS:                            7.1    11.00 )-----------( 327      ( 29 Oct 2021 08:01 )             22.4       10-29    138  |  103  |  16  ----------------------------<  93  3.6   |  24  |  0.34<L>    Ca    8.9      29 Oct 2021 08:01  Phos  2.7     10-29  Mg     2.00     10-29         RADIOLOGY & ADDITIONAL STUDIES: no new imaging    Protein Calorie Malnutrition Present: [ ]mild [ ]moderate [ ]severe [ ]underweight [ ]morbid obesity  https://www.andeal.org/vault/2440/web/files/ONC/Table_Clinical%20Characteristics%20to%20Document%20Malnutrition-White%20JV%20et%20al%202012.pdf    Height (cm): 154.9 (10-21-21 @ 21:45), 154.9 (09-13-21 @ 14:12), 151.5 (07-21-21 @ 16:20)  Weight (kg): 43 (10-21-21 @ 21:45), 43.5 (09-17-21 @ 08:38), 43.5 (09-13-21 @ 14:12)  BMI (kg/m2): 17.9 (10-21-21 @ 21:45), 18.1 (09-17-21 @ 08:38), 18.1 (09-13-21 @ 14:12)    [ ]PPSV2 < or = 30%  [ ]significant weight loss [ ]poor nutritional intake [ ]anasarca    [ ]Artificial Nutrition    REFERRALS:   [ ]Chaplaincy  [x ]Hospice  [ ]Child Life  [ ]Social Work  [x ]Case management [ ]Holistic Therapy

## 2021-10-29 NOTE — PROGRESS NOTE ADULT - PROBLEM SELECTOR PROBLEM 8
Palliative care encounter
Palliative care encounter
Prophylactic measure
Palliative care encounter
Prophylactic measure
Palliative care encounter

## 2021-10-29 NOTE — PROGRESS NOTE ADULT - PROBLEM SELECTOR PROBLEM 1
Sepsis
Neoplasm related pain
Sepsis
Neoplasm related pain
Neoplasm related pain
Sepsis
Neoplasm related pain
Neoplasm related pain

## 2021-10-29 NOTE — PROGRESS NOTE ADULT - FAMILY/CHILD(REN)
Daughter-in Law Nicolle Martinez
anthony Olivier and dtr in law Nicolle Martinez
son Soy, daughter-inlaw Nicolle Martinez, anthony Tracy

## 2021-10-29 NOTE — PROGRESS NOTE ADULT - CONVERSATION DETAILS
Addressed code status with patient. Discussed with patient her wishes in an event of a cardiopulmonary arrest. Recommended DNR/DNI as interventions such as CPR and mechanical intubation would not be able to reverse underlying malignancy.   Patient verbalized that she would want to allow for a natural death. She is in agreement with DNR/DNI.  MOLST completed.
Addressed code status with daughter-in-law. Reviewed the risk and benefits of resuscitative measures at the end of life in patients with advanced malignancy. She agrees that due to patient's underlying advanced malignancy that DNR/DNI would be most appropriate. Daughter-in-law would like to discuss further with patient and rest of family for final decision regarding code status.
Family understands that given patient is not a candidate for DMT at this time that hospice would be appropriate and beneficial for her care. Family is interested in Hospice Referral. Addressed and answered all of daughter-in-law's questions regarding Hospice services.  Emotional support provided to patient and family.

## 2021-10-29 NOTE — PROGRESS NOTE ADULT - PROBLEM SELECTOR PROBLEM 4
Lung cancer
Emphysematous cystitis
Lung cancer
Ovarian cancer
Emphysematous cystitis
Ovarian cancer
Ovarian cancer
Emphysematous cystitis
Lung cancer
Ovarian cancer
Emphysematous cystitis
Ovarian cancer
Emphysematous cystitis

## 2021-10-29 NOTE — PROGRESS NOTE ADULT - SUBJECTIVE AND OBJECTIVE BOX
Progress Note    LINA CHAIDEZ 71y (1950) Female [id number]  10-21-21 (8d)    CHRIS Lane PGY1  Pager# [phone]    Chief Complaint: abdominal pain, urinary tract infection   patient is Mandarin-speaking; patient preferred to use her daughter-in-law as  as opposed to  services    Subjective:  Patient seen and examined at bedside this AM.  No acute events overnight. Family was at bedside last night- pt currently has dilaudid 2mg po q4h but states that it wears off in 3 hrs, asking if pt can get it every 3 hrs instead.      REVIEW OF SYSTEMS:  CONSTITUTIONAL: No fevers or chills  EYES/ENT: No visual changes;  No vertigo or throat pain   NECK: No pain or stiffness  RESPIRATORY: No cough, wheezing, hemoptysis; No shortness of breath  CARDIOVASCULAR: No chest pain or palpitations  GASTROINTESTINAL: No abdominal or epigastric pain. No nausea, vomiting, or hematemesis; No diarrhea or constipation. No melena or hematochezia.  GENITOURINARY: No dysuria, frequency or hematuria  NEUROLOGICAL: No syncope or dizziness  SKIN: No itching, rashes      Physical exam:  General: NAD  HEENT: PERRLA, EOMi, no scleral icterus  Neck: supple, no jvd  CV: RRR, normal S1 and S2, no m/r/g  Lungs: normal respiratory effort. CTAB, no wheezes, rales, or rhonchi  Abd: soft, nontender, nondistended  Ext: no edema, 2+ peripheral pulses   Pysch: AAOx3  Neuro: non-focal  Skin: no rashes         PAST MEDICAL & SURGICAL HISTORY:  HTN (hypertension) [I10]    Abnormal lung field [R91.8]  Lung Ca    GERD (gastroesophageal reflux disease) [K21.9]    Noninflammatory disorder of ovary, fallopian tube and broad ligament, unspecified [N83.9]    Malignant neoplasm [C80.1]  left lung    Ovarian ca [C56.9]    Diabetes [E11.9]    History of cholecystectomy [Z90.49]    H/O abdominal hysterectomy [Z90.710]    H/O bilateral salpingo-oophorectomy [Z90.722]  7/21/17 - ovarian ca    History of lung surgery [Z98.890]  8/16/17 - Left = lung ca    S/P AVEL-BSO [Z90.710]      acetaminophen     Tablet .. 650 milliGRAM(s) Oral every 6 hours PRN  bisacodyl 5 milliGRAM(s) Oral every 12 hours  cyanocobalamin 1000 MICROGram(s) Oral daily  dexAMETHasone     Tablet 2 milliGRAM(s) Oral daily  enoxaparin Injectable 40 milliGRAM(s) SubCutaneous daily  folic acid 1 milliGRAM(s) Oral daily  HYDROmorphone   Tablet 2 milliGRAM(s) Oral every 3 hours PRN  HYDROmorphone  Injectable 0.5 milliGRAM(s) IV Push every 4 hours PRN  morphine ER Tablet 15 milliGRAM(s) Oral two times a day  ondansetron   Disintegrating Tablet 4 milliGRAM(s) Oral every 6 hours PRN  pantoprazole    Tablet 40 milliGRAM(s) Oral before breakfast  polyethylene glycol 3350 17 Gram(s) Oral two times a day  potassium phosphate / sodium phosphate Tablet (K-PHOS No. 2) 1 Tablet(s) Oral four times a day with meals  senna 2 Tablet(s) Oral at bedtime    Objective:  T(C): 36.7 (10-29-21 @ 04:54), Max: 36.7 (10-28-21 @ 21:34)  HR: 83 (10-29-21 @ 04:54) (81 - 86)  BP: 106/61 (10-29-21 @ 04:54) (106/61 - 111/74)  RR: 17 (10-29-21 @ 04:54) (16 - 18)  SpO2: 99% (10-29-21 @ 04:54) (99% - 99%)        CAPILLARY BLOOD GLUCOSE      (10-28 @ 07:55)                      7.2  12.16 )-----------( 332                 23.0    Neutrophils = 9.91 (81.5%)  Lymphocytes = 1.18 (9.7%)  Eosinophils = 0.03 (0.2%)  Basophils = 0.02 (0.2%)  Monocytes = 0.94 (7.7%)  Bands = --%    10-28    141  |  104  |  19  ----------------------------<  83  3.7   |  24  |  0.37<L>    Ca    8.6      28 Oct 2021 07:55  Phos  2.0     10-28  Mg     2.00     10-28    TPro  6.0  /  Alb  2.8<L>  /  TBili  0.2  /  DBili  x   /  AST  12  /  ALT  15  /  AlkPhos  79  10-27          RVP:          Tox:             WBC Trend: 12.16<--, 14.00<--, 17.12<--    Hb Trend: 7.2<--, 7.1<--, 8.0<--, 8.2<--, 7.1<--        New imaging in last 24 hours:  Consult notes reviewed: Progress Note    LINA CHAIDEZ 71y (1950) Female [id number]  10-21-21 (8d)    CHRIS Lane PGY1  Pager# [phone]    Chief Complaint: abdominal pain, urinary tract infection   patient is Mandarin-speaking; patient preferred to use her daughter-in-law as  as opposed to  services    Subjective:  Patient seen and examined at bedside this AM.  No acute events overnight. Family was at bedside last night- pt currently has dilaudid 2mg po q4h but states that it wears off in 3 hrs, asking if pt can get it every 3 hrs instead. Pt had a BM yesterday afternoon.       REVIEW OF SYSTEMS:  CONSTITUTIONAL: No fevers or chills  EYES/ENT: No visual changes;  No vertigo or throat pain   NECK: No pain or stiffness  RESPIRATORY: No cough, wheezing, hemoptysis; No shortness of breath  CARDIOVASCULAR: No chest pain or palpitations  GASTROINTESTINAL: No abdominal or epigastric pain. No nausea, vomiting, or hematemesis; No diarrhea or constipation. No melena or hematochezia.  GENITOURINARY: No dysuria, frequency or hematuria  NEUROLOGICAL: No syncope or dizziness  SKIN: No itching, rashes      Physical exam:  General: NAD  HEENT: PERRLA, EOMi, no scleral icterus  Neck: supple, no jvd  CV: RRR, normal S1 and S2, no m/r/g  Lungs: normal respiratory effort. CTAB, no wheezes, rales, or rhonchi  Abd: soft, nontender, nondistended  Ext: no edema, 2+ peripheral pulses   Pysch: AAOx3  Neuro: non-focal  Skin: no rashes         PAST MEDICAL & SURGICAL HISTORY:  HTN (hypertension) [I10]    Abnormal lung field [R91.8]  Lung Ca    GERD (gastroesophageal reflux disease) [K21.9]    Noninflammatory disorder of ovary, fallopian tube and broad ligament, unspecified [N83.9]    Malignant neoplasm [C80.1]  left lung    Ovarian ca [C56.9]    Diabetes [E11.9]    History of cholecystectomy [Z90.49]    H/O abdominal hysterectomy [Z90.710]    H/O bilateral salpingo-oophorectomy [Z90.722]  7/21/17 - ovarian ca    History of lung surgery [Z98.890]  8/16/17 - Left = lung ca    S/P AVEL-BSO [Z90.710]      acetaminophen     Tablet .. 650 milliGRAM(s) Oral every 6 hours PRN  bisacodyl 5 milliGRAM(s) Oral every 12 hours  cyanocobalamin 1000 MICROGram(s) Oral daily  dexAMETHasone     Tablet 2 milliGRAM(s) Oral daily  enoxaparin Injectable 40 milliGRAM(s) SubCutaneous daily  folic acid 1 milliGRAM(s) Oral daily  HYDROmorphone   Tablet 2 milliGRAM(s) Oral every 3 hours PRN  HYDROmorphone  Injectable 0.5 milliGRAM(s) IV Push every 4 hours PRN  morphine ER Tablet 15 milliGRAM(s) Oral two times a day  ondansetron   Disintegrating Tablet 4 milliGRAM(s) Oral every 6 hours PRN  pantoprazole    Tablet 40 milliGRAM(s) Oral before breakfast  polyethylene glycol 3350 17 Gram(s) Oral two times a day  potassium phosphate / sodium phosphate Tablet (K-PHOS No. 2) 1 Tablet(s) Oral four times a day with meals  senna 2 Tablet(s) Oral at bedtime    Objective:  T(C): 36.7 (10-29-21 @ 04:54), Max: 36.7 (10-28-21 @ 21:34)  HR: 83 (10-29-21 @ 04:54) (81 - 86)  BP: 106/61 (10-29-21 @ 04:54) (106/61 - 111/74)  RR: 17 (10-29-21 @ 04:54) (16 - 18)  SpO2: 99% (10-29-21 @ 04:54) (99% - 99%)        CAPILLARY BLOOD GLUCOSE      (10-28 @ 07:55)                      7.2  12.16 )-----------( 332                 23.0    Neutrophils = 9.91 (81.5%)  Lymphocytes = 1.18 (9.7%)  Eosinophils = 0.03 (0.2%)  Basophils = 0.02 (0.2%)  Monocytes = 0.94 (7.7%)  Bands = --%    10-28    141  |  104  |  19  ----------------------------<  83  3.7   |  24  |  0.37<L>    Ca    8.6      28 Oct 2021 07:55  Phos  2.0     10-28  Mg     2.00     10-28    TPro  6.0  /  Alb  2.8<L>  /  TBili  0.2  /  DBili  x   /  AST  12  /  ALT  15  /  AlkPhos  79  10-27          RVP:          Tox:             WBC Trend: 12.16<--, 14.00<--, 17.12<--    Hb Trend: 7.2<--, 7.1<--, 8.0<--, 8.2<--, 7.1<--        New imaging in last 24 hours:  Consult notes reviewed:

## 2021-10-29 NOTE — PROGRESS NOTE ADULT - PROVIDER SPECIALTY LIST ADULT
Heme/Onc
Infectious Disease
Internal Medicine
Gyn Onc
Infectious Disease
Internal Medicine
Internal Medicine
Palliative Care
Urology
Infectious Disease
Internal Medicine
Surgery
Surgery
Palliative Care
Internal Medicine
Internal Medicine
Palliative Care
Palliative Care
Internal Medicine
Palliative Care
Internal Medicine

## 2021-10-29 NOTE — PROGRESS NOTE ADULT - PROBLEM SELECTOR PLAN 7
-History of non-small cell cancer of the lung noted, stage I, status post surgical resection   -No further evaluation or management necessary at this time DVT PPx: Lovenox 40 for dvt prophylaxis   Diet: Soft diet

## 2021-10-29 NOTE — PROGRESS NOTE ADULT - PROBLEM SELECTOR PLAN 5
-ISTOP noted in chart; will administer acetaminophen for mild and moderate pain and oxycodone for severe pain every six hours; will assess patient's daily requirement of pain medications and order standing regimen according to daily requirements    - pain better controlled today on current regimen  - MS contin 15mg qd (titrate up as needed), tylenol for mild pain, dilaudid 2mg q4h PO for moderate pain, dilaudid 0.5mg IV q4h prn severe pain    - bowel regimen: senna 2mg qhs, miralax bid, dulcolax bid  - have not had BM in 4-5d, will change dulcolax to bid -ISTOP noted in chart; will administer acetaminophen for mild and moderate pain and oxycodone for severe pain every six hours; will assess patient's daily requirement of pain medications and order standing regimen according to daily requirements    - pt needing dilaudid every 3 hrs. will uptitrate the MS contin 15mg to BID, was given dilaudid 2mg q3h prn for now but will d/c with q4h prn  - discharge with: MS contin 15mg q 12 or 24h, tylenol for mild pain, dilaudid 2mg q4h PO for moderate pain, dilaudid 0.5mg IV q4h prn severe pain    - bowel regimen: senna 2mg qhs, miralax bid, dulcolax bid  - cont to monitor BM's

## 2021-10-29 NOTE — PROGRESS NOTE ADULT - TREATMENT GUIDELINE COMMENT
FULL CODE for now   Pending further discussion with rest of family to establish code status
DNR/DNI
Hospice Referral

## 2021-10-29 NOTE — PROGRESS NOTE ADULT - PROBLEM SELECTOR PLAN 1
Meets sepsis criteria given tachycardia and leukocytosis. Currently HD stable. Resolving, leukocytosis continues to downtrend. WbC 12 today   - likely 2/2 emphysematous cystitis vs inflamed/infected abdominal wall mass   - urine cx neg, blood cx 10/21 ngtd     - c/w meropenem 10/21- today

## 2021-10-29 NOTE — PROGRESS NOTE ADULT - PROBLEM SELECTOR PLAN 3
- last Bowel movement yesterday per patient   - Continue Miralax BID and Senna 2 tablets at bedtime  - Increased to PO Dulcolax 5mg BID  - Goal BM 1-2x daily  - If no BM >2d, offer lactulose or enema

## 2021-10-29 NOTE — PROGRESS NOTE ADULT - PROBLEM SELECTOR PLAN 2
- no surgical intervention at this time  - see above  - now extending cutaneously and opened. no overt signs of infection. pain improved now that wound opened up  - 3cm tubular structure seen in urine 2 days ago- ?parasite, ?fecal matter- sent to lab  - c/w wound care recs   - per ID: last day of meropenem today, no need for antibiotic ppx on d/c

## 2021-10-29 NOTE — PROGRESS NOTE ADULT - PROBLEM SELECTOR PLAN 8
Emotional support provided, questions answered.    Thank you for allowing us to participate in your patient's care. Please page [phone] for any questions/concerns.
Emotional support provided, questions answered.    Thank you for allowing us to participate in your patient's care. Please page [phone] for any questions/concerns.
DVT PPx: Lovenox 40 for dvt prophylaxis   Diet: Soft diet
Emotional support provided, questions answered.    Thank you for allowing us to participate in your patient's care. Please page [phone] for any questions/concerns.
Emotional support provided, questions answered.    Thank you for allowing us to participate in your patient's care. Please page [phone] for any questions/concerns.
DVT PPx: Lovenox 40 for dvt prophylaxis   Diet: Soft diet

## 2021-10-29 NOTE — DISCHARGE NOTE NURSING/CASE MANAGEMENT/SOCIAL WORK - PATIENT PORTAL LINK FT
You can access the FollowMyHealth Patient Portal offered by [facility] by registering at the following website: [email or web address]. By joining [employer]’s FollowMyHealth portal, you will also be able to view your health information using other applications (apps) compatible with our system.

## 2021-10-29 NOTE — PROGRESS NOTE ADULT - ATTENDING COMMENTS
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

## 2021-10-29 NOTE — PROGRESS NOTE ADULT - PROBLEM SELECTOR PLAN 7
A meeting to discuss advance care planning was held today with patient and family .   Discussed goals of care and advance directives including, but not limited to code status  Decision regarding code status: DNR/DNI  Documentation completed today: MOLST. Please see GOC note.  >16 minutes spent on advanced care planning with family.

## 2021-11-03 NOTE — H&P PST ADULT - ALLERGIC/IMMUNOLOGIC
Render Risk Assessment In Note?: no Detail Level: Simple Additional Notes: Did a biopsy , results were psoriasis. Pt states that with each pregnancy (3) it cleared up completely, and creeped back up after given birth negative

## 2021-11-19 NOTE — ED ADULT NURSE NOTE - NS ED NOTE ABUSE RESPONSE YN
Health Maintenance Due   Topic Date Due   • COVID-19 Vaccine (1) Never done   • Influenza Vaccine (1) Never done   • TDaP Pregnancy Vaccine  11/07/2021       Patient is due for topics as listed above but is not proceeding with Immunization(s) COVID-19, Dtap/Tdap/Td and Influenza at this time.        Yes

## 2022-04-11 PROBLEM — Z11.59 SCREENING FOR VIRAL DISEASE: Status: RESOLVED | Noted: 2017-10-10 | Resolved: 2022-04-11

## 2022-07-11 NOTE — HISTORY OF PRESENT ILLNESS
" Emergency Department TeleTriage Encounter Note      CHIEF COMPLAINT    Chief Complaint   Patient presents with    Chest Pain     Left-sided, anterior, localized CP that started today. 8/10 pain. NO BP med taken today.     Shortness of Breath     At rest x2 days.     Headache     Generalized HA that started today . No visual disturbances.        VITAL SIGNS   Initial Vitals [07/11/22 1740]   BP Pulse Resp Temp SpO2   (S) (!) 235/106 82 20 99.1 °F (37.3 °C) 99 %      MAP       --            ALLERGIES    Review of patient's allergies indicates:   Allergen Reactions    Ibuprofen Anaphylaxis    Peanut Anaphylaxis     2 years ago last exacerbation, pt states her throat swells but has not been treated in an ER for same.       PROVIDER TRIAGE NOTE  This is a teletriage evaluation of a 43 y.o. female presenting to the ED complaining of CP and SOB. CP started yesterday but worsened today. Pt reports SOB that also started a few days ago. Pmhx of anxiety but no relief from usual anxiety medications. Pt states that she feels her heart "stopping and starting."    No resp distress, pt speaking in full sentences.    Initial orders will be placed and care will be transferred to an alternate provider when patient is roomed for a full evaluation. Any additional orders and the final disposition will be determined by that provider.         ORDERS  Labs Reviewed   CBC W/ AUTO DIFFERENTIAL   COMPREHENSIVE METABOLIC PANEL   TROPONIN I   B-TYPE NATRIURETIC PEPTIDE   TSH       ED Orders (720h ago, onward)    Start Ordered     Status Ordering Provider    07/11/22 1756 07/11/22 1755  X-Ray Chest PA And Lateral  1 time imaging         Ordered CARLIE COTTON N.    07/11/22 1756 07/11/22 1755  TSH  STAT         Ordered CARLIE COTTON N.    07/11/22 1755 07/11/22 1755  Vital signs  Every 15 min         Ordered CARLIE COTTON N.    07/11/22 1755 07/11/22 1755  Cardiac Monitoring - Adult  Continuous        Comments: " Notify Physician If:    Ordered NORMOSEAS CARLIE N.    07/11/22 1755 07/11/22 1755  Pulse Oximetry Continuous  Continuous         Ordered NORMOSEAS CARLIE N.    07/11/22 1755 07/11/22 1755  Diet NPO  Diet effective now         Ordered YURY CARLIE N.    07/11/22 1755 07/11/22 1755  Saline lock IV  Once         Ordered CARLIE COTTON N.    07/11/22 1755 07/11/22 1755  EKG 12-lead  Once        Comments: Do not perform if previously done during this visit/ triage    Ordered YURY CARLIE N.    07/11/22 1755 07/11/22 1755  CBC auto differential  STAT         Ordered CARLIE COTTON N.    07/11/22 1755 07/11/22 1755  Comprehensive metabolic panel  STAT         Ordered CARLIE COTTON N.    07/11/22 1755 07/11/22 1755  Troponin I #1  STAT         Ordered YURY CARLIE N.    07/11/22 1755 07/11/22 1755  BNP  STAT         Ordered CARLIE COTTON N.            Virtual Visit Note: The provider triage portion of this emergency department evaluation and documentation was performed via [employer], a HIPAA-compliant telemedicine application, in concert with a tele-presenter in the room. A face to face patient evaluation with one of my colleagues will occur once the patient is placed in an emergency department room.      DISCLAIMER: This note was prepared with [employer] voice recognition transcription software. Garbled syntax, mangled pronouns, and other bizarre constructions may be attributed to that software system.   [Disease: _____________________] : Disease: [unfilled] [T: ___] : T[unfilled] [N: ___] : N[unfilled] [M: ___] : M[unfilled] [AJCC Stage: ____] : AJCC Stage: [unfilled] [de-identified] : Age 67: Stage I NSCLC adenocarcinoma s/p left VATs robotic assisted FERMÍN lobectomy and MLND 8/16/17 with Dr Ken Sol\par Age 67: Stage IV poorly differentiated ovarian cancer s/p AVEL/ BSO/ total omentectomy, bilateral ureterolysis, resection of abdominal anterior wall masses, repair of cystostomy\par She initially presented with hemoptysis and had evaluation consisting of bronchoscopy and FNA. The bronchoscopy was negative and the FNA was positive for malignant cells: TTF1+ and PAX 8 negative. She had Pet/ CT on 7/6/17 which showed 2.3 cm left upper lobe nodule with SUV of 23 and 4.2 x 3.1 cm left ovarian lesion SUV of 7.9 and left common iliac lymph nodes measuring up to 8mm with SUV of 2.2, 6 mm perihepatic implant SUV of 3.1. She initially had laparoscopic evaluation with left ovary biopsy which showed poorly differentiated carcinoma that was ER, WT1, PAX 8 positive and TTF1 negative. She initially had the left VATs. Then she subsequently had exploratory laparotomy with  AVEL, BSO, radical dissection of tumor with total omentectomy, resection of anterior abdominal wall masses, lysis of adhesions, and repair of cystostomy. She had CT imaging done after 5th cycle of chemotherapy to evaluate abdominal pain and CT showed no evidence of disease. She had abdominal pain in 3/2019 and had follow up CT which showed new small pelvic implant and enlarged sita-caval LN. She had headache and went to [facility] 4/10/19. She had imaging that showed predominantly cystic peripherally enhancing mass within the left cerebellar hemisphere. She had left suboccipital craniotomy with Dr Tatyana Ortiz. Had carboplatin retreat with paclitaxel/ Avastin started and had carboplatin reaction on 5/29/19 during bag 2 Cycle 2: redness over entire face and uncomfortable sensation over face/ arms. She had slowly increasing  on maintenance bevacizumab and had CT done on 2/5/2020 which showed 1.1 x 1 cm enhancing nodule in the right hemipelvis. She had subsequent Pet/ CT which showed hypermetabolic right cardiophrenic and periportal lymphadenopathy, hypermetabolic implants over the liver capsule, serosal surface of the urinary bladder and posterior right hemipelvis. She was not a surgical candidate and proceeded to Doxil/ bevacizumab. She developed mucositis that was persistent after C2 and reduced dose of Doxil for C3. Interval CT of the chest/ abd/ pelvis done on 6/9/2020 showed new RLL 5 mm lung nodule nonspecific but regression of abdominal adenopathy and right pelvic tumor implant. She had repeat imaging done on 1/19/2021 which showed new 1.4 cm liver lesion along with interval increase in periportal lymph nodes and pelvic implant. She was switched to gemcitabine on 2/2/2020. She had continuing abdominal pain sensation that would come and go and she had interval CT imaging showing progression of hepatic metastatic disease, periportal adenopathy, new RP and pelvic adenopathy, and new hepatic capsular implant. Due to persistent anemia and inability to deliver chemotherapy, we switched therapy to bevacizumab. She had continued abdominal pain and had interval CT imaging done on 9/28/2021 which showed extensive disease that is stable and pelvic lesion containing air that is likely eroded into the adjacent small bowel. \par  [de-identified] : poorly differentiated carcinoma: ER positive  [de-identified] : carbo/ taxol: 10/24/17 to 2/12/18 with cumulative fatigue and neuropathy\par bevacizumab added on 12/8/17 to 4/2018 (pt stopped coming for treatment and lost to follow up until 12/2018)\par retreat carbo/ taxol 5/7/19\par bevacizumab added to Cycle 2 of retreat carboplatin/ paclitaxel 5/29/19 and allergic reaction with 2nd bag of carboplatin\par Oncomine from [facility]: (evaluated hotspot and full gene and fusion protein panel) GENESIS missense, tp53, Myc amplication, Birc3\par bevacizumab and paclitaxel 6/19/19 to 8/27/19\par bevacizumab maintenance 9/2019 to 2/2020\par Doxil/ bevacizumab 3/4/2020 to 6/2020\par Doxil 6/2020 to 1/19/2021\par gemcitabine 2/2/2021 to 4/2021\par topotecan 4/20/2021 to 8/2021\par bevacizumab 8/2021 to present  [de-identified] : She has been having pain with urination: feels pubic area and L groin area is very painful. Urinating more frequently with urine being muddy colored. Has not been taking her temperature at home. She has abdominal pain with worse pain over the L groin: most comfortable when lying down. She is taking tramadol 2 to 4 tablets a day which helps with pain but causes drowsiness. She has been more in bed due to fatigue and urinary symptoms. Has been eating congee at home and family helping to care for her. She is wondering what can be done for the pain.

## 2022-11-14 NOTE — H&P ADULT - PROBLEM SELECTOR PLAN 1
[Fully active, able to carry on all pre-disease performance without restriction] : Status 0 - Fully active, able to carry on all pre-disease performance without restriction [Normal] : affect appropriate -Pain and burning with urination, with nitrites and leukocyte esterase noted on outpatient laboratory concerning for cystitis; of note, patient does not have any fevers, flank pain, costovertebral angle tenderness to raise concern for complicated urinary tract infection   -Patient failed empiric antibiotic therapy, ciprofloxacin, as outpatient   -Possible that air in bladder and in bladder wall represents production of air by gas-producing organism and that for that reason, patient failed empiric antibiotic therapy, which did not cover for ESBL  -However, in the setting of cancerous mass noted abutting bladder and small intestine, 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, which has precipitated the patient's urinary symptoms  -Will trial meropenem for anticipated five-day course for treatment of emphysematous cystitis; urinalysis and urine culture ordered but not yet performed prior to administration of one dose of meropenem; question diagnostic yield of this specimen   -If antibiotic course is unsuccessful, I question whether or not the patient's symptoms really represent infection   -Per documented conversations on outpatient side and per my discussion with the patient and her daughter-in-law today, a major surgery to relieve this fistula is unlikely to be consistent with patient's goals of care -Meets sepsis criteria given tachycardia and leukocytosis   -2/2 UTI, management as below

## 2023-02-26 NOTE — PATIENT PROFILE ADULT - CAREGIVER RELATION TO PATIENT
Hyperkalemia  Repeat blood-work this coming Wednesday per Dr Basilio Lopez and start on tamsulosin  daughter in law

## 2023-06-08 NOTE — H&P PST ADULT - TEACHING/LEARNING LEARNING PREFERENCES
[General Appearance - Well Developed] : well developed [General Appearance - In No Acute Distress] : no acute distress [Normal Conjunctiva] : the conjunctiva exhibited no abnormalities [Normal Oral Mucosa] : normal oral mucosa [Normal Jugular Venous V Waves Present] : normal jugular venous V waves present [] : no respiratory distress [Heart Rate And Rhythm] : heart rate and rhythm were normal [Heart Sounds] : normal S1 and S2 [Murmurs] : no murmurs present [Bowel Sounds] : normal bowel sounds [Abnormal Walk] : normal gait [Petechial Hemorrhages (___cm)] : no petechial hemorrhages [Skin Color & Pigmentation] : normal skin color and pigmentation [Skin Turgor] : normal skin turgor [No Skin Ulcers] : no skin ulcer [No Xanthoma] : no  xanthoma was observed [Oriented To Time, Place, And Person] : oriented to person, place, and time [FreeTextEntry1] : B/L 1-2+ edema  pictorial/skill demonstration

## 2023-08-08 NOTE — DIETITIAN INITIAL EVALUATION ADULT. - CALCULATED TO (CAL/KG)
"Received fax from [facility] in regards to patient requesting a physical therapy referral.    Within their notes, it states:    "Patient's balance unsteady, patient would like to do physical therapy to improve mobility, if okay with MD, may patient have order for physical therapy evaluation and treatment. Please advise."    Please advise. Thanks!      " 1712

## 2023-10-05 NOTE — PROGRESS NOTE ADULT - PROBLEM SELECTOR PROBLEM 7
Advanced care planning/counseling discussion Cimzia Pregnancy And Lactation Text: This medication crosses the placenta but can be considered safe in certain situations. Cimzia may be excreted in breast milk.

## 2024-05-07 NOTE — PROGRESS NOTE ADULT - SUBJECTIVE AND OBJECTIVE BOX
POD#4   HD#5    Patient seen and examined at bedside, no acute overnight events. No acute complaints, pain well controlled.  Patient is ambulating, passing scant flatus, and tolerating clear liquid diet. Indwelling catheter in place.  Denies CP, SOB, N/V, fevers, and chills.    Vital Signs Last 24 Hours  T(C): 36.7 (09-19-17 @ 05:55), Max: 36.9 (09-18-17 @ 14:22)  HR: 88 (09-19-17 @ 05:55) (86 - 96)  BP: 139/79 (09-19-17 @ 05:55) (132/72 - 141/81)  RR: 17 (09-19-17 @ 05:55) (16 - 18)  SpO2: 99% (09-19-17 @ 05:55) (90% - 99%)  FS (fasting) 97    I&O's Summary    17 Sep 2017 07:01  -  18 Sep 2017 07:00  --------------------------------------------------------  IN: 1200 mL / OUT: 3230 mL / NET: -2030 mL    18 Sep 2017 07:01  -  19 Sep 2017 06:50  --------------------------------------------------------  IN: 520 mL / OUT: 2697.5 mL / NET: -2177.5 mL    JOSÉ L 0  JOSÉ R 40      Physical Exam:  General: NAD  CV: NR, RR, S1, S2, no M/R/G  Lungs: CTAB  Abdomen: Soft, appropriate post-op tenderness, non-distended, faint BS  Incision: vertical midline CDI, JOSÉ drains - serosanguinous fluid  Ext: No pain or swelling    Labs:                        8.7    13.56 )-----------( 200      ( 18 Sep 2017 16:29 )             25.8   baso x      eos x      imm gran x      lymph x      mono x      poly x                            8.5    13.24 )-----------( 177      ( 18 Sep 2017 05:20 )             24.7   baso x      eos x      imm gran x      lymph x      mono x      poly x                            8.9    15.38 )-----------( 191      ( 17 Sep 2017 12:06 )             27.2   baso x      eos x      imm gran x      lymph x      mono x      poly x                            9.5    17.17 )-----------( 180      ( 17 Sep 2017 04:50 )             28.5   baso x      eos x      imm gran x      lymph x      mono x      poly x        09-18    139  |  103  |  7   ----------------------------<  103<H>  3.4<L>   |  19<L>  |  0.33<L>    Ca    7.8<L>      18 Sep 2017 05:20  Phos  2.0     09-18  Mg     1.9     09-18            Blood Type: A Positive      MEDICATIONS  (STANDING):  sodium chloride 0.9% lock flush 3 milliLiter(s) IV Push every 8 hours  metoprolol Injectable 5 milliGRAM(s) IV Push every 6 hours  sodium chloride 0.9%. 1000 milliLiter(s) (30 mL/Hr) IV Continuous <Continuous>  influenza   Vaccine 0.5 milliLiter(s) IntraMuscular once  acetaminophen   Tablet. 650 milliGRAM(s) Oral every 6 hours  enoxaparin Injectable 40 milliGRAM(s) SubCutaneous daily  insulin lispro (HumaLOG) corrective regimen sliding scale   SubCutaneous four times a day before meals    MEDICATIONS  (PRN):  naloxone Injectable 0.1 milliGRAM(s) IV Push every 3 minutes PRN For ANY of the following changes in patient status:  A. RR LESS THAN 10 breaths per minute, B. Oxygen saturation LESS THAN 90%, C. Sedation score of 6  ondansetron Injectable 4 milliGRAM(s) IV Push every 6 hours PRN Nausea  benzocaine 15 mG/menthol 3.6 mG Lozenge 1 Lozenge Oral five times a day PRN Sore Throat  oxyCODONE    IR 5 milliGRAM(s) Oral every 3 hours PRN Moderate and Severe Pain (4 - 10) Except as documented in the HPI, all other systems are negative.

## 2024-08-21 NOTE — ASU DISCHARGE PLAN (ADULT/PEDIATRIC). - DISCHARGE TO
[No studies available for review at this time.] : No studies available for review at this time. Home